# Patient Record
Sex: FEMALE | Race: WHITE | NOT HISPANIC OR LATINO | Employment: FULL TIME | URBAN - METROPOLITAN AREA
[De-identification: names, ages, dates, MRNs, and addresses within clinical notes are randomized per-mention and may not be internally consistent; named-entity substitution may affect disease eponyms.]

---

## 2020-12-10 ENCOUNTER — COMMUNICATION - HEALTHEAST (OUTPATIENT)
Dept: TELEHEALTH | Facility: CLINIC | Age: 35
End: 2020-12-10

## 2020-12-10 ENCOUNTER — OFFICE VISIT - HEALTHEAST (OUTPATIENT)
Dept: FAMILY MEDICINE | Facility: CLINIC | Age: 35
End: 2020-12-10

## 2020-12-10 DIAGNOSIS — M25.562 ACUTE PAIN OF LEFT KNEE: ICD-10-CM

## 2020-12-10 DIAGNOSIS — M25.561 ACUTE PAIN OF RIGHT KNEE: ICD-10-CM

## 2020-12-16 ENCOUNTER — OFFICE VISIT - HEALTHEAST (OUTPATIENT)
Dept: FAMILY MEDICINE | Facility: CLINIC | Age: 35
End: 2020-12-16

## 2020-12-16 DIAGNOSIS — E66.01 MORBID OBESITY (H): ICD-10-CM

## 2020-12-16 DIAGNOSIS — Z23 NEED FOR INFLUENZA VACCINATION: ICD-10-CM

## 2020-12-16 DIAGNOSIS — M17.0 PRIMARY OSTEOARTHRITIS OF BOTH KNEES: ICD-10-CM

## 2020-12-16 DIAGNOSIS — Z23 NEED FOR TETANUS BOOSTER: ICD-10-CM

## 2020-12-16 ASSESSMENT — MIFFLIN-ST. JEOR: SCORE: 2563.56

## 2020-12-22 ENCOUNTER — RECORDS - HEALTHEAST (OUTPATIENT)
Dept: ADMINISTRATIVE | Facility: OTHER | Age: 35
End: 2020-12-22

## 2021-01-13 ENCOUNTER — OFFICE VISIT - HEALTHEAST (OUTPATIENT)
Dept: FAMILY MEDICINE | Facility: CLINIC | Age: 36
End: 2021-01-13

## 2021-01-13 DIAGNOSIS — M22.40 CHONDROMALACIA OF PATELLOFEMORAL JOINT, UNSPECIFIED LATERALITY: ICD-10-CM

## 2021-01-13 DIAGNOSIS — E66.01 MORBID OBESITY (H): ICD-10-CM

## 2021-01-13 DIAGNOSIS — Z00.00 ROUTINE GENERAL MEDICAL EXAMINATION AT A HEALTH CARE FACILITY: ICD-10-CM

## 2021-01-13 ASSESSMENT — MIFFLIN-ST. JEOR: SCORE: 2536.34

## 2021-01-29 ENCOUNTER — AMBULATORY - HEALTHEAST (OUTPATIENT)
Dept: FAMILY MEDICINE | Facility: CLINIC | Age: 36
End: 2021-01-29

## 2021-01-29 ENCOUNTER — AMBULATORY - HEALTHEAST (OUTPATIENT)
Dept: LAB | Facility: CLINIC | Age: 36
End: 2021-01-29

## 2021-01-29 DIAGNOSIS — R63.4 WEIGHT LOSS: ICD-10-CM

## 2021-01-29 DIAGNOSIS — Z00.00 ROUTINE GENERAL MEDICAL EXAMINATION AT A HEALTH CARE FACILITY: ICD-10-CM

## 2021-01-29 LAB
ALBUMIN SERPL-MCNC: 3.8 G/DL (ref 3.5–5)
ALP SERPL-CCNC: 102 U/L (ref 45–120)
ALT SERPL W P-5'-P-CCNC: 37 U/L (ref 0–45)
ANION GAP SERPL CALCULATED.3IONS-SCNC: 10 MMOL/L (ref 5–18)
AST SERPL W P-5'-P-CCNC: 19 U/L (ref 0–40)
BILIRUB SERPL-MCNC: 0.4 MG/DL (ref 0–1)
BUN SERPL-MCNC: 15 MG/DL (ref 8–22)
CALCIUM SERPL-MCNC: 8.9 MG/DL (ref 8.5–10.5)
CHLORIDE BLD-SCNC: 106 MMOL/L (ref 98–107)
CHOLEST SERPL-MCNC: 185 MG/DL
CO2 SERPL-SCNC: 22 MMOL/L (ref 22–31)
CREAT SERPL-MCNC: 0.86 MG/DL (ref 0.6–1.1)
ERYTHROCYTE [DISTWIDTH] IN BLOOD BY AUTOMATED COUNT: 12.5 % (ref 11–14.5)
FASTING STATUS PATIENT QL REPORTED: YES
GFR SERPL CREATININE-BSD FRML MDRD: >60 ML/MIN/1.73M2
GLUCOSE BLD-MCNC: 143 MG/DL (ref 70–125)
HCT VFR BLD AUTO: 40.2 % (ref 35–47)
HDLC SERPL-MCNC: 59 MG/DL
HGB BLD-MCNC: 13.6 G/DL (ref 12–16)
LDLC SERPL CALC-MCNC: 108 MG/DL
MCH RBC QN AUTO: 30.1 PG (ref 27–34)
MCHC RBC AUTO-ENTMCNC: 33.8 G/DL (ref 32–36)
MCV RBC AUTO: 89 FL (ref 80–100)
PLATELET # BLD AUTO: 338 THOU/UL (ref 140–440)
PMV BLD AUTO: 9.5 FL (ref 7–10)
POTASSIUM BLD-SCNC: 4.1 MMOL/L (ref 3.5–5)
PROT SERPL-MCNC: 6.9 G/DL (ref 6–8)
RBC # BLD AUTO: 4.52 MILL/UL (ref 3.8–5.4)
SODIUM SERPL-SCNC: 138 MMOL/L (ref 136–145)
TRIGL SERPL-MCNC: 92 MG/DL
TSH SERPL DL<=0.005 MIU/L-ACNC: 3.76 UIU/ML (ref 0.3–5)
VIT B12 SERPL-MCNC: 214 PG/ML (ref 213–816)
WBC: 7.8 THOU/UL (ref 4–11)

## 2021-02-01 LAB
25(OH)D3 SERPL-MCNC: 10.2 NG/ML (ref 30–80)
25(OH)D3 SERPL-MCNC: 10.2 NG/ML (ref 30–80)

## 2021-02-05 ENCOUNTER — OFFICE VISIT - HEALTHEAST (OUTPATIENT)
Dept: FAMILY MEDICINE | Facility: CLINIC | Age: 36
End: 2021-02-05

## 2021-02-05 ENCOUNTER — COMMUNICATION - HEALTHEAST (OUTPATIENT)
Dept: SCHEDULING | Facility: CLINIC | Age: 36
End: 2021-02-05

## 2021-02-05 DIAGNOSIS — S89.92XA KNEE INJURY, LEFT, INITIAL ENCOUNTER: ICD-10-CM

## 2021-02-05 DIAGNOSIS — R73.09 ELEVATED GLUCOSE: ICD-10-CM

## 2021-02-05 DIAGNOSIS — E66.01 MORBID OBESITY (H): ICD-10-CM

## 2021-02-05 DIAGNOSIS — E56.9 VITAMIN DEFICIENCY: ICD-10-CM

## 2021-02-23 ENCOUNTER — OFFICE VISIT - HEALTHEAST (OUTPATIENT)
Dept: PHYSICAL THERAPY | Facility: REHABILITATION | Age: 36
End: 2021-02-23

## 2021-02-23 DIAGNOSIS — M25.562 MEDIAL KNEE PAIN, LEFT: ICD-10-CM

## 2021-02-23 DIAGNOSIS — M25.561 MEDIAL KNEE PAIN, RIGHT: ICD-10-CM

## 2021-03-01 ENCOUNTER — OFFICE VISIT - HEALTHEAST (OUTPATIENT)
Dept: PHYSICAL THERAPY | Facility: REHABILITATION | Age: 36
End: 2021-03-01

## 2021-03-01 DIAGNOSIS — M25.561 MEDIAL KNEE PAIN, RIGHT: ICD-10-CM

## 2021-03-01 DIAGNOSIS — M25.562 MEDIAL KNEE PAIN, LEFT: ICD-10-CM

## 2021-03-04 ENCOUNTER — COMMUNICATION - HEALTHEAST (OUTPATIENT)
Dept: FAMILY MEDICINE | Facility: CLINIC | Age: 36
End: 2021-03-04

## 2021-03-04 ENCOUNTER — OFFICE VISIT - HEALTHEAST (OUTPATIENT)
Dept: PHYSICAL THERAPY | Facility: REHABILITATION | Age: 36
End: 2021-03-04

## 2021-03-04 DIAGNOSIS — M25.561 MEDIAL KNEE PAIN, RIGHT: ICD-10-CM

## 2021-03-04 DIAGNOSIS — M22.40 CHONDROMALACIA OF PATELLOFEMORAL JOINT, UNSPECIFIED LATERALITY: ICD-10-CM

## 2021-03-04 DIAGNOSIS — M25.562 CHRONIC PAIN OF LEFT KNEE: ICD-10-CM

## 2021-03-04 DIAGNOSIS — M25.562 MEDIAL KNEE PAIN, LEFT: ICD-10-CM

## 2021-03-04 DIAGNOSIS — G89.29 CHRONIC PAIN OF LEFT KNEE: ICD-10-CM

## 2021-03-10 ENCOUNTER — HOSPITAL ENCOUNTER (OUTPATIENT)
Dept: MRI IMAGING | Facility: HOSPITAL | Age: 36
Discharge: HOME | End: 2021-03-10
Attending: FAMILY MEDICINE

## 2021-03-10 DIAGNOSIS — G89.29 CHRONIC PAIN OF LEFT KNEE: ICD-10-CM

## 2021-03-10 DIAGNOSIS — M25.562 CHRONIC PAIN OF LEFT KNEE: ICD-10-CM

## 2021-03-10 DIAGNOSIS — M22.40 CHONDROMALACIA OF PATELLOFEMORAL JOINT, UNSPECIFIED LATERALITY: ICD-10-CM

## 2021-03-16 ENCOUNTER — COMMUNICATION - HEALTHEAST (OUTPATIENT)
Dept: FAMILY MEDICINE | Facility: CLINIC | Age: 36
End: 2021-03-16

## 2021-03-16 ENCOUNTER — AMBULATORY - HEALTHEAST (OUTPATIENT)
Dept: FAMILY MEDICINE | Facility: CLINIC | Age: 36
End: 2021-03-16

## 2021-03-16 DIAGNOSIS — M23.90: ICD-10-CM

## 2021-03-16 DIAGNOSIS — M22.40 CHONDROMALACIA OF PATELLOFEMORAL JOINT, UNSPECIFIED LATERALITY: ICD-10-CM

## 2021-03-16 DIAGNOSIS — M25.562 CHRONIC PAIN OF LEFT KNEE: ICD-10-CM

## 2021-03-16 DIAGNOSIS — G89.29 CHRONIC PAIN OF LEFT KNEE: ICD-10-CM

## 2021-03-18 ENCOUNTER — RECORDS - HEALTHEAST (OUTPATIENT)
Dept: ADMINISTRATIVE | Facility: OTHER | Age: 36
End: 2021-03-18

## 2021-03-26 ENCOUNTER — OFFICE VISIT - HEALTHEAST (OUTPATIENT)
Dept: PHYSICAL THERAPY | Facility: REHABILITATION | Age: 36
End: 2021-03-26

## 2021-03-26 DIAGNOSIS — M25.562 MEDIAL KNEE PAIN, LEFT: ICD-10-CM

## 2021-03-26 DIAGNOSIS — M25.561 MEDIAL KNEE PAIN, RIGHT: ICD-10-CM

## 2021-03-29 ENCOUNTER — OFFICE VISIT - HEALTHEAST (OUTPATIENT)
Dept: PHYSICAL THERAPY | Facility: REHABILITATION | Age: 36
End: 2021-03-29

## 2021-03-29 DIAGNOSIS — M25.561 MEDIAL KNEE PAIN, RIGHT: ICD-10-CM

## 2021-03-29 DIAGNOSIS — M25.562 MEDIAL KNEE PAIN, LEFT: ICD-10-CM

## 2021-04-06 ENCOUNTER — RECORDS - HEALTHEAST (OUTPATIENT)
Dept: ADMINISTRATIVE | Facility: OTHER | Age: 36
End: 2021-04-06

## 2021-05-10 ENCOUNTER — OFFICE VISIT - HEALTHEAST (OUTPATIENT)
Dept: FAMILY MEDICINE | Facility: CLINIC | Age: 36
End: 2021-05-10

## 2021-05-10 DIAGNOSIS — E66.01 MORBID OBESITY (H): ICD-10-CM

## 2021-06-05 VITALS
DIASTOLIC BLOOD PRESSURE: 89 MMHG | WEIGHT: 293 LBS | BODY MASS INDEX: 60.99 KG/M2 | SYSTOLIC BLOOD PRESSURE: 135 MMHG | HEART RATE: 114 BPM

## 2021-06-05 VITALS
RESPIRATION RATE: 14 BRPM | WEIGHT: 293 LBS | TEMPERATURE: 98.8 F | OXYGEN SATURATION: 96 % | DIASTOLIC BLOOD PRESSURE: 100 MMHG | HEART RATE: 100 BPM | SYSTOLIC BLOOD PRESSURE: 171 MMHG

## 2021-06-05 VITALS
WEIGHT: 293 LBS | HEIGHT: 68 IN | HEART RATE: 94 BPM | RESPIRATION RATE: 16 BRPM | TEMPERATURE: 97.5 F | DIASTOLIC BLOOD PRESSURE: 87 MMHG | SYSTOLIC BLOOD PRESSURE: 137 MMHG | BODY MASS INDEX: 44.41 KG/M2

## 2021-06-05 VITALS
DIASTOLIC BLOOD PRESSURE: 95 MMHG | BODY MASS INDEX: 44.41 KG/M2 | RESPIRATION RATE: 16 BRPM | WEIGHT: 293 LBS | SYSTOLIC BLOOD PRESSURE: 160 MMHG | TEMPERATURE: 97.9 F | HEIGHT: 68 IN | HEART RATE: 88 BPM

## 2021-06-10 ENCOUNTER — OFFICE VISIT - HEALTHEAST (OUTPATIENT)
Dept: SURGERY | Facility: CLINIC | Age: 36
End: 2021-06-10

## 2021-06-10 DIAGNOSIS — E66.813 CLASS 3 SEVERE OBESITY DUE TO EXCESS CALORIES WITH BODY MASS INDEX (BMI) OF 60.0 TO 69.9 IN ADULT, UNSPECIFIED WHETHER SERIOUS COMORBIDITY PRESENT (H): ICD-10-CM

## 2021-06-10 DIAGNOSIS — M25.562 CHRONIC PAIN OF BOTH KNEES: ICD-10-CM

## 2021-06-10 DIAGNOSIS — G89.29 CHRONIC PAIN OF BOTH KNEES: ICD-10-CM

## 2021-06-10 DIAGNOSIS — R73.01 ELEVATED FASTING GLUCOSE: ICD-10-CM

## 2021-06-10 DIAGNOSIS — E66.01 CLASS 3 SEVERE OBESITY DUE TO EXCESS CALORIES WITH BODY MASS INDEX (BMI) OF 60.0 TO 69.9 IN ADULT, UNSPECIFIED WHETHER SERIOUS COMORBIDITY PRESENT (H): ICD-10-CM

## 2021-06-10 DIAGNOSIS — M25.561 CHRONIC PAIN OF BOTH KNEES: ICD-10-CM

## 2021-06-10 ASSESSMENT — MIFFLIN-ST. JEOR: SCORE: 2532.48

## 2021-06-11 ENCOUNTER — AMBULATORY - HEALTHEAST (OUTPATIENT)
Dept: LAB | Facility: CLINIC | Age: 36
End: 2021-06-11

## 2021-06-11 DIAGNOSIS — E66.01 CLASS 3 SEVERE OBESITY DUE TO EXCESS CALORIES WITH BODY MASS INDEX (BMI) OF 60.0 TO 69.9 IN ADULT, UNSPECIFIED WHETHER SERIOUS COMORBIDITY PRESENT (H): ICD-10-CM

## 2021-06-11 DIAGNOSIS — E66.813 CLASS 3 SEVERE OBESITY DUE TO EXCESS CALORIES WITH BODY MASS INDEX (BMI) OF 60.0 TO 69.9 IN ADULT, UNSPECIFIED WHETHER SERIOUS COMORBIDITY PRESENT (H): ICD-10-CM

## 2021-06-11 LAB
ALBUMIN SERPL-MCNC: 3.9 G/DL (ref 3.5–5)
ALP SERPL-CCNC: 84 U/L (ref 45–120)
ALT SERPL W P-5'-P-CCNC: 52 U/L (ref 0–45)
ANION GAP SERPL CALCULATED.3IONS-SCNC: 14 MMOL/L (ref 5–18)
AST SERPL W P-5'-P-CCNC: 34 U/L (ref 0–40)
BILIRUB SERPL-MCNC: 0.6 MG/DL (ref 0–1)
BUN SERPL-MCNC: 7 MG/DL (ref 8–22)
CALCIUM SERPL-MCNC: 9.2 MG/DL (ref 8.5–10.5)
CHLORIDE BLD-SCNC: 99 MMOL/L (ref 98–107)
CHOLEST SERPL-MCNC: 167 MG/DL
CO2 SERPL-SCNC: 23 MMOL/L (ref 22–31)
CREAT SERPL-MCNC: 0.72 MG/DL (ref 0.6–1.1)
ERYTHROCYTE [DISTWIDTH] IN BLOOD BY AUTOMATED COUNT: 12.6 % (ref 11–14.5)
FASTING STATUS PATIENT QL REPORTED: YES
FERRITIN SERPL-MCNC: 130 NG/ML (ref 10–130)
GFR SERPL CREATININE-BSD FRML MDRD: >60 ML/MIN/1.73M2
GLUCOSE BLD-MCNC: 161 MG/DL (ref 70–125)
HBA1C MFR BLD: 6.4 %
HCT VFR BLD AUTO: 37.9 % (ref 35–47)
HDLC SERPL-MCNC: 53 MG/DL
HGB BLD-MCNC: 12.8 G/DL (ref 12–16)
LDLC SERPL CALC-MCNC: 88 MG/DL
MCH RBC QN AUTO: 30.1 PG (ref 27–34)
MCHC RBC AUTO-ENTMCNC: 33.8 G/DL (ref 32–36)
MCV RBC AUTO: 89 FL (ref 80–100)
PLATELET # BLD AUTO: 332 THOU/UL (ref 140–440)
PMV BLD AUTO: 10.3 FL (ref 7–10)
POTASSIUM BLD-SCNC: 4.3 MMOL/L (ref 3.5–5)
PROT SERPL-MCNC: 6.8 G/DL (ref 6–8)
PTH-INTACT SERPL-MCNC: 37 PG/ML (ref 10–86)
RBC # BLD AUTO: 4.25 MILL/UL (ref 3.8–5.4)
SODIUM SERPL-SCNC: 136 MMOL/L (ref 136–145)
TRIGL SERPL-MCNC: 128 MG/DL
TSH SERPL DL<=0.005 MIU/L-ACNC: 2.1 UIU/ML (ref 0.3–5)
WBC: 8.1 THOU/UL (ref 4–11)

## 2021-06-12 LAB
FOLATE SERPL-MCNC: 6.5 NG/ML
VIT B12 SERPL-MCNC: 213 PG/ML (ref 213–816)

## 2021-06-13 NOTE — PROGRESS NOTES
ASSESSMENT/PLAN:  1. Primary osteoarthritis of both knees  Ambulatory referral to Orthopedics   2. Morbid obesity (H)     3. Need for influenza vaccination  Influenza, Seasonal Quad, PF =/> 6months   4. Need for tetanus booster  Tdap vaccine,  6yo or older,  IM       This is a 36 yo female with:  1.  Pain in both knees - likely related to osteoarthritis - patient has been seen at  and had xrays.  We reviewed these together - I have demonstrated this to her, especially shows medial joint narrowing bilaterally.  Will refer to Ortho.    2.  Morbid Obesity - patient has actually been working on weight loss and has been successful recently.  In spite of the weight loss, she continues to have significant pain in the knees.   3.  Health Maintenance - due for seasonal influenza vaccine - ordered; due for tetanus booster - ordered.   Return in about 3 months (around 3/16/2021) for Recheck.      There are no discontinued medications.  There are no Patient Instructions on file for this visit.    Chief Complaint:  Chief Complaint   Patient presents with     Establish Care     Follow-up     knee pain       HPI:   Lena Quesada is a 35 y.o. female c/o  Knee pain  Fell a total of 4 times on the ice and snow last winter  Landed straight on knee cap once  Slid and twisted under car  Has been working out - lost about 30 pounds since September -   Dreads going sitting to standing or standing to sitting - because that's the most pain  Going down steps or incline -gets really difficult    Pain is inside the knee   Sharp and in both knees, R>L  Seen at walk in in [place] on Thursday -     Dad has psoriatic arthritis    Doing Weight Watchers - walking every night -   No excessive walking - making better diet decisions -       PMH:   Patient Active Problem List    Diagnosis Date Noted     Morbid obesity (H) 12/16/2020     No past medical history on file.  No past surgical history on file.  Social History     Socioeconomic History      "Marital status: Single     Spouse name: Not on file     Number of children: Not on file     Years of education: Not on file     Highest education level: Not on file   Occupational History     Not on file   Social Needs     Financial resource strain: Not on file     Food insecurity     Worry: Not on file     Inability: Not on file     Transportation needs     Medical: Not on file     Non-medical: Not on file   Tobacco Use     Smoking status: Never Smoker     Smokeless tobacco: Never Used   Substance and Sexual Activity     Alcohol use: Not on file     Drug use: Not on file     Sexual activity: Not on file   Lifestyle     Physical activity     Days per week: Not on file     Minutes per session: Not on file     Stress: Not on file   Relationships     Social connections     Talks on phone: Not on file     Gets together: Not on file     Attends Jain service: Not on file     Active member of club or organization: Not on file     Attends meetings of clubs or organizations: Not on file     Relationship status: Not on file     Intimate partner violence     Fear of current or ex partner: Not on file     Emotionally abused: Not on file     Physically abused: Not on file     Forced sexual activity: Not on file   Other Topics Concern     Not on file   Social History Narrative     Not on file     No family history on file.    Meds:  No current outpatient medications on file.    Allergies:  No Known Allergies    ROS:  Pertinent positives as noted in HPI; otherwise 12 point ROS negative.      Physical Exam:  EXAM:  BP (!) 160/95 (Patient Site: Right Arm, Patient Position: Sitting, Cuff Size: Adult Large)   Pulse 88   Temp 97.9  F (36.6  C) (Temporal)   Resp 16   Ht 5' 7.5\" (1.715 m)   Wt (!) 403 lb (182.8 kg)   LMP 11/23/2020 (Exact Date)   BMI 62.19 kg/m     Gen:  NAD, appears well, well-hydrated  HEENT:  TMs nl, oropharynx benign, nasal mucosa nl, conjunctiva clear  Neck:  Supple, no adenopathy, no thyromegaly, no " carotid bruits, no JVD  Lungs:  Clear to auscultation bilaterally  Cor:  RRR no murmur  Abd:  Soft, nontender, BS+, no masses, no guarding or rebound, no HSM  Extr:  Mild degenerative findings - knees bilaterally  Neuro:  No asymmetry  Skin:  Warm/dry      Xr Knee Bilateral Plus Sunrise Vw    Result Date: 12/10/2020  EXAM: XR KNEE BILATERAL PLUS SUNRISE VW LOCATION: [facility] DATE/TIME: 12/10/2020 5:39 PM INDICATION: Left knee trauma and pain. COMPARISON: None.     RIGHT KNEE: Mild medial compartment joint space narrowing. Mild patellofemoral degenerative changes. No significant joint effusion or evidence of fracture. LEFT KNEE: Mild to moderate medial compartment joint space narrowing. Mild patellofemoral degenerative changes. No joint effusion or evidence of fracture.      Results:  No results found for this or any previous visit.

## 2021-06-14 LAB — 25(OH)D3 SERPL-MCNC: 7.1 NG/ML (ref 30–80)

## 2021-06-14 NOTE — PROGRESS NOTES
"Assessment:      Healthy female exam.    1. Routine general medical examination at a health care facility     2. Morbid obesity (H)     3. Chondromalacia of patellofemoral joint, unspecified laterality          Plan:      This is a 36 yo female here for physical exam.  No specific issues today - she is currently having her period - and would like to defer her labs and pap smear to another visit (seen late in day today).    1.  Morbid Obesity - patient is doing the Weight Watchers program and having success - she has had ongoing weight loss - she has realistic goals - and we discussed this.  Encourage her to continue this weight loss journey.  2.  Chondromalacia patella - patient has seen Ortho for her bilateral knee pain - recognizes that weight loss should help this as well; working on this.  Seems better.       Subjective:      Lena Quesada is a 35 y.o. female who presents for an annual exam.  The patient reports that there is not domestic violence in her life.     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    No exam data present    Gynecologic History  Patient's last menstrual period was 01/12/2021 (exact date).  Contraception: none  Last Pap: unknown  Results were: normal  Last " mammogram: na. Results were: na      OB History   No obstetric history on file.       No current outpatient medications on file.     No current facility-administered medications for this visit.      No past medical history on file.  No past surgical history on file.  Patient has no known allergies.  No family history on file.  Social History     Socioeconomic History     Marital status: Single     Spouse name: Not on file     Number of children: Not on file     Years of education: Not on file     Highest education level: Not on file   Occupational History     Not on file   Social Needs     Financial resource strain: Not on file     Food insecurity     Worry: Not on file     Inability: Not on file     Transportation needs     Medical: Not on file     Non-medical: Not on file   Tobacco Use     Smoking status: Never Smoker     Smokeless tobacco: Never Used   Substance and Sexual Activity     Alcohol use: Not on file     Drug use: Not on file     Sexual activity: Not on file   Lifestyle     Physical activity     Days per week: Not on file     Minutes per session: Not on file     Stress: Not on file   Relationships     Social connections     Talks on phone: Not on file     Gets together: Not on file     Attends Gnosticism service: Not on file     Active member of club or organization: Not on file     Attends meetings of clubs or organizations: Not on file     Relationship status: Not on file     Intimate partner violence     Fear of current or ex partner: Not on file     Emotionally abused: Not on file     Physically abused: Not on file     Forced sexual activity: Not on file   Other Topics Concern     Not on file   Social History Narrative     Not on file       Review of Systems  Review of Systems     Pertinent positives as noted in HPI; otherwise 12 point ROS negative.        Objective:         Vitals:    01/13/21 1638 01/13/21 1647   BP: (!) 164/102 137/87   Pulse: 97 94   Resp: 16    Temp: 97.5  F (36.4  C)   "  TempSrc: Temporal    Weight: (!) 397 lb (180.1 kg)    Height: 5' 7.5\" (1.715 m)      Body mass index is 61.26 kg/m .    Physical  Physical Exam    EXAM:  /87   Pulse 94   Temp 97.5  F (36.4  C) (Temporal)   Resp 16   Ht 5' 7.5\" (1.715 m)   Wt (!) 397 lb (180.1 kg)   LMP 01/12/2021 (Exact Date)   BMI 61.26 kg/m     Gen:  NAD, appears well, well-hydrated, morbid obesity  HEENT:  TMs nl, oropharynx benign, nasal mucosa nl, conjunctiva clear  Neck:  Supple, no adenopathy, no thyromegaly, no carotid bruits, no JVD  Lungs:  Clear to auscultation bilaterally  Breast exam:  No breast lumps, no skin changes, no nipple discharge, no axillary adenopathy  Cor:  RRR no murmur  Abd:  Soft, nontender, BS+, no masses, no guarding or rebound, no HSM  Extr:  Neg.  Neuro:  No asymmetry, Nl motor tone/strength, nl sensation, reflexes =, gait nl, nl coordination, CN intact,   Skin:  Warm/dry      "

## 2021-06-15 LAB
ANNOTATION COMMENT IMP: NORMAL
COPPER SERPL-MCNC: 109.5 UG/DL (ref 80–155)
VIT A SERPL-MCNC: 0.38 MG/L (ref 0.3–1.2)
VITAMIN A (RETINYL PALMITATE): <0.02 MG/L (ref 0–0.1)
ZINC SERPL-MCNC: 61.6 UG/DL (ref 60–120)

## 2021-06-15 NOTE — TELEPHONE ENCOUNTER
patient states she do not want to be seen again due to her having to pay , she did see someone else here and they made her do PT and stated she should get MRI after she just wants and referral she said you can call her if needed.

## 2021-06-15 NOTE — PROGRESS NOTES
[facility] Daily Progress     Patient Name: Lena Quesada  Date: 3/4/2021  Visit #: 3/10   PTA visit #:    Referral Diagnosis: Knee injury, left, initial encounter [S89.92XA]  - Primary   Referring provider:  Shirley Evans MD  Visit Diagnosis:     ICD-10-CM    1. Medial knee pain, left  M25.562    2. Medial knee pain, right  M25.561      Precautions: Morbid obesity (H)  Chondromalacia of patellofemoral joint    Assessment:   From Evaluation: Pt is a 35 y.o. year old female with bilateral knee pain that started in May 2020.  Patient has difficulty with sit<>stand, stairs and walking on uneven surfaces secondary to medial knee pain and instability (L>R). Findings are consistent with .  Patient appears motivated to participate in Physical Therapy and present with a good Physical Therapy prognosis for resolution of activities limitations.     Patient demonstrates understanding/independence with home program.    Pt does reports decreased instability with exercises and decreased pain with K-tape.  Continues to have difficulty/pain with stairs, walking and post severely with sit<>stand. Pt would like to return to [facility].  Will leave chart open for 30 days if pt would like to return.     Goal Status:  Pt. will be independent with home exercise program in : 4 weeks  Pt. will report decreased intensity, frequency of : in 6 weeks  Comment:: pain in medial knee <4/10 8 weeks.    Pt will: perform sit to stand without pain >3/10 in 8 weeks.  Pt will: walk to car (incline) without feelings of instability in 8 weeks.      Plan / Patient Education:     Continue with initial plan of care.  Progress with home program as tolerated.     Plan for next session: monster, K-tape - review and print wall squat     Subjective:   Pt reports 12/10 pain after last session. Left medial knee pain.   This week the pain has been at an 8-9/10.   Right now sitting 0/10   Pain only comes with sit to stand.     Pt had 2  "cortisone shots.     Pt has had pain since Nov/Dec -   Still losing weight.     No longer having to perform stairs at apartment - construction is over.     Objective:   Exercises:  Exercise #1: SLR 2 sets 10-15 reps  Comment #1: Clams 2 sets 10-15 reps  Exercise #2: Bridge - pt has trouble performing in bed and trouble getting up and off the floor  Comment #2: Squats with bed behind her 10-15 reps 2-3 sets  Exercise #3: heel slides on L to increased ROM  Comment #3: S/L hip abdcution 1-2 sets 5-15 reps  Exercise #4: wall squats      K-Tape  Prior to application skin was cleaned with alcohol wipe  2 strips were applied to medial and lateral knee with mild stretch, one strip applied horizontally to tibial tuberosity.  No stretch applied to 2\" ends of tape.   Pt was sitting during application.      K-tape helps her feel more stable - like knee is not going to buckle.     Continues to have significant pain with sit to stand and stand to sit from low surfaces such as her toilet and her car.  Pt does have pain from her work chair but not a severe. Advised pt go get a elevated toilet seat for now.  Pt reports she does have access to one.     Pt would like to return to [facility].  Cancelled Monday's appointment but will leave her chart open for 30 days and she can return at any time.        Treatment Today     TREATMENT MINUTES COMMENTS   Evaluation     Self-care/ Home management     Manual therapy 8 K-tape   Seated tibiofemoral traction in sitting.      Neuromuscular Re-education     Therapeutic Activity     Therapeutic Exercises 22 See above    Gait training     Modality__________________                Total 30    Blank areas are intentional and mean the treatment did not include these items.       Cira Holland, PT, DPT  3/4/2021  "

## 2021-06-15 NOTE — TELEPHONE ENCOUNTER
"Triage call:  Patient calling after having a slip and fall on the ice on Saturday- landed on her left knee   Tuesday of this week- trouble with her left knee on the medial aspect, from knee cap towards the center of her body  Feels unstable- knee has buckled a couple times per patient report  Discomfort with walking- No discomfort at rest  Rating discomfort 5-6/10- 8-9/10 when she initial starts walking  - didn't feel anything when she fell- denies hearing a pop- states that she was more worried about someone having seen her fall- got up quickly.   Knee is \"puffier\" than her right knee, reports that she is not able to fully extend her knee  Able to bear weight but reports that she is limping a lot  Reports that yesterday she took tylenol- extra strength tylenol, has iced a few times    Has been working on weight loss- Lost about 35 pounds    Reports history of knee issues in the past- was referred to [facility]- Cortisone has helped    Advised patient to be seen in the clinic within the next 3 days. Reviewed additional care advice with patient and she verbalizes understanding. Patient agrees to plan, assisted in transferring to scheduling.     Melissa العلي RN BSBA [facility] Triage/Med Refill 2/5/2021 7:58 AM    COVID 19 Nurse Triage Plan/Patient Instructions    Please be aware that novel coronavirus (COVID-19) may be circulating in the community. If you develop symptoms such as fever, cough, or SOB or if you have concerns about the presence of another infection including coronavirus (COVID-19), please contact your health care provider or visit [email or web address].     Disposition/Instructions    In-Person Visit with provider recommended. Reference Visit Selection Guide.    Thank you for taking steps to prevent the spread of this virus.  o Limit your contact with others.  o Wear a simple mask to cover your cough.  o Wash your hands well and often.    Resources    [facility]: About COVID-19: " [email or web address]    CDC: What to Do If You're Sick: www.cdc.gov/coronavirus/2019-ncov/about/steps-when-sick.html    CDC: Ending Home Isolation: www.cdc.gov/coronavirus/2019-ncov/hcp/disposition-in-home-patients.html     CDC: Caring for Someone: www.cdc.gov/coronavirus/2019-ncov/if-you-are-sick/care-for-someone.html     [facility]: Interim Guidance for Hospital Discharge to Home: www.health.[facility].[place]./diseases/coronavirus/hcp/hospdischarge.pdf    [facility] clinical trials (COVID-19 research studies): [email or web address]-clinical-trials     Below are the COVID-19 hotlines at the [place] Department of Health ([facility]). Interpreters are available.   o For health questions: Call 960-423-9601 or 1-226.339.9837 (7 a.m. to 7 p.m.)  o For questions about schools and childcare: Call 537-225-1791 or 1-450.419.2612 (7 a.m. to 7 p.m.)         Reason for Disposition    Knee giving way (or buckling) when walking    Additional Information    Negative: Serious injury with multiple fractures    Negative: [1] Major bleeding (e.g., actively dripping or spurting) AND [2] can't be stopped    Negative: Looks like a dislocated joint or knee cap (crooked or deformed)    Negative: Bullet wound, stabbed by knife, or other serious penetrating wound    Negative: Sounds like a life-threatening emergency to the triager    Negative: Wound looks infected    Negative: Can't stand (bear weight) or walk    Negative: Skin is split open or gaping (or length > 1/2 inch or 12 mm)    Negative: [1] Bleeding AND [2] won't stop after 10 minutes of direct pressure (using correct technique)    Negative: [1] Dirt in the wound AND [2] not removed with 15 minutes of scrubbing    Negative: Sounds like a serious injury to the triager    Negative: [1] SEVERE pain AND [2] not improved 2 hours after pain medicine/ice packs    Negative: Suspicious history for the injury    Negative: [1] High-risk adult (e.g., age > 60, osteoporosis, chronic  "steroid use) AND [2] limping    Negative: A \"snap\" or \"pop\" was heard at the time of injury    Negative: Large swelling or bruise (> 2 inches or 5 cm)    Negative: [1] No prior tetanus shots (or is not fully vaccinated) AND [2] any wound (e.g., cut, scrape)    Negative: [1] Last tetanus shot > 5 years ago AND [2] DIRTY cut or scrape    Negative: [1] Last tetanus shot > 10 years ago AND [2] CLEAN cut or scrape (e.g., object AND skin were clean)    Negative: [1] Limp when walking AND [2] due to a twisted knee    Negative: [1] Limp when walking AND [2] due to a direct blow    Protocols used: KNEE INJURY-A-AH      "

## 2021-06-15 NOTE — PROGRESS NOTES
[facility] IN-OFFICE Visit  Phone : none    Chief Complaint:  Chief Complaint   Patient presents with     Follow-up     fall       Assessment/Plan:  1. Knee injury, left, initial encounter  Left knee injury caused by fall on the ice on 1/25/21. Exam somewhat limited due to body habitus but differentials include ligament strain, ligament tear, or bursitis. Unlikely to change treatment. Referral to PT- encouraged to try for 2 weeks and follow-up if no improvement. Has a scheduled appointment with PCP in 10 days and can check-in with how knee is feeling at that time. Consider left knee MRI and follow-up with knee specialist at [facility] if no improvement.  Has been advised to not have surgery on her knees at this point due to her age and obesity.  Ibuprofen 600 mg three times a day for the next 5 days to help with inflammation and swelling. Icing/cold packs as needed. Encouraged caution with ambulation to ensure no additional falls.   - Ambulatory referral to PT/OT    2. Morbid obesity (H)  BMI 60.99. 35 lb weight loss in last 4 months using weight watchers. Continue physical activity as tolerated given recent knee injury.   Wt Readings from Last 3 Encounters:   02/05/21 (!) 395 lb 4 oz (179.3 kg)   01/13/21 (!) 397 lb (180.1 kg)   12/16/20 (!) 403 lb (182.8 kg)     3. Vitamin deficiency  Vitamin D 10.2 with labs from 1/29/21. Pt aware and planning to initiate Vitamin D supplement.     4. Elevated glucose  Fasting glucose 143 with labs from 1/29/21. Follow-up appointment with PCP on 2/15/21. Consider Hemoglobin A1c for diabetes screening at that time.     Return in about 2 weeks (around 2/19/2021).  The following high BMI interventions were performed this visit: encouragement to exercise, weight monitoring, exercise promotion: walking/step counting, weight loss from baseline weight and lifestyle education regarding diet    Diagnosis or treatment significantly limited by social  determinants of health- finances     Patient Education/AVS:  Patient Instructions   Try ibuprofen 600mg (3 at a time) three times a day with meals for next 5-7 days.      Ice as needed    Physical therapy     If not really getting better in 2-3 weeks consider follow up with ortho again with [facility]      HPI:   Lena Quesada is a 35 y.o. female and presents to clinic today for the following health issues: Left knee injury after fall.    Slipped on the ice on 1/28 and landed on her left knee with her left leg extending behind her. She does have a history of falling on the ice in the past. Felt embarrassed and got up quickly, but did not experience pain that day. Began having left knee pain on 2/2 with pain radiating from her knee cap to the medial aspect of knee. Knee feels unstable and has buckled multiple times. Feels that her knee may buckle backwards and worried that it will give out. Hangs on to objects around her to make sure she doesn't fall. Pain increased with ambulation and any twisting motions. Has tried intermittent extra strength tylenol and ice packs for pain relief. No pain when resting. Rates the pain a 8-9/10 in severity with ambulation. Pain subsides slightly to 5-6/10 after moving. Worried less about pain and more about the knee instability. Stairs are difficulty unless she leads with her other leg. Left knee is only slightly swollen compared to the right. Able to bear weight but difficulty extending the knee fully and feels that she is limping with ambulation. Took her 20 minutes to walk to her car today and this normally takes 10 minutes. History of bilateral knee pain and followed by [facility] for 2 steroid injections in December. This was helpful with managing her every day pain- able to do a little more activity but pain still there. X-rays were obtained at this time. Was to follow-up with ortho as needed.     Focused on weight loss. Prior to knee injury was walking 0.5-1 mile daily  and some arm exercises. On weight watchers. Has lost 35 lbs since September. Feels that she is motivated to lose weight this time and the big difference is that she really wants the weight loss compared to someone else wanting it for her.     Physical with Dr. Escalera on 1/13. Returned for labs 1/29/21. Planning to return on 2/15/21 for pap smear.     Aware that Vitamin D was low and planning to  Vitamin D OTC.    History summarized from1-2: Nurse triage note from 2/5: left knee injury from falling on ice.   Old Records-1: Outside allergies, meds, problems and immunizations were reconciled as needed from [facility]  Radiology tests reviewed-1: Bilateral knee x-ray 12/10/20: Bilaterl joint space narrowing and mild patellofemoral degenerative changes. No joint effusion of evidence of fracture.   Lab tests reviewed-1: Vitamin D low and glucose elevated with labs 1/29/21    Social History     Social History Narrative     Not on file       Physical Exam:  /89 (Patient Site: Left Arm, Patient Position: Sitting, Cuff Size: Thigh)   Pulse (!) 114   Wt (!) 395 lb 4 oz (179.3 kg)   LMP 01/12/2021 (Exact Date)   BMI 60.99 kg/m   Body mass index is 60.99 kg/m . Patient's last menstrual period was 01/12/2021 (exact date).  Vital signs reviewed  Wt Readings from Last 3 Encounters:   02/05/21 (!) 395 lb 4 oz (179.3 kg)   01/13/21 (!) 397 lb (180.1 kg)   12/16/20 (!) 403 lb (182.8 kg)     No data recorded  No data recorded  PHQ-2 Total Score: 1 (12/16/2020  4:25 PM)    No data recorded    All normal as below except abnormalities include: Left knee exam somewhat limited due to body habitus. Preferred to be examined in chair vs. getting up to exam table. No bruising or erythema. No changes in sensation. Pain with palpation below the patella and extending medially. Left knee swelling difficult to visualize but slight swelling palpable below knee cap and medially. Left knee with full ROM but increased pain with  extension and flexion movements. Increased left knee pain with left ankle external rotation. No increased pain with left ankle internal rotation. Able to bear full weight, but limping favoring her right leg when walking. Difficulty performing drawer test given position.  General is a  35 y.o. female sitting comfortably in no apparent distress wearing a mask.  CV: Regular rate and rhythm S1S2 without rubs, murmurs or gallops  Lungs: Clear to auscultation bilaterally  Extremities: Warm, 2+ Pedal and radial pulses bilaterally  Skin: No lesions or rashes noted  Neuro/MSK: See above      Gonzalez Woodard  DNP-FNP Student  [facility]     Physician Attestation   I, Shirley Evans, saw this patient and have discussed and personally reviewed information outlined in this document.    I agree with the findings and plan of care as documented in the note.      Shirley Evans MD      [facility]

## 2021-06-15 NOTE — TELEPHONE ENCOUNTER
Maybe she should have a virtual visit - set it up with me early next week.  (I don't see anything about an MRI in the therapist's last note in chart).

## 2021-06-15 NOTE — PROGRESS NOTES
[facility]   Knee Initial Evaluation    Patient Name: Lena Quesada  Date of evaluation: 2/23/2021  Referral Diagnosis: Knee injury, left, initial encounter [S89.92XA]  - Primary   Referring provider: Shirley Evans MD  Visit Diagnosis:     ICD-10-CM    1. Medial knee pain, left  M25.562    2. Medial knee pain, right  M25.561      Precautions: Morbid obesity (H)  Chondromalacia of patellofemoral joint    Assessment:   Pt is a 35 y.o. year old female with bilateral knee pain that started in May 2020.  Patient has difficulty with sit<>stand, stairs and walking on uneven surfaces secondary to medial knee pain and instability (L>R). Findings are consistent with .  Patient appears motivated to participate in Physical Therapy and present with a good Physical Therapy prognosis for resolution of activities limitations.        Pt. is appropriate for skilled PT intervention as outlined in the Plan of Care (POC).  Pt. is a good candidate for skilled PT services to improve pain levels and function.    Goals:  Pt. will be independent with home exercise program in : 4 weeks  Pt. will report decreased intensity, frequency of : in 6 weeks  Comment:: pain in medial knee <4/10 8 weeks.    Pt will: perform sit to stand without pain >3/10 in 8 weeks.  Pt will: walk to car (incline) without feelings of instability in 8 weeks.      Goals and plan of care were set in collaboration with the patient.    Patient's expectations/goals are realistic.    Barriers to Learning or Achieving Goals:  No Barriers.       Plan / Patient Instructions:      Plan of Care:   Authorization / Certification Start Date: 02/23/21  Authorization / Certification End Date: 04/20/21  Authorization / Certification Number of Visits: 10  Patient Related Instruction: Nature of Condition;Treatment plan and rationale;Self Care instruction;Basis of treatment;Body mechanics;Precautions;Next steps;Expected outcome  Times per Week: start with 2x a week  for 2 week progress to 1x a week  Number of Weeks: 10  Number of Visits: up to 10  Discharge Planning: met goals  Therapeutic Exercise: ROM;Stretching;Strengthening  Neuromuscular Reeducation: kinesio tape;core  Manual Therapy: soft tissue mobilization;myofascial release;joint mobilization;strain counterstrain  Equipment: theraband      Treatment techniques, plan of care, and goals were discussed with the patient. The patient agrees to the plan as outlined. The plan of care is dynamic and will be modified on an ongoing basis.  Plan for next visit: review HEP and progress strengthening   Seated traction   Assess K-tape   Darien Beal      Subjective:     2/5/20: Left knee injury caused by fall on the ice on 1/25/21. Exam somewhat limited due to body habitus but differentials include ligament strain, ligament tear, or bursitis. Unlikely to change treatment. Referral to PT- encouraged to try for 2 weeks and follow-up if no improvement. Has a scheduled appointment with PCP in 10 days and can check-in with how knee is feeling at that time. Consider left knee MRI and follow-up with knee specialist at [facility] if no improvement. Slipped on the ice on 1/28 and landed on her left knee with her left leg extending behind her. She does have a history of falling on the ice in the past. Felt embarrassed and got up quickly, but did not experience pain that day. Began having left knee pain on 2/2 with pain radiating from her knee cap to the medial aspect of knee. Knee feels unstable and has buckled multiple times. Feels that her knee may buckle backwards and worried that it will give out. Hangs on to objects around her to make sure she doesn't fall. Pain increased with ambulation and any twisting motions. Has tried intermittent extra strength tylenol and ice packs for pain relief. No pain when resting. Rates the pain a 8-9/10 in severity with ambulation. Pain subsides slightly to 5-6/10 after moving. Worried less about pain  and more about the knee instability. Stairs are difficulty unless she leads with her other leg. Left knee is only slightly swollen compared to the right. Able to bear weight but difficulty extending the knee fully and feels that she is limping with ambulation. Took her 20 minutes to walk to her car today and this normally takes 10 minutes. History of bilateral knee pain and followed by [facility] for 2 steroid injections in December. This was helpful with managing her every day pain- able to do a little more activity but pain still there. X-rays were obtained at this time. Was to follow-up with ortho as needed. Focused on weight loss. Prior to knee injury was walking 0.5-1 mile daily and some arm exercises. On weight watchers. Has lost 35 lbs since September. Feels that she is motivated to lose weight this time and the big difference is that she really wants the weight loss compared to someone else wanting it for her.     IMPRESSION:   RIGHT KNEE: Mild medial compartment joint space narrowing. Mild patellofemoral degenerative changes. No significant joint effusion or evidence of fracture.     LEFT KNEE: Mild to moderate medial compartment joint space narrowing. Mild patellofemoral degenerative changes. No joint effusion or evidence of fracture.       Social information:   Living Situation:apartment - with elevator   Has to walk 4 blocks to car from her apartment - slight decline - painful    Occupation:works for [employer] [place] - works from home    Work Status:Working full time   Equipment Available: None    History of Present Illness:    Lena is a 35 y.o. female who presents to therapy today with complaints of bilateral knees. Date of onset/duration of symptoms is May 2020.  Onset was gradual. Symptoms are intermittent and not improving. She denies history of similar symptoms. She describes their previous level of function as not limited   Cortisone shot (December 2020) in bilaterally knees due to patella on bone -  no pain relief.        Pain rating at worst: 9  Pt has lost 30# in the last few months with no pain relief in knees.   Left knee more painful then right.     Functional limitations are described as occurring with:   Sharp pain with sit<>stand   Sit to stand - worse is recliner or soft chair   Stand to sit   Pt feels unstable with ambulating.   Achy if sitting prolonged periods with knees flexed.   Playing with niece and nephew  Put shoes on and wiggle to get to get shoe all the way on.   Turn to fast or move to fast - knees will buckle.     Patient reports benefit from:  has tried ice - 10-15 min     10-12 min on treadmill. Arms and legs on weight machines. Weighted squats - 40#.      Objective:      Note: Items left blank indicates the item was not performed or not indicated at the time of the evaluation.    2 min walk test: 339 feet - lateral lean to right. Achy in medial knee. Flat feet.     Knee Examination  1. Medial knee pain, left     2. Medial knee pain, right       Precautions/Restrictions:  None  Involved Side: L>R medial knee   Posture Observation:      General sitting posture is  fair.  Assistive Device: None  Gait Observation: see above       Knee ROM     Date:  2/23/21    AROM in degrees  Right   Left  Right   Left  Right   Left       Knee Flexion  (130 )   110   98                   Knee Extension  (0 )   0    0 with guarding - pt reports her knees hyperextend.                  PROM in degrees  Right   Left  Right   Left  Right   Left       Knee Flexion  (130 )                         Knee Extension  (0 )                       LE Strength                  Date:  2/23/21   Strength (MMT/5)  Right   Left  Right   Left  Right   Left       Hip Flexion   4   4                   Hip Abduction   Pain not able to perform                       Hip Adduction                         Hip Extension                         Hip Internal Rotation                         Hip External Rotation                          "Knee Extension   4   4 pain                    Knee Flexion   4 pain    4 pain                    Ankle Dorsiflexion                         Ankle Plantarflexion                         Palpation:  Pain in medial and lateral joint line on left, pain in lateral distal HS and   Pain in lateral joint on right       Knee Special Tests (+/-):      Knee OA Cluster   Right   Left   Ligament Tests   Right   Left    1. > 51 y/o           Lachman   -   -    2. Stiffness > 30 min.           Anterior Drawer   -   Pain     3. Crepitus           Posterior Drawer          4. Bony tenderness           Posterior Sag          5. Bone enlargement           Valgus Stress         0 /30 degrees    + at 0    + at 0     6. No warmth to the touch           Varus Stress      0/30 degrees    -    + at 0      Meniscal Tests   Right   Left    Other   Right    Left       Varghese's   +   +     Ely's             Joint line tenderness   +   +     Eder             Thessaly - standing            Thomas Apley's - prone       Patellar grind        Bounce home   Guarding    Guarding     Patellar      apprehension    -    -      K-Tape  Prior to application skin was cleaned with alcohol wipe  3 strips were applied to medial/lateral knee with one strip across tibial tuberosity with mild stretch.  No stretch applied to 2\" ends of tape.   Pt was sitting during application.     Exercises:  Exercise #1: SLR 10-15 reps 2-3 sets  Comment #1: Bridge 10-15 reps 2-3 sets  Exercise #2: Clams 10-15 reps 2-3 sets  Comment #2: squats with bed behind her 10-15 reps 2-3 sets  Trialed hip abduction on R resulting in increased knee pain.  Did not give as HEP in S/L - gave clams instead.     Treatment Today     TREATMENT MINUTES COMMENTS   Evaluation 25 -knee pain  educated on POC, diagnosis, relevant anatomy and basis for treatment.  Handouts provided for HEP.    Self-care/ Home management     Manual therapy     Neuromuscular Re-education     Therapeutic " Activity     Therapeutic Exercises 23 -see exercise flow sheet     Gait training     Modality__________________                Total 45    Blank areas are intentional and mean the treatment did not include these items.     PT Evaluation Code: (Please list factors)  Patient History/Comorbidities: see above   Examination: LE  Clinical Presentation: stable   Clinical Decision Making: low    Patient History/  Comorbidities Examination  (body structures and functions, activity limitations, and/or participation restrictions) Clinical Presentation Clinical Decision Making (Complexity)   No documented Comorbidities or personal factors 1-2 Elements Stable and/or uncomplicated Low   1-2 documented comorbidities or personal factor 3 Elements Evolving clinical presentation with changing characteristics Moderate   3-4 documented comorbidities or personal factors 4 or more Unstable and unpredictable High                Cira Holland, PT, DPT  2/23/2021  7:49 AM

## 2021-06-15 NOTE — PROGRESS NOTES
[facility] Daily Progress     Patient Name: Lena Quesada  Date: 3/1/2021  Visit #: 2/10   PTA visit #:    Referral Diagnosis: Knee injury, left, initial encounter [S89.92XA]  - Primary   Referring provider:  Shirley Evans MD  Visit Diagnosis:     ICD-10-CM    1. Medial knee pain, left  M25.562    2. Medial knee pain, right  M25.561      Precautions: Morbid obesity (H)  Chondromalacia of patellofemoral joint    Assessment:   From Evaluation: Pt is a 35 y.o. year old female with bilateral knee pain that started in May 2020.  Patient has difficulty with sit<>stand, stairs and walking on uneven surfaces secondary to medial knee pain and instability (L>R). Findings are consistent with .  Patient appears motivated to participate in Physical Therapy and present with a good Physical Therapy prognosis for resolution of activities limitations.     Patient demonstrates understanding/independence with home program.  Patient is benefitting from skilled physical therapy and is making steady progress toward functional goals.  Patient is appropriate to continue with skilled physical therapy intervention, as indicated by initial plan of care.    Pt does reports decreased instability with exercises and decreased pain with K-tape.  Continues to have difficulty/pain with stairs and walking.  Has had to perform more stairs due to construction at her apartment building.     Goal Status:  Pt. will be independent with home exercise program in : 4 weeks  Pt. will report decreased intensity, frequency of : in 6 weeks  Comment:: pain in medial knee <4/10 8 weeks.    Pt will: perform sit to stand without pain >3/10 in 8 weeks.  Pt will: walk to car (incline) without feelings of instability in 8 weeks.      Plan / Patient Education:     Continue with initial plan of care.  Progress with home program as tolerated.     Plan for next session: monster, K-tape, seated traction     Subjective:   Pain Ratin- 8 on left   3-4  "on right   Feeling less instability since doing the exercises.   K-tape was helpful - able to stand longer.     Pt did stairs at her apartment 3x since last session due to construction.     Objective:   Exercises:  Exercise #1: SLR 2 sets 10-15 reps  Comment #1: Clams 2 sets 10-15 reps  Exercise #2: Bridge - pt has trouble performing in bed and trouble getting up and off the floor  Comment #2: Squats with bed behind her 10-15 reps 2-3 sets  Exercise #3: heel slides on L to increased ROM  Comment #3: S/L hip abdcution 1-2 sets 5-15 reps    Nustep: pain at a 6-7 on left with pushing - 1 min   From 4 to 0 resistance    K-Tape  Prior to application skin was cleaned with alcohol wipe  2 strips were applied to medial and lateral knee with mild stretch, one strip applied horizontally to tibial tuberosity.  No stretch applied to 2\" ends of tape.   Pt was sitting during application.  Pt instructed how to apply and applied R side herself.  Strips given for her to apply at home.        Treatment Today     TREATMENT MINUTES COMMENTS   Evaluation     Self-care/ Home management     Manual therapy 10 K-tape   Tibiofemoral mob: Posterior Glide to improve flexion   Supine: with foot resting on table at 80-90 degrees knee flexion   Grade III oscillation      Neuromuscular Re-education     Therapeutic Activity     Therapeutic Exercises 24 See above    Gait training     Modality__________________                Total 34    Blank areas are intentional and mean the treatment did not include these items.       Cira Holland, PT, DPT  3/1/2021    "

## 2021-06-15 NOTE — PATIENT INSTRUCTIONS - HE
Try ibuprofen 600mg (3 at a time) three times a day with meals for next 5-7 days.      Ice as needed    Physical therapy     If not really getting better in 2-3 weeks consider follow up with ortho again with [facility]

## 2021-06-15 NOTE — TELEPHONE ENCOUNTER
Reason for Call: Request for an order or referral:    Order or referral being requested: refferal for MRI left knee    Date needed: as soon as possible    Has the patient been seen by the PCP for this problem? YES    Additional comments: pt has been going to PT for this knee x 2 weeks ,has had injection in December, she is still having pain therapist recommended she get a MRI    Phone number Patient can be reached at:  Home number on file [phone] (home)    Best Time:  As soon as possible  Can we leave a detailed message on this number?  Yes    Call taken on 3/4/2021 at 2:24 PM by Lachelle Oakes

## 2021-06-16 ENCOUNTER — OFFICE VISIT - HEALTHEAST (OUTPATIENT)
Dept: SURGERY | Facility: CLINIC | Age: 36
End: 2021-06-16

## 2021-06-16 DIAGNOSIS — E66.01 MORBID OBESITY (H): ICD-10-CM

## 2021-06-16 DIAGNOSIS — E66.813 CLASS 3 SEVERE OBESITY DUE TO EXCESS CALORIES WITH BODY MASS INDEX (BMI) OF 60.0 TO 69.9 IN ADULT, UNSPECIFIED WHETHER SERIOUS COMORBIDITY PRESENT (H): ICD-10-CM

## 2021-06-16 DIAGNOSIS — E66.01 CLASS 3 SEVERE OBESITY DUE TO EXCESS CALORIES WITH BODY MASS INDEX (BMI) OF 60.0 TO 69.9 IN ADULT, UNSPECIFIED WHETHER SERIOUS COMORBIDITY PRESENT (H): ICD-10-CM

## 2021-06-16 PROBLEM — M22.40 CHONDROMALACIA OF PATELLOFEMORAL JOINT: Status: ACTIVE | Noted: 2021-01-17

## 2021-06-16 NOTE — PROGRESS NOTES
[facility] Daily Progress     Patient Name: Lena Quesada  Date: 3/26/2021  Visit #: 4/10   PTA visit #:    Referral Diagnosis: Knee injury, left, initial encounter [S89.92XA]  - Primary   Referring provider:  Shirley Evans MD  Visit Diagnosis:     ICD-10-CM    1. Medial knee pain, left  M25.562    2. Medial knee pain, right  M25.561      Precautions: Morbid obesity (H)  Chondromalacia of patellofemoral joint    Assessment:   From Evaluation: Pt is a 35 y.o. year old female with bilateral knee pain that started in May 2020.  Patient has difficulty with sit<>stand, stairs and walking on uneven surfaces secondary to medial knee pain and instability (L>R). Findings are consistent with .  Patient appears motivated to participate in Physical Therapy and present with a good Physical Therapy prognosis for resolution of activities limitations.     Patient demonstrates understanding/independence with home program.    Pt is returning for 2 sessions of PT pre-gel injection at [facility].  Patient's pain has decreased significantly with pain medication but she continues to have functional limitations such as performing stairs, walking >1 block, walking on an incline/decline and performing curb.     Goal Status:  Pt. will be independent with home exercise program in : 4 weeks MET   Pt. will report decreased intensity, frequency of : in 6 weeks  Comment:: pain in medial <4/10 8 weeks. MET with medication   Pt will: perform sit to stand without pain >3/10 in 8 weeks. MET with medication   Pt will: walk to car (incline) without feelings of instability in 8 weeks. Can walk 1 block on even/flat ground but does not walk up or down hill.       Plan / Patient Education:     Continue with initial plan of care.  Progress with home program as tolerated.     Plan for next session: monster, review and print wall squat, review stretches     Subjective:      IMPRESSION:   1.  Both menisci, cruciate and collateral  ligaments are intact.     2.  No evidence of acute injury.     3.  Small full-thickness chondral defect in the lateral tibial plateau.     4.  Moderate degenerative changes in the medial compartment.     5.  Small full-thickness chondral defect in the central trochlear groove.    Went to [facility]: discussed cartilage damage causes wear and tear.  Next steps: cortisone shot - but pt already had in the past with no results.  Prednisone dose - pt finished.  New medication is helping pain.     2/10 with sit to stand with medication.   Next step: gel injection - insurance is requiring 2 more PT visits.   Next step after gel shot is total knee replacement.   Can't step off curb with right leg, can't step up with left leg.      Objective:   Exercises:  Exercise #1: SLR 2 sets 10-15 reps  Comment #1: Clams 2 sets 10-15 reps - added OTB today  Exercise #2: Bridge - pt has trouble performing in bed and trouble getting up and off the floor - not been performing  Comment #2: Squats with bed behind her 10-15 reps 2-3 sets -has not been performing  Exercise #3: heel slides on L to increased ROM - tight but not painful.  Comment #3: S/L hip abdcution 1-2 sets 5-15 reps  Exercise #4: wall squats - pt has not been performing - will assess next session  Comment #4: HS stretch in sitting - pt is uncomfortable with full knee extension - can bend knee slightly and hold 30 seconds  Exercise #5: HS stretch with band - hold 30 seconds - able to fully extend knee with weightbearing without discomfort or apprehension.  Comment #5: Quad stretch with leg off table - use of PTB to increase stretch hold 30 seconds  Performing ex's every other day.   Pt demonstrated HEP in the clinic today to assess form as I have not seen pt in 3 weeks.     Pt was walking 1 mile a day before pain started.   Pt is currently walking 1 block a day on even/level ground.     K-Tape - pt declined application today   Prior to application skin was cleaned with  "alcohol wipe  2 strips were applied to medial and lateral knee with mild stretch, one strip applied horizontally to tibial tuberosity.  No stretch applied to 2\" ends of tape.   Pt was sitting during application.    K-tape helps her feel more stable - like knee is not going to buckle.     Treatment Today     TREATMENT MINUTES COMMENTS   Evaluation     Self-care/ Home management     Manual therapy 0  K-tape   Seated tibiofemoral traction in sitting.      Neuromuscular Re-education     Therapeutic Activity     Therapeutic Exercises 25 See above    Gait training     Modality__________________                Total 25    Blank areas are intentional and mean the treatment did not include these items.       Cira Holland, PT, DPT  3/26/2021  "

## 2021-06-16 NOTE — PROGRESS NOTES
[facility] Daily Progress     Patient Name: Lena Quesada  Date: 3/29/2021  Visit #: 5/10   PTA visit #:    Referral Diagnosis: Knee injury, left, initial encounter [S89.92XA]  - Primary   Referring provider:  Shirley Evans MD  Visit Diagnosis:     ICD-10-CM    1. Medial knee pain, left  M25.562    2. Medial knee pain, right  M25.561      Precautions: Morbid obesity (H)  Chondromalacia of patellofemoral joint    Assessment:   From Evaluation: Pt is a 35 y.o. year old female with bilateral knee pain that started in May 2020.  Patient has difficulty with sit<>stand, stairs and walking on uneven surfaces secondary to medial knee pain and instability (L>R). Findings are consistent with .  Patient appears motivated to participate in Physical Therapy and present with a good Physical Therapy prognosis for resolution of activities limitations.     Patient demonstrates understanding/independence with home program.    Patient has been seen for 5 visits from 2/23/21 to 3/29/21.  Patient's pain has decreased significantly with pain medication but she continues to have functional limitations such as performing stairs, walking >2-3 block, walking on an incline/decline and performing curb.     Goal Status:  Pt. will be independent with home exercise program in : 4 weeks MET   Pt. will report decreased intensity, frequency of : in 6 weeks  Comment:: pain in medial <4/10 8 weeks. MET with medication   Pt will: perform sit to stand without pain >3/10 in 8 weeks. MET with medication   Pt will: walk to car (incline) without feelings of instability in 8 weeks. Can walk 1 block on even/flat ground but does not walk up or down hill.       Plan / Patient Education:     Continue with initial plan of care.  Progress with home program as tolerated.     Subjective:   Getting out of the car is painful on knees. Walking prolonged periods is painful.        IMPRESSION:   1.  Both menisci, cruciate and collateral ligaments  are intact.     2.  No evidence of acute injury.     3.  Small full-thickness chondral defect in the lateral tibial plateau.     4.  Moderate degenerative changes in the medial compartment.     5.  Small full-thickness chondral defect in the central trochlear groove.    Last session: Went to [facility]: discussed cartilage damage causes wear and tear.  Next steps: cortisone shot - but pt already had in the past with no results.  Prednisone dose - pt finished.  New medication is helping pain.     2/10 with sit to stand with medication.   Next step: gel injection - insurance is requiring 2 more PT visits.   Next step after gel shot is total knee replacement.   Can't step off curb with right leg, can't step up with left leg.      Objective:   Exercises:  Exercise #1: SLR 2 sets 10-15 reps  Comment #1: Clams 2 sets 10-15 reps - added OTB today  Exercise #2: Bridge - pt has trouble performing in bed and trouble getting up and off the floor - not been performing  Comment #2: Squats with bed behind her 10-15 reps 2-3 sets -has not been performing  Exercise #3: heel slides on L to increased ROM - tight but not painful.  Comment #3: S/L hip abdcution 1-2 sets 5-15 reps  Exercise #4: wall squats - pt has not been performing - will assess next session  Comment #4: HS stretch in sitting - pt is uncomfortable with full knee extension - can bend knee slightly and hold 30 seconds  Exercise #5: HS stretch with band - hold 30 seconds - able to fully extend knee with weightbearing without discomfort or apprehension.  Comment #5: Quad stretch with leg off table - use of PTB to increase stretch hold 30 seconds  Exercise #6: Monster walk - sideways only with GTB  Today: added monster walks - reviewed HS stretch, wall squats.     Performing ex's every other day.     Pt was walking 1 mile a day before pain started.   Pt is currently walking 2-3 block a day on even/level ground.     K-Tape - pt declined application today   Prior to  "application skin was cleaned with alcohol wipe  2 strips were applied to medial and lateral knee with mild stretch, one strip applied horizontally to tibial tuberosity.  No stretch applied to 2\" ends of tape.   Pt was sitting during application.    K-tape helps her feel more stable - like knee is not going to buckle.     Treatment Today     TREATMENT MINUTES COMMENTS   Evaluation     Self-care/ Home management     Manual therapy 17 K-tape   Tibiofemoral mob: Posterior Glide to improve flexion   Supine: with foot resting on table at 80-90 degrees knee flexion   Grade III oscillation \    STM to quads     Quad stretch in S/L hold 30 seconds      Neuromuscular Re-education     Therapeutic Activity     Therapeutic Exercises 10 See above    Gait training     Modality__________________                Total 27    Blank areas are intentional and mean the treatment did not include these items.       Cira Holland, PT, DPT  3/29/2021     [facility] Discharge Summary  Patient Name: Lena Quesada  Date: 4/30/2021  Referral Diagnosis: left knee injury   Referring provider: Maris Abbott*  Visit Diagnosis:   1. Medial knee pain, left     2. Medial knee pain, right       Goal Status:  Pt. will be independent with home exercise program in : 4 weeks MET   Pt. will report decreased intensity, frequency of : in 6 weeks  Comment:: pain in medial <4/10 8 weeks. MET with medication   Pt will: perform sit to stand without pain >3/10 in 8 weeks. MET with medication   Pt will: walk to car (incline) without feelings of instability in 8 weeks. Can walk 1 block on even/flat ground but does not walk up or down hill.       Patient was seen for 5 visits from 2/23/21 to 3/29/21 with 0 missed appointments.  The patient was instructed to follow up with physician's clinic.    Therapy will be discontinued at this time.  The patient will need a new referral to resume.    Thank you for your referral.  Cira DAVIS" Skyler  4/30/2021  11:28 AM

## 2021-06-17 NOTE — PROGRESS NOTES
Lena Quesada is a 35 y.o. female who is being evaluated via a billable video visit.      How would you like to obtain your AVS? MyChart.  If dropped from the video visit, the video invitation should be resent by: Send to e-mail at: [email or web address]  Will anyone else be joining your video visit? No      Video Start Time: 5:46 PM    ASSESSMENT/PLAN:  1. Morbid obesity (H)  Ambulatory referral to Bariatric Care: Surgical and Non-Surgical       This is a 36 yo female with morbid obesity.  She is now ready to consider bariatric intervention - she has done a great deal of research.  Wants bariatric surgical options.  Discussed process with patient.  Referral sent for bariatric care.    Return in about 3 months (around 8/10/2021) for Recheck.      There are no discontinued medications.  There are no Patient Instructions on file for this visit.    Chief Complaint:  Chief Complaint   Patient presents with     weightloss     looking into Bariatic surgery       HPI:   Lena Quesada is a 35 y.o. female c/o  Wants to get bariatric surgery  Has thought about it for years - now ready to do it  Has been having trouble with her knees - pain hasn't gotten better - this is preventing her from living life  Tried weight watchers (and continues) - lost a little weight, then increased  Gets winded quickly when walking  Top weight noted as 425#, today, 395# at 57.5 inches    PMH:   Patient Active Problem List    Diagnosis Date Noted     Chondromalacia of patellofemoral joint 01/17/2021     Morbid obesity (H) 12/16/2020     No past medical history on file.  No past surgical history on file.  Social History     Socioeconomic History     Marital status: Single     Spouse name: Not on file     Number of children: Not on file     Years of education: Not on file     Highest education level: Not on file   Occupational History     Not on file   Social Needs     Financial resource strain: Not on file     Food insecurity      Worry: Not on file     Inability: Not on file     Transportation needs     Medical: Not on file     Non-medical: Not on file   Tobacco Use     Smoking status: Never Smoker     Smokeless tobacco: Never Used   Substance and Sexual Activity     Alcohol use: Not on file     Drug use: Not on file     Sexual activity: Not on file   Lifestyle     Physical activity     Days per week: Not on file     Minutes per session: Not on file     Stress: Not on file   Relationships     Social connections     Talks on phone: Not on file     Gets together: Not on file     Attends Confucianist service: Not on file     Active member of club or organization: Not on file     Attends meetings of clubs or organizations: Not on file     Relationship status: Not on file     Intimate partner violence     Fear of current or ex partner: Not on file     Emotionally abused: Not on file     Physically abused: Not on file     Forced sexual activity: Not on file   Other Topics Concern     Not on file   Social History Narrative     Not on file     No family history on file.    Meds:  No current outpatient medications on file.    Allergies:  No Known Allergies    ROS:  Pertinent positives as noted in HPI; otherwise 12 point ROS negative.      Physical Exam:  EXAM:  LMP 04/14/2021 (Approximate)    Gen:  NAD, appears well, well-hydrated, morbid obesity  Extr:  Neg.  Neuro:  No asymmetry  Skin:  Warm/dry        Results:  Results for orders placed or performed in visit on 01/29/21   Comprehensive Metabolic Panel   Result Value Ref Range    Sodium 138 136 - 145 mmol/L    Potassium 4.1 3.5 - 5.0 mmol/L    Chloride 106 98 - 107 mmol/L    CO2 22 22 - 31 mmol/L    Anion Gap, Calculation 10 5 - 18 mmol/L    Glucose 143 (H) 70 - 125 mg/dL    BUN 15 8 - 22 mg/dL    Creatinine 0.86 0.60 - 1.10 mg/dL    GFR MDRD Af Amer >60 >60 mL/min/1.73m2    GFR MDRD Non Af Amer >60 >60 mL/min/1.73m2    Bilirubin, Total 0.4 0.0 - 1.0 mg/dL    Calcium 8.9 8.5 - 10.5 mg/dL    Protein,  Total 6.9 6.0 - 8.0 g/dL    Albumin 3.8 3.5 - 5.0 g/dL    Alkaline Phosphatase 102 45 - 120 U/L    AST 19 0 - 40 U/L    ALT 37 0 - 45 U/L   HM2(CBC w/o Differential)   Result Value Ref Range    WBC 7.8 4.0 - 11.0 thou/uL    RBC 4.52 3.80 - 5.40 mill/uL    Hemoglobin 13.6 12.0 - 16.0 g/dL    Hematocrit 40.2 35.0 - 47.0 %    MCV 89 80 - 100 fL    MCH 30.1 27.0 - 34.0 pg    MCHC 33.8 32.0 - 36.0 g/dL    RDW 12.5 11.0 - 14.5 %    Platelets 338 140 - 440 thou/uL    MPV 9.5 7.0 - 10.0 fL   Lipid Cascade FASTING   Result Value Ref Range    Cholesterol 185 <=199 mg/dL    Triglycerides 92 <=149 mg/dL    HDL Cholesterol 59 >=50 mg/dL    LDL Calculated 108 <=129 mg/dL    Patient Fasting > 8hrs? Yes    Thyroid Stimulating Hormone (TSH)   Result Value Ref Range    TSH 3.76 0.30 - 5.00 uIU/mL   Vitamin D, Total (25-Hydroxy)   Result Value Ref Range    Vitamin D, Total (25-Hydroxy) 10.2 (L) 30.0 - 80.0 ng/mL   Vitamin B12   Result Value Ref Range    Vitamin B-12 214 213 - 816 pg/mL             Video-Visit Details    Type of service:  Video Visit    Video End Time (time video stopped): 6:02 PM  Originating Location (pt. Location): Home    Distant Location (provider location):  [facility]     Platform used for Video Visit: [employer]

## 2021-06-18 NOTE — PATIENT INSTRUCTIONS - HE
Patient Instructions by Cira Holland PT at 3/29/2021  7:00 AM     Author: Cira Holland PT Service: -- Author Type: Physical Therapist    Filed: 3/29/2021  7:21 AM Encounter Date: 3/29/2021 Status: Incomplete    : Cira Holland PT (Physical Therapist)           Wall Squat (mini squat)     1 set - work up to 2 sets   Hold 30-60 seconds.     Leaning up against a wall or closed door on your back, slide your body downward and then return back to upright position.    A door was used here because it was smoother and had less friction than the wall.     Knees should bend in line with the 2nd toe and not pass the front of the foot.            MONSTER WALK    Walk forwards, backwards and side step both ways with knees slightly bent, keeping tension in the resistance band.    Green band at ankles. Sideways only.

## 2021-06-18 NOTE — PATIENT INSTRUCTIONS - HE
Patient Instructions by Cira Holland PT at 3/1/2021  7:00 AM     Author: Cira Holland PT Service: -- Author Type: Physical Therapist    Filed: 3/1/2021  7:26 AM Encounter Date: 3/1/2021 Status: Signed    : Cira Holland PT (Physical Therapist)             Heel Slides    Lying on your back with knees straight, slide the affected heel towards your buttock as you bend your knee.     Hold a gentle stretch in this position and then return to original position.            HIP ABDUCTION - SIDELYING    While lying on your side, slowly raise up your top leg to the side. Keep your knee straight and maintain your toes pointed forward the entire time.     The bottom leg can be bent to stabilize your body.    1-2 sets 5-15 reps

## 2021-06-18 NOTE — PATIENT INSTRUCTIONS - HE
"Patient Instructions by Cira Holland PT at 2/23/2021  7:00 AM     Author: Cira Holland PT Service: -- Author Type: Physical Therapist    Filed: 2/23/2021  7:49 AM Encounter Date: 2/23/2021 Status: Signed    : Cira Holland PT (Physical Therapist)        STRAIGHT LEG RAISE - SLR    While lying or sitting, raise up your leg with a straight knee.  Keep the opposite knee bent with the foot planted to the ground.      BRIDGING    While lying on your back, tighten your lower abdominals, squeeze your buttocks and then raise your buttocks off the floor/bed as creating a \"Bridge\" with your body.      CLAM SHELLS    While lying on your side with your knees bent, draw up the top knee while keeping contact of your feet together.    Do not let your pelvis roll back during the lifting movement.        SQUAT WITH HIP HINGE - HIP AND BACK DISASSOCIATION DRILL    When squatting, bend over at the waist, tighten your stomach muscles by drawing in your navel and keep your back straight while bending at your hips. This will protect your back from excessive loads.     Your buttock should lower behind your feet as if you are going to sit on a seat. Emphasize your weight going through your heels.     Also, for good knee alignment, do not let your knees pass in front of your toes and keep your knee in line with your 2nd toe (next to the big toe) as it bends.                "

## 2021-06-18 NOTE — PATIENT INSTRUCTIONS - HE
Patient Instructions by Cira Holland PT at 3/4/2021 12:30 PM     Author: Cira Holland PT Service: -- Author Type: Physical Therapist    Filed: 3/4/2021  1:02 PM Encounter Date: 3/4/2021 Status: Signed    : Cira Holland PT (Physical Therapist)           Wall Squat    Leaning up against a wall or closed door on your back, slide your body downward and then return back to upright position.    A door was used here because it was smoother and had less friction than the wall.     Knees should bend in line with the 2nd toe and not pass the front of the foot.

## 2021-06-18 NOTE — PATIENT INSTRUCTIONS - HE
Patient Instructions by Gem Luna CNP at 12/10/2020  4:30 PM     Author: Gem Luna CNP Service: -- Author Type: Nurse Practitioner    Filed: 12/10/2020  6:06 PM Encounter Date: 12/10/2020 Status: Addendum    : Gem Luna CNP (Nurse Practitioner)    Related Notes: Original Note by Gem Luna CNP (Nurse Practitioner) filed at 12/10/2020  6:04 PM       Xray were similar - showed mild joint narrowing - unlikely to be causing your symptoms.      Recommend Aleve two tablets every 12 hours scheduled for up to 14 days. Anti-inflammatory.       Ice sore knees 3-4 times daily x 20 minutes each time for the next 3 days.     Rest from extra walking the next few days.  If start walking for exercise again, start slowly and increase as tolerated.  Preferably biking with minimal leg flexion when gyms open again vs walking to start.  Can continue upper body workouts, situps, etc.      See your primary care provider in the next 1-2 weeks - may need MRI and/or physical therapy.      Can consider [facility] if pain very severe or your knee is giving out/suddenly worsens.            Patient Education     Knee Pain with Uncertain Cause    There are several common causes for knee pain. These can include:    A sprain of the ligaments that support the joint    An injury to the cartilage lining of the joint    Arthritis from wear-and-tear or inflammation  There are other causes as well. There may also be swelling, reduced movement of the knee joint, and pain with walking. A definite diagnosis will still need to be made. If your symptoms do not improve, further follow-up and testing may be needed.  Home care    Stay off the injured leg as much as possible until pain improves.    Apply an ice pack over the injured area for 15 to 20 minutes every 3 to 6 hours. You should do this for the first 24 to 48 hours. You can make an ice pack by filling a plastic bag that seals at the top with ice cubes and  then wrapping it with a thin towel. Continue to use ice packs for relief of pain and swelling as needed. As the ice melts, be careful to avoid getting your wrap, splint, or cast wet. After 48 hours, apply heat (warm shower or warm bath) for 15 to 20 minutes several times a day, or alternate ice and heat. If you have to wear a hook-and-loop knee brace, you can open it to apply the ice pack, or heat, directly to the knee. Never put ice directly on the skin. Always wrap the ice in a towel or other type of cloth.    You may use over-the-counter pain medicine to control pain, unless another pain medicine was prescribed. If you have chronic liver or kidney disease or ever had a stomach ulcer or GI bleeding, talk with your healthcare provider using these medicines.    If crutches or a walker have been recommended, do not put weight on the injured leg until you can do so without pain. Check with your healthcare provider before returning to sports or full work duties.    If you have a hook-and-loop knee brace, you can remove it to bathe and sleep, unless told otherwise.  Follow-up care  Follow up with your healthcare provider as advised. This is usually within 1 to 2 weeks.  If X-rays were taken, you will be told of any new findings that may affect your care.  Call 911  Call 911 if you have:    Shortness of breath    Chest pain  When to seek medical advice  Call your healthcare provider right away if any of these occur:    Toes or foot becomes swollen, cold, blue, numb, or tingly    Pain or swelling spreads over the knee or calf    Warmth or redness appears over the knee or calf    Other joints become painful    Rash appears    Fever of 100.4 F (38 C) or higher, or as directed by your healthcare provider  Date Last Reviewed: 11/23/2015 2000-2017 [employer]. [address] [place] [address], [place] [address]. All rights reserved. This information is not intended as a substitute for professional medical care. Always  follow your healthcare professional's instructions.

## 2021-06-18 NOTE — PATIENT INSTRUCTIONS - HE
Patient Instructions by Cira Holland PT at 3/26/2021  7:00 AM     Author: Cira Holland PT Service: -- Author Type: Physical Therapist    Filed: 3/26/2021  7:17 AM Encounter Date: 3/26/2021 Status: Signed    : Cira Holland PT (Physical Therapist)               SEATED HAMSTRING STRETCH    While seated, rest your heel on the floor with your knee straight and gently lean forward until a stretch is felt behind you knee/thigh.    30 seconds       HAMSTRING STRETCH WITH TOWEL    While lying down on your back, hook a towel or strap under  your foot and draw up your leg until a stretch is felt under your leg. calf area.    Keep your knee in a straightened position during the stretch    30 seconds       Quad stretch off edge of bed    Lie down on your back with your leg off the edge of your bed.  Put a belt or leash around your ankle and use it to bend your knee back until a stretch is felt in the front of your thigh.     30 seconds

## 2021-06-19 LAB — VIT B1 PYROPHOSHATE BLD-SCNC: 93 NMOL/L (ref 70–180)

## 2021-06-20 ENCOUNTER — AMBULATORY - HEALTHEAST (OUTPATIENT)
Dept: SURGERY | Facility: CLINIC | Age: 36
End: 2021-06-20

## 2021-06-20 DIAGNOSIS — E55.9 VITAMIN D DEFICIENCY: ICD-10-CM

## 2021-06-20 DIAGNOSIS — E66.1 CLASS 3 DRUG-INDUCED OBESITY WITH SERIOUS COMORBIDITY AND BODY MASS INDEX (BMI) OF 60.0 TO 69.9 IN ADULT (H): ICD-10-CM

## 2021-06-20 DIAGNOSIS — K76.0 FATTY LIVER: ICD-10-CM

## 2021-06-20 DIAGNOSIS — R73.03 BORDERLINE DIABETES: ICD-10-CM

## 2021-06-20 DIAGNOSIS — E66.813 CLASS 3 DRUG-INDUCED OBESITY WITH SERIOUS COMORBIDITY AND BODY MASS INDEX (BMI) OF 60.0 TO 69.9 IN ADULT (H): ICD-10-CM

## 2021-06-20 DIAGNOSIS — E53.8 VITAMIN B12 DEFICIENCY (NON ANEMIC): ICD-10-CM

## 2021-06-21 NOTE — LETTER
Letter by Shirley Evans MD at      Author: Shirley Evans MD Service: -- Author Type: --    Filed:  Encounter Date: 2/5/2021 Status: (Other)         February 5, 2021     Patient: Lena Quesada   YOB: 1985   Date of Visit: 2/5/2021       To Whom it May Concern:    Lena Quesada was seen in my clinic on 2/5/2021.    If you have any questions or concerns, please don't hesitate to call.    Sincerely,         Electronically signed by Shirley Evans MD

## 2021-06-25 ENCOUNTER — OFFICE VISIT - HEALTHEAST (OUTPATIENT)
Dept: SURGERY | Facility: CLINIC | Age: 36
End: 2021-06-25

## 2021-06-25 DIAGNOSIS — E66.813 CLASS 3 SEVERE OBESITY DUE TO EXCESS CALORIES WITH BODY MASS INDEX (BMI) OF 60.0 TO 69.9 IN ADULT, UNSPECIFIED WHETHER SERIOUS COMORBIDITY PRESENT (H): ICD-10-CM

## 2021-06-25 DIAGNOSIS — Z71.3 NUTRITIONAL COUNSELING: ICD-10-CM

## 2021-06-25 DIAGNOSIS — E66.01 CLASS 3 SEVERE OBESITY DUE TO EXCESS CALORIES WITH BODY MASS INDEX (BMI) OF 60.0 TO 69.9 IN ADULT, UNSPECIFIED WHETHER SERIOUS COMORBIDITY PRESENT (H): ICD-10-CM

## 2021-06-26 NOTE — PROGRESS NOTES
Tele-Visit Details    Type of service:  Video Visit    Video Start Time (time video started): 8:27 AM    Video End Time (time video stopped): 9:12 AM    Originating Location (pt. Location): Patient Home    Distant Location (provider location): Home office    Reason for Televisit: Bariatric Psychology-Initial Session    Mode of Communication:  Video Conference via [email or web address]    Physician has received verbal consent for a video visit from the patient? Yes    Lena would like to follow through with [facility] for health reasons. She has struggled with her weight for much of her life and now is concerned about various comorbidities. She has a history of boredom eating. She has good knowledge of surgery and good support. She will follow up and complete psychological testing. Dx: F50.9    Yosef Snow

## 2021-06-26 NOTE — PROGRESS NOTES
"Lena Quesada is 35 y.o.  female who presents for a billable video visit today.    How would you like to obtain your AVS? MyChart.  If dropped from the video visit, the video invitation should be resent by: Send to e-mail at: [email or web address]  Will anyone else be joining your video visit? No      Video Start Time: 8:40 AM    Provider Notes:   New Bariatric Surgery Consultation Note    6/10/2021    RE: Lena Quesada  MR#: [id number]  : 1985      Referring provider:   BAR REFERRING PROVIDER 2021   Who referred you? Dr. Krueger       Chief Complaint/Reason for visit: evaluation for possible weight loss surgery    Dear Jazz Abbott MD,    I had the pleasure of seeing your patient, Lena Quesada, to evaluate her obesity and consider her for possible weight loss surgery. As you know, Lena Quesada is 35 y.o..  She has a height of Height: 5' 7\" (1.702 m)  , a weight of   Vitals:    06/10/21 0800   Weight: (!) 399 lb (181 kg)   , and calculated Body mass index is 62.49 kg/m .  She's struggled to find durable weight loss over her lifetime and has used multiple commercial and self guided weight loss attempts/diets in the past.  She's developing signs of borderline diabetes on lab testing in January with a reportedly fasting blood glucose of 143mg/dl and we'll confirm presence of diabetes on A1c check tomorrow with her labs.     She has 6 months of structured weight loss and once her labs are back we'll discuss medication options to expedite her preoperative weight loss requirement goal of getting under 380 lbs or less. Semaglutide, phentermine/contrave/topiramate/metformin may all be options based on her findings.  We'll recheck med efficacy/tolerance about a month after initiation and continue with dietary/psychological visits accordingly. Given her preoperative weight loss goal, we'll move up her dietician visit to as soon as possible.     Plan:  1. Welcome to the bariatric " surgery program, your manual should be arriving in about a week. Call if not received.  2. Get labs done tomorrow. I'll review those over the following week as results come in and then call you with an update and review which appetite suppressant medications may be best suited to your health needs.  3. Intake visits with dietician ASAP and psychology later this summer as you go through your 6 months of structured weight loss requirements.  4. Weight will need to be under 380 lbs by the completion of these 6 months, more is better.  5. Attend support group at least once.  6. I anticipate needing both D and B12 correction based on your January labs, we'll see how things look.  7. Consider non progesterone birth control options to allow at least 15mo of no pregnancy following bariatric surgery.           HISTORY OF PRESENT ILLNESS:  Weight Loss History Reviewed with Patient 6/7/2021   How long have you been overweight? Since early childhood   What is the most that you have ever weighed? 410   What is the most weight you have lost? 40   I have tried the following methods to lose weight Watching portions or calories, Exercise, Weight Watchers, Atkins type diet (low carb/high protein)   I have tried the following weight loss medications? (Check all that apply) None   Have you ever had weight loss surgery? No   medication supported weight loss in the past?   Using Weight watchers currently.     CO-MORBIDITIES OF OBESITY INCLUDE:  No flowsheet data found.  Knee pain/chondromalacia. Elevated fasting glucose in January, diabetic status pending.  PAST MEDICAL HISTORY:  Past Medical History:   Diagnosis Date     Arthritis     chondromalacia of knee (right). injections.     Cholelithiasis      Low D and B12 on January labs w/ elevated glucose of 143mg/dl reportedly fasting.    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 No   Who do you have in your support network that can be available to help you for the first 2 weeks after surgery? Yes   Who can you count on for support throughout your weight loss surgery journey? Parents, family, friends.   Can you afford 3 meals a day?  Yes   Can you afford 50-60 dollars a month for vitamins? Yes       HABITS:  BAR SURGERY - HABITS 6/7/2021   How often do you drink alcohol? 2-4 times a month   If you do drink alcohol, how many drinks might you have in a day? (one drink = 5 oz. wine, 1 can/bottle of beer, 1 shot liquor) 3 or 4   Do you currently use any of the following Nicotine products? No   Have you ever used any of the folowing nicotine products? No   Have you or are you currently using street drugs or prescription strength medication for which you do not have a prescription for? No   Do you have a history of chemical dependency (alcohol or drug abuse)? No       PSYCHOLOGICAL HISTORY:   Psychological History Reviewed With Patient 6/7/2021   Have you ever attempted suicide? Never   Have you had thoughts of suicide in the past year? No   Have you ever been hospitalized for mental illness or a suicide attempt? Never   Do you have a history of chronic pain? No   Have you ever been diagnosed with fibromyalgia? No   Are you currently being treated for any of the following? I do not have a mental illness       ROS:  BAR NBS ROS 6/7/2021   Skin: None of the above   HEENT: None of these  "  Musculoskeletal: Joint Pain, Limited mobility   Cardiovascular: Shortness of breath with activity   Pulmonary: Shortness of breath with activity, Snoring   Gastrointestinal: None of the above   Genitourinary: None of the above   Hematological: None of the above   Neurological: None of the above   Female ONLY: None of the above       EATING BEHAVIORS:  BAR SURGERY - EATING BEHAVIORS 6/7/2021   Have you or anyone else thought that you had an eating disorder? No   Do you currently binge eat (eat a large amount of food in a short time)? Yes: \"eat until can't move\", \"should have stopped\" worse if skipped meal and over compensates..   Are you an emotional eater? No   Do you get up to eat after falling asleep? No       EXERCISE:  BAR SURGERY - EXERCISE 6/7/2021   How often do you exercise? 3 to 4 times per week   What is the duration of your exercise (in minutes)? 15 Minutes   What types of exercise do you do? walking   What keeps you from being more active?  Pain, My ability to walk or move around is limited, Shortness of breath, Worried people will look at me       MEDICATIONS:  No current outpatient medications on file.     No current facility-administered medications for this visit.        ALLERGIES:  No Known Allergies    LABS/IMAGING/MEDICAL RECORDS REVIEW: glucose 143 in January, low d of 10 and B12 in low 200s.     PHYSICAL EXAM:  There were no vitals filed for this visit.  Alert/young female in NAD, normal cognition and mood. No dyspnea. Central adiposity/super morbid obesity. No tremor. No pallor or jaundice.    In summary, Lena Quesada has Class III obesity with a body mass index of Body mass index is 62.49 kg/m . kg/m2 and the comorbidities stated above. She completed an informational seminar and is a candidate for the laparoscopic gastric sleeve   Once the patient has completed the requirements in their task list and there are no further recommendations, the pt will be allowed to see the surgeon of her " choice for consultation on the laparoscopic gastric sleeve surgery. Patient verbalizes understanding of the process to surgery and expectations for the postoperative period including the need for lifelong lifestyle changes, vitamin supplementation, and laboratory monitoring.  Sincerely,     Wai Anders MD    Medical Decision Makin minutes spent on the date of the encounter doing chart review, history and exam, documentation and further activities per the note        Video-Visit Details    Type of service:  Video Visit    Video End Time (time video stopped): 9:49 AM  Originating Location (pt. Location): Home    Distant Location (provider location):  [facility]     Platform used for Video Visit: [employer]

## 2021-06-27 ENCOUNTER — HEALTH MAINTENANCE LETTER (OUTPATIENT)
Age: 36
End: 2021-06-27

## 2021-06-30 NOTE — PROGRESS NOTES
Progress Notes by Gem Luna CNP at 12/10/2020  4:30 PM     Author: Gem Luna CNP Service: -- Author Type: Nurse Practitioner    Filed: 12/14/2020  7:36 AM Encounter Date: 12/10/2020 Status: Addendum    : Gem Luna CNP (Nurse Practitioner)    Related Notes: Original Note by Gem Luna CNP (Nurse Practitioner) filed at 12/14/2020  7:35 AM       Chief Complaint   Patient presents with   ? knee pain x going  sitting and standing  x 3 months       ASSESSMENT & PLAN:   Diagnoses and all orders for this visit:    Acute pain of right knee  -     Cancel: XR Knee Right Plus [place]mera VW    Acute pain of left knee  -     Cancel: XR Knee Left Plus Sunrise VW  -     XR Knee Bilateral Plus Hymera VW        MDM:  Patient with morbid obesity with right greater than left knee pain, worse with flexion and extension or going up and down stairs. Able to bear weight.  Declines offer of crutches or ACE.  Tender over the MCL and the LCL bilaterally.  Also associated with pain while kneeling.  Recently started walking more frequently to lose weight.  No recent trauma.  X-rays were negative except for mild compartment narrowing.      Exam limited by body habitus, but likely dx include tendonitis or bursitis given assoc w kneeling.        Aleve 2 tab two times a day x 14 days, limit walking, no kneeling for now.  Ice, elevate.  Recheck in about a week.  Will need BP rechecked as well.     Patient to follow up with Primary Care provider regarding elevated blood pressure.    Supportive care discussed.  See discharge instructions below for specific recommendations given.    At the end of the encounter, I discussed results, diagnosis, medications. Discussed red flags for immediate return to clinic/ER, as well as indications for follow up if no improvement. Patient and/or caregiver understood and agreed to plan. Patient was stable for discharge.    SUBJECTIVE    HPI:  HPI  Lena Quesada presents to the  walk-in clinic with   Chief Complaint   Patient presents with   ? knee pain x going  sitting and standing  x 3 months     Right greater than left medial and lateral knee pain, posterior knee pain bilaterally, worse during the act of  flexion and extension, going up stairs, going down stairs, squatting.    With minimal squatting, pain is 9 out of 10 on the right side.     Believes pain has flared up recently because she has started taking daily walks to lose weight.  Also worsened after kneeling to clean apartment.      Has lost 20 or 30 pounds so far.  Patient is notably 402 pounds today.    Tried 400 mg of ibuprofen recently a few times with no change in pain.  No recent acute trauma nor acute worsening of pain.    Associated with: Fell 4 times last winter which caused knee pain at the time - . Unable to kneel.     See ROS for additional symptoms and/or pertinent negatives.       History obtained from the patient.    No past medical history on file.    There are no active non-hospital problems to display for this patient.      No family history on file.    Social History     Tobacco Use   ? Smoking status: Never Smoker   ? Smokeless tobacco: Never Used   Substance Use Topics   ? Alcohol use: Not on file       Review of Systems   Constitutional: Negative for fever.   HENT: Negative for congestion.    Respiratory: Negative for cough.    Cardiovascular: Negative for leg swelling.       OBJECTIVE    Vitals:    12/10/20 1650 12/10/20 1655   BP: (!) 162/111 (!) 171/100   Pulse: 100    Resp: 14    Temp: 98.8  F (37.1  C)    SpO2: 95% 96%   Weight: (!) 402 lb (182.3 kg)        Physical Exam  Constitutional:       General: She is not in acute distress.     Appearance: She is well-developed.   HENT:      Right Ear: External ear normal.      Left Ear: External ear normal.   Eyes:      General:         Right eye: No discharge.         Left eye: No discharge.      Conjunctiva/sclera: Conjunctivae normal.   Cardiovascular:       Pulses: Normal pulses.   Pulmonary:      Effort: Pulmonary effort is normal.   Musculoskeletal: Normal range of motion.         General: Tenderness present. No deformity.      Right knee: She exhibits no erythema and normal patellar mobility. Tenderness found. MCL and LCL tenderness noted. No patellar tendon tenderness noted.      Left knee: She exhibits no erythema. Tenderness found. MCL and LCL tenderness noted.      Comments: Able to walk. Only minimal ability to squat before pain 'severe'.     Exam limited by body habitus    Skin:     General: Skin is warm and dry.      Capillary Refill: Capillary refill takes less than 2 seconds.      Findings: No bruising or erythema.      Comments: No joint warmth   Neurological:      Mental Status: She is alert and oriented to person, place, and time.   Psychiatric:         Mood and Affect: Mood normal.         Behavior: Behavior normal.         Thought Content: Thought content normal.         Judgment: Judgment normal.         Labs:  No results found for this or any previous visit (from the past 240 hour(s)).      Radiology:    Xr Knee Bilateral Plus Sunrise Vw    Result Date: 12/10/2020  EXAM: XR KNEE BILATERAL PLUS SUNRISE VW LOCATION: [facility] DATE/TIME: 12/10/2020 5:39 PM INDICATION: Left knee trauma and pain. COMPARISON: None.     RIGHT KNEE: Mild medial compartment joint space narrowing. Mild patellofemoral degenerative changes. No significant joint effusion or evidence of fracture. LEFT KNEE: Mild to moderate medial compartment joint space narrowing. Mild patellofemoral degenerative changes. No joint effusion or evidence of fracture.    xrays reviewed by Gem Luna No acute fractures nor obvious dislocations seen.     PATIENT INSTRUCTIONS:   Patient Instructions   Xray were similar - showed mild joint narrowing - unlikely to be causing your symptoms.      Recommend Aleve two tablets every 12 hours scheduled for up to 14 days.  Anti-inflammatory.       Ice sore knees 3-4 times daily x 20 minutes each time for the next 3 days.     Rest from extra walking the next few days.  If start walking for exercise again, start slowly and increase as tolerated.  Preferably biking with minimal leg flexion when gyms open again vs walking to start.  Can continue upper body workouts, situps, etc.      See your primary care provider in the next 1-2 weeks - may need MRI and/or physical therapy.      Can consider [facility] if pain very severe or your knee is giving out/suddenly worsens.            Patient Education     Knee Pain with Uncertain Cause    There are several common causes for knee pain. These can include:    A sprain of the ligaments that support the joint    An injury to the cartilage lining of the joint    Arthritis from wear-and-tear or inflammation  There are other causes as well. There may also be swelling, reduced movement of the knee joint, and pain with walking. A definite diagnosis will still need to be made. If your symptoms do not improve, further follow-up and testing may be needed.  Home care    Stay off the injured leg as much as possible until pain improves.    Apply an ice pack over the injured area for 15 to 20 minutes every 3 to 6 hours. You should do this for the first 24 to 48 hours. You can make an ice pack by filling a plastic bag that seals at the top with ice cubes and then wrapping it with a thin towel. Continue to use ice packs for relief of pain and swelling as needed. As the ice melts, be careful to avoid getting your wrap, splint, or cast wet. After 48 hours, apply heat (warm shower or warm bath) for 15 to 20 minutes several times a day, or alternate ice and heat. If you have to wear a hook-and-loop knee brace, you can open it to apply the ice pack, or heat, directly to the knee. Never put ice directly on the skin. Always wrap the ice in a towel or other type of cloth.    You may use over-the-counter pain  medicine to control pain, unless another pain medicine was prescribed. If you have chronic liver or kidney disease or ever had a stomach ulcer or GI bleeding, talk with your healthcare provider using these medicines.    If crutches or a walker have been recommended, do not put weight on the injured leg until you can do so without pain. Check with your healthcare provider before returning to sports or full work duties.    If you have a hook-and-loop knee brace, you can remove it to bathe and sleep, unless told otherwise.  Follow-up care  Follow up with your healthcare provider as advised. This is usually within 1 to 2 weeks.  If X-rays were taken, you will be told of any new findings that may affect your care.  Call 911  Call 911 if you have:    Shortness of breath    Chest pain  When to seek medical advice  Call your healthcare provider right away if any of these occur:    Toes or foot becomes swollen, cold, blue, numb, or tingly    Pain or swelling spreads over the knee or calf    Warmth or redness appears over the knee or calf    Other joints become painful    Rash appears    Fever of 100.4 F (38 C) or higher, or as directed by your healthcare provider  Date Last Reviewed: 11/23/2015 2000-2017 [employer]. [address] [place] [address], [place] [address]. All rights reserved. This information is not intended as a substitute for professional medical care. Always follow your healthcare professional's instructions.

## 2021-07-01 ENCOUNTER — TRANSFERRED RECORDS (OUTPATIENT)
Dept: HEALTH INFORMATION MANAGEMENT | Facility: CLINIC | Age: 36
End: 2021-07-01

## 2021-07-04 NOTE — PATIENT INSTRUCTIONS - HE
Patient Instructions by Richie Pisano CMA at 6/10/2021  8:45 AM     Author: Richie Pisano CMA Service: -- Author Type: Certified Medical Assistant    Filed: 6/25/2021 10:36 AM Encounter Date: 6/10/2021 Status: Addendum    : Hawa Trevino RN (Registered Nurse)    Related Notes: Original Note by Wai Anders MD (Physician) filed at 6/10/2021 10:48 AM         Plan:  1. Welcome to the bariatric surgery program, your manual should be arriving in about a week. Call if not received.  2. Get labs done tomorrow. I'll review those over the following week as results come in and then call you with an update and review which appetite suppressant medications may be best suited to your health needs.  3. Intake visits with dietician ASAP and psychology later this summer as you go through your 6 months of structured weight loss requirements.  4. Weight will need to be under 380 lbs by the completion of these 6 months, more is better.  5. Attend support group at least once.  6. I anticipate needing both D and B12 correction based on your January labs, we'll see how things look.  7. Consider non progesterone birth control options to allow at least 15mo of no pregnancy following bariatric surgery.           Before being submitted for insurance approval, you will need the following:    -Clearance by the Psychologist  -Clearance by the dietitian  -Attend Support Group ([address] [place] [address] at [facility]) 2nd Tuesday of the month 6:30-8pm. Make sure to sign in.  -Routine Health Care Maintenance must be up to date (mammograms yearly after age 40, paps as recommended by your primary provider,  colonoscopy after age 50, earlier if high risk.  -If you are on estrogen, estrogen will be discontinued one month prior to surgery. It may be resumed one month after surgery unless otherwise advised to limit blood clotting risks.  -Pre Operative Lab work-ordered today.    -Structured weight loss visits IF mandated by your  insurance carrier or bariatrician.  -Surgeon consult as we get nearer to potential surgery or sooner if you have special considerations that may make your surgery more complex.  -If you have sleep apnea you will need to be using CPAP for at least one month before surgery to reduce your risk of breathing problems after surgery. Make sure to bring your CPAP or BiPAP to the hospital at the time of surgery.    -You will need to be tobacco free for 2 months before surgery and remain a non-smoker thereafter. If you are currently smoking or have recently quit, your urine will be evaluated for tobacco metabolites pre-operatively.  Smoking is a major risk factor for developing ulceration of your surgical sites and potential severe complications.    -If you are on insulin, you might be referred to an endocrinologist who will manage your insulin during the liquid diet and around the time of surgery. This endocrinologist does not replace your primary provider or your endocrinologist.   -You will need an exercise plan which includes MOVE, ie., walking and MUSCLE, ie.,calisthenics, bands, weight, machines, etc...Maintenance of long term weight loss and quality of life is much improved with regular exercise as the weight comes off.  ______________________________________________________________________    Remember that after your bariatric surgery, vitamin supplementation is a lifelong need.    You will take:    B-12 300-500mcg or higher sublingual (under the tongue) daily or by 1000mcg injection 1-2X/month  D3:  3000- 5000 IUs daily   Multivitamin containing 18mg of iron twice a day  Calcium citrate 1 or 2 daily    To keep your weight off and your vitamin levels up, follow-up is important.    Your labs will be monitored every 6 months for the first two years (every 3 months if you had a duodenal switch) and yearly thereafter.    To avoid ulcers in your stomach avoid tobacco forever, alcohol in excess, caffeine in excess and  "anti-inflammatories (NSAIDS)  (Aspirin, Ibuprofen, Naproxen and similar medications). Tylenol is fine at usual doses on the box.    If you are told by your physician take Aspirin to protect your heart or for another reason, make sure to take omeprazole or similar medication (protonix, nexium, prevacid) to protect your stomach.    Remember that alcohol affects you differently after bariatric surgery. If you have even ONE drink DO NOT DRIVE due to rapid absorption and fast spiking of blood alcohol levels within minutes of consumption.     Slowly Increasing your exercise capacity such that 3-6 months after your surgery you're tolerating 150-250 minutes of exercise weekly will help optimize your metabolism and improve the chance of good weight loss maintenance.                What makes a person succeed with dramatic and sustained weight loss?    It's being at the right point in your life where you feel the need to lose the weight, not because anyone told you so but because of a voice inside of you that says, \"I am ready for this\".  You're now at a point where you may be feeling anxious, irritable and when you look in the mirror you do not recognize the person looking back.  Your healthy self is buried somewhere in that reflexion and you want to free it again.  This is the sort of motivation that leads to success, and it comes from you.    Because the only person that can lose that extra weight is you, I like my patients to focus on the mindset of being in Weight Loss Season.  This gives you permission to make the changes necessary to be consistent with the diet/activity and behavior changes that lead to successful, healthy weight loss.  Nearly any diet plan can work for weight loss, but keeping it healthy and nutrient based to prevent deficiencies/hair loss/fatigue or irritability is vital.  If you have a plan you want to try, we'll work with you to make sure no adjustments are needed to keep you healthy through your " "weight loss season and working with our Bariatric Dieticians you'll be given expert guidance to customize your diet plan to suit your particular needs. If you don't have your own ideas in mind, we are always happy to suggest well researched and validated plans that provide enough food to prevent hunger but still tap into your excess fat reserves and lose weight in a sustainable fashion.  There is great evidence that lean protein/healthy fat intake with good fiber intake while minimizing simple starches/carbs produces reliable/sustainable weight loss in most people. But some feel more connected to an intermittent fasting/fast mimicking or ketogenic diet.  These protocols can be hard for many to stick with and that's why we prefer the protein/healthy fat focused diets but if these alternative strategies appeal to you, we can work with you to optimize your knowledge and results with these tools.    Losing weight is a temporary commitment, but you need to be \"All In\" to have a good weight loss season.  To avoid frustration, you have to be willing to be on track 19 out of 20 days or even better than that. But, weight loss season is generally only 4-8 months in length. After that length of time, it can be hard to maintain a negative calorie balance and our brain, motivations and metabolism will usually bring you to a plateau that cannot be broken in this modern world where other commitments start to take priority. That's when we look to stabilize the weight loss you've achieved.  If you've reached your goal by that time, fantastic, and job well done.  If there is more to go, then after a few months of stabilization, we can usually attack that previous plateau and break into new territory.    Because of this time limitation, we want to really get to work right away and get into a sustainable routine ASAP.  One of the best predictors of how much weight you're going to lose throughout the season is how much you lose in the " "first 6 weeks, so prepare well and jump in with both feet.      Occasionally, people may feel like they cannot commit fully to the changes necessary and may want to change one thing at a time and \"get used to\" the idea of losing weight.  That is OK because that is where they are in their life, and they cannot fully commit for any number of reasons.  It's part of that internal motivation and they just haven't reached PRIORTY NUMBER ONE status yet. It's possible that what they need is more time to reach that point and I am always willing to work with people that want to \"dabble\", but understand, the amount of success obtained with half measures, is much less than half results. Behavior Change cannot occur until we prepare our minds, bodies and environment for what is to come, action!    As you go through your plan, look for things to keep your motivation rolling.  The most successful people have a goal or target/reward that they are working towards.  Having a reward that celebrates your new fitness, mobility and energy is the best sort because it will encourage you to do well with the weight maintenance phase and long term lifestyle changes that promotes keeping the weight off for the long term.  Usually, \"getting healthier\" or improving blood tests or losing weight so your clothes fit better is not as internally motivating as having a tangible reward.  A good weight loss season reward is one that isn't food based, is affordable, but something special:  Something you won't be getting/doing unless you achieve your goal.   Its important to keep to the rule of success:  in order to get the reward, your goal MUST be achieved. Write this reward down, where you can look at it daily and keep it in the front of your mind as you go through your weight loss season and it will help keep you on track.    Tools that help change behavior are vital for success. The most studied and most supported tool for weight loss is nothing more " than writing down your food and weight every day.  Every Day.  Accurately and completely.  When you commit to weight loss season, this information tells you whether you're getting ENOUGH food to fuel your weight loss properly as well as teaches you the interaction between different foods you eat and how your body responds with weight loss.  You'll see that sometimes after a heavy workout you don't see the scale move until 2-3 days later.  How saltier meals (chili for example) may make you retain water for 4-5 days before you see the weight come off, you'll get used to the mini-plateaus that develop after a good 3-8 lb drop in weight as well as how you break through if you keep working the diet as you should.    Weight loss is not a linear process, there are mini ups and downs.  Learning how your body loses weight and getting comfortable with that is very rewarding. The act of writing words on paper also solidifies your will power and commitment to the season of weight loss and that by itself changes your brain chemistry/appetite, motivation and prepares you for maximal success.     Behavior change is all about getting into a new routine.  The old habits and routine have to change because without changing the circumstances of how you gained your weight, it's unlikely you'll enjoy satisfying results. If you have snacking habits, like every time you walk through the kitchen you grab a little something, well, that habit has to change and be replaced by a new habit.  It can be something as simple as keeping a doodle pad on the counter that you make a few scribbles and then walk through the kitchen having not opened the cupboard, or starting with a glass of water and leaving the kitchen without anything else, or checking your food journal to see how many calories you have left for the day.  Boredom is the enemy as are the old habits. Break new ground and try to push those old habits into a deep hole.  There are  "apps/counseling options available that can help with some of the day to day urges/behaviors if you're struggling. One commercial product that does a good job is [employer].  Unfortunately, there is a subscription fee.    Finally, exercise always helps.  While not mandatory to lose weight, every little bit helps and exercise has so many other benefits that to not work it into your plan is to miss out on all the mood, sleep, stress and general health benefits that come from making yourself a little short of breath and sweaty at least 3-4 days out of the week.  The metabolism and calorie burn benefits aside, almost every chronic ailment in medicine gets better with proper, aerobic exercise.  Allow yourself to start slow and let your body prepare itself to accept harder training 4-6 weeks down the road, but start now and commit to a plan.  Whether you have the means to hire a , join a gym or just walk out your front door or go down to your basement for a video workout, get into a exercise routine and  after 3 weeks of at least 3 times a week exercise you should be at a point where you can slowly start ramping up 10% each week to our goal of at least 150-300minutes weekly of aerobic exercise and at least twice weekly resistance training/strenghtening with weights/bands or body weight exercises.     I am a big fan of modifying the free training plan, \"Couch to 5k\", for almost all of my patients. Just type it into [employer] or look it up on your smart phone [employer] store.  To modify the plan,  you can use the training plan for whatever aerobic activity you do (bike/treadmill/elliptical/rower/pool/etc). During the \"jog\" intervals, you just move a little faster or harder or increase the tension or incline.  You use those little intervals to switch up the workout and recruit more muscles and pump the blood a little more and then recover again in the \"walk\" intervals by slowing down, decreasing the incline or turning down the " tension.  3-4 days a week is not that much to ask and the benefits are enormous.  Start slow and develop the base from which you can then build on and reduce the risk of injury.  It's much more important 2-4 months from now to be enjoying your exercise then it is to over exert yourself at the start and hurt yourself.  Starting slowly allows your body to accept the training better down the road when the exercise becomes crucial for weight maintenance.  Without exercise down the road during your maintenance phase, all this hard work you are about to put in can be undone. It usually takes about 100-300 calories a day of exercise to maintain a weight loss and our focus during weight loss season is to generate the routine/activities and hobbies that make that enjoyable/sustainable.    Thanks for taking this first and most important step in your weight loss season.  Commit to it and we will cheerlead you all the way to success.  When things get tough or off track we'll offer guidance and analysis and when you reach your goal we'll celebrate your success.  In the end, it is all about your success, your health and what you do with it.      Wai Anders MD  [facility]  [phone]      MEDICATIONS FOR WEIGHT LOSS  There are several medications available to assist us in weight loss.  By themselves, without compliance to a change in diet and increase in movement/activity these medications are disappointing in their results. However, combined with a closely monitored program of diet change and exercise they can be very effective in controlling appetite and boosting initial weight loss.  All weight loss medications need continual re-evaluation for efficacy as their side effects and health benefits fail to be worthwhile if a person is not continuing to lose weight or in maintaining their healthy weight.  Some weight loss medications are scheduled drugs, meaning there is at least a theoretical  possibility for developing addiction to them but in practice this is rare.  We do anticipate coming off meds in the future after stabilization of weight loss is assurred.  Finally, a tolerance can develop and peoples perceived efficacy of medication can diminish.  In communication with your physician, it may be appropriate to intermittently take a break from these medications and then restart again (few weeks off then restart again) if a plateau is reached that cannot be broken through.  Each person can respond to a medication differently and to be a good option for you, it will need to be affordable, effective and well tolerated with minimal side effects.    In most cases, weight loss progress after one month and three months will be obtained and if a patient is not reaching the satisfactory progress towards weight loss, the medications may be discontinued.  The thought is that if a person is taking a weight loss medication and not receiving the potential health benefit of that drug, the side effects are not worthwhile and use should be discontinued.  On the flip side, there are many people on some weight loss medications for years because it continues to be an effective tool in their weight management and they are tolerating the medication without any long-term side effects.  Each person's response and purpose will be evaluated.      PHENTERMINE (Adipex): approved in 1959 for appetite suppression.  It has stimulant effects and cannot be used with Ritalin, Concerta, or other stimulants.  Although it is not highly addictive, it's chemically related to amphetamines which are addictive.  Occasional dependence can develop, but rarely. The most common side effects are dry mouth, increased energy and concentration, increased pulse, and constipation.  You should not take phentermine if you have glaucoma, hyperthyroidism, or uncontrolled/untreated hypertension or overly anxious. You should stop if dramatic mood swings,  severe insomnia, palpations, chest pains, visual changes or if your Blood Pressure is consistently elevated or any time it's over 160/90.   It's ok to go off the med for a few weeks and restart if efficacy is wearing off.  $24-$30 for 90 tablets at [facility]. Females are required to have reliable birth control to reduce the risk birth defects/miscarriage.      TOPIRAMATE (Topamax): Anti-seizure medication, also used to prevent migraines and sometimes for mood stabilization.  Side effects include paresthesia, glaucoma, altered concentration, attention difficulties, memory and speech problems, metabolic acidosis, depression, increase in body temperature and decrease sweating, risk of kidney stones.  Do not take Topamax while taking Depakote as this can cause high ammonia levels.  You must have reliable birth control as Topamax can cause birth defects.  If prolonged use has occurred it should be tapered off slowly to avoid withdrawal issues.  Insurance usually covers Topiramate.  At higher doses, there may be some confusion/forgetfulness associated with this so we try to limit dose to under 75mg twice daily to reduce this risk. Often covered by insurance as it's used for many reasons.  Topamax will cause carbonated beverages to taste bad. A recheck of your kidney/electrolytes may occur within a few months of starting.    QSYMIA (Phentermine + Topamax):  See above information about phentermine and Topamax.  Most common side effects are paresthesia, dizziness, distortion of taste, insomnia, constipation, and dry mouth.  See above descriptions for the two individual agents.Females are required to have reliable birth control to reduce the risk birth defects/miscarriage.  $150-$220 per month      Liraglutide (Victoza/Saxenda):   Part of the family of Glucagon Like Peptide Agonists, liraglutide directly suppresses appetite and is often used by diabetic patients due to decrease liver production of glucose, thereby  "improving glucose levels.  It also slows how quickly the stomach empties. May be hard to get covered for non diabetics and is an injectable medication delivered via a injector pen. It can be very costly without insurance coverage (over $500/month).  Small risk for pancreatitis and dose should be held if increased mid abdominal pain/burning. It is not to be used if previous Multiple Endocrine Neoplasia. In rodents, may increase risk of thyroid tumors and not indicated for anyone with hx of medullary thyroid cancer as a result.  If changes in voice/swallowing should be discontinued. Reliable birth control required in women.    Contrave (Bupropion/Naltrexone).    Synergistic combination of a mild appetite suppressing anti-depressant (Bupropion) whose effects are increased due to interaction with Naltrexone.  Naltrexone may have some effects on craving and is often used in addiction medicine to help previous opiate addicts be less prone to relapse as it blocks the action of opiates. Should be stopped if any need for opiate pain medication, surgery or planned procedures where you'll be given sedation/anesthesia. If prolonged use recommend stepping down bupropion over 2-3 weeks to limit any risk of withdrawal issues. Side effects may include dry mouth, increased heart rate, mild elevation in Blood pressure;  dizziness, ringing in the ears, anxiety (typically due to bupropion), nausea, constipation, and some get fatigued with naltrexone.  About $210 on [facility] for 120 tabs of \"Contrave\", the brand name without insurance coverage. Generic Bupropion 75mg: $25 for 120 tabs, Naltrexone: $55 for 90 tabs without insurance coverage on [employer] Cannot be used if pregnant/trying to conceive or breast feeding.      Plenity:   Recently available October 2020, by mail order pharmacy only, but expensive. $98/month.  2-3 capsules taken 20 minutes before 2 meals daily provides crystals that expand into a gel that provides a mechanical " "fullness. Gel mixes with your next meal and increases satisfaction by bulking the meal up to feel bigger than it truly is. Plenity is not absorbed and gets passed through the digestive tract and excreted in stools. May cause some bloating/gas/full feeling as it behaves like a fiber in many ways. Cost not available yet. FDA cleared in March of 2019 but not available in stores as of March of 2020. Clearance safety and efficacy data done under \"Gelesis\" name. Not appropriate for people with stomach or bowel motility issues as requires you to pass it through digestive tract. [email or web address].com has more information.    LEAN PROTEIN SOURCES  Getting 20-30 grams of protein, 3 meals daily, is appropriate for most people, some need more but more than about 40 grams per meal is not useful.  General rule is drinking one ounce of water per gram of protein eaten over the course of the day:  70 grams of protein each day, drink 70 oz of water.  Protein Source Portion Calories Grams of Protein                           Nonfat, plain Greek yogurt    (10 grams sugar or less) 3/4 cup (6 oz)  12-17   Light Yogurt (10 grams sugar or less) 3/4 cup (6 oz)  6-8   Protein Shake 1 shake 110-180 15-30   Skim/1% Milk or lactose-free milk 1 cup ( 8 oz)  8   Plain or light, flavored soymilk 1 cup  7-8   Plain or light, hemp milk 1 cup 110 6   Fat Free or 1% Cottage Cheese 1/2 cup 90 15   Part skim ricotta cheese 1/2 cup 100 14   Part skim or reduced fat cheese slices 1 ounce 65-80 8     Mozzarella String Cheese 1 80 8   Canned tuna, chicken, crab or salmon  (canned in water)  1/2 cup 100 15-20   White fish (broiled, grilled, baked) 3 ounces 100 21   [place]/Tuna (broiled, grilled, baked) 3 ounces 150-180 21   Shrimp, Scallops, Lobster, Crab 3 ounces 100 21   Pork loin, Pork Tenderloin 3 ounces 150 21   Boneless, skinless chicken /turkey breast                          (broiled, grilled, baked) 3 ounces 120 21   [place], [place], " [place], and Venison 3 ounces 120 21   Lean cuts of red meat and pork (sirloin,   round, tenderloin, flank, ground 93%-96%) 3 ounces 170 21   Lean or Extra Lean Ground Turkey 1/2 cup 150 20   90-95% Lean Gratis Burger 1 phani 140-180 21   Low-fat casserole with lean meat 3/4 cup 200 17   Luncheon Meats                                                        (turkey, lean ham, roast beef, chicken) 3 ounces 100 21   Egg (boiled, poached, scrambled) 1 Egg 60 7   Egg Substitute 1/2 cup 70 10   Nuts (limit to 1 serving per day)  3 Tbsp. 150 7   Nut Lago (peanut, almond)  Limit to 1 serving or less daily 1 Tbsp. 90 4   Soy Burger (varies) 1  15   Garbanzo, Black, Pinon Beans 1/2 cup 110 7   Refried Beans 1/2 cup 100 7   Kidney and Lima beans 1/2 cup 110 7   Tempeh 3 oz 175 18   Vegan crumbles 1/2 cup 100 14   Tofu 1/2 cup 110 14   Chili (beans and extra lean beef or turkey) 1 cup 200 23   Lentil Stew/Soup 1 cup 150 12   Black Bean Soup 1 cup 175 12         Exercise Guidance    Nearly everything that bothers us gets better when the proper amount of exercise can be done in the proper amounts.  Getting to that level safely and without injury is the key.  When it comes to weight loss, exercise is especially important in maintaining the weight loss.  Unfortunately, one of the harsh realities is that substantial weight loss slows our metabolism, often anywhere from 5-20%.    Our brain always remembers our heaviest weight and we can return to that if we're not mindful and moving regularly.  Our biology doesn't understand the concept of having too much energy, only not having enough.  As such, when we lose weight, it's thought that the brain interprets this as we're ill or in a famine and dials back our metabolism to limit further weight loss.  This is why exercise is so important in keeping the weight off and is the main reason people have some weight regain from their low weight point after weight loss.  We have to make  up that 10-20% of calories not being burned.Since we can restrict our intake for only so long, exercise becomes very important in our long term healthy weigh maintenance to balance out the occasional indiscretion with our diet.    Generally, for every 5% body weight reduction in a weight loss season, a person needs to add  kilocalories of exercise in their daily routine to keep that weight off for the long term.  This is why it's vital to be starting your fitness regimen during weight loss season, so that routine is well established as you move into your maintenance period.    Additionally, all sorts of good enzymes and genes turn on with exercise and our stress, sleep, mood and bodies feel better when we can get to the point of making ourselves a little sweaty and short of breath 35-50 minutes most days of the week. But we have to start with what we can do first and give ourselves permission to work our way up to this goal.    Who isn't ready for exercise? Well, if you get severe dizziness/palpitations, chest pain or short of breath/faint with even minimal activity like walking across a room or you're having to pause while going up a flight of stairs, then getting your heart and/or lungs fully evaluated prior to starting an exercise regimen is recommended. Everyone else can probably start a program, but everyone may start at a different point:  Some can set a 5-10 minute walking goal and others will be able to ride their bike for an hour.      Start with where you're at and look to add 10% more each week until you're at that 150 minutes or more a week (or 75 minutes/week or more of vigorous exercise). Moderate exercise can be estimated as the pace you can carry on a conversation and vigorous is the pace at which you can get 3-5 words out before having to take a breath.  If you're using heart rate monitoring, Moderate is about 60% of your maximum heart rate and vigorous about 75%. (Max heart rate estimated as  "220 beats minus your age:  Example: 220-age of 44 =176 Beats per minute (BPM) maximum. 0.6X 176= 105 BPM (moderate), 132 BMP(vigorous)).    If you like to count steps, the 10,000 steps per day does correlate well with weight maintenance but try to make at least 20-25% of those steps at a brisk pace (like you are about to miss your bus).    Finally, if you are pressed for time, it's important to know that some exercise is better than none.  High Intensity Interval training (HIIT) is a good way to get as much out of a short period of working out. If you can't walk, use the stairs, bike or swim; you could use a punching/arm workout regimen for your activity.  The idea with HIIT is to have a 3-6 minute warm up period of low intensity and the 3-6 \"intervals\" where you push the intensity up and then recover and start the next interval. One study showed that 3 intervals of 20 seconds at \"Maximum Effort\" while either biking on a stationary bike or going up stairs and then having 100 seconds recovery time before the next Maximum Effort was equally as beneficial on cardiovascular fitness development as doing 30 minutes of moderately paced walking 3 days weekly over a 6 week period of time.  So intensity matters. You just need to be able to safely do your desired exercise without injury. There are many great HIIT exercises/routines out there. IF you're not doing much exercise currently, I recommend giving your self 2-3 weeks of moderate exercise, 3 days weekly minimum to get your bones/tendons/muscles used to exercise before going for High Intensity workouts.    If you like to use Apps on the phone, the couch to 5k beryl and 7 minute workout apps are nice places to start if you are reasonably healthy.  There are hundreds of other options out there.  Consider viewing YouVivakorube if gentler exercise/movement is desired. Videos on [employer]her [employer] and chair yoga for seniors exist and are free. Check them out and let's get that 3-4 days a week " routine going.    Let's move!  Wai Anders MD.               Thank you for your interest in Support Group!  We currently have two options for support group but they are in the virtual format only at this time.  Both groups are using Microsoft Teams for their platform and you can access it through the web or an beryl that can be downloaded to a smart phone if you have one.      The Pre- and Post-op Connections Group is on the second Tuesday of the month from 6:30-8pm and is hosted by Yosef Snow, PhD.  If you are interested in this group, you will need to email him at [email or web address] each month and then he will in turn send you the invitation to join.      We also have a Post-op focused Connections Group the 4th Wednesday of the month from 11am-12pm that is mostly geared toward post-operative patients who are past three months post-op.  This group is hosted by MARII Herrera, CBN, CIC and you can email her to join the group at [email or web address] each month and she will send you an invite similar to Yosef's group.  If you decide you would like to be a regular attender at this group, we can add you to an automatic invitation list of people.    You can see more information about available support groups that the [facility] offers to our patients as well by following the link:    [email or web address]      Please let us know if you have any questions.     Thank you,   [facility] B[facility]ric Team    Bariatric Task List  Status:  Is patient a candidate for bariatric surgery?:  patient is a candidate for bariatric surgery -     Cleared to schedule surgeon consult?:  not cleared to schedule surgeon consult -     Status:  surgery evaluation in process -     Surgeon: Yusuf or Michael -        Insurance: Insurance:  [employer] [place]  -     Other:  Call [facility] at [phone] to discuss  "insurance requirements. -        Patient Info: Initial Weight:  399#, 5'7, BMI 62.4 -     Date of Initial Weight/Height:  6/10/2021 -     Goal Weight (lbs):  380 -     Required Weight Loss:  19 pounds -     Surgery Type:  sleeve gastrectomy -        Dietician Visits: Structured weight loss required by insurance?:  structured weight loss required - 6 months consecutive between the dietitian and .   Dietician Visit 1:  Completed - June-   Dietician Visit 2:  Needed -     Dietician Visit 3:  Needed -     Dietician Visit 4:  Needed -     Dietician Visit 5:  Needed -     Dietician Visit 6:  Needed -     Clearance from dietician to see surgeon?:  Needed -        Psychological Evaluation: Psych eval:  Needed - Yosef      Lab Work: Hgb A1c:  Completed - 6.4 on 6/11/2021   Other:  Completed - Initial labs completed 6/11/2021      Consults/ Clearance: Other  Needed - Pap Smear-NEEDED         Smoking: Quit tobacco use (3 months smoke free)?:    - Never smoker      Patient Education:  Information Session:  Completed - 5/11/2021   Given \"Making your decision\" handout?:  Given \"\"Making your decision\"\" handout? -     Given support group information?:  support group contact information provided - send email to [email or web address] to request invite to next meeting   Attended support group?:  Needed -     Support plan in place?:  Completed - Parents, family and friends      Additional Surgery Requirements: Review Coag plan:  Completed - standard   HgA1c <8:  Needed - check with pre-op labs   Birth control plan:  Needed - Need birth control following surgery to prevent pregnancy   Other Requirements:  Actigall-not needed, s/p cholecystectomy -        Final Tasks:  Before surgery online class:  Needed - Pre-surgery w/dietitian and nurse   After surgery online class:  Needed - 1 wk after surgery w/dietitian   Nurse visit for weigh-in and information:  Needed - Call [phone] to make nurse visit to get " official height, weight, measurements and photos   Pre-assessment clinic visit with primary MD for H&P:  Needed - within 30 days of surgery   Final labs and testing:  Needed - done at pre-surgery appt w/primary MD      Notes: preop weight loss/med management. -

## 2021-07-06 VITALS — BODY MASS INDEX: 45.99 KG/M2 | HEIGHT: 67 IN | WEIGHT: 293 LBS

## 2021-07-07 NOTE — PROGRESS NOTES
"Lena Quesada is a 35 y.o. female who is being evaluated via a billable video visit.       How would you like to obtain your AVS? MyChart.  If dropped from the video visit, the video invitation should be resent by: Send to e-mail at: [email or web address]  Will anyone else be joining your video visit? No     Video Start Time: 3:01 PM    Initial Structured Weight Loss Supervised Diet Evaluation     Assessment:  Lena is being seen today for initial RD nutritional evaluation. Patient has been unsuccessful with non-surgical weight loss methods and is interested in bariatric surgery. Today we reviewed current eating habits and level of physical activity, and instructed on the changes that are required for successful bariatric outcomes.    Surgery of interest per pt: LSG.    Workflow review:  Support Group: Not completed.  Psychology:In progress.  Lab work:Completed.  SWL:Yes 2nd Visit       Weight goal: 380 lbs or less.    Anthropometrics:  Pt's Initial Weight: 399 lbs  Weight: (!) 399 lb (181 kg)  Weight loss from initial: 0  % Weight loss: 0 %  Weight (Patient Reported): 399 lb (181 kg)  Height (Patient Reported): 5' 7\" (1.702 m)  BMI (Based on Pt Reported Ht/Wt): 62.49  Current Weight: 399 lbs   BMI: There is no height or weight on file to calculate BMI.   Patient weight not recorded  Estimated RMR ([place]-St Jeor equation):  2540 kcals x 1.2 (sedentary) = 3048 kcals (for weight maintenance)    Medical History:  Patient Active Problem List   Diagnosis     Morbid obesity (H)     Chondromalacia of patellofemoral joint      Diabetes: No   HGBA1c:    Hemoglobin A1c   Date/Time Value Ref Range Status   06/11/2021 04:31 PM 6.4 (H) <=5.6 % Final       Nutrition History  Food allergies/intolerances/cultural or religous food customs: No   Diet history: WW, Paleo/Keto,   Vitamins/Mineral currently taking: Vitamin D, Vitamin B12, MVI, Calcium Citrate,   Socioeconomic Status  Who does the grocery shopping for your " household? Self     Who prepares your meals at home? Self     Diet Recall/Time  Breakfast: skipped or toasted bagel with PB or bowl of cereal or Scrambled Eggs   Lunch: buttered noodles or pasta or grilled chicken with taco seasoning and tortillas or fast food   Dinner: similar to lunch  Typical Snacks: in between meals-chips or popcorn or crackers or saltines with PB   Overnight eating: No  Eating out: has been avoiding in the last 3 week, however prior to that was averaging 4-5 days/week  Beverages  Water 32 oz 4 times/day   Diet Pop 1 can or bottle every 2 weeks    Exercise  No set regimen due to knee pain     Nutrition Diagnosis (PES statement)  Overweight/Obesity (NC 3.3) related to overeating and poor lifestyle habits as evidenced by patient's subjective statements (excessive energy intake, lack of exercise) and BMI of 62.6 kg/m2.     Intervention    Nutrition Education:   1. Provided general overview of diet and lifestyle modifications needed to be a deemed a safe candidate for bariatric surgery.     Food/Nutrient Delivery:  2. Educated patient on eating three meals, with cutting out snacking.  3. Bariatric Plate: Patient and I discussed the importance of including a lean protein source (20-30 grams/meal), vegetables (included at lunch and dinner), one serving (15g) of carbohydrate, and limited added fat (1 tb/day) at each meal.   4. Educated patient on using a protein powder drink as a meal replacement and/or supplement after bariatric surgery.   5. Discussed importance of adequate hydration after surgery, with goal of at least 64 oz of fluids/day.  6. Addressed avoiding all carbonated, caffeinated and sweetened drinks to prepare for bariatric surgery.     Nutrition Counselin. Mindful eating techniques: Encouraged slow meal pace, chewing foods to applesauce consistency for 20-30 minutes/meal.   8. Discussed  fluids 30 minutes before, during, and after meal to prevent dumping syndrome and  discomfort post bariatric surgery.     Instructions/Goals:     1. Start implementing bariatric surgery lifestyle modifications.    Handouts Provided:   [facility] Weight Management Patient Handbook      Monitor/Evaluation:  Pt. s target weight: weight loss to 380 lbs or below/no gain from initial visit, patient verbalized understanding.     Plan for next visit:   Bariatric plate.   Educate on dumping syndrome and reading food labels.  (Final Supervised Diet visit with RD) pre/post-op  diet progression, give review of surgery process.    Video Visit Details:    Type of service:  Video Visit    Video End Time (time video stopped): 3:42 PM  Originating Location (pt. Location): Home    Distant Location (provider location):  [facility] GENERAL SURGERY AND BAR[facility]ATRICS CARE     Platform used for Video Visit: Monster Shin, TIFFANY

## 2021-07-13 ENCOUNTER — TRANSFERRED RECORDS (OUTPATIENT)
Dept: HEALTH INFORMATION MANAGEMENT | Facility: CLINIC | Age: 36
End: 2021-07-13

## 2021-07-14 ENCOUNTER — VIRTUAL VISIT (OUTPATIENT)
Dept: SURGERY | Facility: CLINIC | Age: 36
End: 2021-07-14
Payer: COMMERCIAL

## 2021-07-14 DIAGNOSIS — R73.03 BORDERLINE DIABETES: ICD-10-CM

## 2021-07-14 DIAGNOSIS — E66.813 CLASS 3 SEVERE OBESITY DUE TO EXCESS CALORIES WITH BODY MASS INDEX (BMI) OF 60.0 TO 69.9 IN ADULT, UNSPECIFIED WHETHER SERIOUS COMORBIDITY PRESENT (H): Primary | ICD-10-CM

## 2021-07-14 DIAGNOSIS — K76.0 FATTY LIVER: ICD-10-CM

## 2021-07-14 DIAGNOSIS — E66.01 CLASS 3 SEVERE OBESITY DUE TO EXCESS CALORIES WITH BODY MASS INDEX (BMI) OF 60.0 TO 69.9 IN ADULT, UNSPECIFIED WHETHER SERIOUS COMORBIDITY PRESENT (H): Primary | ICD-10-CM

## 2021-07-14 PROCEDURE — 99203 OFFICE O/P NEW LOW 30 MIN: CPT | Mod: 95 | Performed by: EMERGENCY MEDICINE

## 2021-07-14 NOTE — PROGRESS NOTES
"Lena Quesada is 36 year old  female who presents for a billable video visit today.    How would you like to obtain your AVS? MyChart  If dropped from the video visit, the video invitation should be resent by: Send to e-mail at: [email or web address]  Will anyone else be joining your video visit? No      Video Start Time: 2:20 PM    Provider Notes: Follow up visit for weight management as she progresses towards bariatric surgery.  Surgical intake visit on 6/10/21 at 399 lbs, BMI 62.5. We have a preoperative weight loss goal of getting under 380 lbs. She was found to have borderline diabetes/metabolic syndrome on her intake labs (A1c 6.4%), low vitamin D, Low B12.  She's met with the dietician and has a calculated RMR of 2540kcal/day.  At her intro, after labs returned she was started on metformin (didn't fill) and semaglutide therapy and today she reports weight has come down 4 lbs, 395lbs.   Medication tolerance is good, will be ramping up to 0.5mg/week next week. Taking time w/ meals more. Travelled to [place] last week and was more in control and has come back down in weight. More mindful protein/carb balance.  Vitamin use has been D3 1250mcg boost has been started.  B12 twice now 3 days weekly? Using drops, advised 1000mcg 3 days weekly..  Had Synvisc injection followed by cortisone injection under ultrasound yesterday. Knee feels great today.  Ozempic well tolerated but not using metformin, reviewed using both together and she'll  new rx. Will give a second 0.25mg shot of Ozempic today and next Tuesday start the 0.5mg/week setting and continue on that dose until our follow up in 2 months.  Vital signs:                         Estimated body mass index is 62.49 kg/m  as calculated from the following:    Height as of 6/10/21: 1.702 m (5' 7\").    Weight as of 6/10/21: 181 kg (399 lb).   reported weight today of 395lbs.    Well appearing on exam, no distress. Normal affect/speech/cognition and " respiratory effort. Denies abdominal tenderness. Tolerating abdominal shots well.    Plan:  1. Great work with coming down in weight while on vacation last week. Continued mindful eating  Of 3 meals daily about every 5 hours with good protein and vegetable focus should produce regular/consistent weight reduction.  Aiming for 500-600kcal meals and 25-30g of lean protein per meal should produce good satiety. Daily goal of 1900-2000kcal/day and 80g of lean protein daily should work well for your goals.    2. Start metformin 500mg once daily with supper for 2-3 weeks. If too much intestinal discomfort, reduce to half a tablet for 2 weeks then increase back to one full tablet for 2 more weeks and then, only if tolerating the supper dose, increase to twice daily dosing of 500mg with breakfast and supper.    3. Continue semaglutide therapy for metabolic syndrome and additional appetite suppressant effects to help achieve your preoperative weight loss goal of getting under 380 lbs. More weight loss is fine. We do anticipate some additional reduction with preoperative liquid diet.  You can take a second 0.25mg injection today and then start the 0.5mg/week setting next Tuesday when you're due for your next shot. Stay on this dose until our September follow up.  We may consider a dose increase at that time if needed.    4. Continue your weekly D3 boost until done with the full 12 weeks. Once done, continue with OTC vitamin D3 ql517ume/day.    5. B12 should be 1000mcg 3 days weekly.  Sublingual works best for absorption.    6. Discussed your birth control needs and given current abstinence no need to start meds. If relationship status changes, barrier methods and consideration for IUD or pill use to reduce risk of early pregnancy after bariatric surgery.      Medical Decision Makin minutes spent on the date of the encounter doing chart review, history and exam, documentation and further activities per the  note      Video-Visit Details    Type of service:  Video Visit    Video End Time (time video stopped): 3:50 PM  Originating Location (pt. Location): Home    Distant Location (provider location):  [facility]     Platform used for Video Visit: Apptimize

## 2021-07-14 NOTE — PATIENT INSTRUCTIONS
Plan:  1. Great work with coming down in weight while on vacation last week. Continued mindful eating  Of 3 meals daily about every 5 hours with good protein and vegetable focus should produce regular/consistent weight reduction.  Aiming for 500-600kcal meals and 25-30g of lean protein per meal should produce good satiety. Daily goal of 1900-2000kcal/day and 80g of lean protein daily should work well for your goals.    2. Start metformin 500mg once daily with supper for 2-3 weeks. If too much intestinal discomfort, reduce to half a tablet for 2 weeks then increase back to one full tablet for 2 more weeks and then, only if tolerating the supper dose, increase to twice daily dosing of 500mg with breakfast and supper.    3. Continue semaglutide therapy for metabolic syndrome and additional appetite suppressant effects to help achieve your preoperative weight loss goal of getting under 380 lbs. More weight loss is fine. We do anticipate some additional reduction with preoperative liquid diet.  You can take a second 0.25mg injection today and then start the 0.5mg/week setting next Tuesday when you're due for your next shot. Stay on this dose until our September follow up.  We may consider a dose increase at that time if needed.    4. Continue your weekly D3 boost until done with the full 12 weeks. Once done, continue with OTC vitamin D3 [id number]/day.    5. B12 should be 1000mcg 3 days weekly.  Sublingual works best for absorption.    6. Discussed your birth control needs and given current abstinence no need to start meds. If relationship status changes, barrier methods and consideration for IUD or pill use to reduce risk of early pregnancy after bariatric surgery.            Before being submitted for insurance approval, you will need the following:    -Clearance by the Psychologist  -Clearance by the dietitian  -Attend Support Group ([address] [place] [address] at [facility]) 2nd Tuesday of the month 6:30-8pm. Make sure to sign  in.  -Routine Health Care Maintenance must be up to date (mammograms yearly after age 40, paps as recommended by your primary provider,  colonoscopy after age 50, earlier if high risk.  -If you are on estrogen, estrogen will be discontinued one month prior to surgery. It may be resumed one month after surgery unless otherwise advised to limit blood clotting risks.  -Pre Operative Lab work-ordered today.    -Structured weight loss visits IF mandated by your insurance carrier or bariatrician.  -Surgeon consult as we get nearer to potential surgery or sooner if you have special considerations that may make your surgery more complex.  -If you have sleep apnea you will need to be using CPAP for at least one month before surgery to reduce your risk of breathing problems after surgery. Make sure to bring your CPAP or BiPAP to the hospital at the time of surgery.    -You will need to be tobacco free for 2 months before surgery and remain a non-smoker thereafter. If you are currently smoking or have recently quit, your urine will be evaluated for tobacco metabolites pre-operatively.  Smoking is a major risk factor for developing ulceration of your surgical sites and potential severe complications.    -If you are on insulin, you might be referred to an endocrinologist who will manage your insulin during the liquid diet and around the time of surgery. This endocrinologist does not replace your primary provider or your endocrinologist.   -You will need an exercise plan which includes MOVE, ie., walking and MUSCLE, ie.,calisthenics, bands, weight, machines, etc...Maintenance of long term weight loss and quality of life is much improved with regular exercise as the weight comes off.  ______________________________________________________________________    Remember that after your bariatric surgery, vitamin supplementation is a lifelong need.    You will take:    B-12 300-500mcg or higher sublingual (under the tongue) daily or by  1000mcg injection 1-2X/month  D3:  3000- 5000 IUs daily   Multivitamin containing 18mg of iron twice a day  Calcium citrate 1 or 2 daily    To keep your weight off and your vitamin levels up, follow-up is important.    Your labs will be monitored every 6 months for the first two years (every 3 months if you had a duodenal switch) and yearly thereafter.    To avoid ulcers in your stomach avoid tobacco forever, alcohol in excess, caffeine in excess and anti-inflammatories (NSAIDS)  (Aspirin, Ibuprofen, Naproxen and similar medications). Tylenol is fine at usual doses on the box.    If you are told by your physician take Aspirin to protect your heart or for another reason, make sure to take omeprazole or similar medication (protonix, nexium, prevacid) to protect your stomach.    Remember that alcohol affects you differently after bariatric surgery. If you have even ONE drink DO NOT DRIVE due to rapid absorption and fast spiking of blood alcohol levels within minutes of consumption.     Slowly Increasing your exercise capacity such that 3-6 months after your surgery you're tolerating 150-250 minutes of exercise weekly will help optimize your metabolism and improve the chance of good weight loss maintenance.              LEAN PROTEIN SOURCES  Getting 20-30 grams of protein, 3 meals daily, is appropriate for most people, some need more but more than about 40 grams per meal is not useful.  General rule is drinking one ounce of water per gram of protein eaten over the course of the day:  70 grams of protein each day, drink 70 oz of water.  Protein Source Portion Calories Grams of Protein                           Nonfat, plain Greek yogurt    (10 grams sugar or less) 3/4 cup (6 oz)  12-17   Light Yogurt (10 grams sugar or less) 3/4 cup (6 oz)  6-8   Protein Shake 1 shake 110-180 15-30   Skim/1% Milk or lactose-free milk 1 cup ( 8 oz)  8   Plain or light, flavored soymilk 1 cup  7-8   Plain or light,  hemp milk 1 cup 110 6   Fat Free or 1% Cottage Cheese 1/2 cup 90 15   Part skim ricotta cheese 1/2 cup 100 14   Part skim or reduced fat cheese slices 1 ounce 65-80 8     Mozzarella String Cheese 1 80 8   Canned tuna, chicken, crab or salmon  (canned in water)  1/2 cup 100 15-20   White fish (broiled, grilled, baked) 3 ounces 100 21   [place]/Tuna (broiled, grilled, baked) 3 ounces 150-180 21   Shrimp, Scallops, Lobster, Crab 3 ounces 100 21   Pork loin, Pork Tenderloin 3 ounces 150 21   Boneless, skinless chicken /turkey breast                          (broiled, grilled, baked) 3 ounces 120 21   [place], [place], [place], and Venison 3 ounces 120 21   Lean cuts of red meat and pork (sirloin,   round, tenderloin, flank, ground 93%-96%) 3 ounces 170 21   Lean or Extra Lean Ground Turkey 1/2 cup 150 20   90-95% Lean [place] Burger 1 phani 140-180 21   Low-fat casserole with lean meat 3/4 cup 200 17   Luncheon Meats                                                        (turkey, lean ham, roast beef, chicken) 3 ounces 100 21   Egg (boiled, poached, scrambled) 1 Egg 60 7   Egg Substitute 1/2 cup 70 10   Nuts (limit to 1 serving per day)  3 Tbsp. 150 7   Nut [place] (peanut, almond)  Limit to 1 serving or less daily 1 Tbsp. 90 4   Soy Burger (varies) 1  15   Garbanzo, Black, Pinon Beans 1/2 cup 110 7   Refried Beans 1/2 cup 100 7   Kidney and Lima beans 1/2 cup 110 7   Tempeh 3 oz 175 18   Vegan crumbles 1/2 cup 100 14   Tofu 1/2 cup 110 14   Chili (beans and extra lean beef or turkey) 1 cup 200 23   Lentil Stew/Soup 1 cup 150 12   Black Bean Soup 1 cup 175 12

## 2021-07-14 NOTE — Clinical Note
Progressing well in preop weight loss phase. I updated episode of care some (not sure if old epic carry over). Abstinence is her birth control and will take on new form if needed after surgery. Anticipate coming off both semaglutide and metformin preop.  Wai Anders MD

## 2021-07-14 NOTE — LETTER
7/14/2021         RE: Lena Quesada  [address]  [place] [address]        Dear Colleague,    Thank you for referring your patient, Lena Quesada, to the [facility] SURGERY CLIN[facility] AND BARIATRICS CARE [place]. Please see a copy of my visit note below.    Lena Quesada is 36 year old  female who presents for a billable video visit today.    How would you like to obtain your AVS? MyChart  If dropped from the video visit, the video invitation should be resent by: Send to e-mail at: [email or web address]  Will anyone else be joining your video visit? No      Video Start Time: 2:20 PM    Provider Notes: Follow up visit for weight management as she progresses towards bariatric surgery.  Surgical intake visit on 6/10/21 at 399 lbs, BMI 62.5. We have a preoperative weight loss goal of getting under 380 lbs. She was found to have borderline diabetes/metabolic syndrome on her intake labs (A1c 6.4%), low vitamin D, Low B12.  She's met with the dietician and has a calculated RMR of 2540kcal/day.  At her intro, after labs returned she was started on metformin (didn't fill) and semaglutide therapy and today she reports weight has come down 4 lbs, 395lbs.   Medication tolerance is good, will be ramping up to 0.5mg/week next week. Taking time w/ meals more. Travelled to [place] last week and was more in control and has come back down in weight. More mindful protein/carb balance.  Vitamin use has been D3 1250mcg boost has been started.  B12 twice now 3 days weekly? Using drops, advised 1000mcg 3 days weekly..  Had Synvisc injection followed by cortisone injection under ultrasound yesterday. Knee feels great today.  Ozempic well tolerated but not using metformin, reviewed using both together and she'll  new rx. Will give a second 0.25mg shot of Ozempic today and next Tuesday start the 0.5mg/week setting and continue on that dose until our follow up in 2 months.  Vital signs:            "              Estimated body mass index is 62.49 kg/m  as calculated from the following:    Height as of 6/10/21: 1.702 m (5' 7\").    Weight as of 6/10/21: 181 kg (399 lb).   reported weight today of 395lbs.    Well appearing on exam, no distress. Normal affect/speech/cognition and respiratory effort. Denies abdominal tenderness. Tolerating abdominal shots well.    Plan:  1. Great work with coming down in weight while on vacation last week. Continued mindful eating  Of 3 meals daily about every 5 hours with good protein and vegetable focus should produce regular/consistent weight reduction.  Aiming for 500-600kcal meals and 25-30g of lean protein per meal should produce good satiety. Daily goal of 1900-2000kcal/day and 80g of lean protein daily should work well for your goals.    2. Start metformin 500mg once daily with supper for 2-3 weeks. If too much intestinal discomfort, reduce to half a tablet for 2 weeks then increase back to one full tablet for 2 more weeks and then, only if tolerating the supper dose, increase to twice daily dosing of 500mg with breakfast and supper.    3. Continue semaglutide therapy for metabolic syndrome and additional appetite suppressant effects to help achieve your preoperative weight loss goal of getting under 380 lbs. More weight loss is fine. We do anticipate some additional reduction with preoperative liquid diet.  You can take a second 0.25mg injection today and then start the 0.5mg/week setting next Tuesday when you're due for your next shot. Stay on this dose until our September follow up.  We may consider a dose increase at that time if needed.    4. Continue your weekly D3 boost until done with the full 12 weeks. Once done, continue with OTC vitamin D3 [id number]/day.    5. B12 should be 1000mcg 3 days weekly.  Sublingual works best for absorption.    6. Discussed your birth control needs and given current abstinence no need to start meds. If relationship status changes, " barrier methods and consideration for IUD or pill use to reduce risk of early pregnancy after bariatric surgery.      Medical Decision Makin minutes spent on the date of the encounter doing chart review, history and exam, documentation and further activities per the note      Video-Visit Details    Type of service:  Video Visit    Video End Time (time video stopped): 3:50 PM  Originating Location (pt. Location): Home    Distant Location (provider location):  [facility] SURGERY [facility] AND BARIATRICS [facility]     Platform used for Video Visit: AmWell      Again, thank you for allowing me to participate in the care of your patient.        Sincerely,        Wai Anders MD

## 2021-07-20 ENCOUNTER — MYC MEDICAL ADVICE (OUTPATIENT)
Dept: SURGERY | Facility: CLINIC | Age: 36
End: 2021-07-20

## 2021-07-20 DIAGNOSIS — E66.1 CLASS 3 DRUG-INDUCED OBESITY WITH SERIOUS COMORBIDITY AND BODY MASS INDEX (BMI) OF 60.0 TO 69.9 IN ADULT (H): ICD-10-CM

## 2021-07-20 DIAGNOSIS — R73.03 BORDERLINE DIABETES: ICD-10-CM

## 2021-07-20 DIAGNOSIS — E66.813 CLASS 3 DRUG-INDUCED OBESITY WITH SERIOUS COMORBIDITY AND BODY MASS INDEX (BMI) OF 60.0 TO 69.9 IN ADULT (H): ICD-10-CM

## 2021-07-29 ENCOUNTER — VIRTUAL VISIT (OUTPATIENT)
Dept: SURGERY | Facility: CLINIC | Age: 36
End: 2021-07-29
Payer: COMMERCIAL

## 2021-07-29 DIAGNOSIS — E66.01 MORBID OBESITY (H): Primary | ICD-10-CM

## 2021-07-29 NOTE — LETTER
7/29/2021         RE: Lena Quesada  [address]  [place] [address]        Dear Colleague,    Thank you for referring your patient, Lena Quesada, to the [facility] SURGERY [facility] AND BARIATRICS [facility]. Please see a copy of my visit note below.    Video-Visit Details    Type of service:  Video Visit    Video Start Time (time video started): 7:23 AM    Video End Time (time video stopped): 7:49 AM    Originating Location (pt. Location): Home    Distant Location (provider location):  [facility]     Mode of Communication:  Video Conference via [facility]    Physician has received verbal consent for a Video Visit from the patient? Yes    Lena noted that being on Ozempic and Metformin has suppressed her appetite. She also admits that she needs to work on her picky eating behaviors. On the other hand, I feel these are behaviors that she can continue to work on with the bariatric dietitian. At this point, she has been much more mindful about her eating and there are no contraindications to bariatric surgery from a psychosocial perspective. 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        Sincerely,        Yosef Snow, PhD

## 2021-07-29 NOTE — PROGRESS NOTES
Video-Visit Details    Type of service:  Video Visit    Video Start Time (time video started): 7:23 AM    Video End Time (time video stopped): 7:49 AM    Originating Location (pt. Location): Home    Distant Location (provider location):  [facility] SURGERY [facility] AND BARIATRICS [facility]     Mode of Communication:  Video Conference via [employer]    Physician has received verbal consent for a Video Visit from the patient? Yes    Lena noted that being on Ozempic and Metformin has suppressed her appetite. She also admits that she needs to work on her picky eating behaviors. On the other hand, I feel these are behaviors that she can continue to work on with the bariatric dietitian. At this point, she has been much more mindful about her eating and there are no contraindications to bariatric surgery from a psychosocial perspective. 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

## 2021-08-09 ENCOUNTER — VIRTUAL VISIT (OUTPATIENT)
Dept: SURGERY | Facility: CLINIC | Age: 36
End: 2021-08-09
Payer: COMMERCIAL

## 2021-08-09 DIAGNOSIS — K76.0 FATTY LIVER: ICD-10-CM

## 2021-08-09 DIAGNOSIS — E66.813 CLASS 3 SEVERE OBESITY DUE TO EXCESS CALORIES WITH BODY MASS INDEX (BMI) OF 60.0 TO 69.9 IN ADULT, UNSPECIFIED WHETHER SERIOUS COMORBIDITY PRESENT (H): Primary | ICD-10-CM

## 2021-08-09 DIAGNOSIS — Z71.3 NUTRITIONAL COUNSELING: ICD-10-CM

## 2021-08-09 DIAGNOSIS — E66.01 CLASS 3 SEVERE OBESITY DUE TO EXCESS CALORIES WITH BODY MASS INDEX (BMI) OF 60.0 TO 69.9 IN ADULT, UNSPECIFIED WHETHER SERIOUS COMORBIDITY PRESENT (H): Primary | ICD-10-CM

## 2021-08-09 PROCEDURE — 97803 MED NUTRITION INDIV SUBSEQ: CPT | Mod: 95 | Performed by: DIETITIAN, REGISTERED

## 2021-08-09 NOTE — PROGRESS NOTES
"Lena Quesada is a 36 year old who is being evaluated via a billable video visit.      How would you like to obtain your AVS? MyChart  If the video visit is dropped, the invitation should be resent by: Send to e-mail at: [email or web address]  Will anyone else be joining your video visit? No      Video Start Time: 3:47 PM      Follow Up Surgical Weight Loss Supervised Diet Evaluation    Assessment:  Pt. is being seen today for a follow up RD nutritional evaluation. Pt. has been unsuccessful with non-surgical weight loss methods and is interested in bariatric surgery. Today we reviewed current eating habits and level of physical activity, and instructed on the changes that are required for successful bariatric outcomes.    Surgery of interest per pt: LSG.    Workflow review:  Support Group: Completed.  Psychology:Completed.  Lab work:Completed.  SWL:Yes 3/6      Weight goal: 380 lbs or less .    Anthropometrics:  Initial Weight: 399 lbs   Current Weight: 384.8 lbs   BMI: There is no height or weight on file to calculate BMI.   Ht Readings from Last 2 Encounters:   06/10/21 1.702 m (5' 7\")   01/13/21 1.715 m (5' 7.5\")     Wt Readings from Last 2 Encounters:   06/10/21 (!) 181 kg (399 lb)   02/05/21 (!) 179.3 kg (395 lb 4 oz)     No height and weight on file for this encounter.    Ideal body weight: 61.6 kg (135 lb 12.9 oz)  Adjusted ideal body weight: 109.4 kg (241 lb 1.3 oz)    Estimated RMR (Jber-St Jeor equation):  2471 kcals x 1.3 (light active) = 3212 kcals (for weight maintenance)    Medical History:  Patient Active Problem List   Diagnosis     Morbid obesity (H)     Chondromalacia of patellofemoral joint      Diabetes: No   HbA1c:  No results found for: HGBA1C    Progress over past month: Has been focusing on eating 3 small meals/day, focusing on protein first along with incorporating more vegetables and fruit into the diet. Has been taking all of her vitamins on a regular basis as well (including " MVI, Vitamin D, Vitamin B12, Calcium Citrate).      Meal duration: has been working on, being conscious      fluids by 30 minutes before, during meal, and waiting 30 minutes after meal before drinking fluids: Yes-finding very tough, however trying to be consistent    Beverages  Water 4-5 bottles/day (32 oz bottle), occasional cup of milk     Exercise  Walking 2-3 times/week for a total of 10 blocks at a time due to knee issues, some stairs     PES statement:   Overweight/Obesity (NC 3.3) related to overeating and poor lifestyle habits as evidenced by patient's subjective statements (h/o excessive energy intake, lack of exercise) and BMI of 60.4 kg/m2.     Intervention    Nutrition Education:   1. Provided general overview of diet and lifestyle modifications needed to be a deemed a safe candidate for bariatric surgery.   2. Educated patient on how to read a food label: choosing foods with than 10 grams fat and 10 grams sugar per serving to avoid dumping syndrome.  3. Dumping Syndrome: Described the mechanisms of syndrome, symptoms, and prevention tools from a dietary perspective.       Food/Nutrient Delivery:  4. Educated patient on eating three meals, with cutting out snacking.  5. Bariatric Plate: Patient and I discussed the importance of including a lean protein source (20-30 grams/meal), vegetables (included at lunch and dinner), one serving (15g) of carbohydrate, and limited added fat (1 tb/day) at each meal.   6. Educated patient on using a protein powder drink as a meal replacement and/or supplement after bariatric surgery.   7. Discussed importance of adequate hydration after surgery, with goal of at least 64 oz of fluids/day.  8. Addressed avoiding all carbonated, caffeinated and sweetened drinks to prepare for bariatric surgery.     Nutrition Counselin. Mindful eating techniques: Encouraged slow meal pace, chewing foods to applesauce consistency for 20-30 minutes/meal.   10. Discussed   fluids 30 minutes before, during, and after meal to prevent dumping syndrome and discomfort post bariatric surgery.     Instructions/Goals:     1. Continue implementing bariatric surgery lifestyle modifications.    Handouts Provided:   [facility] Weight Management Patient Handbook    Monitor/Evaluation:  Pt. s target weight:  weight loss to 380 lbs or less/no gain from initial visit, pt. verbalized understanding.       Plan for next visit:   (Final supervised diet visit with RD) pre/post-op  diet progression, give review of surgery process.      Video-Visit Details    Type of service:  Video Visit    Video End Time:4:21 PM    Originating Location (pt. Location): Home    Distant Location (provider location):  [facility] SURGERY CLINIC AND BARIATRICS CARE [place]     Platform used for Video Visit: Monster Shin RD

## 2021-08-09 NOTE — LETTER
"    8/9/2021         RE: Lena Quesada  [address]  [place] [address]        Dear Colleague,    Thank you for referring your patient, Lena Quesada, to the [facility] SURGERY CLINIC AND BARIATRICS CARE [place]. Please see a copy of my visit note below.    Lena Quesada is a 36 year old who is being evaluated via a billable video visit.      How would you like to obtain your AVS? MyChart  If the video visit is dropped, the invitation should be resent by: Send to e-mail at: [email or web address]  Will anyone else be joining your video visit? No      Video Start Time: 3:47 PM      Follow Up Surgical Weight Loss Supervised Diet Evaluation    Assessment:  Pt. is being seen today for a follow up RD nutritional evaluation. Pt. has been unsuccessful with non-surgical weight loss methods and is interested in bariatric surgery. Today we reviewed current eating habits and level of physical activity, and instructed on the changes that are required for successful bariatric outcomes.    Surgery of interest per pt: LSG.    Workflow review:  Support Group: Completed.  Psychology:Completed.  Lab work:Completed.  SWL:Yes 3/6      Weight goal: 380 lbs or less .    Anthropometrics:  Initial Weight: 399 lbs   Current Weight: 384.8 lbs   BMI: There is no height or weight on file to calculate BMI.   Ht Readings from Last 2 Encounters:   06/10/21 1.702 m (5' 7\")   01/13/21 1.715 m (5' 7.5\")     Wt Readings from Last 2 Encounters:   06/10/21 (!) 181 kg (399 lb)   02/05/21 (!) 179.3 kg (395 lb 4 oz)     No height and weight on file for this encounter.    Ideal body weight: 61.6 kg (135 lb 12.9 oz)  Adjusted ideal body weight: 109.4 kg (241 lb 1.3 oz)    Estimated RMR ([place]-St Jeor equation):  2471 kcals x 1.3 (light active) = 3212 kcals (for weight maintenance)    Medical History:  Patient Active Problem List   Diagnosis     Morbid obesity (H)     Chondromalacia of patellofemoral joint    "   Diabetes: No   HbA1c:  No results found for: HGBA1C    Progress over past month: Has been focusing on eating 3 small meals/day, focusing on protein first along with incorporating more vegetables and fruit into the diet. Has been taking all of her vitamins on a regular basis as well (including MVI, Vitamin D, Vitamin B12, Calcium Citrate).      Meal duration: has been working on, being conscious      fluids by 30 minutes before, during meal, and waiting 30 minutes after meal before drinking fluids: Yes-finding very tough, however trying to be consistent    Beverages  Water 4-5 bottles/day (32 oz bottle), occasional cup of milk     Exercise  Walking 2-3 times/week for a total of 10 blocks at a time due to knee issues, some stairs     PES statement:   Overweight/Obesity (NC 3.3) related to overeating and poor lifestyle habits as evidenced by patient's subjective statements (h/o excessive energy intake, lack of exercise) and BMI of 60.4 kg/m2.     Intervention    Nutrition Education:   1. Provided general overview of diet and lifestyle modifications needed to be a deemed a safe candidate for bariatric surgery.   2. Educated patient on how to read a food label: choosing foods with than 10 grams fat and 10 grams sugar per serving to avoid dumping syndrome.  3. Dumping Syndrome: Described the mechanisms of syndrome, symptoms, and prevention tools from a dietary perspective.       Food/Nutrient Delivery:  4. Educated patient on eating three meals, with cutting out snacking.  5. Bariatric Plate: Patient and I discussed the importance of including a lean protein source (20-30 grams/meal), vegetables (included at lunch and dinner), one serving (15g) of carbohydrate, and limited added fat (1 tb/day) at each meal.   6. Educated patient on using a protein powder drink as a meal replacement and/or supplement after bariatric surgery.   7. Discussed importance of adequate hydration after surgery, with goal of at least 64  oz of fluids/day.  8. Addressed avoiding all carbonated, caffeinated and sweetened drinks to prepare for bariatric surgery.     Nutrition Counselin. Mindful eating techniques: Encouraged slow meal pace, chewing foods to applesauce consistency for 20-30 minutes/meal.   10. Discussed  fluids 30 minutes before, during, and after meal to prevent dumping syndrome and discomfort post bariatric surgery.     Instructions/Goals:     1. Continue implementing bariatric surgery lifestyle modifications.    Handouts Provided:   [facility] Weight Management Patient Handbook    Monitor/Evaluation:  Pt. s target weight:  weight loss to 380 lbs or less/no gain from initial visit, pt. verbalized understanding.       Plan for next visit:   (Final supervised diet visit with RD) pre/post-op  diet progression, give review of surgery process.      Video-Visit Details    Type of service:  Video Visit    Video End Time:4:21 PM    Originating Location (pt. Location): Home    Distant Location (provider location):  [facility] SURGERY CLINIC AND BARIATRICS CARE [place]     Platform used for Video Visit: Monster Shin RD          Again, thank you for allowing me to participate in the care of your patient.        Sincerely,        Rosalva Shin RD

## 2021-09-16 ENCOUNTER — VIRTUAL VISIT (OUTPATIENT)
Dept: SURGERY | Facility: CLINIC | Age: 36
End: 2021-09-16
Payer: COMMERCIAL

## 2021-09-16 VITALS — HEIGHT: 67 IN | BODY MASS INDEX: 45.99 KG/M2 | WEIGHT: 293 LBS

## 2021-09-16 DIAGNOSIS — E66.813 CLASS 3 SEVERE OBESITY DUE TO EXCESS CALORIES WITH BODY MASS INDEX (BMI) OF 60.0 TO 69.9 IN ADULT, UNSPECIFIED WHETHER SERIOUS COMORBIDITY PRESENT (H): Primary | ICD-10-CM

## 2021-09-16 DIAGNOSIS — K76.0 FATTY LIVER: ICD-10-CM

## 2021-09-16 DIAGNOSIS — E66.01 CLASS 3 SEVERE OBESITY DUE TO EXCESS CALORIES WITH BODY MASS INDEX (BMI) OF 60.0 TO 69.9 IN ADULT, UNSPECIFIED WHETHER SERIOUS COMORBIDITY PRESENT (H): Primary | ICD-10-CM

## 2021-09-16 DIAGNOSIS — R73.03 BORDERLINE DIABETES: ICD-10-CM

## 2021-09-16 PROCEDURE — 99214 OFFICE O/P EST MOD 30 MIN: CPT | Mod: 95 | Performed by: EMERGENCY MEDICINE

## 2021-09-16 ASSESSMENT — MIFFLIN-ST. JEOR: SCORE: 2444.03

## 2021-09-16 NOTE — PATIENT INSTRUCTIONS
Impression     36 year old female with Body mass index is 59.44 kg/m . in the setting of morbid obesity which satisfies the NIH criteria for bariatric surgery. She is progressing through the program without difficulty, has already met weight loss goal with her reported weight of 379 lbs today, down 20 lbs and reaching preop goal of getting under 380lbs after intake weight of 399 w/ BMI of 62.5. BMI now 59.4.     Toleration of metformin is getting better (500mg twice daily) and 0.5mg semaglutide once weekly. She'll stay on both through the duration of her structured weight loss phase the next 2-3 months and can discontinue both preop (semaglutide w/ liquid diet and metformin 1-2 days preop).     Continues to improve on avoiding soda pop.     D3 boost is done, recommended she start 125mcg/day now, continue her twice weekly B12 and once daily multivitamin should suffice. She can hold off on extra calcium right now     Follow up as planned with dietician the next couple of months and I anticipate getting your surgeon meeting as planned toward the end of the year.    She'll update her papsmear this Fall with her PCP.             Before being submitted for insurance approval, you will need the following:    -Clearance by the Psychologist  -Clearance by the dietitian  -Attend Support Group ([address] [place] [address] at [facility]) 2nd Tuesday of the month 6:30-8pm. Make sure to sign in.  -Routine Health Care Maintenance must be up to date (mammograms yearly after age 40, paps as recommended by your primary provider,  colonoscopy after age 50, earlier if high risk.  -If you are on estrogen, estrogen will be discontinued one month prior to surgery. It may be resumed one month after surgery unless otherwise advised to limit blood clotting risks.  -Pre Operative Lab work-ordered today.    -Structured weight loss visits IF mandated by your insurance carrier or bariatrician.  -Surgeon consult as we get nearer to potential surgery or  sooner if you have special considerations that may make your surgery more complex.  -If you have sleep apnea you will need to be using CPAP for at least one month before surgery to reduce your risk of breathing problems after surgery. Make sure to bring your CPAP or BiPAP to the hospital at the time of surgery.    -You will need to be tobacco free for 2 months before surgery and remain a non-smoker thereafter. If you are currently smoking or have recently quit, your urine will be evaluated for tobacco metabolites pre-operatively.  Smoking is a major risk factor for developing ulceration of your surgical sites and potential severe complications.    -If you are on insulin, you might be referred to an endocrinologist who will manage your insulin during the liquid diet and around the time of surgery. This endocrinologist does not replace your primary provider or your endocrinologist.   -You will need an exercise plan which includes MOVE, ie., walking and MUSCLE, ie.,calisthenics, bands, weight, machines, etc...Maintenance of long term weight loss and quality of life is much improved with regular exercise as the weight comes off.  ______________________________________________________________________    Remember that after your bariatric surgery, vitamin supplementation is a lifelong need.    You will take after surgery similar to what you're taking now, Lena, but add in the calcium depending upon the type of surgery you wind up getting.    B-12 300-500mcg or higher sublingual (under the tongue) daily or by 1000mcg injection 1-2X/month  D3:  3000- 5000 IUs daily   Multivitamin containing 18mg of iron twice a day  Calcium citrate 1 or 2 daily    To keep your weight off and your vitamin levels up, follow-up is important.    Your labs will be monitored every 6 months for the first two years (every 3 months if you had a duodenal switch) and yearly thereafter.    To avoid ulcers in your stomach avoid tobacco forever,  alcohol in excess, caffeine in excess and anti-inflammatories (NSAIDS)  (Aspirin, Ibuprofen, Naproxen and similar medications). Tylenol is fine at usual doses on the box.    If you are told by your physician take Aspirin to protect your heart or for another reason, make sure to take omeprazole or similar medication (protonix, nexium, prevacid) to protect your stomach.    Remember that alcohol affects you differently after bariatric surgery. If you have even ONE drink DO NOT DRIVE due to rapid absorption and fast spiking of blood alcohol levels within minutes of consumption.     Slowly Increasing your exercise capacity such that 3-6 months after your surgery you're tolerating 150-250 minutes of exercise weekly will help optimize your metabolism and improve the chance of good weight loss maintenance.

## 2021-09-16 NOTE — PROGRESS NOTES
Lena Quesada is 36 year old  female who presents for a billable video visit today.    How would you like to obtain your AVS? MyChart  If dropped from the video visit, the video invitation should be resent by: Send to e-mail at: [email or web address]  Will anyone else be joining your video visit? No      Video Start Time: 3:15 pm3:17 PM      Provider Notes:   Outpatient Bariatric Medicine Progress Note-Structured Weight Loss   Indication: Medical bariatric therapy to precede bariatric surgery    Surgery:  TBD but given borderline diabetes, consideration for gastric bypass vs AVELINO would likely be most beneficial for her metabolic disease vs consideration of staged LSG w/ conversion to AVELINO if needed.    Surgeon: TBD    Impression     36 year old female with Body mass index is 59.44 kg/m . in the setting of morbid obesity which satisfies the NIH criteria for bariatric surgery. She is progressing through the program without difficulty, has already met weight loss goal with her reported weight of 379 lbs today, down 20 lbs and reaching preop goal of getting under 380lbs after intake weight of 399 w/ BMI of 62.5. BMI now 59.4.     Toleration of metformin is getting better (500mg twice daily) and 0.5mg of injected Semaglutide once weekly. She'll stay on both through the duration of her structured weight loss phase the next 2-3 months and can discontinue both preop (semaglutide w/ liquid diet and metformin 1-2 days preop).     Continues to improve on avoiding soda pop.     D3 boost is done, recommended she start 125mcg/day now, continue her twice weekly B12 and once daily multivitamin should suffice. She can hold off on extra calcium right now     Follow up as planned with dietician the next couple of months and I anticipate getting your surgeon meeting as planned toward the end of the year.    She'll update her papsmear this Fall with her PCP.       Comorbidities:  Patient Active Problem List   Diagnosis      "Morbid obesity (H)     Chondromalacia of patellofemoral joint       Plan   Weight will need to be under 380lbs prior to the 2 week pre-operative diet.  Heparin Protocol: standard  CPAP: none.  Actigall: no need, cholecystectomy  Exercise Plan: walking a bit more. More steps.3-4 nights   Contraception Plan: none.  Agrees to take vitamins for Life: yes  Agrees to follow up for life: yes  Medication Management: anticipate stoppage of semaglutide w/ liquid diet and stopping metformin 2 days preop if tolerating.  Preop lab evaluation has been done, using B12, D3 can start at 125mcg/day after boosting in June. .  Past Surgical History        Past Surgical History:   Procedure Laterality Date     CHOLECYSTECTOMY  2005     TYMPANOPLASTY       WISDOM TOOTH EXTRACTION         Family History, Social History   Reviewed as documented. See time and date confirmation.  Using bariatric group online.  Interim History/Pre-op needs list    Initial Labs Complete and Reviewed: yes  Dietitian Visits Initiated/cleared: pending visit next month  Weight Change since initial weight: down 20 lbs, has met goal (reported weight today). Weighs in on Saturdays at [facility]  Psychologist visits initiated/cleared: yes  HCM UTD:    Pap: needed.   Mammogram: none needed   Colonscopy:  n/a   Other: NA  Sugars Well Controlled: tolerating meds, A1c of 6.4%.  Cardiac: n/o  Pulmonary: n/a  Hormone use: semaglutide.  Birth control none..  Other: feeling well.  Medications and Allergies      Current Outpatient Medications   Medication Sig Dispense Refill     cyanocobalamin, vitamin B-12, 1,000 mcg Subl [CYANOCOBALAMIN, VITAMIN B-12, 1,000 MCG SUBL] Use once daily for 14 days then reduce to 3 days weekly thereafter. 90 each 1     metFORMIN (GLUCOPHAGE) 500 MG tablet Take 1 tablet (500 mg) by mouth 2 times daily (with meals) 180 tablet 1     pen needle, diabetic (PEN NEEDLE) 32 gauge x 5/32\" Ndle [PEN NEEDLE, DIABETIC (PEN NEEDLE) 32 GAUGE X 5/32\" NDLE] For use " "with pen injector. 90 each 3     Allergies: Patient has no known allergies.    Habits   Tobacco Use: no  Alcohol Use: stopped in April.   NSAIDS: occasionally. Mostly tylenol now.   Caffeine: cut back on soda, water. No coffee.   SLEEP: 7-8. Work from home at least through the end of the year.  CPAP: no  PHYSICAL ACTIVITY PATTERNS:  Cardiovascular: walking  Strength Training: weights  Dietary History   Reviewed proper bariatric method again today.  The patient has been working with our bariatric dieticians and has working on changes w/ resultant weight loss..    As far as distracted eating/grazing, getting 3 meals daily, avoiding empty calories and optimizing their protein intake, going well. .    Review of Systems       GASTROINTESTINAL:  GERD/Heartburn: none  Gallbladder: removed.   Some nausea initially w/ metformin, has improved.  ENDOCRINE:  A1c of 6.4% on June labs. Tolerating metformin and semaglutide.  DERMATOLOGIC:  Rashes: none.  Physical Exam   Vitals: Ht 1.702 m (5' 7\")   Wt (!) 172.1 kg (379 lb 8 oz)   BMI 59.44 kg/m    Body mass index is 59.44 kg/m .  GENERAL: appears her stated age. Ambulation is independent on video.  NECK:thick.  HEART:n/a  LUNGS: Clear speech, no cough.  MUSCULOSKELETAL: no obvious deformities  Krystal color, no pallor. No jaundice.  Smiling affect, happy with progress.    Counseling     We discussed the National Weight Control Registry and Healthy Weight Maintenance Strategies.   We discussed ways to optimize her metabolism.  We discussed specific exercise goals and dietary habits conducive to a healthy weight.  We discussed the importance of lifelong vitamin supplementation and the potential medical complications of vitamin non-compliance.  We discussed the importance of restorative sleep and stress management in maintaining a healthy weight.  I reminded her to avoid alcohol for 1 year post-operatively, to avoid NSAIDS for life unless specifically indicated and used with a " concomitant PPI, to avoid caffeine in excess, and explained the importance of lifelong tobacco abstinence.  Medical Decision Makin minutes spent on the date of the encounter doing chart review, history and exam, documentation and further activities per the note      Wai Anders MD  [facility] Bariatric Care [facility] Surgery [facility]  2021  3:17 PM      Video-Visit Details    Type of service:  Video Visit    Video End Time (time video stopped): 3:50 PM  Originating Location (pt. Location): Home    Distant Location (provider location):  [facility] SURGERY CLINIC AND BARIATRICS CARE [place]     Platform used for Video Visit: Vestec

## 2021-09-16 NOTE — LETTER
9/16/2021         RE: Lena Quesada  [address]  [place] [address]        Dear Colleague,    Thank you for referring your patient, Lena Quesada, to the [facility] SURGERY CLINIC AND BARIATRICS CARE [place]. Please see a copy of my visit note below.    Lena Quesada is 36 year old  female who presents for a billable video visit today.    How would you like to obtain your AVS? MyChart  If dropped from the video visit, the video invitation should be resent by: Send to e-mail at: [email or web address]  Will anyone else be joining your video visit? No      Video Start Time: 3:15 pm3:17 PM      Provider Notes:   Outpatient Bariatric Medicine Progress Note-Structured Weight Loss   Indication: Medical bariatric therapy to precede bariatric surgery    Surgery:  TBD but given borderline diabetes, consideration for gastric bypass vs AVELINO would likely be most beneficial for her metabolic disease vs consideration of staged LSG w/ conversion to AVELINO if needed.    Surgeon: TBD    Impression     36 year old female with Body mass index is 59.44 kg/m . in the setting of morbid obesity which satisfies the NIH criteria for bariatric surgery. She is progressing through the program without difficulty, has already met weight loss goal with her reported weight of 379 lbs today, down 20 lbs and reaching preop goal of getting under 380lbs after intake weight of 399 w/ BMI of 62.5. BMI now 59.4.     Toleration of metformin is getting better (500mg twice daily) and 0.5mg of injected Semaglutide once weekly. She'll stay on both through the duration of her structured weight loss phase the next 2-3 months and can discontinue both preop (semaglutide w/ liquid diet and metformin 1-2 days preop).     Continues to improve on avoiding soda pop.     D3 boost is done, recommended she start 125mcg/day now, continue her twice weekly B12 and once daily multivitamin should suffice. She can hold off on extra  calcium right now     Follow up as planned with dietician the next couple of months and I anticipate getting your surgeon meeting as planned toward the end of the year.    She'll update her papsmear this Fall with her PCP.       Comorbidities:  Patient Active Problem List   Diagnosis     Morbid obesity (H)     Chondromalacia of patellofemoral joint       Plan   Weight will need to be under 380lbs prior to the 2 week pre-operative diet.  Heparin Protocol: standard  CPAP: none.  Actigall: no need, cholecystectomy  Exercise Plan: walking a bit more. More steps.3-4 nights   Contraception Plan: none.  Agrees to take vitamins for Life: yes  Agrees to follow up for life: yes  Medication Management: anticipate stoppage of semaglutide w/ liquid diet and stopping metformin 2 days preop if tolerating.  Preop lab evaluation has been done, using B12, D3 can start at 125mcg/day after boosting in June. .  Past Surgical History        Past Surgical History:   Procedure Laterality Date     CHOLECYSTECTOMY  2005     TYMPANOPLASTY       WISDOM TOOTH EXTRACTION         Family History, Social History   Reviewed as documented. See time and date confirmation.  Using bariatric group online.  Interim History/Pre-op needs list    Initial Labs Complete and Reviewed: yes  Dietitian Visits Initiated/cleared: pending visit next month  Weight Change since initial weight: down 20 lbs, has met goal (reported weight today). Weighs in on Saturdays at s  Psychologist visits initiated/cleared: yes  HCM UTD:    Pap: needed.   Mammogram: none needed   Colonscopy:  n/a   Other: NA  Sugars Well Controlled: tolerating meds, A1c of 6.4%.  Cardiac: n/o  Pulmonary: n/a  Hormone use: semaglutide.  Birth control none..  Other: feeling well.  Medications and Allergies      Current Outpatient Medications   Medication Sig Dispense Refill     cyanocobalamin, vitamin B-12, 1,000 mcg Subl [CYANOCOBALAMIN, VITAMIN B-12, 1,000 MCG SUBL] Use once daily for 14 days then  "reduce to 3 days weekly thereafter. 90 each 1     metFORMIN (GLUCOPHAGE) 500 MG tablet Take 1 tablet (500 mg) by mouth 2 times daily (with meals) 180 tablet 1     pen needle, diabetic (PEN NEEDLE) 32 gauge x 5/32\" Ndle [PEN NEEDLE, DIABETIC (PEN NEEDLE) 32 GAUGE X 5/32\" NDLE] For use with pen injector. 90 each 3     Allergies: Patient has no known allergies.    Habits   Tobacco Use: no  Alcohol Use: stopped in April.   NSAIDS: occasionally. Mostly tylenol now.   Caffeine: cut back on soda, water. No coffee.   SLEEP: 7-8. Work from home at least through the end of the year.  CPAP: no  PHYSICAL ACTIVITY PATTERNS:  Cardiovascular: walking  Strength Training: weights  Dietary History   Reviewed proper bariatric method again today.  The patient has been working with our bariatric dieticians and has working on changes w/ resultant weight loss..    As far as distracted eating/grazing, getting 3 meals daily, avoiding empty calories and optimizing their protein intake, going well. .    Review of Systems       GASTROINTESTINAL:  GERD/Heartburn: none  Gallbladder: removed.   Some nausea initially w/ metformin, has improved.  ENDOCRINE:  A1c of 6.4% on June labs. Tolerating metformin and semaglutide.  DERMATOLOGIC:  Rashes: none.  Physical Exam   Vitals: Ht 1.702 m (5' 7\")   Wt (!) 172.1 kg (379 lb 8 oz)   BMI 59.44 kg/m    Body mass index is 59.44 kg/m .  GENERAL: appears her stated age. Ambulation is independent on video.  NECK:thick.  HEART:n/a  LUNGS: Clear speech, no cough.  MUSCULOSKELETAL: no obvious deformities  Krystal color, no pallor. No jaundice.  Smiling affect, happy with progress.    Counseling     We discussed the National Weight Control Registry and Healthy Weight Maintenance Strategies.   We discussed ways to optimize her metabolism.  We discussed specific exercise goals and dietary habits conducive to a healthy weight.  We discussed the importance of lifelong vitamin supplementation and the potential medical " complications of vitamin non-compliance.  We discussed the importance of restorative sleep and stress management in maintaining a healthy weight.  I reminded her to avoid alcohol for 1 year post-operatively, to avoid NSAIDS for life unless specifically indicated and used with a concomitant PPI, to avoid caffeine in excess, and explained the importance of lifelong tobacco abstinence.  Medical Decision Makin minutes spent on the date of the encounter doing chart review, history and exam, documentation and further activities per the note      Wai Anders MD  [facility] Bariatric Care and Surgery [facility]  2021  3:17 PM      Video-Visit Details    Type of service:  Video Visit    Video End Time (time video stopped): 3:50 PM  Originating Location (pt. Location): Home    Distant Location (provider location):  [facility] SURGERY CLINIC AND BARIATRICS [facility]     Platform used for Video Visit: Well        Again, thank you for allowing me to participate in the care of your patient.        Sincerely,        Wai Anders MD

## 2021-10-05 ENCOUNTER — OFFICE VISIT (OUTPATIENT)
Dept: FAMILY MEDICINE | Facility: CLINIC | Age: 36
End: 2021-10-05
Payer: COMMERCIAL

## 2021-10-05 VITALS
TEMPERATURE: 98.2 F | SYSTOLIC BLOOD PRESSURE: 141 MMHG | BODY MASS INDEX: 58.61 KG/M2 | WEIGHT: 293 LBS | DIASTOLIC BLOOD PRESSURE: 93 MMHG | HEART RATE: 101 BPM

## 2021-10-05 DIAGNOSIS — Z12.4 SCREENING FOR MALIGNANT NEOPLASM OF CERVIX: Primary | ICD-10-CM

## 2021-10-05 PROCEDURE — G0123 SCREEN CERV/VAG THIN LAYER: HCPCS | Performed by: FAMILY MEDICINE

## 2021-10-05 PROCEDURE — 99213 OFFICE O/P EST LOW 20 MIN: CPT | Mod: 25 | Performed by: FAMILY MEDICINE

## 2021-10-05 PROCEDURE — 87624 HPV HI-RISK TYP POOLED RSLT: CPT | Performed by: FAMILY MEDICINE

## 2021-10-05 PROCEDURE — 90471 IMMUNIZATION ADMIN: CPT | Performed by: FAMILY MEDICINE

## 2021-10-05 PROCEDURE — 90686 IIV4 VACC NO PRSV 0.5 ML IM: CPT | Performed by: FAMILY MEDICINE

## 2021-10-05 NOTE — PROGRESS NOTES
"Assessment & Plan    There are no diagnoses linked to this encounter.     No follow-ups on file.    Chief Complaint   Patient presents with     Gyn Exam      HPI  On Ozempic, Metformin for weight loss -   Down 50# from highest weight -   Will be scheduled for bariatric surgery before end of year (hopefully)    Knees are a little better -   Had to get cortisone with ultrasound -   Had cortisone and then gel - didn't help  Then, with ultrasound - improved immediately     Trying to walk more - 2-3 times/week   Walks about 1/2 mile to 1 mile         Patient Active Problem List   Diagnosis     Morbid obesity (H)     Chondromalacia of patellofemoral joint        Past Medical History:   Diagnosis Date     Arthritis     chondromalacia of knee (right). injections.     Cholelithiasis         Current Outpatient Medications   Medication     cyanocobalamin, vitamin B-12, 1,000 mcg Subl     metFORMIN (GLUCOPHAGE) 500 MG tablet     pen needle, diabetic (PEN NEEDLE) 32 gauge x 5/32\" Ndle     Semaglutide-Weight Management 0.5 MG/0.5ML SOAJ     No current facility-administered medications for this visit.        Past Surgical History:   Procedure Laterality Date     CHOLECYSTECTOMY  2005     TYMPANOPLASTY       WISDOM TOOTH EXTRACTION          Social History     Socioeconomic History     Marital status: Single     Spouse name: Not on file     Number of children: Not on file     Years of education: Not on file     Highest education level: Not on file   Occupational History     Not on file   Tobacco Use     Smoking status: Never Smoker     Smokeless tobacco: Never Used   Substance and Sexual Activity     Alcohol use: Yes     Comment: Alcoholic Drinks/day:  x2 month      Drug use: Never     Sexual activity: Not Currently   Other Topics Concern     Not on file   Social History Narrative     Not on file     Social Determinants of Health     Financial Resource Strain:      Difficulty of Paying Living Expenses:    Food Insecurity:      " Worried About Running Out of Food in the Last Year:      Ran Out of Food in the Last Year:    Transportation Needs:      Lack of Transportation (Medical):      Lack of Transportation (Non-Medical):    Physical Activity:      Days of Exercise per Week:      Minutes of Exercise per Session:    Stress:      Feeling of Stress :    Social Connections:      Frequency of Communication with Friends and Family:      Frequency of Social Gatherings with Friends and Family:      Attends [employer] Services:      Active Member of Clubs or Organizations:      Attends Club or Organization Meetings:      Marital Status:    Intimate Partner Violence:      Fear of Current or Ex-Partner:      Emotionally Abused:      Physically Abused:      Sexually Abused:         Family History   Problem Relation Age of Onset     Arthritis Mother      Obesity Mother      Arthritis Father      Obesity Father      Sleep Apnea Brother      Obesity Brother         Review of Systems   Musculoskeletal: Positive for arthralgias (knee pain - sl improved).   All other systems reviewed and are negative.       BP (!) 141/93   Pulse 101   Temp 98.2  F (36.8  C)   Wt (!) 169.7 kg (374 lb 3.2 oz)   LMP 10/02/2021 (Exact Date)   BMI 58.61 kg/m       Physical Exam  Constitutional:       General: She is not in acute distress.     Appearance: She is well-developed.   HENT:      Right Ear: Tympanic membrane and external ear normal.      Left Ear: Tympanic membrane and external ear normal.      Nose: Nose normal.      Mouth/Throat:      Pharynx: No oropharyngeal exudate.   Eyes:      General:         Right eye: No discharge.         Left eye: No discharge.      Conjunctiva/sclera: Conjunctivae normal.      Pupils: Pupils are equal, round, and reactive to light.   Neck:      Thyroid: No thyromegaly.      Trachea: No tracheal deviation.   Cardiovascular:      Rate and Rhythm: Normal rate and regular rhythm.      Pulses: Normal pulses.      Heart sounds: Normal heart  "sounds, S1 normal and S2 normal. No murmur heard.   No friction rub. No S3 or S4 sounds.    Pulmonary:      Effort: Pulmonary effort is normal. No respiratory distress.      Breath sounds: Normal breath sounds. No wheezing or rales.   Chest:      Breasts:         Right: No mass, nipple discharge or tenderness.         Left: No mass, nipple discharge or tenderness.   Abdominal:      General: Bowel sounds are normal.      Palpations: Abdomen is soft. There is no mass.      Tenderness: There is no abdominal tenderness.   Genitourinary:     Comments: PELVIC EXAM:External genitalia: normal  Vaginal mucosa normal  Vaginal discharge: minimal  Speculum exam shows a normal appearing cervix .   Bimanual exam: Cervix closed, firm, non tender  to motion.  Uterus  firm, regular, mobile, non tender to palpation. No adnexal masses or tenderness.     Musculoskeletal:         General: Normal range of motion.      Cervical back: Neck supple.   Lymphadenopathy:      Cervical: No cervical adenopathy.   Skin:     General: Skin is warm and dry.      Findings: No rash.   Neurological:      Mental Status: She is alert and oriented to person, place, and time.      Motor: No abnormal muscle tone.      Deep Tendon Reflexes: Reflexes are normal and symmetric.   Psychiatric:         Thought Content: Thought content normal.         Judgment: Judgment normal.          Results:  No results found for any visits on 10/05/21.    Medications at Conclusion of Visit:  Current Outpatient Medications   Medication Sig Dispense Refill     cyanocobalamin, vitamin B-12, 1,000 mcg Subl [CYANOCOBALAMIN, VITAMIN B-12, 1,000 MCG SUBL] Use once daily for 14 days then reduce to 3 days weekly thereafter. 90 each 1     metFORMIN (GLUCOPHAGE) 500 MG tablet Take 1 tablet (500 mg) by mouth 2 times daily (with meals) 180 tablet 1     pen needle, diabetic (PEN NEEDLE) 32 gauge x 5/32\" Ndle [PEN NEEDLE, DIABETIC (PEN NEEDLE) 32 GAUGE X 5/32\" NDLE] For use with pen " injector. 90 each 3     Semaglutide-Weight Management 0.5 MG/0.5ML SOAJ Inject 0.5 mg Subcutaneous every 7 days           CR DUDLEY MD

## 2021-10-10 ASSESSMENT — ENCOUNTER SYMPTOMS: ARTHRALGIAS: 1

## 2021-10-11 ENCOUNTER — VIRTUAL VISIT (OUTPATIENT)
Dept: SURGERY | Facility: CLINIC | Age: 36
End: 2021-10-11
Payer: COMMERCIAL

## 2021-10-11 DIAGNOSIS — E66.01 CLASS 3 SEVERE OBESITY DUE TO EXCESS CALORIES WITHOUT SERIOUS COMORBIDITY WITH BODY MASS INDEX (BMI) OF 50.0 TO 59.9 IN ADULT (H): Primary | ICD-10-CM

## 2021-10-11 DIAGNOSIS — E66.813 CLASS 3 SEVERE OBESITY DUE TO EXCESS CALORIES WITHOUT SERIOUS COMORBIDITY WITH BODY MASS INDEX (BMI) OF 50.0 TO 59.9 IN ADULT (H): Primary | ICD-10-CM

## 2021-10-11 DIAGNOSIS — Z71.3 NUTRITIONAL COUNSELING: ICD-10-CM

## 2021-10-11 LAB
HUMAN PAPILLOMA VIRUS 16 DNA: NEGATIVE
HUMAN PAPILLOMA VIRUS 18 DNA: NEGATIVE
HUMAN PAPILLOMA VIRUS FINAL DIAGNOSIS: NORMAL
HUMAN PAPILLOMA VIRUS OTHER HR: NEGATIVE

## 2021-10-11 PROCEDURE — 97803 MED NUTRITION INDIV SUBSEQ: CPT | Mod: 95 | Performed by: DIETITIAN, REGISTERED

## 2021-10-11 NOTE — LETTER
"    10/11/2021         RE: Lena Quesada  [address]  [place] [address]        Dear Colleague,    Thank you for referring your patient, Lena Quesada, to the [facility]ATRICS C[facility] [place]. Please see a copy of my visit note below.    Lena Quesada is a 36 year old who is being evaluated via a billable video visit.      How would you like to obtain your AVS? MyChart  If the video visit is dropped, the invitation should be resent by: Send to e-mail at: [email or web address]  Will anyone else be joining your video visit? No      Video Start Time: 4:00 PM      Follow Up Surgical Weight Loss Supervised Diet Evaluation    Assessment:  Pt. is being seen today for a follow up RD nutritional evaluation. Pt. has been unsuccessful with non-surgical weight loss methods and is interested in bariatric surgery. Today we reviewed current eating habits and level of physical activity, and instructed on the changes that are required for successful bariatric outcomes.    Surgery of interest per pt: LSG.    Workflow review:  Support Group: Completed.  Psychology:Completed.  Lab work:Completed.  SWL:Yes 5/6      Weight goal: 380 lbs or less.    Anthropometrics:  Current Weight: 373.8 lbs   BMI: There is no height or weight on file to calculate BMI.   Ht Readings from Last 2 Encounters:   09/16/21 1.702 m (5' 7\")   06/10/21 1.702 m (5' 7\")     Wt Readings from Last 2 Encounters:   10/05/21 (!) 169.7 kg (374 lb 3.2 oz)   09/16/21 (!) 172.1 kg (379 lb 8 oz)     No height and weight on file for this encounter.    Ideal body weight: 61.6 kg (135 lb 12.9 oz)  Adjusted ideal body weight: 104.9 kg (231 lb 2.6 oz)    Estimated RMR ([place]-St Jeor equation):  2421 kcals x 1.3 (light active) = 3147 kcals (for weight maintenance)    Medical History:  Patient Active Problem List   Diagnosis     Morbid obesity (H)     Chondromalacia of patellofemoral joint      Diabetes: No  HbA1c:  " No results found for: HGBA1C    Progress over past month: Focusing on protein first along with reduced carbohydrate consumption.  Patient has also reduced consumption of certain condiments as well (I.e. Ketchup).  Patient reports that her portion sizes have significantly reduced over the past few months.  Patient reports that she continues to take the MVI 2 times/day as well as Biotin on a daily basis.        Meal duration: 20-30 minutes.      fluids by 30 minutes before, during meal, and waiting 30 minutes after meal before drinking fluids: Yes-continues to be as consistent with it as possible    Beverages  Water     Exercise  Walking-has been able to increase more recently, 4 days/week for 3/4th-1 mile at at time  Taking the stairs more frequently as well     PES statement:   Overweight/Obesity (NC 3.3) related to overeating and poor lifestyle habits as evidenced by patient's subjective statements (h/o excessive energy intake, lack of exercise) and BMI of 58.7 kg/m2.     Intervention    Nutrition Education:   1. Provided general overview of diet and lifestyle modifications needed to be a deemed a safe candidate for bariatric surgery.     Food/Nutrient Delivery:  2. Educated patient on eating three meals, with cutting out snacking.  3. Bariatric Plate: Patient and I discussed the importance of including a lean protein source (20-30 grams/meal), vegetables (included at lunch and dinner), one serving (15g) of carbohydrate, and limited added fat (1 tb/day) at each meal.   4. Discussed importance of adequate hydration after surgery, with goal of at least 64 oz of fluids/day.  5. Addressed avoiding all carbonated, caffeinated and sweetened drinks to prepare for bariatric surgery.     Nutrition Counselin. Mindful eating techniques: Encouraged slow meal pace, chewing foods to applesauce consistency for 20-30 minutes/meal.   7. Discussed  fluids 30 minutes before, during, and after meal to prevent  dumping syndrome and discomfort post bariatric surgery.     Instructions/Goals:     1. Continue implementing bariatric surgery lifestyle modifications.      Handouts Provided:   [facility] Weight Management Patient Handbook    Monitor/Evaluation:  Pt. s target weight:  weight loss to 380 lbs or less/no gain from initial visit, pt. verbalized understanding.       Plan for next visit:   (Final supervised diet visit with RD) pre/post-op  diet progression, give review of surgery process.      Video-Visit Details    Type of service:  Video Visit    Video End Time:4:13 PM    Originating Location (pt. Location): Home    Distant Location (provider location):  [facility] SURGERY CL[facility] AND BARIATRICS CARE [place]     Platform used for Video Visit: Monster Shin RD          Again, thank you for allowing me to participate in the care of your patient.        Sincerely,        Rosalva Shin RD

## 2021-10-11 NOTE — PROGRESS NOTES
"Lena Quesada is a 36 year old who is being evaluated via a billable video visit.      How would you like to obtain your AVS? MyChart  If the video visit is dropped, the invitation should be resent by: Send to e-mail at: [email or web address]  Will anyone else be joining your video visit? No      Video Start Time: 4:00 PM      Follow Up Surgical Weight Loss Supervised Diet Evaluation    Assessment:  Pt. is being seen today for a follow up RD nutritional evaluation. Pt. has been unsuccessful with non-surgical weight loss methods and is interested in bariatric surgery. Today we reviewed current eating habits and level of physical activity, and instructed on the changes that are required for successful bariatric outcomes.    Surgery of interest per pt: LSG.    Workflow review:  Support Group: Completed.  Psychology:Completed.  Lab work:Completed.  SWL:Yes 5/6      Weight goal: 380 lbs or less.    Anthropometrics:  Current Weight: 373.8 lbs   BMI: There is no height or weight on file to calculate BMI.   Ht Readings from Last 2 Encounters:   09/16/21 1.702 m (5' 7\")   06/10/21 1.702 m (5' 7\")     Wt Readings from Last 2 Encounters:   10/05/21 (!) 169.7 kg (374 lb 3.2 oz)   09/16/21 (!) 172.1 kg (379 lb 8 oz)     No height and weight on file for this encounter.    Ideal body weight: 61.6 kg (135 lb 12.9 oz)  Adjusted ideal body weight: 104.9 kg (231 lb 2.6 oz)    Estimated RMR ([place]-St Jeor equation):  2421 kcals x 1.3 (light active) = 3147 kcals (for weight maintenance)    Medical History:  Patient Active Problem List   Diagnosis     Morbid obesity (H)     Chondromalacia of patellofemoral joint      Diabetes: No  HbA1c:  No results found for: HGBA1C    Progress over past month: Focusing on protein first along with reduced carbohydrate consumption.  Patient has also reduced consumption of certain condiments as well (I.e. Ketchup).  Patient reports that her portion sizes have significantly reduced over the " past few months.  Patient reports that she continues to take the MVI 2 times/day as well as Biotin on a daily basis.        Meal duration: 20-30 minutes.      fluids by 30 minutes before, during meal, and waiting 30 minutes after meal before drinking fluids: Yes-continues to be as consistent with it as possible    Beverages  Water     Exercise  Walking-has been able to increase more recently, 4 days/week for 3/-1 mile at at time  Taking the stairs more frequently as well     PES statement:   Overweight/Obesity (NC 3.3) related to overeating and poor lifestyle habits as evidenced by patient's subjective statements (h/o excessive energy intake, lack of exercise) and BMI of 58.7 kg/m2.     Intervention    Nutrition Education:   1. Provided general overview of diet and lifestyle modifications needed to be a deemed a safe candidate for bariatric surgery.     Food/Nutrient Delivery:  2. Educated patient on eating three meals, with cutting out snacking.  3. Bariatric Plate: Patient and I discussed the importance of including a lean protein source (20-30 grams/meal), vegetables (included at lunch and dinner), one serving (15g) of carbohydrate, and limited added fat (1 tb/day) at each meal.   4. Discussed importance of adequate hydration after surgery, with goal of at least 64 oz of fluids/day.  5. Addressed avoiding all carbonated, caffeinated and sweetened drinks to prepare for bariatric surgery.     Nutrition Counselin. Mindful eating techniques: Encouraged slow meal pace, chewing foods to applesauce consistency for 20-30 minutes/meal.   7. Discussed  fluids 30 minutes before, during, and after meal to prevent dumping syndrome and discomfort post bariatric surgery.     Instructions/Goals:     1. Continue implementing bariatric surgery lifestyle modifications.      Handouts Provided:   [facility] Weight Management Patient Handbook    Monitor/Evaluation:  Pt. s target weight:  weight loss  to 380 lbs or less/no gain from initial visit, pt. verbalized understanding.       Plan for next visit:   (Final supervised diet visit with RD) pre/post-op  diet progression, give review of surgery process.      Video-Visit Details    Type of service:  Video Visit    Video End Time:4:13 PM    Originating Location (pt. Location): Home    Distant Location (provider location):  [facility] SURGERY CLINIC AND BARIATRICS CARE [place]     Platform used for Video Visit: Monster Shin RD

## 2021-10-13 LAB
BKR LAB AP GYN ADEQUACY: NORMAL
BKR LAB AP GYN INTERPRETATION: NORMAL
BKR LAB AP HPV REFLEX: NORMAL
BKR LAB AP LMP: NORMAL
BKR LAB AP PREVIOUS ABNORMAL: NORMAL
PATH REPORT.COMMENTS IMP SPEC: NORMAL
PATH REPORT.RELEVANT HX SPEC: NORMAL

## 2021-11-02 ENCOUNTER — VIRTUAL VISIT (OUTPATIENT)
Dept: SURGERY | Facility: CLINIC | Age: 36
End: 2021-11-02
Payer: COMMERCIAL

## 2021-11-02 ENCOUNTER — DOCUMENTATION ONLY (OUTPATIENT)
Dept: SURGERY | Facility: CLINIC | Age: 36
End: 2021-11-02

## 2021-11-02 DIAGNOSIS — E66.813 CLASS 3 SEVERE OBESITY DUE TO EXCESS CALORIES WITHOUT SERIOUS COMORBIDITY WITH BODY MASS INDEX (BMI) OF 50.0 TO 59.9 IN ADULT (H): Primary | ICD-10-CM

## 2021-11-02 DIAGNOSIS — E66.01 CLASS 3 SEVERE OBESITY DUE TO EXCESS CALORIES WITHOUT SERIOUS COMORBIDITY WITH BODY MASS INDEX (BMI) OF 50.0 TO 59.9 IN ADULT (H): Primary | ICD-10-CM

## 2021-11-02 DIAGNOSIS — Z71.3 NUTRITIONAL COUNSELING: ICD-10-CM

## 2021-11-02 PROCEDURE — 97803 MED NUTRITION INDIV SUBSEQ: CPT | Mod: 95 | Performed by: DIETITIAN, REGISTERED

## 2021-11-02 NOTE — LETTER
11/2/2021         RE: Lena Quesada  [address]  [place] [address]        Dear Colleague,    Thank you for referring your patient, Lena Quesada, to the [facility] BARIATRICS [facility] [place]. Please see a copy of my visit note below.    Lena Quesada is a 36 year old who is being evaluated via a billable video visit.      How would you like to obtain your AVS? MyChart  If the video visit is dropped, the invitation should be resent by: Send to e-mail at: [email or web address]  Will anyone else be joining your video visit? No      Video Start Time: 7:31 AM      Follow Up Surgical Weight Loss Supervised Diet Evaluation    Assessment:  Pt. is being seen today for a follow up RD nutritional evaluation. Pt. has been unsuccessful with non-surgical weight loss methods and is interested in bariatric surgery. Today we reviewed current eating habits and level of physical activity, and instructed on the changes that are required for successful bariatric outcomes.    Surgery of interest per pt: LSG.    Workflow review:  Support Group: Completed.  Psychology:Completed.  Lab work:Completed.  SWL:Yes 6/6    Weight goal: 380 lbs or less.    Anthropometrics:  Pt's weight is 370.4 lbs  Initial weight: 380 lbs   Weight change: 9.6 lbs down    BMI: There is no height or weight on file to calculate BMI.   Ideal body weight: 61.6 kg (135 lb 12.9 oz)  Adjusted ideal body weight: 104.9 kg (231 lb 2.6 oz)    Estimated RMR ([place]-St Jeor equation):  2406 kcals x 1.3 (light active) = 3128 kcals (for weight maintenance)    Medical History:  Patient Active Problem List   Diagnosis     Morbid obesity (H)     Chondromalacia of patellofemoral joint      Diabetes: No  HbA1c:  No results found for: HGBA1C    Progress over past month: continues to take vitamins on a regular basis.  Patient continues to focus on adequate protein intake, consuming her protein first at each meal.      Diet  Recall/Time  Breakfast: scrambled or hard boiled egg with string cheese   Lunch: greek yogurt, fruit, grilled chicken (from the night before)   Dinner: baked fish or grilled chicken or chicken tacos     Typical Snacks: has been avoiding, focusing on the 3 meals/day    Meal duration: 20-30 minutes-has noticed that it has helped with portion control      fluids by 30 minutes before, during meal, and waiting 30 minutes after meal before drinking fluids: Yes -continues to improve upon     Beverages  Water (at least 64 oz/day)     Exercise  Walking 3 times/week for 1 mile   Packing (planning to move this coming weekend), Cleaning     PES statement:   Overweight/Obesity (NC 3.3) related to overeating and poor lifestyle habits as evidenced by patient's subjective statements (h/o excessive energy intake, lack of exercise) and BMI of 58.1 kg/m2.     Intervention    Nutrition Education:   1. Provided general overview of diet and lifestyle modifications needed to be a deemed a safe candidate for bariatric surgery.       Food/Nutrient Delivery:  2. Educated patient on eating three meals, with cutting out snacking.  3. Bariatric Plate: Patient and I discussed the importance of including a lean protein source (20-30 grams/meal), vegetables (included at lunch and dinner), one serving (15g) of carbohydrate, and limited added fat (1 tb/day) at each meal.   4. Educated patient on using a protein powder drink as a meal replacement and/or supplement after bariatric surgery.   5. Discussed importance of adequate hydration after surgery, with goal of at least 64 oz of fluids/day.  6. Addressed avoiding all carbonated, caffeinated and sweetened drinks to prepare for bariatric surgery.     Nutrition Counselin. Mindful eating techniques: Encouraged slow meal pace, chewing foods to applesauce consistency for 20-30 minutes/meal.   8. Discussed  fluids 30 minutes before, during, and after meal to prevent dumping  syndrome and discomfort post bariatric surgery.   9. Discussed pre/post operative diet progression, post op vitamin regimen, gave review of surgery process.     Instructions/Goals:     1. Continue implementing bariatric surgery lifestyle modifications.      Handouts Provided:   [facility] Weight Management Patient Handbook  Protein supplement list    Monitor/Evaluation:  Pt. s target weight:  weight loss to 380 lbs or less/no gain from initial visit, pt. verbalized understanding.     Pt has completed all nutrition requirements and is well-informed of the dietary and physical activity requirements that are necessary for successful bariatric outcomes. This pt is an appropriate candidate for surgery from a nutrition standpoint at this time. The patient understands that surgery is a tool, not a cure, and post operative follow up is essential.     Plan for next visit:   Post Op Nutrition      Video-Visit Details    Type of service:  Video Visit    Video End Time:7:53 AM    Originating Location (pt. Location): Home    Distant Location (provider location):  [facility] SURGERY CLINIC AND BARIATRICS CARE [place]     Platform used for Video Visit: Monster Shin RD          Again, thank you for allowing me to participate in the care of your patient.        Sincerely,        Rosalva Shin RD

## 2021-11-02 NOTE — PROGRESS NOTES
Lena Quesada is a 36 year old who is being evaluated via a billable video visit.      How would you like to obtain your AVS? MyChart  If the video visit is dropped, the invitation should be resent by: Send to e-mail at: [email or web address]  Will anyone else be joining your video visit? No      Video Start Time: 7:31 AM      Follow Up Surgical Weight Loss Supervised Diet Evaluation    Assessment:  Pt. is being seen today for a follow up RD nutritional evaluation. Pt. has been unsuccessful with non-surgical weight loss methods and is interested in bariatric surgery. Today we reviewed current eating habits and level of physical activity, and instructed on the changes that are required for successful bariatric outcomes.    Surgery of interest per pt: LSG.    Workflow review:  Support Group: Completed.  Psychology:Completed.  Lab work:Completed.  SWL:Yes 6/6    Weight goal: 380 lbs or less.    Anthropometrics:  Pt's weight is 370.4 lbs  Initial weight: 380 lbs   Weight change: 9.6 lbs down    BMI: There is no height or weight on file to calculate BMI.   Ideal body weight: 61.6 kg (135 lb 12.9 oz)  Adjusted ideal body weight: 104.9 kg (231 lb 2.6 oz)    Estimated RMR ([place]-St Jeor equation):  2406 kcals x 1.3 (light active) = 3128 kcals (for weight maintenance)    Medical History:  Patient Active Problem List   Diagnosis     Morbid obesity (H)     Chondromalacia of patellofemoral joint      Diabetes: No  HbA1c:  No results found for: HGBA1C    Progress over past month: continues to take vitamins on a regular basis.  Patient continues to focus on adequate protein intake, consuming her protein first at each meal.      Diet Recall/Time  Breakfast: scrambled or hard boiled egg with string cheese   Lunch: greek yogurt, fruit, grilled chicken (from the night before)   Dinner: baked fish or grilled chicken or chicken tacos     Typical Snacks: has been avoiding, focusing on the 3 meals/day    Meal duration: 20-30  minutes-has noticed that it has helped with portion control      fluids by 30 minutes before, during meal, and waiting 30 minutes after meal before drinking fluids: Yes -continues to improve upon     Beverages  Water (at least 64 oz/day)     Exercise  Walking 3 times/week for 1 mile   Packing (planning to move this coming weekend), Cleaning     PES statement:   Overweight/Obesity (NC 3.3) related to overeating and poor lifestyle habits as evidenced by patient's subjective statements (h/o excessive energy intake, lack of exercise) and BMI of 58.1 kg/m2.     Intervention    Nutrition Education:   1. Provided general overview of diet and lifestyle modifications needed to be a deemed a safe candidate for bariatric surgery.       Food/Nutrient Delivery:  2. Educated patient on eating three meals, with cutting out snacking.  3. Bariatric Plate: Patient and I discussed the importance of including a lean protein source (20-30 grams/meal), vegetables (included at lunch and dinner), one serving (15g) of carbohydrate, and limited added fat (1 tb/day) at each meal.   4. Educated patient on using a protein powder drink as a meal replacement and/or supplement after bariatric surgery.   5. Discussed importance of adequate hydration after surgery, with goal of at least 64 oz of fluids/day.  6. Addressed avoiding all carbonated, caffeinated and sweetened drinks to prepare for bariatric surgery.     Nutrition Counselin. Mindful eating techniques: Encouraged slow meal pace, chewing foods to applesauce consistency for 20-30 minutes/meal.   8. Discussed  fluids 30 minutes before, during, and after meal to prevent dumping syndrome and discomfort post bariatric surgery.   9. Discussed pre/post operative diet progression, post op vitamin regimen, gave review of surgery process.     Instructions/Goals:     1. Continue implementing bariatric surgery lifestyle modifications.      Handouts Provided:   [facility]  Weight Management Patient Handbook  Protein supplement list    Monitor/Evaluation:  Pt. s target weight:  weight loss to 380 lbs or less/no gain from initial visit, pt. verbalized understanding.     Pt has completed all nutrition requirements and is well-informed of the dietary and physical activity requirements that are necessary for successful bariatric outcomes. This pt is an appropriate candidate for surgery from a nutrition standpoint at this time. The patient understands that surgery is a tool, not a cure, and post operative follow up is essential.     Plan for next visit:   Post Op Nutrition      Video-Visit Details    Type of service:  Video Visit    Video End Time:7:53 AM    Originating Location (pt. Location): Home    Distant Location (provider location):  [facility]     Platform used for Video Visit: Monster Shin RD

## 2021-11-02 NOTE — PROGRESS NOTES
Patient has now been cleared by RD and Psych.  Workflow reviewed and updated.  She is now ready for an AB Consult. Corrie Sanchez RN

## 2021-11-11 ENCOUNTER — OFFICE VISIT (OUTPATIENT)
Dept: SURGERY | Facility: CLINIC | Age: 36
End: 2021-11-11
Payer: COMMERCIAL

## 2021-11-11 VITALS — WEIGHT: 293 LBS | BODY MASS INDEX: 45.99 KG/M2 | HEIGHT: 67 IN

## 2021-11-11 DIAGNOSIS — E66.01 MORBID OBESITY (H): Primary | ICD-10-CM

## 2021-11-11 PROCEDURE — 99204 OFFICE O/P NEW MOD 45 MIN: CPT | Performed by: SURGERY

## 2021-11-11 RX ORDER — CEFAZOLIN SODIUM IN 0.9 % NACL 3 G/100 ML
3 INTRAVENOUS SOLUTION, PIGGYBACK (ML) INTRAVENOUS
Status: CANCELLED | OUTPATIENT
Start: 2022-03-24

## 2021-11-11 RX ORDER — ACETAMINOPHEN 325 MG/1
975 TABLET ORAL ONCE
Status: CANCELLED | OUTPATIENT
Start: 2022-03-24 | End: 2021-11-11

## 2021-11-11 RX ORDER — GABAPENTIN 100 MG/1
600 CAPSULE ORAL
Status: CANCELLED | OUTPATIENT
Start: 2021-11-11

## 2021-11-11 RX ORDER — SEMAGLUTIDE 1.34 MG/ML
0.5 INJECTION, SOLUTION SUBCUTANEOUS WEEKLY
Status: ON HOLD | COMMUNITY
Start: 2021-11-09 | End: 2022-03-24

## 2021-11-11 RX ORDER — CELECOXIB 100 MG/1
400 CAPSULE ORAL
Status: CANCELLED | OUTPATIENT
Start: 2021-11-11

## 2021-11-11 RX ORDER — ONDANSETRON 2 MG/ML
4 INJECTION INTRAMUSCULAR; INTRAVENOUS
Status: CANCELLED | OUTPATIENT
Start: 2022-03-24

## 2021-11-11 RX ORDER — CEFAZOLIN SODIUM 1 G/3ML
1 INJECTION, POWDER, FOR SOLUTION INTRAMUSCULAR; INTRAVENOUS SEE ADMIN INSTRUCTIONS
Status: CANCELLED | OUTPATIENT
Start: 2021-11-11

## 2021-11-11 RX ORDER — HEPARIN SODIUM 5000 [USP'U]/.5ML
5000 INJECTION, SOLUTION INTRAVENOUS; SUBCUTANEOUS ONCE
Status: CANCELLED | OUTPATIENT
Start: 2022-03-24 | End: 2021-11-11

## 2021-11-11 ASSESSMENT — MIFFLIN-ST. JEOR: SCORE: 2421.8

## 2021-11-11 NOTE — LETTER
11/11/2021         RE: Lena Quesada  [address]  [facility] [address]        Dear Colleague,    Thank you for referring your patient, Lena Quesada, to the [facility] [place]. Please see a copy of my visit note below.    HPI: Lena Quesada is a 36 year old female here today for consideration of metabolic and bariatric surgery. She is referred by Dr. Abbott.  She has struggled with obesity for her entire life, reaching a high of 410 pounds in the past several years.  She began working with us over the summer and has been able to get her weight down to 375 pounds.  She has attempted weight loss via a variety of means thus far, primarily low calorie diet and exercise regimens but has thus far failed to maintain any long-term success.  She turned her attention to her bariatric surgery due to increasing knee pain, and limited physical function including walking and going up and down stairs.  She would like to be able to engage in more physical activity with her young nieces and nephews and struggling to do so in her current size.    Her bariatric comorbidities include mild GERD, borderline diabetes with H1Ac of 6.4.    Bariatric comorbidities not present include hypertension, obstructive sleep apnea,.      Allergies:Patient has no known allergies.    Past Medical History:   Diagnosis Date     Arthritis     chondromalacia of knee (right). injections.     Cholelithiasis        Past Surgical History:   Procedure Laterality Date     CHOLECYSTECTOMY  2005     TYMPANOPLASTY       WISDOM TOOTH EXTRACTION         CURRENT MEDS:  Prior to Admission medications    Medication Sig Start Date End Date Taking? Authorizing Provider   OZEMPIC, 0.25 OR 0.5 MG/DOSE, 2 MG/1.5ML SOPN pen Inject 0.5 mg Subcutaneous once a week 11/9/21  Yes Reported, Patient   metFORMIN (GLUCOPHAGE) 500 MG tablet Take 1 tablet (500 mg) by mouth 2 times daily (with meals)  "7/20/21   Wai Anders MD         Social Connections: Not on file       Family History   Problem Relation Age of Onset     Arthritis Mother      Obesity Mother      Arthritis Father      Obesity Father      Sleep Apnea Brother      Obesity Brother         reports that she has never smoked. She has never used smokeless tobacco. She reports current alcohol use. She reports that she does not use drugs.    History   Smoking Status     Never Smoker   Smokeless Tobacco     Never Used       Review of Systems -  A complete ROS was reviewed and except for what is listed in the HPI above, all others are negative  PSYCHIATRIC: She has undergone a lifestyle assessment and has been deemed a good candidate for bariatric surgery by the psychologist.    Ht 1.702 m (5' 7\")   Wt (!) 169.9 kg (374 lb 9.6 oz)   Breastfeeding No   BMI 58.67 kg/m      Wt Readings from Last 4 Encounters:   11/11/21 (!) 169.9 kg (374 lb 9.6 oz)   10/05/21 (!) 169.7 kg (374 lb 3.2 oz)   09/16/21 (!) 172.1 kg (379 lb 8 oz)   06/10/21 (!) 181 kg (399 lb)        Body mass index is 58.67 kg/m .    EXAM:  GENERAL: This is a well-developed 36 year old female who appears her stated age  HEAD & NECK: Grossly normal.  No visible goiter or scars  CARDIAC: Regular rate and rhythm  CHEST/LUNG: Normal respiratory effort  ABDOMEN: Obese.  No visible hernias or masses appreciated.  LYMPHATIC:  No significant adenopathy visualized.    EXTREMITIES: Grossly normal.  No evidence of chronic venous stasis.    NEUROLOGIC: Focally intact  INTEGUMENT: No open lesions or ulcers appreciated visually  PSYCHIATRIC: Normal affect. She has a good grasp on the nature of her obesity and the treatment options.    LABS:      Assessment/Plan: 36 year old female who is an excellent candidate for bariatric and metabolic surgery.  After a careful conversation with the patient it was decided that a laparoscopic sleeve gastrectomy would be her best option.       I went over the surgery in " detail with her.  I went over the nature of the operation and some of the potential consequences of the surgery.  I went over the expected hospital course and discussed laparoscopic versus open surgery, understanding that we will plan on doing this laparoscopically with the possibility of having to convert to an open operation.  I went over some of the risks and complications of the operation including, but not limited to, DVT, pulmonary emboli, pneumonia, postoperative bleeding, wound infection, staple line leak, intra-abdominal sepsis, and possible death.  I also went over some of the potential nutritional concerns such as vitamin B-12, iron, vitamin D, vitamin A, calcium and protein deficiencies.  I will also went over the need for lifelong nutritional surveillance.  The patient understands and wants to proceed with surgery.  We will submit for prior authorization.      Bobby Rodriguez MD ,MD  [facility] Department of Surgery      Again, thank you for allowing me to participate in the care of your patient.        Sincerely,        Bobby Rodriguez MD

## 2021-11-11 NOTE — PROGRESS NOTES
HPI: Lena Quesada is a 36 year old female here today for consideration of metabolic and bariatric surgery. She is referred by Dr. Abbott.  She has struggled with obesity for her entire life, reaching a high of 410 pounds in the past several years.  She began working with us over the summer and has been able to get her weight down to 375 pounds.  She has attempted weight loss via a variety of means thus far, primarily low calorie diet and exercise regimens but has thus far failed to maintain any long-term success.  She turned her attention to her bariatric surgery due to increasing knee pain, and limited physical function including walking and going up and down stairs.  She would like to be able to engage in more physical activity with her young nieces and nephews and struggling to do so in her current size.    Her bariatric comorbidities include mild GERD, borderline diabetes with H1Ac of 6.4.    Bariatric comorbidities not present include hypertension, obstructive sleep apnea,.      Allergies:Patient has no known allergies.    Past Medical History:   Diagnosis Date     Arthritis     chondromalacia of knee (right). injections.     Cholelithiasis        Past Surgical History:   Procedure Laterality Date     CHOLECYSTECTOMY  2005     TYMPANOPLASTY       WISDOM TOOTH EXTRACTION         CURRENT MEDS:  Prior to Admission medications    Medication Sig Start Date End Date Taking? Authorizing Provider   OZEMPIC, 0.25 OR 0.5 MG/DOSE, 2 MG/1.5ML SOPN pen Inject 0.5 mg Subcutaneous once a week 11/9/21  Yes Reported, Patient   metFORMIN (GLUCOPHAGE) 500 MG tablet Take 1 tablet (500 mg) by mouth 2 times daily (with meals) 7/20/21   Wai Anders MD         Social Connections: Not on file       Family History   Problem Relation Age of Onset     Arthritis Mother      Obesity Mother      Arthritis Father      Obesity Father      Sleep Apnea Brother      Obesity Brother         reports that she has never smoked. She  "has never used smokeless tobacco. She reports current alcohol use. She reports that she does not use drugs.    History   Smoking Status     Never Smoker   Smokeless Tobacco     Never Used       Review of Systems -  A complete ROS was reviewed and except for what is listed in the HPI above, all others are negative  PSYCHIATRIC: She has undergone a lifestyle assessment and has been deemed a good candidate for bariatric surgery by the psychologist.    Ht 1.702 m (5' 7\")   Wt (!) 169.9 kg (374 lb 9.6 oz)   Breastfeeding No   BMI 58.67 kg/m      Wt Readings from Last 4 Encounters:   11/11/21 (!) 169.9 kg (374 lb 9.6 oz)   10/05/21 (!) 169.7 kg (374 lb 3.2 oz)   09/16/21 (!) 172.1 kg (379 lb 8 oz)   06/10/21 (!) 181 kg (399 lb)        Body mass index is 58.67 kg/m .    EXAM:  GENERAL: This is a well-developed 36 year old female who appears her stated age  HEAD & NECK: Grossly normal.  No visible goiter or scars  CARDIAC: Regular rate and rhythm  CHEST/LUNG: Normal respiratory effort  ABDOMEN: Obese.  No visible hernias or masses appreciated.  LYMPHATIC:  No significant adenopathy visualized.    EXTREMITIES: Grossly normal.  No evidence of chronic venous stasis.    NEUROLOGIC: Focally intact  INTEGUMENT: No open lesions or ulcers appreciated visually  PSYCHIATRIC: Normal affect. She has a good grasp on the nature of her obesity and the treatment options.    LABS:      Assessment/Plan: 36 year old female who is an excellent candidate for bariatric and metabolic surgery.  After a careful conversation with the patient it was decided that a laparoscopic sleeve gastrectomy would be her best option.       I went over the surgery in detail with her.  I went over the nature of the operation and some of the potential consequences of the surgery.  I went over the expected hospital course and discussed laparoscopic versus open surgery, understanding that we will plan on doing this laparoscopically with the possibility of having to " convert to an open operation.  I went over some of the risks and complications of the operation including, but not limited to, DVT, pulmonary emboli, pneumonia, postoperative bleeding, wound infection, staple line leak, intra-abdominal sepsis, and possible death.  I also went over some of the potential nutritional concerns such as vitamin B-12, iron, vitamin D, vitamin A, calcium and protein deficiencies.  I will also went over the need for lifelong nutritional surveillance.  The patient understands and wants to proceed with surgery.  We will submit for prior authorization.      Bobby Rodriguez MD ,MD  [facility] Department of Surgery

## 2021-11-15 ENCOUNTER — DOCUMENTATION ONLY (OUTPATIENT)
Dept: SURGERY | Facility: CLINIC | Age: 36
End: 2021-11-15
Payer: COMMERCIAL

## 2021-11-15 NOTE — TELEPHONE ENCOUNTER
Tasklist updated.    Hawa Trevino RN, CBN  [facility]  P [phone]  F [phone]

## 2021-11-15 NOTE — PROGRESS NOTES
I have submitted a PA to [facility] for this patient to be approved for a LSG with Dr. Bobby Rodriguez

## 2022-01-26 ENCOUNTER — TELEPHONE (OUTPATIENT)
Dept: SURGERY | Facility: CLINIC | Age: 37
End: 2022-01-26

## 2022-01-27 ENCOUNTER — MYC MEDICAL ADVICE (OUTPATIENT)
Dept: SURGERY | Facility: CLINIC | Age: 37
End: 2022-01-27
Payer: COMMERCIAL

## 2022-01-27 NOTE — TELEPHONE ENCOUNTER
Pre-op Class Checklist:    Hemoglobin A1C   Date Value Ref Range Status   06/11/2021 6.4 (H) <=5.6 % Final      CPAP: No   Smoking Cessation Date: NA  Urine Nicotine Screen: N/A   Consents: Complete   Support Group: Yes 7/13/2021  Omeprazole: Need   Actigall: Rafaela yonathan   Bariatrician: Wai Anders MD   Email: [email or web address]  Initial Wt:  399 lb  AB Visit:    Wt Readings from Last 1 Encounters:   11/11/21 (!) 169.9 kg (374 lb 9.6 oz)

## 2022-01-31 ENCOUNTER — VIRTUAL VISIT (OUTPATIENT)
Dept: SURGERY | Facility: CLINIC | Age: 37
End: 2022-01-31
Payer: COMMERCIAL

## 2022-01-31 DIAGNOSIS — E66.01 CLASS 3 SEVERE OBESITY DUE TO EXCESS CALORIES WITHOUT SERIOUS COMORBIDITY WITH BODY MASS INDEX (BMI) OF 50.0 TO 59.9 IN ADULT (H): Primary | ICD-10-CM

## 2022-01-31 DIAGNOSIS — Z71.3 NUTRITIONAL COUNSELING: ICD-10-CM

## 2022-01-31 DIAGNOSIS — E66.813 CLASS 3 SEVERE OBESITY DUE TO EXCESS CALORIES WITHOUT SERIOUS COMORBIDITY WITH BODY MASS INDEX (BMI) OF 50.0 TO 59.9 IN ADULT (H): Primary | ICD-10-CM

## 2022-01-31 PROCEDURE — 97803 MED NUTRITION INDIV SUBSEQ: CPT | Mod: 95 | Performed by: DIETITIAN, REGISTERED

## 2022-01-31 NOTE — LETTER
1/31/2022         RE: Lena Quesada  [address]  [place] [address]        Dear Colleague,    Thank you for referring your patient, Lena Quesada, to the [facility] [place]. Please see a copy of my visit note below.    Lena Quesada is a 36 year old who is being evaluated via a billable video visit.      How would you like to obtain your AVS? MyChart  If the video visit is dropped, the invitation should be resent by: Send to e-mail at: [email or web address]  Will anyone else be joining your video visit? No      Video Start Time: 2:49 PM      Follow Up Surgical Weight Loss Supervised Diet Evaluation    Assessment:  Pt. is being seen today for a follow up RD nutritional evaluation. Pt. has been unsuccessful with non-surgical weight loss methods and is interested in bariatric surgery. Today we reviewed current eating habits and level of physical activity, and instructed on the changes that are required for successful bariatric outcomes.    Surgery of interest per pt: LSG.    Workflow review:  Support Group: Completed.  Psychology:Completed.  Lab work:Completed.  SWL:Yes 7/6 (check in)     Weight goal: 380 lbs or less.    Anthropometrics:  Pt's weight is 360.2 lbs   Initial weight: 380 lbs   Weight change: 19.8 lbs (down)  BMI: There is no height or weight on file to calculate BMI.   Ideal body weight: 61.6 kg (135 lb 12.9 oz)  Adjusted ideal body weight: 104.9 kg (231 lb 5.1 oz)    Estimated RMR ([place]-St Jeor equation):  2359 kcals x 1.3 (light active) = 3067 kcals (for weight maintenance)    Medical History:  Patient Active Problem List   Diagnosis     Morbid obesity (H)     Chondromalacia of patellofemoral joint      Diabetes: No   HbA1c:  No results found for: HGBA1C    Progress over past month: Has really stepped up her water consumption more recently.  Patient reports that she has been working on making more meals from home vs  eating out.  Patient reports that she has been working on increased protein along with vegetables and fruit at meals.  Patient reports that she has significantly reduced her consumption of carbohydrates, especially pasta.  Patient reports that she really isn't having snacks in between meals, noting that the Ozempic seems to be helping with this.        Meal duration: 20-30 minutes.      fluids by 30 minutes before, during meal, and waiting 30 minutes after meal before drinking fluids: Yes-continues to be conscious of, noting that it can francisco javier challenging at times     Beverages  Water    Exercise  Pool Exercises/Swimming most days of the week     PES statement:   Overweight/Obesity (NC 3.3) related to overeating and poor lifestyle habits as evidenced by patient's subjective statements (h/o excessive energy intake) and BMI of 56.5 kg/m2.     Intervention    Nutrition Education:   1. Provided general overview of diet and lifestyle modifications needed to be a deemed a safe candidate for bariatric surgery.     Food/Nutrient Delivery:  2. Educated patient on eating three meals, with cutting out snacking.  3. Bariatric Plate: Patient and I discussed the importance of including a lean protein source (20-30 grams/meal), vegetables (included at lunch and dinner), one serving (15g) of carbohydrate, and limited added fat (1 tb/day) at each meal.   4. Discussed importance of adequate hydration after surgery, with goal of at least 64 oz of fluids/day.  5. Addressed avoiding all carbonated, caffeinated and sweetened drinks to prepare for bariatric surgery.     Nutrition Counselin. Mindful eating techniques: Encouraged slow meal pace, chewing foods to applesauce consistency for 20-30 minutes/meal.   7. Discussed  fluids 30 minutes before, during, and after meal to prevent dumping syndrome and discomfort post bariatric surgery.     Instructions/Goals:     1. Continue implementing bariatric surgery lifestyle  modifications.    Handouts Provided:   [facility] Bariatric Surgery Nutrition Info    Monitor/Evaluation:  Pt. s target weight:  no gain from initial visit, pt. verbalized understanding.     Pt has completed all nutrition requirements and is well-informed of the dietary and physical activity requirements that are necessary for successful bariatric outcomes. This pt is an appropriate candidate for surgery from a nutrition standpoint at this time. The patient understands that surgery is a tool, not a cure, and post operative follow up is essential.     Plan for next visit:   Post-Op Nutrition       Video-Visit Details    Type of service:  Video Visit    Video End Time:3:09 PM    Originating Location (pt. Location): Home    Distant Location (provider location):  [facility]     Platform used for Video Visit: Monster Shin RD          Again, thank you for allowing me to participate in the care of your patient.        Sincerely,        Rosalva Shin RD

## 2022-01-31 NOTE — PROGRESS NOTES
Lena Quesada is a 36 year old who is being evaluated via a billable video visit.      How would you like to obtain your AVS? MyChart  If the video visit is dropped, the invitation should be resent by: Send to e-mail at: [email or web address]  Will anyone else be joining your video visit? No      Video Start Time: 2:49 PM      Follow Up Surgical Weight Loss Supervised Diet Evaluation    Assessment:  Pt. is being seen today for a follow up RD nutritional evaluation. Pt. has been unsuccessful with non-surgical weight loss methods and is interested in bariatric surgery. Today we reviewed current eating habits and level of physical activity, and instructed on the changes that are required for successful bariatric outcomes.    Surgery of interest per pt: LSG.    Workflow review:  Support Group: Completed.  Psychology:Completed.  Lab work:Completed.  SWL:Yes 7/6 (check in)     Weight goal: 380 lbs or less.    Anthropometrics:  Pt's weight is 360.2 lbs   Initial weight: 380 lbs   Weight change: 19.8 lbs (down)  BMI: There is no height or weight on file to calculate BMI.   Ideal body weight: 61.6 kg (135 lb 12.9 oz)  Adjusted ideal body weight: 104.9 kg (231 lb 5.1 oz)    Estimated RMR (Cassel-St Jeor equation):  2359 kcals x 1.3 (light active) = 3067 kcals (for weight maintenance)    Medical History:  Patient Active Problem List   Diagnosis     Morbid obesity (H)     Chondromalacia of patellofemoral joint      Diabetes: No   HbA1c:  No results found for: HGBA1C    Progress over past month: Has really stepped up her water consumption more recently.  Patient reports that she has been working on making more meals from home vs eating out.  Patient reports that she has been working on increased protein along with vegetables and fruit at meals.  Patient reports that she has significantly reduced her consumption of carbohydrates, especially pasta.  Patient reports that she really isn't having snacks in between meals,  noting that the Ozempic seems to be helping with this.        Meal duration: 20-30 minutes.      fluids by 30 minutes before, during meal, and waiting 30 minutes after meal before drinking fluids: Yes-continues to be conscious of, noting that it can francisco javier challenging at times     Beverages  Water    Exercise  Pool Exercises/Swimming most days of the week     PES statement:   Overweight/Obesity (NC 3.3) related to overeating and poor lifestyle habits as evidenced by patient's subjective statements (h/o excessive energy intake) and BMI of 56.5 kg/m2.     Intervention    Nutrition Education:   1. Provided general overview of diet and lifestyle modifications needed to be a deemed a safe candidate for bariatric surgery.     Food/Nutrient Delivery:  2. Educated patient on eating three meals, with cutting out snacking.  3. Bariatric Plate: Patient and I discussed the importance of including a lean protein source (20-30 grams/meal), vegetables (included at lunch and dinner), one serving (15g) of carbohydrate, and limited added fat (1 tb/day) at each meal.   4. Discussed importance of adequate hydration after surgery, with goal of at least 64 oz of fluids/day.  5. Addressed avoiding all carbonated, caffeinated and sweetened drinks to prepare for bariatric surgery.     Nutrition Counselin. Mindful eating techniques: Encouraged slow meal pace, chewing foods to applesauce consistency for 20-30 minutes/meal.   7. Discussed  fluids 30 minutes before, during, and after meal to prevent dumping syndrome and discomfort post bariatric surgery.     Instructions/Goals:     1. Continue implementing bariatric surgery lifestyle modifications.    Handouts Provided:   [facility] Bariatric Surgery Nutrition Info    Monitor/Evaluation:  Pt. s target weight:  no gain from initial visit, pt. verbalized understanding.     Pt has completed all nutrition requirements and is well-informed of the dietary and physical  activity requirements that are necessary for successful bariatric outcomes. This pt is an appropriate candidate for surgery from a nutrition standpoint at this time. The patient understands that surgery is a tool, not a cure, and post operative follow up is essential.     Plan for next visit:   Post-Op Nutrition       Video-Visit Details    Type of service:  Video Visit    Video End Time:3:09 PM    Originating Location (pt. Location): Home    Distant Location (provider location):  [facility]     Platform used for Video Visit: Monster Shin RD

## 2022-02-22 DIAGNOSIS — Z11.59 ENCOUNTER FOR SCREENING FOR OTHER VIRAL DISEASES: Primary | ICD-10-CM

## 2022-02-23 ENCOUNTER — DOCUMENTATION ONLY (OUTPATIENT)
Dept: SURGERY | Facility: CLINIC | Age: 37
End: 2022-02-23
Payer: COMMERCIAL

## 2022-02-23 NOTE — PROGRESS NOTES
I have Lena scheduled for a LSG with Dr. Bobby Rodriguez on 03/24/22 @ 8:30 am.  Scheduled for pre op class on 03/02/22.  [facility] [place] [id number]  12/15/21-5/31/22

## 2022-02-28 DIAGNOSIS — Z98.84 S/P BARIATRIC SURGERY: ICD-10-CM

## 2022-02-28 DIAGNOSIS — E66.01 MORBID OBESITY (H): Primary | ICD-10-CM

## 2022-02-28 DIAGNOSIS — K21.9 ACID REFLUX: ICD-10-CM

## 2022-02-28 DIAGNOSIS — K91.2 POSTOPERATIVE MALABSORPTION: ICD-10-CM

## 2022-02-28 RX ORDER — MAGNESIUM 200 MG
1000 TABLET ORAL DAILY
Qty: 90 TABLET | Refills: 3 | Status: SHIPPED | OUTPATIENT
Start: 2022-02-28 | End: 2022-09-08

## 2022-02-28 RX ORDER — MULTIVIT-MIN/FOLIC/VIT K/LYCOP 400-300MCG
1 TABLET ORAL 2 TIMES DAILY
Qty: 180 TABLET | Refills: 3 | Status: SHIPPED | OUTPATIENT
Start: 2022-02-28 | End: 2022-09-08

## 2022-02-28 NOTE — PROGRESS NOTES
Order placed for post op medications/supplements per .    Hawa Trevino RN, CBN  [facility]  P [phone]  F [phone]

## 2022-03-01 VITALS — BODY MASS INDEX: 45.99 KG/M2 | HEIGHT: 67 IN | WEIGHT: 293 LBS

## 2022-03-01 NOTE — PROGRESS NOTES
Patient attended virtual group class to review pre-op instructions for upcoming surgery.    Discussed admission process, COVID restrictions and hospital course.  Pharmacy information packet given and explained. Patient was given exercises to work on post-op for maintaining muscle mass and strengthening.  Bariatric quiz reviewed in class. Discharge instructions and follow up appointments given and reviewed with pt at this time and patient verbalized understanding. She will have her H&P at [facility] on 3/7/2022 with  and do her pre-op testing at that visit.  Prescriptions for Omeprazole sent to the patient's pharmacy to be started after surgery as well as vitamins.      Hawa Trevino RN, CBN  [facility]  P [phone]  F [phone]

## 2022-03-01 NOTE — PROGRESS NOTES
Time in: 1:00p /Time out: 2:00p   Pt's [unfilled]  BMI: There is no height or weight on file to calculate BMI.    Pt presents for nutrition education class for liquid diets. Educated pt on 2-week pre-op and 1-week post-op liquid diets. Discussed appropriate liquids and demonstrated portions for each of the food groupings during each diet phase. Reviewed appropriate calories/protein/fluid goals during 2-week pre-op liquid diet. Educated on correct vitamins/minerals to take after surgery in correct dosage and frequency. Provided grocery list and sample menu plan for each diet stage, as well as unflavored protein powder samples.        Pt will begin 2-week pre-op liquid diet 3/10/22, will do clear liquids the day before surgery. Pt is scheduled for [facility] on 3/24/22, and will then follow 2 week post-op liquid diet. Pt will f/u with RD 1-week post-op for further diet advancement.

## 2022-03-02 ENCOUNTER — ALLIED HEALTH/NURSE VISIT (OUTPATIENT)
Dept: SURGERY | Facility: CLINIC | Age: 37
End: 2022-03-02
Payer: COMMERCIAL

## 2022-03-02 DIAGNOSIS — Z71.89 ENCOUNTER FOR PRE-BARIATRIC SURGERY COUNSELING AND EDUCATION: Primary | ICD-10-CM

## 2022-03-02 DIAGNOSIS — E66.01 MORBID OBESITY (H): ICD-10-CM

## 2022-03-02 DIAGNOSIS — Z01.818 PRE-OP TESTING: ICD-10-CM

## 2022-03-02 PROCEDURE — 99207 PR PREOP VISIT IN GLOBAL PKG: CPT

## 2022-03-07 ENCOUNTER — OFFICE VISIT (OUTPATIENT)
Dept: FAMILY MEDICINE | Facility: CLINIC | Age: 37
End: 2022-03-07
Payer: COMMERCIAL

## 2022-03-07 VITALS
BODY MASS INDEX: 45.99 KG/M2 | SYSTOLIC BLOOD PRESSURE: 156 MMHG | HEART RATE: 83 BPM | WEIGHT: 293 LBS | HEIGHT: 67 IN | DIASTOLIC BLOOD PRESSURE: 94 MMHG | TEMPERATURE: 97.7 F | RESPIRATION RATE: 16 BRPM

## 2022-03-07 DIAGNOSIS — Z01.818 PRE-OP TESTING: Primary | ICD-10-CM

## 2022-03-07 DIAGNOSIS — E66.01 MORBID OBESITY (H): ICD-10-CM

## 2022-03-07 DIAGNOSIS — Z11.4 SCREENING FOR HIV (HUMAN IMMUNODEFICIENCY VIRUS): ICD-10-CM

## 2022-03-07 DIAGNOSIS — Z00.00 HEALTHCARE MAINTENANCE: ICD-10-CM

## 2022-03-07 DIAGNOSIS — Z11.59 NEED FOR HEPATITIS C SCREENING TEST: ICD-10-CM

## 2022-03-07 LAB
ALBUMIN SERPL-MCNC: 3.9 G/DL (ref 3.5–5)
ALP SERPL-CCNC: 74 U/L (ref 45–120)
ALT SERPL W P-5'-P-CCNC: 44 U/L (ref 0–45)
ANION GAP SERPL CALCULATED.3IONS-SCNC: 13 MMOL/L (ref 5–18)
AST SERPL W P-5'-P-CCNC: 31 U/L (ref 0–40)
BASOPHILS # BLD AUTO: 0 10E3/UL (ref 0–0.2)
BASOPHILS NFR BLD AUTO: 0 %
BILIRUB SERPL-MCNC: 0.6 MG/DL (ref 0–1)
BUN SERPL-MCNC: 9 MG/DL (ref 8–22)
CALCIUM SERPL-MCNC: 9.8 MG/DL (ref 8.5–10.5)
CHLORIDE BLD-SCNC: 101 MMOL/L (ref 98–107)
CO2 SERPL-SCNC: 25 MMOL/L (ref 22–31)
CREAT SERPL-MCNC: 0.73 MG/DL (ref 0.6–1.1)
EOSINOPHIL # BLD AUTO: 0.1 10E3/UL (ref 0–0.7)
EOSINOPHIL NFR BLD AUTO: 1 %
ERYTHROCYTE [DISTWIDTH] IN BLOOD BY AUTOMATED COUNT: 12.8 % (ref 10–15)
GFR SERPL CREATININE-BSD FRML MDRD: >90 ML/MIN/1.73M2
GLUCOSE BLD-MCNC: 101 MG/DL (ref 70–125)
HBA1C MFR BLD: 5.5 % (ref 0–5.6)
HCT VFR BLD AUTO: 37.8 % (ref 35–47)
HGB BLD-MCNC: 13 G/DL (ref 11.7–15.7)
IMM GRANULOCYTES # BLD: 0 10E3/UL
IMM GRANULOCYTES NFR BLD: 0 %
LYMPHOCYTES # BLD AUTO: 2.4 10E3/UL (ref 0.8–5.3)
LYMPHOCYTES NFR BLD AUTO: 22 %
MCH RBC QN AUTO: 30 PG (ref 26.5–33)
MCHC RBC AUTO-ENTMCNC: 34.4 G/DL (ref 31.5–36.5)
MCV RBC AUTO: 87 FL (ref 78–100)
MONOCYTES # BLD AUTO: 0.6 10E3/UL (ref 0–1.3)
MONOCYTES NFR BLD AUTO: 6 %
NEUTROPHILS # BLD AUTO: 7.5 10E3/UL (ref 1.6–8.3)
NEUTROPHILS NFR BLD AUTO: 71 %
PLATELET # BLD AUTO: 322 10E3/UL (ref 150–450)
POTASSIUM BLD-SCNC: 4.2 MMOL/L (ref 3.5–5)
PROT SERPL-MCNC: 7 G/DL (ref 6–8)
RBC # BLD AUTO: 4.33 10E6/UL (ref 3.8–5.2)
SODIUM SERPL-SCNC: 139 MMOL/L (ref 136–145)
WBC # BLD AUTO: 10.5 10E3/UL (ref 4–11)

## 2022-03-07 PROCEDURE — 99214 OFFICE O/P EST MOD 30 MIN: CPT | Performed by: FAMILY MEDICINE

## 2022-03-07 PROCEDURE — 36415 COLL VENOUS BLD VENIPUNCTURE: CPT | Performed by: FAMILY MEDICINE

## 2022-03-07 PROCEDURE — 85610 PROTHROMBIN TIME: CPT | Performed by: FAMILY MEDICINE

## 2022-03-07 PROCEDURE — 93010 ELECTROCARDIOGRAM REPORT: CPT | Performed by: INTERNAL MEDICINE

## 2022-03-07 PROCEDURE — 93005 ELECTROCARDIOGRAM TRACING: CPT | Performed by: FAMILY MEDICINE

## 2022-03-07 PROCEDURE — 85025 COMPLETE CBC W/AUTO DIFF WBC: CPT | Performed by: FAMILY MEDICINE

## 2022-03-07 PROCEDURE — 93000 ELECTROCARDIOGRAM COMPLETE: CPT | Mod: 77 | Performed by: FAMILY MEDICINE

## 2022-03-07 PROCEDURE — 83036 HEMOGLOBIN GLYCOSYLATED A1C: CPT | Performed by: FAMILY MEDICINE

## 2022-03-07 PROCEDURE — 80053 COMPREHEN METABOLIC PANEL: CPT | Performed by: FAMILY MEDICINE

## 2022-03-07 PROCEDURE — 85730 THROMBOPLASTIN TIME PARTIAL: CPT | Performed by: FAMILY MEDICINE

## 2022-03-07 NOTE — PROGRESS NOTES
[facility] [address] [address] [place] [address]  Phone: [phone]  Fax: [phone]  Primary Provider: Jazz Abbott  Pre-op Performing Provider: JAZZ ABBOTT      PREOPERATIVE EVALUATION:  Today's date: 3/7/2022    Lena Quesada is a 36 year old female who presents for a preoperative evaluation.    Surgical Information:  Surgery/Procedure: gastric sleeve  Surgery Location: [facility]  Surgeon: Dr. Mauricio  Surgery Date: 03/24/2022  Time of Surgery: 8:30am  Where patient plans to recover: At home with family  Fax number for surgical facility: Note does not need to be faxed, will be available electronically in Epic.    Type of Anesthesia Anticipated: General    Assessment & Plan     The proposed surgical procedure is considered INTERMEDIATE risk.    Problem List Items Addressed This Visit        Digestive    Morbid obesity (H)      Other Visit Diagnoses     Pre-op testing    -  Primary    Relevant Orders    EKG 12-lead, tracing only (Completed)    Screening for HIV (human immunodeficiency virus)        Need for hepatitis C screening test        Healthcare maintenance        Relevant Orders    REVIEW OF HEALTH MAINTENANCE PROTOCOL ORDERS (Completed)               Risks and Recommendations:  The patient has the following additional risks and recommendations for perioperative complications:   - Morbid obesity (BMI >40)    Medication Instructions:  Take all medications until morning of surgery     RECOMMENDATION:  APPROVAL GIVEN to proceed with proposed procedure, without further diagnostic evaluation.    Review of external notes as documented above       Subjective     HPI related to upcoming procedure: starts liquid diet on Thursday -   Has lost 60 pounds - Ozempic, Weight Watchers, Metformin - doesn't feel hungry  Swimming 2-3x/week; does some steps going down (not modified); last cortisone in knees was July -     Has COVID test scheduled on Monday  on the week of surgery -   Has stayed COVID free in last 2 years -        Preop Questions 3/7/2022   1. Have you ever had a heart attack or stroke? No   2. Have you ever had surgery on your heart or blood vessels, such as a stent placement, a coronary artery bypass, or surgery on an artery in your head, neck, heart, or legs? No   3. Do you have chest pain with activity? No   4. Do you have a history of  heart failure? No   5. Do you currently have a cold, bronchitis or symptoms of other infection? No   6. Do you have a cough, shortness of breath, or wheezing? No   7. Do you or anyone in your family have previous history of blood clots? No   8. Do you or does anyone in your family have a serious bleeding problem such as prolonged bleeding following surgeries or cuts? No   9. Have you ever had problems with anemia or been told to take iron pills? No   10. Have you had any abnormal blood loss such as black, tarry or bloody stools, or abnormal vaginal bleeding? No   11. Have you ever had a blood transfusion? No   12. Are you willing to have a blood transfusion if it is medically needed before, during, or after your surgery? Yes   13. Have you or any of your relatives ever had problems with anesthesia? No   14. Do you have sleep apnea, excessive snoring or daytime drowsiness? No   15. Do you have any artifical heart valves or other implanted medical devices like a pacemaker, defibrillator, or continuous glucose monitor? No   16. Do you have artificial joints? No   17. Are you allergic to latex? No   18. Is there any chance that you may be pregnant? No       Health Care Directive:  Patient does not have a Health Care Directive or Living Will: Discussed advance care planning with patient; information given to patient to review.    Preoperative Review of :   reviewed - no record of controlled substances prescribed.      Status of Chronic Conditions:  See problem list for active medical problems.  Problems all  longstanding and stable, except as noted/documented.  See ROS for pertinent symptoms related to these conditions.      Review of Systems   Constitutional: Positive for appetite change (improving appetite while working on weight loss).   HENT: Negative.    Respiratory: Negative for chest tightness and shortness of breath.    Cardiovascular: Negative.  Negative for chest pain, palpitations and leg swelling.   Gastrointestinal: Negative.  Negative for abdominal pain.   Endocrine: Negative.    Genitourinary: Negative.    Musculoskeletal: Positive for arthralgias (bilateral knee pain, improving with weight loss).   Skin: Negative.    Allergic/Immunologic: Negative.    Neurological: Negative.    Hematological: Negative.    Psychiatric/Behavioral: Negative.          Patient Active Problem List    Diagnosis Date Noted     Chondromalacia of patellofemoral joint 01/17/2021     Priority: Medium     bilateral         Morbid obesity (H) 12/16/2020     Priority: Medium      Past Medical History:   Diagnosis Date     Arthritis     chondromalacia of knee (right). injections.     Cholelithiasis      Past Surgical History:   Procedure Laterality Date     CHOLECYSTECTOMY  2005     TYMPANOPLASTY       WISDOM TOOTH EXTRACTION       Current Outpatient Medications   Medication Sig Dispense Refill     cyanocobalamin (VITAMIN B-12) 1000 MCG SUBL sublingual tablet Place 1 tablet (1,000 mcg) under the tongue daily Start right after surgery. 90 tablet 3     metFORMIN (GLUCOPHAGE) 500 MG tablet Take 1 tablet (500 mg) by mouth 2 times daily (with meals) 180 tablet 1     [START ON 3/25/2022] omeprazole (PRILOSEC) 20 MG DR capsule Take 1 capsule (20 mg) by mouth daily Start day after surgery, open contents and sprinkle on food for first 6 weeks. Take daily for 3 months after surgery. 90 capsule 0     OZEMPIC, 0.25 OR 0.5 MG/DOSE, 2 MG/1.5ML SOPN pen Inject 0.5 mg Subcutaneous once a week       Pediatric Multivitamins-Iron (MULTIVITAMINS PLUS IRON  "CHILD) 18 MG CHEW Take 1 chew tab by mouth 2 times daily Ok to substitute with any chewable that contains 18 mg of iron, Vitamin A, Thiamine and Zinc. 180 tablet 3       No Known Allergies     Social History     Tobacco Use     Smoking status: Never Smoker     Smokeless tobacco: Never Used   Substance Use Topics     Alcohol use: Yes     Comment: Alcoholic Drinks/day:  x2 month      Family History   Problem Relation Age of Onset     Arthritis Mother      Obesity Mother      Arthritis Father      Obesity Father      Sleep Apnea Brother      Obesity Brother      History   Drug Use Unknown         Objective     BP (!) 156/94 (BP Location: Left arm, Patient Position: Sitting, Cuff Size: Adult Large)   Pulse 83   Temp 97.7  F (36.5  C) (Oral)   Resp 16   Ht 1.71 m (5' 7.32\")   Wt (!) 169 kg (372 lb 9.6 oz)   LMP 02/04/2022 (Approximate)   BMI 57.80 kg/m      Physical Exam  Constitutional:       General: She is not in acute distress.     Appearance: She is well-developed. She is obese.   HENT:      Right Ear: Tympanic membrane and external ear normal.      Left Ear: Tympanic membrane and external ear normal.      Nose: Nose normal.      Mouth/Throat:      Pharynx: No oropharyngeal exudate.   Eyes:      General:         Right eye: No discharge.         Left eye: No discharge.      Conjunctiva/sclera: Conjunctivae normal.      Pupils: Pupils are equal, round, and reactive to light.   Neck:      Thyroid: No thyromegaly.      Trachea: No tracheal deviation.   Cardiovascular:      Rate and Rhythm: Normal rate and regular rhythm.      Pulses: Normal pulses.      Heart sounds: Normal heart sounds, S1 normal and S2 normal. No murmur heard.    No friction rub. No S3 or S4 sounds.   Pulmonary:      Effort: Pulmonary effort is normal. No respiratory distress.      Breath sounds: Normal breath sounds. No wheezing or rales.   Abdominal:      General: Bowel sounds are normal.      Palpations: Abdomen is soft. There is no mass.    "   Tenderness: There is no abdominal tenderness.   Musculoskeletal:         General: Normal range of motion.      Cervical back: Neck supple.   Lymphadenopathy:      Cervical: No cervical adenopathy.   Skin:     General: Skin is warm and dry.      Findings: No rash.   Neurological:      Mental Status: She is alert and oriented to person, place, and time.      Motor: No abnormal muscle tone.      Deep Tendon Reflexes: Reflexes are normal and symmetric.   Psychiatric:         Thought Content: Thought content normal.         Judgment: Judgment normal.           Recent Labs   Lab Test 06/11/21  1631 01/29/21  0709   HGB 12.8 13.6    338    138   POTASSIUM 4.3 4.1   CR 0.72 0.86   A1C 6.4*  --         Diagnostics:  Recent Results (from the past 168 hour(s))   Partial thromboplastin time    Collection Time: 03/07/22  5:53 PM   Result Value Ref Range    aPTT 32 22 - 38 Seconds   INR    Collection Time: 03/07/22  5:53 PM   Result Value Ref Range    INR 0.95 0.85 - 1.15   Hemoglobin A1c    Collection Time: 03/07/22  5:53 PM   Result Value Ref Range    Hemoglobin A1C 5.5 0.0 - 5.6 %   Comprehensive metabolic panel    Collection Time: 03/07/22  5:53 PM   Result Value Ref Range    Sodium 139 136 - 145 mmol/L    Potassium 4.2 3.5 - 5.0 mmol/L    Chloride 101 98 - 107 mmol/L    Carbon Dioxide (CO2) 25 22 - 31 mmol/L    Anion Gap 13 5 - 18 mmol/L    Urea Nitrogen 9 8 - 22 mg/dL    Creatinine 0.73 0.60 - 1.10 mg/dL    Calcium 9.8 8.5 - 10.5 mg/dL    Glucose 101 70 - 125 mg/dL    Alkaline Phosphatase 74 45 - 120 U/L    AST 31 0 - 40 U/L    ALT 44 0 - 45 U/L    Protein Total 7.0 6.0 - 8.0 g/dL    Albumin 3.9 3.5 - 5.0 g/dL    Bilirubin Total 0.6 0.0 - 1.0 mg/dL    GFR Estimate >90 >60 mL/min/1.73m2   CBC with platelets and differential    Collection Time: 03/07/22  5:53 PM   Result Value Ref Range    WBC Count 10.5 4.0 - 11.0 10e3/uL    RBC Count 4.33 3.80 - 5.20 10e6/uL    Hemoglobin 13.0 11.7 - 15.7 g/dL    Hematocrit  37.8 35.0 - 47.0 %    MCV 87 78 - 100 fL    MCH 30.0 26.5 - 33.0 pg    MCHC 34.4 31.5 - 36.5 g/dL    RDW 12.8 10.0 - 15.0 %    Platelet Count 322 150 - 450 10e3/uL    % Neutrophils 71 %    % Lymphocytes 22 %    % Monocytes 6 %    % Eosinophils 1 %    % Basophils 0 %    % Immature Granulocytes 0 %    Absolute Neutrophils 7.5 1.6 - 8.3 10e3/uL    Absolute Lymphocytes 2.4 0.8 - 5.3 10e3/uL    Absolute Monocytes 0.6 0.0 - 1.3 10e3/uL    Absolute Eosinophils 0.1 0.0 - 0.7 10e3/uL    Absolute Basophils 0.0 0.0 - 0.2 10e3/uL    Absolute Immature Granulocytes 0.0 <=0.4 10e3/uL   EKG 12-lead, tracing only    Collection Time: 03/07/22  6:13 PM   Result Value Ref Range    Systolic Blood Pressure  mmHg    Diastolic Blood Pressure  mmHg    Ventricular Rate 74 BPM    Atrial Rate 74 BPM    MD Interval 174 ms    QRS Duration 90 ms     ms    QTc 472 ms    P Axis 39 degrees    R AXIS -9 degrees    T Axis 10 degrees    Interpretation ECG       Sinus rhythm with occasional Premature ventricular complexes  Otherwise normal ECG  No previous ECGs available  Confirmed by FLOWER FUENTES MD LOC:[facility] ([id number]) on 3/8/2022 12:34:30 PM            Revised Cardiac Risk Index (RCRI):  The patient has the following serious cardiovascular risks for perioperative complications:   - High risk surgery (>5% cardiac complication risk) = 1 point     RCRI Interpretation: 1 point: Class II (low risk - 0.9% complication rate)           Signed Electronically by: CR DUDLEY MD  Copy of this evaluation report is provided to requesting physician.

## 2022-03-07 NOTE — H&P (VIEW-ONLY)
[facility] [place] [address]  Phone: [phone]  Fax: [phone]  Primary Provider: Jazz Abbott  Pre-op Performing Provider: JAZZ ABBOTT      PREOPERATIVE EVALUATION:  Today's date: 3/7/2022    Lena Quesada is a 36 year old female who presents for a preoperative evaluation.    Surgical Information:  Surgery/Procedure: gastric sleeve  Surgery Location: [facility]  Surgeon: Dr. Mauricio  Surgery Date: 03/24/2022  Time of Surgery: 8:30am  Where patient plans to recover: At home with family  Fax number for surgical facility: Note does not need to be faxed, will be available electronically in Epic.    Type of Anesthesia Anticipated: General    Assessment & Plan     The proposed surgical procedure is considered INTERMEDIATE risk.    Problem List Items Addressed This Visit        Digestive    Morbid obesity (H)      Other Visit Diagnoses     Pre-op testing    -  Primary    Relevant Orders    EKG 12-lead, tracing only (Completed)    Screening for HIV (human immunodeficiency virus)        Need for hepatitis C screening test        Healthcare maintenance        Relevant Orders    REVIEW OF HEALTH MAINTENANCE PROTOCOL ORDERS (Completed)               Risks and Recommendations:  The patient has the following additional risks and recommendations for perioperative complications:   - Morbid obesity (BMI >40)    Medication Instructions:  Take all medications until morning of surgery     RECOMMENDATION:  APPROVAL GIVEN to proceed with proposed procedure, without further diagnostic evaluation.    Review of external notes as documented above       Subjective     HPI related to upcoming procedure: starts liquid diet on Thursday -   Has lost 60 pounds - Ozempic, Weight Watchers, Metformin - doesn't feel hungry  Swimming 2-3x/week; does some steps going down (not modified); last cortisone in knees was July -     Has COVID test scheduled on Monday  on the week of surgery -   Has stayed COVID free in last 2 years -        Preop Questions 3/7/2022   1. Have you ever had a heart attack or stroke? No   2. Have you ever had surgery on your heart or blood vessels, such as a stent placement, a coronary artery bypass, or surgery on an artery in your head, neck, heart, or legs? No   3. Do you have chest pain with activity? No   4. Do you have a history of  heart failure? No   5. Do you currently have a cold, bronchitis or symptoms of other infection? No   6. Do you have a cough, shortness of breath, or wheezing? No   7. Do you or anyone in your family have previous history of blood clots? No   8. Do you or does anyone in your family have a serious bleeding problem such as prolonged bleeding following surgeries or cuts? No   9. Have you ever had problems with anemia or been told to take iron pills? No   10. Have you had any abnormal blood loss such as black, tarry or bloody stools, or abnormal vaginal bleeding? No   11. Have you ever had a blood transfusion? No   12. Are you willing to have a blood transfusion if it is medically needed before, during, or after your surgery? Yes   13. Have you or any of your relatives ever had problems with anesthesia? No   14. Do you have sleep apnea, excessive snoring or daytime drowsiness? No   15. Do you have any artifical heart valves or other implanted medical devices like a pacemaker, defibrillator, or continuous glucose monitor? No   16. Do you have artificial joints? No   17. Are you allergic to latex? No   18. Is there any chance that you may be pregnant? No       Health Care Directive:  Patient does not have a Health Care Directive or Living Will: Discussed advance care planning with patient; information given to patient to review.    Preoperative Review of :   reviewed - no record of controlled substances prescribed.      Status of Chronic Conditions:  See problem list for active medical problems.  Problems all  longstanding and stable, except as noted/documented.  See ROS for pertinent symptoms related to these conditions.      Review of Systems   Constitutional: Positive for appetite change (improving appetite while working on weight loss).   HENT: Negative.    Respiratory: Negative for chest tightness and shortness of breath.    Cardiovascular: Negative.  Negative for chest pain, palpitations and leg swelling.   Gastrointestinal: Negative.  Negative for abdominal pain.   Endocrine: Negative.    Genitourinary: Negative.    Musculoskeletal: Positive for arthralgias (bilateral knee pain, improving with weight loss).   Skin: Negative.    Allergic/Immunologic: Negative.    Neurological: Negative.    Hematological: Negative.    Psychiatric/Behavioral: Negative.          Patient Active Problem List    Diagnosis Date Noted     Chondromalacia of patellofemoral joint 01/17/2021     Priority: Medium     bilateral         Morbid obesity (H) 12/16/2020     Priority: Medium      Past Medical History:   Diagnosis Date     Arthritis     chondromalacia of knee (right). injections.     Cholelithiasis      Past Surgical History:   Procedure Laterality Date     CHOLECYSTECTOMY  2005     TYMPANOPLASTY       WISDOM TOOTH EXTRACTION       Current Outpatient Medications   Medication Sig Dispense Refill     cyanocobalamin (VITAMIN B-12) 1000 MCG SUBL sublingual tablet Place 1 tablet (1,000 mcg) under the tongue daily Start right after surgery. 90 tablet 3     metFORMIN (GLUCOPHAGE) 500 MG tablet Take 1 tablet (500 mg) by mouth 2 times daily (with meals) 180 tablet 1     [START ON 3/25/2022] omeprazole (PRILOSEC) 20 MG DR capsule Take 1 capsule (20 mg) by mouth daily Start day after surgery, open contents and sprinkle on food for first 6 weeks. Take daily for 3 months after surgery. 90 capsule 0     OZEMPIC, 0.25 OR 0.5 MG/DOSE, 2 MG/1.5ML SOPN pen Inject 0.5 mg Subcutaneous once a week       Pediatric Multivitamins-Iron (MULTIVITAMINS PLUS IRON  "CHILD) 18 MG CHEW Take 1 chew tab by mouth 2 times daily Ok to substitute with any chewable that contains 18 mg of iron, Vitamin A, Thiamine and Zinc. 180 tablet 3       No Known Allergies     Social History     Tobacco Use     Smoking status: Never Smoker     Smokeless tobacco: Never Used   Substance Use Topics     Alcohol use: Yes     Comment: Alcoholic Drinks/day:  x2 month      Family History   Problem Relation Age of Onset     Arthritis Mother      Obesity Mother      Arthritis Father      Obesity Father      Sleep Apnea Brother      Obesity Brother      History   Drug Use Unknown         Objective     BP (!) 156/94 (BP Location: Left arm, Patient Position: Sitting, Cuff Size: Adult Large)   Pulse 83   Temp 97.7  F (36.5  C) (Oral)   Resp 16   Ht 1.71 m (5' 7.32\")   Wt (!) 169 kg (372 lb 9.6 oz)   LMP 02/04/2022 (Approximate)   BMI 57.80 kg/m      Physical Exam  Constitutional:       General: She is not in acute distress.     Appearance: She is well-developed. She is obese.   HENT:      Right Ear: Tympanic membrane and external ear normal.      Left Ear: Tympanic membrane and external ear normal.      Nose: Nose normal.      Mouth/Throat:      Pharynx: No oropharyngeal exudate.   Eyes:      General:         Right eye: No discharge.         Left eye: No discharge.      Conjunctiva/sclera: Conjunctivae normal.      Pupils: Pupils are equal, round, and reactive to light.   Neck:      Thyroid: No thyromegaly.      Trachea: No tracheal deviation.   Cardiovascular:      Rate and Rhythm: Normal rate and regular rhythm.      Pulses: Normal pulses.      Heart sounds: Normal heart sounds, S1 normal and S2 normal. No murmur heard.    No friction rub. No S3 or S4 sounds.   Pulmonary:      Effort: Pulmonary effort is normal. No respiratory distress.      Breath sounds: Normal breath sounds. No wheezing or rales.   Abdominal:      General: Bowel sounds are normal.      Palpations: Abdomen is soft. There is no mass.    "   Tenderness: There is no abdominal tenderness.   Musculoskeletal:         General: Normal range of motion.      Cervical back: Neck supple.   Lymphadenopathy:      Cervical: No cervical adenopathy.   Skin:     General: Skin is warm and dry.      Findings: No rash.   Neurological:      Mental Status: She is alert and oriented to person, place, and time.      Motor: No abnormal muscle tone.      Deep Tendon Reflexes: Reflexes are normal and symmetric.   Psychiatric:         Thought Content: Thought content normal.         Judgment: Judgment normal.           Recent Labs   Lab Test 06/11/21  1631 01/29/21  0709   HGB 12.8 13.6    338    138   POTASSIUM 4.3 4.1   CR 0.72 0.86   A1C 6.4*  --         Diagnostics:  Recent Results (from the past 168 hour(s))   Partial thromboplastin time    Collection Time: 03/07/22  5:53 PM   Result Value Ref Range    aPTT 32 22 - 38 Seconds   INR    Collection Time: 03/07/22  5:53 PM   Result Value Ref Range    INR 0.95 0.85 - 1.15   Hemoglobin A1c    Collection Time: 03/07/22  5:53 PM   Result Value Ref Range    Hemoglobin A1C 5.5 0.0 - 5.6 %   Comprehensive metabolic panel    Collection Time: 03/07/22  5:53 PM   Result Value Ref Range    Sodium 139 136 - 145 mmol/L    Potassium 4.2 3.5 - 5.0 mmol/L    Chloride 101 98 - 107 mmol/L    Carbon Dioxide (CO2) 25 22 - 31 mmol/L    Anion Gap 13 5 - 18 mmol/L    Urea Nitrogen 9 8 - 22 mg/dL    Creatinine 0.73 0.60 - 1.10 mg/dL    Calcium 9.8 8.5 - 10.5 mg/dL    Glucose 101 70 - 125 mg/dL    Alkaline Phosphatase 74 45 - 120 U/L    AST 31 0 - 40 U/L    ALT 44 0 - 45 U/L    Protein Total 7.0 6.0 - 8.0 g/dL    Albumin 3.9 3.5 - 5.0 g/dL    Bilirubin Total 0.6 0.0 - 1.0 mg/dL    GFR Estimate >90 >60 mL/min/1.73m2   CBC with platelets and differential    Collection Time: 03/07/22  5:53 PM   Result Value Ref Range    WBC Count 10.5 4.0 - 11.0 10e3/uL    RBC Count 4.33 3.80 - 5.20 10e6/uL    Hemoglobin 13.0 11.7 - 15.7 g/dL    Hematocrit  37.8 35.0 - 47.0 %    MCV 87 78 - 100 fL    MCH 30.0 26.5 - 33.0 pg    MCHC 34.4 31.5 - 36.5 g/dL    RDW 12.8 10.0 - 15.0 %    Platelet Count 322 150 - 450 10e3/uL    % Neutrophils 71 %    % Lymphocytes 22 %    % Monocytes 6 %    % Eosinophils 1 %    % Basophils 0 %    % Immature Granulocytes 0 %    Absolute Neutrophils 7.5 1.6 - 8.3 10e3/uL    Absolute Lymphocytes 2.4 0.8 - 5.3 10e3/uL    Absolute Monocytes 0.6 0.0 - 1.3 10e3/uL    Absolute Eosinophils 0.1 0.0 - 0.7 10e3/uL    Absolute Basophils 0.0 0.0 - 0.2 10e3/uL    Absolute Immature Granulocytes 0.0 <=0.4 10e3/uL   EKG 12-lead, tracing only    Collection Time: 03/07/22  6:13 PM   Result Value Ref Range    Systolic Blood Pressure  mmHg    Diastolic Blood Pressure  mmHg    Ventricular Rate 74 BPM    Atrial Rate 74 BPM    NH Interval 174 ms    QRS Duration 90 ms     ms    QTc 472 ms    P Axis 39 degrees    R AXIS -9 degrees    T Axis 10 degrees    Interpretation ECG       Sinus rhythm with occasional Premature ventricular complexes  Otherwise normal ECG  No previous ECGs available  Confirmed by FLOWER FUENTES MD LOC:JN ([id number]) on 3/8/2022 12:34:30 PM            Revised Cardiac Risk Index (RCRI):  The patient has the following serious cardiovascular risks for perioperative complications:   - High risk surgery (>5% cardiac complication risk) = 1 point     RCRI Interpretation: 1 point: Class II (low risk - 0.9% complication rate)           Signed Electronically by: CR DUDLEY MD  Copy of this evaluation report is provided to requesting physician.

## 2022-03-08 LAB
APTT PPP: 32 SECONDS (ref 22–38)
ATRIAL RATE - MUSE: 74 BPM
DIASTOLIC BLOOD PRESSURE - MUSE: NORMAL MMHG
INR PPP: 0.95 (ref 0.85–1.15)
INTERPRETATION ECG - MUSE: NORMAL
P AXIS - MUSE: 39 DEGREES
PR INTERVAL - MUSE: 174 MS
QRS DURATION - MUSE: 90 MS
QT - MUSE: 426 MS
QTC - MUSE: 472 MS
R AXIS - MUSE: -9 DEGREES
SYSTOLIC BLOOD PRESSURE - MUSE: NORMAL MMHG
T AXIS - MUSE: 10 DEGREES
VENTRICULAR RATE- MUSE: 74 BPM

## 2022-03-13 ASSESSMENT — ENCOUNTER SYMPTOMS
ENDOCRINE NEGATIVE: 1
HEMATOLOGIC/LYMPHATIC NEGATIVE: 1
PALPITATIONS: 0
NEUROLOGICAL NEGATIVE: 1
ABDOMINAL PAIN: 0
APPETITE CHANGE: 1
CARDIOVASCULAR NEGATIVE: 1
GASTROINTESTINAL NEGATIVE: 1
ALLERGIC/IMMUNOLOGIC NEGATIVE: 1
SHORTNESS OF BREATH: 0
PSYCHIATRIC NEGATIVE: 1
CHEST TIGHTNESS: 0
ARTHRALGIAS: 1

## 2022-03-14 ENCOUNTER — DOCUMENTATION ONLY (OUTPATIENT)
Dept: SURGERY | Facility: CLINIC | Age: 37
End: 2022-03-14
Payer: COMMERCIAL

## 2022-03-14 NOTE — PROGRESS NOTES
Federal Family and Medical Leave Act forms received.     Leave start date: 03/24/2022    Leave end date: 04/07/2022    Pt can RTW on 04/08/2022 with no restrictions.    Forms faxed to: [facility] [employer], F: [phone].    Forms sent to HIM for scanning.    Pt informed via email that forms have been sent: [email or web address].    Teodora Snyder CMA  [facility]  Surgery Clinic [facility]  [facility] [address] [address]  Office: [phone]  Fax: [phone]

## 2022-03-17 ENCOUNTER — APPOINTMENT (OUTPATIENT)
Dept: LAB | Facility: CLINIC | Age: 37
End: 2022-03-17
Payer: COMMERCIAL

## 2022-03-17 LAB
ALBUMIN UR-MCNC: NEGATIVE MG/DL
APPEARANCE UR: CLEAR
BACTERIA #/AREA URNS HPF: ABNORMAL /HPF
BILIRUB UR QL STRIP: NEGATIVE
COLOR UR AUTO: YELLOW
GLUCOSE UR STRIP-MCNC: NEGATIVE MG/DL
HGB UR QL STRIP: ABNORMAL
KETONES UR STRIP-MCNC: NEGATIVE MG/DL
LEUKOCYTE ESTERASE UR QL STRIP: ABNORMAL
NITRATE UR QL: NEGATIVE
PH UR STRIP: 5.5 [PH] (ref 5–8)
RBC #/AREA URNS AUTO: ABNORMAL /HPF
SP GR UR STRIP: <=1.005 (ref 1–1.03)
SQUAMOUS #/AREA URNS AUTO: ABNORMAL /LPF
UROBILINOGEN UR STRIP-ACNC: 0.2 E.U./DL
WBC #/AREA URNS AUTO: ABNORMAL /HPF

## 2022-03-17 PROCEDURE — 81001 URINALYSIS AUTO W/SCOPE: CPT | Performed by: FAMILY MEDICINE

## 2022-03-17 PROCEDURE — 87086 URINE CULTURE/COLONY COUNT: CPT | Performed by: FAMILY MEDICINE

## 2022-03-19 LAB — BACTERIA UR CULT: NORMAL

## 2022-03-21 ENCOUNTER — LAB (OUTPATIENT)
Dept: FAMILY MEDICINE | Facility: CLINIC | Age: 37
End: 2022-03-21
Payer: COMMERCIAL

## 2022-03-21 DIAGNOSIS — Z11.59 ENCOUNTER FOR SCREENING FOR OTHER VIRAL DISEASES: ICD-10-CM

## 2022-03-21 PROCEDURE — U0003 INFECTIOUS AGENT DETECTION BY NUCLEIC ACID (DNA OR RNA); SEVERE ACUTE RESPIRATORY SYNDROME CORONAVIRUS 2 (SARS-COV-2) (CORONAVIRUS DISEASE [COVID-19]), AMPLIFIED PROBE TECHNIQUE, MAKING USE OF HIGH THROUGHPUT TECHNOLOGIES AS DESCRIBED BY CMS-2020-01-R: HCPCS

## 2022-03-21 PROCEDURE — U0005 INFEC AGEN DETEC AMPLI PROBE: HCPCS

## 2022-03-22 LAB — SARS-COV-2 RNA RESP QL NAA+PROBE: NEGATIVE

## 2022-03-24 ENCOUNTER — ANESTHESIA EVENT (OUTPATIENT)
Dept: SURGERY | Facility: HOSPITAL | Age: 37
End: 2022-03-24
Payer: COMMERCIAL

## 2022-03-24 ENCOUNTER — HOSPITAL ENCOUNTER (INPATIENT)
Facility: HOSPITAL | Age: 37
LOS: 1 days | Discharge: HOME | End: 2022-03-25
Attending: SURGERY | Admitting: SURGERY
Payer: COMMERCIAL

## 2022-03-24 ENCOUNTER — ANESTHESIA (OUTPATIENT)
Dept: SURGERY | Facility: HOSPITAL | Age: 37
End: 2022-03-24
Payer: COMMERCIAL

## 2022-03-24 DIAGNOSIS — E66.01 MORBID OBESITY DUE TO EXCESS CALORIES (H): Primary | ICD-10-CM

## 2022-03-24 LAB
CREAT SERPL-MCNC: 0.74 MG/DL (ref 0.6–1.1)
GFR SERPL CREATININE-BSD FRML MDRD: >90 ML/MIN/1.73M2
HCG UR QL: NEGATIVE
HOLD SPECIMEN: NORMAL

## 2022-03-24 PROCEDURE — 36415 COLL VENOUS BLD VENIPUNCTURE: CPT | Performed by: NURSE PRACTITIONER

## 2022-03-24 PROCEDURE — 0DB64Z3 EXCISION OF STOMACH, PERCUTANEOUS ENDOSCOPIC APPROACH, VERTICAL: ICD-10-PCS | Performed by: SURGERY

## 2022-03-24 PROCEDURE — 43775 LAP SLEEVE GASTRECTOMY: CPT | Performed by: SURGERY

## 2022-03-24 PROCEDURE — 250N000011 HC RX IP 250 OP 636: Performed by: ANESTHESIOLOGY

## 2022-03-24 PROCEDURE — 250N000011 HC RX IP 250 OP 636: Performed by: SURGERY

## 2022-03-24 PROCEDURE — 272N000001 HC OR GENERAL SUPPLY STERILE: Performed by: SURGERY

## 2022-03-24 PROCEDURE — 250N000011 HC RX IP 250 OP 636: Performed by: NURSE PRACTITIONER

## 2022-03-24 PROCEDURE — 360N000077 HC SURGERY LEVEL 4, PER MIN: Performed by: SURGERY

## 2022-03-24 PROCEDURE — 82565 ASSAY OF CREATININE: CPT | Performed by: NURSE PRACTITIONER

## 2022-03-24 PROCEDURE — 999N000141 HC STATISTIC PRE-PROCEDURE NURSING ASSESSMENT: Performed by: SURGERY

## 2022-03-24 PROCEDURE — 43775 LAP SLEEVE GASTRECTOMY: CPT | Mod: AS | Performed by: NURSE PRACTITIONER

## 2022-03-24 PROCEDURE — 250N000013 HC RX MED GY IP 250 OP 250 PS 637: Performed by: ANESTHESIOLOGY

## 2022-03-24 PROCEDURE — 250N000013 HC RX MED GY IP 250 OP 250 PS 637: Performed by: NURSE PRACTITIONER

## 2022-03-24 PROCEDURE — 250N000009 HC RX 250: Performed by: SURGERY

## 2022-03-24 PROCEDURE — 81025 URINE PREGNANCY TEST: CPT | Performed by: SURGERY

## 2022-03-24 PROCEDURE — 258N000003 HC RX IP 258 OP 636: Performed by: ANESTHESIOLOGY

## 2022-03-24 PROCEDURE — 258N000003 HC RX IP 258 OP 636: Performed by: NURSE PRACTITIONER

## 2022-03-24 PROCEDURE — 370N000017 HC ANESTHESIA TECHNICAL FEE, PER MIN: Performed by: SURGERY

## 2022-03-24 PROCEDURE — 250N000009 HC RX 250: Performed by: NURSE ANESTHETIST, CERTIFIED REGISTERED

## 2022-03-24 PROCEDURE — 88305 TISSUE EXAM BY PATHOLOGIST: CPT | Mod: TC | Performed by: SURGERY

## 2022-03-24 PROCEDURE — 250N000013 HC RX MED GY IP 250 OP 250 PS 637: Performed by: SURGERY

## 2022-03-24 PROCEDURE — 250N000025 HC SEVOFLURANE, PER MIN: Performed by: SURGERY

## 2022-03-24 PROCEDURE — 120N000001 HC R&B MED SURG/OB

## 2022-03-24 PROCEDURE — 250N000011 HC RX IP 250 OP 636: Performed by: NURSE ANESTHETIST, CERTIFIED REGISTERED

## 2022-03-24 PROCEDURE — 250N000009 HC RX 250: Performed by: ANESTHESIOLOGY

## 2022-03-24 PROCEDURE — 710N000010 HC RECOVERY PHASE 1, LEVEL 2, PER MIN: Performed by: SURGERY

## 2022-03-24 RX ORDER — ENALAPRILAT 1.25 MG/ML
1.25 INJECTION INTRAVENOUS EVERY 6 HOURS PRN
Status: DISCONTINUED | OUTPATIENT
Start: 2022-03-24 | End: 2022-03-25 | Stop reason: HOSPADM

## 2022-03-24 RX ORDER — DEXAMETHASONE SODIUM PHOSPHATE 10 MG/ML
INJECTION, SOLUTION INTRAMUSCULAR; INTRAVENOUS PRN
Status: DISCONTINUED | OUTPATIENT
Start: 2022-03-24 | End: 2022-03-24

## 2022-03-24 RX ORDER — NALOXONE HYDROCHLORIDE 1 MG/ML
0.2 INJECTION INTRAMUSCULAR; INTRAVENOUS; SUBCUTANEOUS
Status: DISCONTINUED | OUTPATIENT
Start: 2022-03-24 | End: 2022-03-24

## 2022-03-24 RX ORDER — NALOXONE HYDROCHLORIDE 1 MG/ML
0.4 INJECTION INTRAMUSCULAR; INTRAVENOUS; SUBCUTANEOUS
Status: DISCONTINUED | OUTPATIENT
Start: 2022-03-24 | End: 2022-03-24

## 2022-03-24 RX ORDER — ONDANSETRON 4 MG/1
4 TABLET, ORALLY DISINTEGRATING ORAL EVERY 6 HOURS PRN
Status: DISCONTINUED | OUTPATIENT
Start: 2022-03-24 | End: 2022-03-25 | Stop reason: HOSPADM

## 2022-03-24 RX ORDER — ONDANSETRON 2 MG/ML
4 INJECTION INTRAMUSCULAR; INTRAVENOUS EVERY 30 MIN PRN
Status: DISCONTINUED | OUTPATIENT
Start: 2022-03-24 | End: 2022-03-24 | Stop reason: HOSPADM

## 2022-03-24 RX ORDER — LIDOCAINE 40 MG/G
CREAM TOPICAL
Status: DISCONTINUED | OUTPATIENT
Start: 2022-03-24 | End: 2022-03-24 | Stop reason: HOSPADM

## 2022-03-24 RX ORDER — ACETAMINOPHEN 325 MG/1
975 TABLET ORAL ONCE
Status: COMPLETED | OUTPATIENT
Start: 2022-03-24 | End: 2022-03-24

## 2022-03-24 RX ORDER — MAGNESIUM 200 MG
1000 TABLET ORAL DAILY
Status: DISCONTINUED | OUTPATIENT
Start: 2022-03-24 | End: 2022-03-25 | Stop reason: HOSPADM

## 2022-03-24 RX ORDER — DEXTROSE MONOHYDRATE, SODIUM CHLORIDE, AND POTASSIUM CHLORIDE 50; 1.49; 4.5 G/1000ML; G/1000ML; G/1000ML
INJECTION, SOLUTION INTRAVENOUS CONTINUOUS
Status: DISCONTINUED | OUTPATIENT
Start: 2022-03-24 | End: 2022-03-25 | Stop reason: HOSPADM

## 2022-03-24 RX ORDER — EPHEDRINE SULFATE 50 MG/ML
INJECTION, SOLUTION INTRAMUSCULAR; INTRAVENOUS; SUBCUTANEOUS PRN
Status: DISCONTINUED | OUTPATIENT
Start: 2022-03-24 | End: 2022-03-24

## 2022-03-24 RX ORDER — ONDANSETRON 4 MG/1
4 TABLET, ORALLY DISINTEGRATING ORAL EVERY 30 MIN PRN
Status: DISCONTINUED | OUTPATIENT
Start: 2022-03-24 | End: 2022-03-24 | Stop reason: HOSPADM

## 2022-03-24 RX ORDER — SODIUM CHLORIDE, SODIUM LACTATE, POTASSIUM CHLORIDE, CALCIUM CHLORIDE 600; 310; 30; 20 MG/100ML; MG/100ML; MG/100ML; MG/100ML
INJECTION, SOLUTION INTRAVENOUS CONTINUOUS
Status: DISCONTINUED | OUTPATIENT
Start: 2022-03-24 | End: 2022-03-24 | Stop reason: HOSPADM

## 2022-03-24 RX ORDER — ACETAMINOPHEN 10 MG/ML
1000 INJECTION, SOLUTION INTRAVENOUS EVERY 6 HOURS
Status: DISCONTINUED | OUTPATIENT
Start: 2022-03-24 | End: 2022-03-25 | Stop reason: HOSPADM

## 2022-03-24 RX ORDER — FENTANYL CITRATE 50 UG/ML
INJECTION, SOLUTION INTRAMUSCULAR; INTRAVENOUS PRN
Status: DISCONTINUED | OUTPATIENT
Start: 2022-03-24 | End: 2022-03-24

## 2022-03-24 RX ORDER — DEXMEDETOMIDINE HYDROCHLORIDE 4 UG/ML
INJECTION, SOLUTION INTRAVENOUS CONTINUOUS PRN
Status: DISCONTINUED | OUTPATIENT
Start: 2022-03-24 | End: 2022-03-24

## 2022-03-24 RX ORDER — GABAPENTIN 250 MG/5ML
250 SOLUTION ORAL EVERY 8 HOURS SCHEDULED
Status: DISCONTINUED | OUTPATIENT
Start: 2022-03-24 | End: 2022-03-25 | Stop reason: HOSPADM

## 2022-03-24 RX ORDER — MAGNESIUM SULFATE 4 G/50ML
4 INJECTION INTRAVENOUS ONCE
Status: COMPLETED | OUTPATIENT
Start: 2022-03-24 | End: 2022-03-24

## 2022-03-24 RX ORDER — ONDANSETRON 2 MG/ML
4 INJECTION INTRAMUSCULAR; INTRAVENOUS
Status: DISCONTINUED | OUTPATIENT
Start: 2022-03-24 | End: 2022-03-24 | Stop reason: HOSPADM

## 2022-03-24 RX ORDER — PROPOFOL 10 MG/ML
INJECTION, EMULSION INTRAVENOUS
Status: COMPLETED | OUTPATIENT
Start: 2022-03-24 | End: 2022-03-24

## 2022-03-24 RX ORDER — NALBUPHINE HYDROCHLORIDE 10 MG/ML
5 INJECTION, SOLUTION INTRAMUSCULAR; INTRAVENOUS; SUBCUTANEOUS EVERY 4 HOURS PRN
Status: DISCONTINUED | OUTPATIENT
Start: 2022-03-24 | End: 2022-03-25 | Stop reason: HOSPADM

## 2022-03-24 RX ORDER — LIDOCAINE 40 MG/G
CREAM TOPICAL
Status: DISCONTINUED | OUTPATIENT
Start: 2022-03-24 | End: 2022-03-25 | Stop reason: HOSPADM

## 2022-03-24 RX ORDER — LORAZEPAM 2 MG/ML
.5-1 INJECTION INTRAMUSCULAR EVERY 6 HOURS PRN
Status: DISCONTINUED | OUTPATIENT
Start: 2022-03-24 | End: 2022-03-25 | Stop reason: HOSPADM

## 2022-03-24 RX ORDER — MORPHINE SULFATE 1 MG/ML
INJECTION, SOLUTION EPIDURAL; INTRATHECAL; INTRAVENOUS
Status: DISCONTINUED | OUTPATIENT
Start: 2022-03-24 | End: 2022-03-24

## 2022-03-24 RX ORDER — MORPHINE SULFATE 1 MG/ML
150 INJECTION, SOLUTION EPIDURAL; INTRATHECAL; INTRAVENOUS ONCE
Status: DISCONTINUED | OUTPATIENT
Start: 2022-03-24 | End: 2022-03-24

## 2022-03-24 RX ORDER — HEPARIN SODIUM 5000 [USP'U]/.5ML
5000 INJECTION, SOLUTION INTRAVENOUS; SUBCUTANEOUS ONCE
Status: DISCONTINUED | OUTPATIENT
Start: 2022-03-24 | End: 2022-03-24 | Stop reason: HOSPADM

## 2022-03-24 RX ORDER — NALBUPHINE HYDROCHLORIDE 10 MG/ML
2.5-5 INJECTION, SOLUTION INTRAMUSCULAR; INTRAVENOUS; SUBCUTANEOUS EVERY 6 HOURS PRN
Status: DISCONTINUED | OUTPATIENT
Start: 2022-03-24 | End: 2022-03-24

## 2022-03-24 RX ORDER — ONDANSETRON 2 MG/ML
INJECTION INTRAMUSCULAR; INTRAVENOUS PRN
Status: DISCONTINUED | OUTPATIENT
Start: 2022-03-24 | End: 2022-03-24

## 2022-03-24 RX ORDER — OXYCODONE HYDROCHLORIDE 5 MG/1
5 TABLET ORAL EVERY 4 HOURS PRN
Status: DISCONTINUED | OUTPATIENT
Start: 2022-03-24 | End: 2022-03-24 | Stop reason: HOSPADM

## 2022-03-24 RX ORDER — FENTANYL CITRATE 50 UG/ML
25 INJECTION, SOLUTION INTRAMUSCULAR; INTRAVENOUS EVERY 5 MIN PRN
Status: DISCONTINUED | OUTPATIENT
Start: 2022-03-24 | End: 2022-03-24 | Stop reason: HOSPADM

## 2022-03-24 RX ORDER — LIDOCAINE HYDROCHLORIDE 20 MG/ML
INJECTION, SOLUTION INFILTRATION; PERINEURAL PRN
Status: DISCONTINUED | OUTPATIENT
Start: 2022-03-24 | End: 2022-03-24

## 2022-03-24 RX ORDER — ONDANSETRON 2 MG/ML
4 INJECTION INTRAMUSCULAR; INTRAVENOUS EVERY 6 HOURS PRN
Status: DISCONTINUED | OUTPATIENT
Start: 2022-03-24 | End: 2022-03-25 | Stop reason: HOSPADM

## 2022-03-24 RX ORDER — ACETAMINOPHEN 325 MG/1
975 TABLET ORAL ONCE
Status: DISCONTINUED | OUTPATIENT
Start: 2022-03-24 | End: 2022-03-24 | Stop reason: HOSPADM

## 2022-03-24 RX ORDER — GABAPENTIN 300 MG/1
600 CAPSULE ORAL
Status: COMPLETED | OUTPATIENT
Start: 2022-03-24 | End: 2022-03-24

## 2022-03-24 RX ORDER — CEFAZOLIN SODIUM IN 0.9 % NACL 3 G/100 ML
3 INTRAVENOUS SOLUTION, PIGGYBACK (ML) INTRAVENOUS SEE ADMIN INSTRUCTIONS
Status: DISCONTINUED | OUTPATIENT
Start: 2022-03-24 | End: 2022-03-24 | Stop reason: HOSPADM

## 2022-03-24 RX ORDER — TRAMADOL HYDROCHLORIDE 50 MG/1
100 TABLET ORAL EVERY 6 HOURS PRN
Status: DISCONTINUED | OUTPATIENT
Start: 2022-03-24 | End: 2022-03-25 | Stop reason: HOSPADM

## 2022-03-24 RX ORDER — DEXMEDETOMIDINE HYDROCHLORIDE 4 UG/ML
.1-1.2 INJECTION, SOLUTION INTRAVENOUS CONTINUOUS
Status: DISCONTINUED | OUTPATIENT
Start: 2022-03-24 | End: 2022-03-24

## 2022-03-24 RX ORDER — SCOLOPAMINE TRANSDERMAL SYSTEM 1 MG/1
1 PATCH, EXTENDED RELEASE TRANSDERMAL ONCE
Status: COMPLETED | OUTPATIENT
Start: 2022-03-24 | End: 2022-03-25

## 2022-03-24 RX ORDER — BUPIVACAINE HYDROCHLORIDE 2.5 MG/ML
INJECTION, SOLUTION EPIDURAL; INFILTRATION; INTRACAUDAL PRN
Status: DISCONTINUED | OUTPATIENT
Start: 2022-03-24 | End: 2022-03-24 | Stop reason: HOSPADM

## 2022-03-24 RX ORDER — ACETAMINOPHEN 325 MG/1
975 TABLET ORAL EVERY 6 HOURS
Status: DISCONTINUED | OUTPATIENT
Start: 2022-03-24 | End: 2022-03-25 | Stop reason: HOSPADM

## 2022-03-24 RX ORDER — DEXMEDETOMIDINE HYDROCHLORIDE 4 UG/ML
INJECTION, SOLUTION INTRAVENOUS
Status: DISCONTINUED
Start: 2022-03-24 | End: 2022-03-24 | Stop reason: HOSPADM

## 2022-03-24 RX ORDER — CELECOXIB 200 MG/1
400 CAPSULE ORAL
Status: COMPLETED | OUTPATIENT
Start: 2022-03-24 | End: 2022-03-24

## 2022-03-24 RX ORDER — PROCHLORPERAZINE MALEATE 10 MG
10 TABLET ORAL EVERY 6 HOURS PRN
Status: DISCONTINUED | OUTPATIENT
Start: 2022-03-24 | End: 2022-03-25 | Stop reason: HOSPADM

## 2022-03-24 RX ORDER — KETOROLAC TROMETHAMINE 30 MG/ML
30 INJECTION, SOLUTION INTRAMUSCULAR; INTRAVENOUS EVERY 6 HOURS
Status: DISCONTINUED | OUTPATIENT
Start: 2022-03-24 | End: 2022-03-25 | Stop reason: HOSPADM

## 2022-03-24 RX ORDER — MORPHINE SULFATE 1 MG/ML
150 INJECTION, SOLUTION EPIDURAL; INTRATHECAL; INTRAVENOUS ONCE
Status: DISCONTINUED | OUTPATIENT
Start: 2022-03-24 | End: 2022-03-24 | Stop reason: HOSPADM

## 2022-03-24 RX ORDER — FENTANYL CITRATE 50 UG/ML
50 INJECTION, SOLUTION INTRAMUSCULAR; INTRAVENOUS
Status: DISCONTINUED | OUTPATIENT
Start: 2022-03-24 | End: 2022-03-24 | Stop reason: HOSPADM

## 2022-03-24 RX ORDER — CEFAZOLIN SODIUM IN 0.9 % NACL 3 G/100 ML
3 INTRAVENOUS SOLUTION, PIGGYBACK (ML) INTRAVENOUS
Status: DISCONTINUED | OUTPATIENT
Start: 2022-03-24 | End: 2022-03-24 | Stop reason: HOSPADM

## 2022-03-24 RX ADMIN — FENTANYL CITRATE 25 MCG: 50 INJECTION INTRAMUSCULAR; INTRAVENOUS at 11:13

## 2022-03-24 RX ADMIN — GABAPENTIN 600 MG: 300 CAPSULE ORAL at 08:21

## 2022-03-24 RX ADMIN — SCOPALAMINE 1 PATCH: 1 PATCH, EXTENDED RELEASE TRANSDERMAL at 08:22

## 2022-03-24 RX ADMIN — ACETAMINOPHEN 1000 MG: 10 INJECTION, SOLUTION INTRAVENOUS at 14:48

## 2022-03-24 RX ADMIN — SUGAMMADEX 320 MG: 100 INJECTION, SOLUTION INTRAVENOUS at 10:32

## 2022-03-24 RX ADMIN — HYOSCYAMINE SULFATE 125 MCG: 0.12 TABLET ORAL at 19:07

## 2022-03-24 RX ADMIN — ACETAMINOPHEN 975 MG: 325 TABLET ORAL at 08:07

## 2022-03-24 RX ADMIN — MIDAZOLAM 1 MG: 1 INJECTION INTRAMUSCULAR; INTRAVENOUS at 09:35

## 2022-03-24 RX ADMIN — Medication 0.5 MCG/KG/HR: at 09:46

## 2022-03-24 RX ADMIN — Medication 3 G: at 09:46

## 2022-03-24 RX ADMIN — HYOSCYAMINE SULFATE 125 MCG: 0.12 TABLET ORAL at 14:47

## 2022-03-24 RX ADMIN — PROPOFOL 200 MG: 10 INJECTION, EMULSION INTRAVENOUS at 09:40

## 2022-03-24 RX ADMIN — SODIUM CHLORIDE, POTASSIUM CHLORIDE, SODIUM LACTATE AND CALCIUM CHLORIDE: 600; 310; 30; 20 INJECTION, SOLUTION INTRAVENOUS at 10:04

## 2022-03-24 RX ADMIN — Medication 10 MG: at 10:04

## 2022-03-24 RX ADMIN — KETOROLAC TROMETHAMINE 30 MG: 30 INJECTION, SOLUTION INTRAMUSCULAR; INTRAVENOUS at 21:46

## 2022-03-24 RX ADMIN — Medication 100 MG: at 09:40

## 2022-03-24 RX ADMIN — HYOSCYAMINE SULFATE 125 MCG: 0.12 TABLET ORAL at 22:06

## 2022-03-24 RX ADMIN — SODIUM CHLORIDE, POTASSIUM CHLORIDE, SODIUM LACTATE AND CALCIUM CHLORIDE: 600; 310; 30; 20 INJECTION, SOLUTION INTRAVENOUS at 08:06

## 2022-03-24 RX ADMIN — FENTANYL CITRATE 25 MCG: 50 INJECTION, SOLUTION INTRAMUSCULAR; INTRAVENOUS at 10:03

## 2022-03-24 RX ADMIN — MAGNESIUM SULFATE HEPTAHYDRATE 4 G: 80 INJECTION, SOLUTION INTRAVENOUS at 08:29

## 2022-03-24 RX ADMIN — POTASSIUM CHLORIDE, DEXTROSE MONOHYDRATE AND SODIUM CHLORIDE: 150; 5; 450 INJECTION, SOLUTION INTRAVENOUS at 12:19

## 2022-03-24 RX ADMIN — FENTANYL CITRATE 25 MCG: 50 INJECTION INTRAMUSCULAR; INTRAVENOUS at 11:18

## 2022-03-24 RX ADMIN — DEXAMETHASONE SODIUM PHOSPHATE 10 MG: 10 INJECTION, SOLUTION INTRAMUSCULAR; INTRAVENOUS at 09:53

## 2022-03-24 RX ADMIN — LIDOCAINE HYDROCHLORIDE 60 MG: 20 INJECTION, SOLUTION INFILTRATION; PERINEURAL at 09:40

## 2022-03-24 RX ADMIN — ROCURONIUM BROMIDE 50 MG: 50 INJECTION, SOLUTION INTRAVENOUS at 09:43

## 2022-03-24 RX ADMIN — FENTANYL CITRATE 25 MCG: 50 INJECTION INTRAMUSCULAR; INTRAVENOUS at 11:26

## 2022-03-24 RX ADMIN — MIDAZOLAM HYDROCHLORIDE 1 MG: 1 INJECTION, SOLUTION INTRAMUSCULAR; INTRAVENOUS at 08:54

## 2022-03-24 RX ADMIN — FENTANYL CITRATE 50 MCG: 50 INJECTION INTRAMUSCULAR; INTRAVENOUS at 08:55

## 2022-03-24 RX ADMIN — FAMOTIDINE 20 MG: 10 INJECTION, SOLUTION INTRAVENOUS at 08:38

## 2022-03-24 RX ADMIN — FENTANYL CITRATE 25 MCG: 50 INJECTION INTRAMUSCULAR; INTRAVENOUS at 11:32

## 2022-03-24 RX ADMIN — ACETAMINOPHEN 1000 MG: 10 INJECTION, SOLUTION INTRAVENOUS at 21:51

## 2022-03-24 RX ADMIN — CELECOXIB 400 MG: 200 CAPSULE ORAL at 08:20

## 2022-03-24 RX ADMIN — POTASSIUM CHLORIDE, DEXTROSE MONOHYDRATE AND SODIUM CHLORIDE: 150; 5; 450 INJECTION, SOLUTION INTRAVENOUS at 21:39

## 2022-03-24 RX ADMIN — ENOXAPARIN SODIUM 40 MG: 40 INJECTION SUBCUTANEOUS at 21:49

## 2022-03-24 RX ADMIN — ONDANSETRON 4 MG: 2 INJECTION INTRAMUSCULAR; INTRAVENOUS at 09:53

## 2022-03-24 RX ADMIN — Medication 0.15 MG: at 09:00

## 2022-03-24 RX ADMIN — ONDANSETRON 4 MG: 2 INJECTION INTRAMUSCULAR; INTRAVENOUS at 21:41

## 2022-03-24 ASSESSMENT — ACTIVITIES OF DAILY LIVING (ADL)
ADLS_ACUITY_SCORE: 4
ADLS_ACUITY_SCORE: 6
ADLS_ACUITY_SCORE: 6
ADLS_ACUITY_SCORE: 4
ADLS_ACUITY_SCORE: 6
ADLS_ACUITY_SCORE: 4
ADLS_ACUITY_SCORE: 4
ADLS_ACUITY_SCORE: 6
ADLS_ACUITY_SCORE: 4
ADLS_ACUITY_SCORE: 4
ADLS_ACUITY_SCORE: 6
ADLS_ACUITY_SCORE: 6
ADLS_ACUITY_SCORE: 4
ADLS_ACUITY_SCORE: 6

## 2022-03-24 ASSESSMENT — ENCOUNTER SYMPTOMS
DYSRHYTHMIAS: 0
SEIZURES: 0

## 2022-03-24 ASSESSMENT — COPD QUESTIONNAIRES: COPD: 0

## 2022-03-24 ASSESSMENT — LIFESTYLE VARIABLES: TOBACCO_USE: 0

## 2022-03-24 NOTE — OR NURSING
0853 Timeout performed correct site, procedure, and pt.  LOBO Hirsch  Unable to chart on Block flowsheet.

## 2022-03-24 NOTE — ANESTHESIA PROCEDURE NOTES
Intrathecal injection Procedure Note    Pre-Procedure   Staff -        Anesthesiologist:  Ariella Ellis MD       Performed By: anesthesiologist       Location: pre-op       Procedure Start/Stop Times: 3/24/2022 8:52 AM and 3/24/2022 9:01 AM       Pre-Anesthestic Checklist: patient identified, IV checked, risks and benefits discussed, informed consent, monitors and equipment checked, pre-op evaluation, at physician/surgeon's request and post-op pain management  Timeout:       Correct Patient: Yes        Correct Procedure: Yes        Correct Site: Yes        Correct Position: Yes   Procedure Documentation  Procedure: intrathecal injection       Patient Position: sitting       Skin prep: Chloraprep       Insertion Site: L3-4. (midline approach).       Needle Gauge: 24.        Needle Length (Inches): 4        Spinal Needle Type: Pencan       Introducer used       # of attempts: 1 and  # of redirects:  0    Assessment/Narrative         Paresthesias: No.       CSF fluid: clear.      Opening pressure was cmH2O while  Sitting.      Medication(s) Administered   Morphine PF 1 mg/mL (Intrathecal), 0.15 mg  Medication Administration Time: 3/24/2022 9:00 AM

## 2022-03-24 NOTE — ANESTHESIA PREPROCEDURE EVALUATION
Anesthesia Pre-Procedure Evaluation    Patient: Lena Quesada   MRN: [id number] : 1985        Procedure : Procedure(s):  GASTRECTOMY, SLEEVE, LAPAROSCOPIC          Past Medical History:   Diagnosis Date     Arthritis     chondromalacia of knee (right). injections.     Cholelithiasis      Chondromalacia of patellofemoral joint       Past Surgical History:   Procedure Laterality Date     CHOLECYSTECTOMY  2005     TYMPANOPLASTY       WISDOM TOOTH EXTRACTION        No Known Allergies   Social History     Tobacco Use     Smoking status: Never Smoker     Smokeless tobacco: Never Used   Substance Use Topics     Alcohol use: Not Currently      Wt Readings from Last 1 Encounters:   22 (!) 161 kg (354 lb 14.4 oz)        Anesthesia Evaluation   Pt has had prior anesthetic.     No history of anesthetic complications       ROS/MED HX  ENT/Pulmonary:    (-) tobacco use, asthma, COPD, sleep apnea, KALIA risk factors and recent URI   Neurologic:    (-) no seizures and no CVA   Cardiovascular:    (-) hypertension, taking anticoagulants/antiplatelets, syncope and arrhythmias   METS/Exercise Tolerance: >4 METS    Hematologic:    (-) anemia   Musculoskeletal:       GI/Hepatic:    (-) GERD   Renal/Genitourinary:    (-) renal disease   Endo:     (+) Obesity (BMI 58),  (-) Type I DM, Type II DM and thyroid disease   Psychiatric/Substance Use:    (-) chronic opioid use history   Infectious Disease:    (-) Recent Fever   Malignancy:       Other:            Physical Exam    Airway        Mallampati: III   TM distance: > 3 FB   Neck ROM: full   Mouth opening: > 3 cm    Respiratory Devices and Support         Dental     Comment: Fair dentition, no loose or removable teeth        Cardiovascular          Rhythm and rate: regular and normal     Pulmonary               Other findings:   COVID negative 3/21/22    OUTSIDE LABS:  CBC:   Lab Results   Component Value Date    WBC 10.5 2022    WBC 8.1 2021    HGB 13.0  03/07/2022    HGB 12.8 06/11/2021    HCT 37.8 03/07/2022    HCT 37.9 06/11/2021     03/07/2022     06/11/2021     BMP:   Lab Results   Component Value Date     03/07/2022     06/11/2021    POTASSIUM 4.2 03/07/2022    POTASSIUM 4.3 06/11/2021    CHLORIDE 101 03/07/2022    CHLORIDE 99 06/11/2021    CO2 25 03/07/2022    CO2 23 06/11/2021    BUN 9 03/07/2022    BUN 7 (L) 06/11/2021    CR 0.73 03/07/2022    CR 0.72 06/11/2021     03/07/2022     (H) 06/11/2021     COAGS:   Lab Results   Component Value Date    PTT 32 03/07/2022    INR 0.95 03/07/2022     POC:   Lab Results   Component Value Date    HCG Negative 03/24/2022     HEPATIC:   Lab Results   Component Value Date    ALBUMIN 3.9 03/07/2022    PROTTOTAL 7.0 03/07/2022    ALT 44 03/07/2022    AST 31 03/07/2022    ALKPHOS 74 03/07/2022    BILITOTAL 0.6 03/07/2022     OTHER:   Lab Results   Component Value Date    A1C 5.5 03/07/2022    LUCINA 9.8 03/07/2022    TSH 2.10 06/11/2021       Anesthesia Plan    ASA Status:  3   NPO Status:  NPO Appropriate    Anesthesia Type: General.     - Airway: ETT   Induction: Intravenous, Propofol.   Maintenance: Balanced.   Techniques and Equipment:     - Airway: Video-Laryngoscope       - Drips/Meds: Dexmed. infusion     Consents    Anesthesia Plan(s) and associated risks, benefits, and realistic alternatives discussed. Questions answered and patient/representative(s) expressed understanding.    - Discussed:     - Discussed with:  Patient      - Extended Intubation/Ventilatory Support Discussed: No.      - Patient is DNR/DNI Status: No    Use of blood products discussed: No .     Postoperative Care    Pain management: Multi-modal analgesia.   PONV prophylaxis: Ondansetron (or other 5HT-3), Dexamethasone or Solumedrol, Scopolamine patch     Comments:    Other Comments:   GETA - Glidescope   Intrathecal duramorph, r/b/a discussed and patient in agreement to proceed  Acetaminophen (pre-op), Mg 4 g  (pre-op), ketamine 50 mg on induction, Precedex gtt - minimize IV opioids  Dexamethasone, ondansetron, scopolamine PONV ppx             Ariella Ellis MD

## 2022-03-24 NOTE — PROVIDER NOTIFICATION
PROVIDER NOTIFICATION    Reason for communication: Patient unable to void.  Team member name: Gem (KEREN)  Team member role: General surgery   Method of Communication: Call placed  Response: New order to straight cath.  Response time: 1835.

## 2022-03-24 NOTE — INTERVAL H&P NOTE
"I have reviewed the surgical (or preoperative) H&P that is linked to this encounter, and examined the patient. There are no significant changes    Bobby Rodriguez MD, FACS  Office: [phone]  [facility]   General and Bariatric Surgery      Clinical Conditions Present on Arrival:  SECTIONPRESENTONADMISSIONBEGIN@                   # Severe Obesity: Estimated body mass index is 57.8 kg/m  as calculated from the following:    Height as of 3/7/22: 1.71 m (5' 7.32\").    Weight as of 3/7/22: 169 kg (372 lb 9.6 oz).       "

## 2022-03-24 NOTE — ANESTHESIA POSTPROCEDURE EVALUATION
Patient: Lena Quesada    Procedure: Procedure(s):  GASTRECTOMY, SLEEVE, LAPAROSCOPIC       Anesthesia Type:  General    Note:  Disposition: Admission   Postop Pain Control: Uneventful            Sign Out: Well controlled pain   PONV: No   Neuro/Psych: Uneventful            Sign Out: Acceptable/Baseline neuro status   Airway/Respiratory: Uneventful            Sign Out: Acceptable/Baseline resp. status   CV/Hemodynamics: Uneventful            Sign Out: Acceptable CV status; No obvious hypovolemia; No obvious fluid overload   Other NRE: NONE   DID A NON-ROUTINE EVENT OCCUR? No           Last vitals:  Vitals Value Taken Time   /61 03/24/22 1330   Temp 36.2  C (97.2  F) 03/24/22 1312   Pulse 71 03/24/22 1339   Resp 16 03/24/22 1300   SpO2 94 % 03/24/22 1339   Vitals shown include unvalidated device data.    Electronically Signed By: Ariella Ellis MD  March 24, 2022  2:25 PM

## 2022-03-24 NOTE — PLAN OF CARE
[id number].    Goal Outcome Evaluation:      Care Plan Progress Note:    Name: Lena Quesada  :   1985  MRN:   [id number]    Problem: Bariatric Procedure  Goal: Prevent post-op bariatric complications.  Appropriate oral intake.  Discharge needs are met.  Intervention:   Post Op Day 0/1 (progress as patient tolerates)   -Notify MD of excessive nausea   -NPO per MD orders; read exceptions to the order   -Toothette with 1/2 inch warm water at bedside until able to take PO   -Do not give ice chips, straws, or carbonation    -Whenever drinking liquids, patient must be upright with both feet on the floor   -No more than 30mL per sip   -All liquids must be at room or warm temperature   -After 4 hours of 30mL PO q30min, you may take 30mL as tolerated and advance to a clear liquid diet    -No oral pills until after the patient tolerates the 4 hours of 30mL sips (exceptions: liquid gabapentin, sublingual medications, or instruction from surgery)   -Strict intake and output   -Bladder scan every two hours until voiding freely   -If tolerating sips, start bariatric clear liquid diet   -If tolerating bariatric clears, advance to a bariatric full liquid diet  Discharge Planning   -Educate patient about pill size   -Patient should take no pill greater than 1/4 inch   -Send pill cutter home with patient   -Nurse to review home discharge instructions  Outcome:    Shift  Intake: Stated sips of water at 1800   Drank 60cc complain of nausea and  small emesis of  10 cc of  Phlegm.  Output:unable to void. An order to cath every 8hrs..  Bladder Scan:  Every 2 hours 43,160,214, 170.  Pain:Denies pain  Incision: 5 lap sites intact.  Nausea: Denies nausea  Activity: Ambulated on the hallway X1, to the bathroom X2  Incentive Spirometry:up to >2000  Abdominal Binder: in place.    Ana Cheung RN  3/24/2022  6:30 PM

## 2022-03-24 NOTE — ANESTHESIA PROCEDURE NOTES
Airway         Procedure Start/Stop Times: 3/24/2022 9:42 AM and 3/24/2022 9:43 AM  Staff -        CRNA: Kendrick Bragg APRN CRNA       Performed By: CRNA  Consent for Airway        Urgency: elective  Indications and Patient Condition       Indications for airway management: paul-procedural and airway protection       Induction type:intravenous       Mask difficulty assessment: 0 - not attempted    Final Airway Details       Final airway type: endotracheal airway       Successful airway: Oral  Endotracheal Airway Details        Cuffed: yes       Cuff volume (mL): 8       Successful intubation technique: video laryngoscopy       VL Blade Size: Glidescope 3       Grade View of Cords: 1       Adjucts: stylet       Position: Center       Measured from: lips       Secured at (cm): 22       Bite block used: None    Post intubation assessment        Placement verified by: capnometry        Number of attempts at approach: 1       Secured with: plastic tape       Ease of procedure: easy       Dentition: Intact    Medication(s) Administered   propofol (DIPRIVAN) injection 10 mg/mL vial, 200 mg  succinylcholine (ANECTINE) 20 mg/mL injection, 100 mg  Medication Administration Time: 3/24/2022 9:40 AM

## 2022-03-24 NOTE — ANESTHESIA CARE TRANSFER NOTE
Patient: Lena Quesada    Procedure: Procedure(s):  GASTRECTOMY, SLEEVE, LAPAROSCOPIC       Diagnosis: Morbid obesity (H) [E66.01]  Diagnosis Additional Information: No value filed.    Anesthesia Type:   General     Note:    Oropharynx: oropharynx clear of all foreign objects  Level of Consciousness: awake  Oxygen Supplementation: face mask  Level of Supplemental Oxygen (L/min / FiO2): 6  Independent Airway: airway patency satisfactory and stable  Dentition: dentition unchanged  Vital Signs Stable: post-procedure vital signs reviewed and stable  Report to RN Given: handoff report given  Patient transferred to: PACU    Handoff Report: Identifed the Patient, Identified the Reponsible Provider, Reviewed the pertinent medical history, Discussed the surgical course, Reviewed Intra-OP anesthesia mangement and issues during anesthesia, Set expectations for post-procedure period and Allowed opportunity for questions and acknowledgement of understanding      Vitals:  Vitals Value Taken Time   /57 03/24/22 1048   Temp 36.1  C (97  F) 03/24/22 1047   Pulse 72 03/24/22 1049   Resp 26 03/24/22 1049   SpO2 97 % 03/24/22 1049   Vitals shown include unvalidated device data.    Electronically Signed By: CATERINA Gonzalez CRNA  March 24, 2022  10:51 AM

## 2022-03-24 NOTE — OP NOTE
[facility]  Operative Note    Pre-operative diagnosis: Morbid obesity (H) [E66.01]   Post-operative diagnosis Morbid obesity   Procedure: Procedure(s):  GASTRECTOMY, SLEEVE, LAPAROSCOPIC   Surgeon: Bobby Rodriguez MD   Assistants(s): The assistance of Gem Ashby CNP, was necessary for retraction and exposure during the course of the case.   Anesthesia: Combined General with Block    Estimated blood loss: Less than 50 ml        Operative Indication:  Lena Quesada has a Body mass index is 55.05 kg/m .  she has attempted weight loss through medical management, diet, and exercise alone without longterm success.  she is therefore pursuing bariatric surgery as a means of achieving long term weight loss and resolution of her bariatric comorbidities.       Drains: None   Specimens: {Sleeve Gastrectomy       Findings: None.   Complications: None.           Description of procedure:    The patient was brought to the operating room where after induction of general anesthesia with endotracheal intubation, they were positioned with both arms out.  After a procedural pause, we began by injecting quarter percent Marcaine and the skin 20 cm inferior to the xiphoid process and just to the left of midline.  This was then sharply incised and access to the abdomen gained with an optical 10 mm trocar.  The abdomen was insufflated.  We then proceeded to place 2 additional 5 mm ports, and one additional 15 mm port across the upper abdomen after injection of local anesthetic.  An incision was made in the epigastric region for placement of the Humberto liver retractor.  Bilateral TAP blocks were performed under visual guidance with 0.25% marcaine, 25 cc per side.  The patient was then put into reverse Trendelenburg body positioning.    On initial survey, the stomach appeared normal with no evidence of a hiatal hernia.  We began our dissection by dividing the omentum from the greater curvature of the stomach  with the Harmonic scalpel.  Distally this was mobilized until we were within 6 cm of the pylorus, which was identified by palpation with the laparoscopic graspers.  Proximally this dissection was carried up along the greater curvature of the stomach, dividing the short gastric vessels and posterior adhesions between the stomach and retroperitoneum until we reached the angle of His.  The left branch of the diaphragmatic raya was identified.  Satisfied with our mobilization of the stomach, we then had our anesthesia colleagues advance a 32 Guinean red rubber catheter into the stomach where was then manipulated until it lay flush against the lesser curvature stomach, terminating near the pylorus.      We then proceeded to begin dividing the stomach, first with a 60 mm black load Endo JAZMINE stapler which was positioned adjacent to the red rubber catheter across the antrum, with approximately 1 cm of distance between the lateral edge of the catheter and medial edge of the stapler.  Adequate space at the incisura was ensured.  After firing the staple load, we then proceeded to perform the remainder of the sleeve gastrectomy, dividing the stomach with sequential firings of the green, yellow, and blue staple loads until we reached the angle of His.  For each firing, we ensured there is approximately 1 cm of space between the lateral aspect of the catheter and medial aspect of the stapler.  No staple line reinforcement was utilized.  The staple line was not over sewn.  A total of 5 staple loads were utilized.  With the stomach thus fully resected, we inspected our staple line which appeared hemostatic.  Clips were then placed along the staple line we could see vessels extending visibly to the edge.  We then sutured the proximal aspect of the staple line to the left branch of the diaphragmatic raya with a 2-0 silk suture.      The resected stomach was then removed from the abdominal cavity through the 15 mm port site.  This  fascial defect was then closed with a interrupted 0 Vicryl suture.  The remainder of the local anesthetic was then injected into the skin and fascia surrounding the port sites.  The Humberto liver retractor was removed under direct visualization and insufflation then released.  The ports were then removed and the skin closed with interrupted 4-0 Vicryl sutures.      Bobby Rodriguez MD, FACS  Office: [phone]  [facility]   General and Bariatric Surgery

## 2022-03-24 NOTE — PHARMACY-ADMISSION MEDICATION HISTORY
Pharmacy Note - Admission Medication History    Pertinent Provider Information: None   ______________________________________________________________________    Prior To Admission (PTA) med list completed and updated in EMR.       PTA Med List   Medication Sig Last Dose     cyanocobalamin (VITAMIN B-12) 1000 MCG SUBL sublingual tablet Place 1 tablet (1,000 mcg) under the tongue daily Start right after surgery.      [START ON 3/25/2022] omeprazole (PRILOSEC) 20 MG DR capsule Take 1 capsule (20 mg) by mouth daily Start day after surgery, open contents and sprinkle on food for first 6 weeks. Take daily for 3 months after surgery.      Pediatric Multivitamins-Iron (MULTIVITAMINS PLUS IRON CHILD) 18 MG CHEW Take 1 chew tab by mouth 2 times daily Ok to substitute with any chewable that contains 18 mg of iron, Vitamin A, Thiamine and Zinc.        Information source(s): Patient and Hospital records    Method of interview communication: in-person    Patient was asked about OTC/herbal products specifically.  PTA med list reflects this.    Based on the pharmacist's assessment, the PTA med list information appears reliable    Allergies were reviewed, assessed, and updated with the patient.      Patient does not use any multi-dose medications prior to admission.     Thank you for the opportunity to participate in the care of this patient.      Jennifer Sifuentes [facility]     3/24/2022     8:20 AM

## 2022-03-25 VITALS
DIASTOLIC BLOOD PRESSURE: 56 MMHG | HEIGHT: 67 IN | RESPIRATION RATE: 18 BRPM | BODY MASS INDEX: 45.99 KG/M2 | SYSTOLIC BLOOD PRESSURE: 118 MMHG | OXYGEN SATURATION: 93 % | HEART RATE: 75 BPM | WEIGHT: 293 LBS | TEMPERATURE: 98.6 F

## 2022-03-25 LAB
PATH REPORT.COMMENTS IMP SPEC: NORMAL
PATH REPORT.COMMENTS IMP SPEC: NORMAL
PATH REPORT.FINAL DX SPEC: NORMAL
PATH REPORT.GROSS SPEC: NORMAL
PATH REPORT.MICROSCOPIC SPEC OTHER STN: NORMAL
PATH REPORT.RELEVANT HX SPEC: NORMAL
PHOTO IMAGE: NORMAL

## 2022-03-25 PROCEDURE — 250N000011 HC RX IP 250 OP 636: Performed by: NURSE PRACTITIONER

## 2022-03-25 PROCEDURE — 99024 POSTOP FOLLOW-UP VISIT: CPT | Performed by: SURGERY

## 2022-03-25 PROCEDURE — 250N000013 HC RX MED GY IP 250 OP 250 PS 637: Performed by: NURSE PRACTITIONER

## 2022-03-25 PROCEDURE — 88307 TISSUE EXAM BY PATHOLOGIST: CPT | Mod: 26 | Performed by: PATHOLOGY

## 2022-03-25 RX ORDER — GABAPENTIN 250 MG/5ML
250 SOLUTION ORAL EVERY 8 HOURS
Qty: 45 ML | Refills: 0 | Status: SHIPPED | OUTPATIENT
Start: 2022-03-25 | End: 2022-03-28

## 2022-03-25 RX ADMIN — HYOSCYAMINE SULFATE 125 MCG: 0.12 TABLET ORAL at 05:52

## 2022-03-25 RX ADMIN — OMEPRAZOLE 20 MG: 20 CAPSULE, DELAYED RELEASE ORAL at 08:35

## 2022-03-25 RX ADMIN — KETOROLAC TROMETHAMINE 30 MG: 30 INJECTION, SOLUTION INTRAMUSCULAR; INTRAVENOUS at 09:57

## 2022-03-25 RX ADMIN — ACETAMINOPHEN 975 MG: 160 SOLUTION ORAL at 02:23

## 2022-03-25 RX ADMIN — KETOROLAC TROMETHAMINE 30 MG: 30 INJECTION, SOLUTION INTRAMUSCULAR; INTRAVENOUS at 03:42

## 2022-03-25 RX ADMIN — GABAPENTIN 250 MG: 250 SOLUTION ORAL at 05:52

## 2022-03-25 RX ADMIN — HYOSCYAMINE SULFATE 125 MCG: 0.12 TABLET ORAL at 02:23

## 2022-03-25 RX ADMIN — Medication 1 TABLET: at 08:37

## 2022-03-25 RX ADMIN — HYOSCYAMINE SULFATE 125 MCG: 0.12 TABLET ORAL at 09:58

## 2022-03-25 RX ADMIN — Medication 1000 MCG: at 08:36

## 2022-03-25 RX ADMIN — ACETAMINOPHEN 975 MG: 160 SOLUTION ORAL at 08:37

## 2022-03-25 ASSESSMENT — ACTIVITIES OF DAILY LIVING (ADL)
ADLS_ACUITY_SCORE: 6

## 2022-03-25 NOTE — PROGRESS NOTES
Education was provided on the following prior to discharge:    You will remain on a full liquid diet until you follow up in the post op class with the dietician.  It is extremely important to follow the liquid diet to allow for optimal healing and to prevent food from becoming lodged at the pouch outlet.     Protein is an important part of the diet after surgery; therefore, it is necessary to drink fluids that are high in protein.  Proteins are building blocks that help you heal after surgery and help preserve your muscle mass while you are losing weight, aim for 40-50g daily for the first week      Including carbohydrates in the diet is also important after surgery to prevent your body from burning fat for energy (Ketosis).  These carbohydrates include: unsweetened applesauce, blended canned fruit (in its own juice), Stage I baby food fruit, thin cream of wheat, light yogurt (no fruit chunks), or 100% fruit juice diluted (50% juice/50% water).      Remember to take 1 Multivitamin with Iron 2 times daily and 1000 mcg of Sublingual B-12 daily.     Sip 48-64 ounces of liquid per day in addition to full liquid meals/supplements, increase liquids slowly using 1oz measuring cup. After day 4 you are able to drink normally.      After 1-2 weeks (1 week for RYGB or 2 weeks for LSG and DS) of the full liquid diet, you will advance to the pureed diet, as instructed.

## 2022-03-25 NOTE — PROGRESS NOTES
Lena Quesada is POD 1 s/p laparoscopic sleeve gastrectomy.  She is doing well this morning, has been able to void this morning.  No nausea, had some retrosternal tightness last night but that has resolved.  Tolerating her liquids well.        Vitals:    03/25/22 0057 03/25/22 0300 03/25/22 0500 03/25/22 0721   BP: 125/59   118/56   BP Location: Left arm   Left arm   Patient Position: Semi-Winchester's   Semi-Winchester's   Cuff Size:    Adult Large   Pulse: 62 59 61 75   Resp: 18 18 18 18   Temp: 98.6  F (37  C)   98.6  F (37  C)   TempSrc: Oral   Oral   SpO2: 95% 93% 94% 93%   Weight:       Height:             PHYSICAL EXAM:  GEN: No acute distress, comfortable  LUNGS: CTA bilaterally  CV:RRR  ABD:Soft, non-distended, appropriate tenderness with palpation.    EXT:No cyanosis, edema or obvious abnormalities    03/24 0700 - 03/25 0659  In: 4377.67 [P.O.:510; I.V.:3867.67]  Out: 1300 [Urine:1300]      A/P:  Lena Quesada is s/p lap sleeve gastrectomy.  Is overall doing quite well.  Can plan to discharge to home later today.       Bobby Rodriguez MD, FACS  Office: [phone]  [facility]   General and Bariatric Surgery

## 2022-03-25 NOTE — PROGRESS NOTES
Care Plan Progress Note:    Name: Lena Quesada  :   1985  MRN:   [id number]    Problem: Bariatric Procedure  Goal: Prevent post-op bariatric complications.  Appropriate oral intake.  Discharge needs are met.  Intervention:   Post Op Day 0/ (progress as patient tolerates)   -Notify MD of excessive nausea   -NPO per MD orders; read exceptions to the order   -Toothette with 1/2 inch warm water at bedside until able to take PO   -Do not give ice chips, straws, or carbonation    -Whenever drinking liquids, patient must be upright with both feet on the floor   -No more than 30mL per sip   -All liquids must be at room or warm temperature   -After 4 hours of 30mL PO q30min, you may take 30mL as tolerated and advance to a clear liquid diet    -No oral pills until after the patient tolerates the 4 hours of 30mL sips (exceptions: liquid gabapentin, sublingual medications, or instruction from surgery)   -Strict intake and output   -Bladder scan every two hours until voiding freely   -If tolerating sips, start bariatric clear liquid diet   -If tolerating bariatric clears, advance to a bariatric full liquid diet  Discharge Planning   -Educate patient about pill size   -Patient should take no pill greater than 1/4 inch   -Send pill cutter home with patient   -Nurse to review home discharge instructions  Outcome:  Denies pain. Denies nausea right now, prn zofran to prevent nausea and try to start sips of water again. Sips started again at 22:40 pm.  Unable to pee. Up walking in hallway. Bladder scan 139 ml. Continue to monitor and straight cath if unable to pee in 8 h.   Addendum: 23:00 pm unable to pee, straight cath for 1100 ml.     Shift  Intake: 120 ml in total since arrival to the unit.  Output: 0   Bladder Scan: 139 ml  Pain: denies  Incision: 5 lap incision, wdl.   Nausea: Denies now.  Activity: stand by assist, ambulated in hallway x 1 and to bathroom x 2 .  Incentive Spirometry: 2500   Abdominal Binder: In  place.    Kaye Cameron, MARCELLA  3/24/2022  10:17 PM

## 2022-03-25 NOTE — DISCHARGE SUMMARY
Physician Discharge Summary    Primary Care Physician:  Jazz Abbott    Discharge Provider: Bobby Rodriguez MD  Admission Date: 3/24/2022  Discharge Date: 3/25/2022     Primary Diagnosis at Discharge:   Patient Active Problem List   Diagnosis     Morbid obesity (H)     Chondromalacia of patellofemoral joint     Morbid obesity due to excess calories (H)      Disposition: Home or Self Care  Condition at Discharge: Stable     Surgery: Laparoscopic sleeve gastrectomy    Hospital Summary:   Lena Quesada was admitted for morbid obesity.  she then underwent an uncomplicated laparoscopic sleeve gastrectomy.  Following a brief recovery in the PACU, she was transferred to the Med/Surg floor for the remainder of her stay.  her post operative course was uneventful, and her diet was advanced as tolerated.  On POD # 1 she was discharged home tolerating a full liquid diet, ambulating without assistance, and pain controlled with oral analgesics.        Discharge Medications:      Medication List      Started    acetaminophen 32 mg/mL liquid  Commonly known as: TYLENOL  975 mg, Oral, EVERY 6 HOURS     gabapentin 250 MG/5ML solution  Commonly known as: NEURONTIN  250 mg, Oral, EVERY 8 HOURS     hyoscyamine 0.125 MG sublingual tablet  Commonly known as: LEVSIN/SL  125 mcg, Sublingual, EVERY 4 HOURS            Discharge Instructions:  Follow up appointment with Primary Care Physician: Jazz Abbott  Follow up appointment with Dr. Rodriguez in: 2-3 weeks        Post operative instructions:   Diet:     For two weeks following your surgery or until you meet with the dietician, you will be on a full liquid diet.  It is extremely important to follow the liquid diet to allow for optimal healing and to prevent food from becoming lodged at the pouch outlet.    Protein is an important part of the diet after surgery; therefore, it is necessary to drink fluids that are high in protein.  Proteins are building  blocks that help you heal after surgery and help preserve your muscle mass while you are losing weight.      Including carbohydrates in the diet is also important after surgery to prevent your body from burning fat for energy (Ketosis).  These carbohydrates include: unsweetened applesauce, blended canned fruit (in its own juice), Stage I baby food fruit, thin cream of wheat, light yogurt (no fruit chunks), or 100% fruit juice diluted (50% juice/50% water).     Remember to take 1 Multivitamin with Iron 2 times daily and 1000 mcg of Sublingual B-12 daily.     Do NOT take any calcium or vitamin D supplements unless directed by your surgeon for the next 3 weeks (dietitian will remind you when to start these).    Aim for 40-50 grams of protein daily during this week.    Sip 48-64 ounces of liquid per day in addition to full liquid meals/supplements.    After 2 weeks of the full liquid diet, you will advance to the pureed diet, as instructed.    Activity: You should continue to be active at home including ambulating frequently.  If possible try to limit the amount of time spent in bed.    Restrictions: you should avoid lifting anything more than 20 pounds or strenuous physical activity for a total of 2 weeks.  This is to help reduce the risk of hernia  formation at your port sites.  Walking does not count as strenuous physical activity.  You are safe to walk up and down stairs.  Following 2 weeks he may resume all normal physical activity.    Wound / drain care:You may remove your outer dressings after a period of 48 hours.  The small white strips on the incisions act like artificial scabs, and will begin to peel at the edges at around 7-10 days.  These can then be removed.     Bathing: You may shower after 48 hours from surgery.  It is ok to get your incisions wet, but avoid rubbing them.  Avoid soaking in bath tubs, or swimming in lakes, pools, or streams for 4 weeks following surgery.          Bobby Rodriguez MD,  FACS  Office: [phone]  [facility] and Bariatric Surgery

## 2022-03-25 NOTE — PLAN OF CARE
Goal Outcome Evaluation:                    Care Plan Progress Note:    Name: Lena Quesada  :   1985  MRN:   [id number]    Problem: Bariatric Procedure  Goal: Prevent post-op bariatric complications.  Appropriate oral intake.  Discharge needs are met.  Intervention:   Post Op Day 0/1 (progress as patient tolerates)   -Notify MD of excessive nausea   -NPO per MD orders; read exceptions to the order   -Toothette with 1/2 inch warm water at bedside until able to take PO   -Do not give ice chips, straws, or carbonation    -Whenever drinking liquids, patient must be upright with both feet on the floor   -No more than 30mL per sip   -All liquids must be at room or warm temperature   -After 4 hours of 30mL PO q30min, you may take 30mL as tolerated and advance to a clear liquid diet    -No oral pills until after the patient tolerates the 4 hours of 30mL sips (exceptions: liquid gabapentin, sublingual medications, or instruction from surgery)   -Strict intake and output   -Bladder scan every two hours until voiding freely   -If tolerating sips, start bariatric clear liquid diet   -If tolerating bariatric clears, advance to a bariatric full liquid diet  Discharge Planning   -Educate patient about pill size   -Patient should take no pill greater than 1/4 inch   -Send pill cutter home with patient   -Nurse to review home discharge instructions  Outcome:    Shift  Intake:390  Output:Pt voided 200ml at 06:30  Bladder Scan: 125ml   Pain:pt denies pain  Incision:dry and intact  Nausea:pt denies any nausea overnight  Activity: Pt is up independently and has walked the halls.  Incentive Spirometry: done independently  Abdominal Binder:in place    Pt tolerated her sips and clears, advanced to full liquids for breakfast.     Gem Brock RN  3/25/2022  6:27 AM

## 2022-03-25 NOTE — PLAN OF CARE
Goal Outcome Evaluation:        Care Plan Progress Note:    Name: Lena Quesada  :   1985  MRN:   [id number]    Problem: Bariatric Procedure  Goal: Prevent post-op bariatric complications.  Appropriate oral intake.  Discharge needs are met.  Intervention:   Post Op Day 0/1 (progress as patient tolerates)   -Notify MD of excessive nausea   -NPO per MD orders; read exceptions to the order   -Toothette with 1/2 inch warm water at bedside until able to take PO   -Do not give ice chips, straws, or carbonation    -Whenever drinking liquids, patient must be upright with both feet on the floor   -No more than 30mL per sip   -All liquids must be at room or warm temperature   -After 4 hours of 30mL PO q30min, you may take 30mL as tolerated and advance to a clear liquid diet    -No oral pills until after the patient tolerates the 4 hours of 30mL sips (exceptions: liquid gabapentin, sublingual medications, or instruction from surgery)   -Strict intake and output   -Bladder scan every two hours until voiding freely   -If tolerating sips, start bariatric clear liquid diet   -If tolerating bariatric clears, advance to a bariatric full liquid diet  Discharge Planning   -Educate patient about pill size   -Patient should take no pill greater than 1/4 inch   -Send pill cutter home with patient   -Nurse to review home discharge instructions  Outcome:    Shift  Intake: 480 tolerated full liquids this am  Output: voiding freely  Bladder Scan: N/A  Pain: denies  Incision: C/D/I  Nausea: denies  Activity: independent  Incentive Spirometry: at bedside, independent  Abdominal Binder: sending with patient    Mamie Oconnell RN  3/25/2022  11:07 AM

## 2022-03-28 ENCOUNTER — TELEPHONE (OUTPATIENT)
Dept: SURGERY | Facility: CLINIC | Age: 37
End: 2022-03-28
Payer: COMMERCIAL

## 2022-03-28 NOTE — TELEPHONE ENCOUNTER
Post-Op Phone Call  [facility]      Date/Time Called:   Date: 3/28/2022 Time: 3:26 PM   Attempt: Second    Patient unavailable, message left to call [facility] with questions/problems? No    Pain Control:  Intensity: Mild (1 - 3)  Duration/Location/Explain: soreness, like she did like 30,000 ab exercises.  What makes it better/worse? Movement.    Medications:  Narcotic Use - No  Drug type: Ran out of Gabapentin this morning.  Frequency:     Non-prescription pain control: Ran out of Tylenol last night, will take PRN.    Other medications currently taking: see med list.    Complete Multivitamin + Iron BID? Yes    Vitamin B12? sublingual daily    Incisions:  Drainage? clean and dry  Comment: Steri strips on still.    Intake/Output:  Fluid Intake(oz/day)? 48 ounces yesterday  Fluid type? water    Heartburn? No  Counseling: Omeprazole.  Nausea? No  Vomiting? No  Explain:     Voiding Frequency? 3/day     Voiding-Color/Amount? Good.    Flatus? Yes    Bowel Movement?Yes     Are you using your incentive spirometer? If yes, how often? 3+/day    Any fever type symptoms? No  Explain:     Walking activity?   Frequency/Type: Lots of walking, steps.    In Preparation for Surgery:  On a scale of 1-5, with 5 being the highest, how well did the pre-op class prepare you for what actually happened in the hospital? 5  If you were unable to give us a 5, what could we have done to earn a 5?     Is there anything that you wish you would have known prior to surgery that you did not know? If yes, what? How mentally challenging the liquid diet would be.    On a scale of 1-5, with 5 being the highest, how was the service while you were in the hospital? 5  If you were unable to give us a 5, what could we have done to earn a 5?     Is/was there anyone in particular; nurse, aide, hospital staff, that did a great job and you would like us to recognize? If yes, whom. All of them.  What did they do? They did  great.    Would you recommend [facility] to others? Yes  If no, can you explain why?     Would you recommend [facility] to others?Yes  If no, can you explain why?      Thank you for your time. Please do not hesitate to call us with any questions or concerns.    Call completed by:   Hawa Trevino RN, CBN  [facility]  P [phone]  F [phone]

## 2022-03-28 NOTE — TELEPHONE ENCOUNTER
Post-Op Phone Call  [facility]    Surgeon: Bobby Rodriguez M.D.  Date of Surgery: 3/24/2022  Discharge Date: 3/25/2022    Date/Time Called:   Date: 3/28/2022 Time: 3:22 PM   Attempt: First    Patient unavailable, message left to call [facility] with questions/problems? Yes    Call completed by:   Hawa Trevino RN, CBN  [facility]  P [phone]  F [phone]

## 2022-03-29 ENCOUNTER — ALLIED HEALTH/NURSE VISIT (OUTPATIENT)
Dept: SURGERY | Facility: CLINIC | Age: 37
End: 2022-03-29
Payer: COMMERCIAL

## 2022-03-29 DIAGNOSIS — Z98.84 BARIATRIC SURGERY STATUS: Primary | ICD-10-CM

## 2022-03-29 DIAGNOSIS — Z71.3 NUTRITIONAL COUNSELING: ICD-10-CM

## 2022-03-29 PROCEDURE — 99024 POSTOP FOLLOW-UP VISIT: CPT

## 2022-03-29 NOTE — PROGRESS NOTES
Time in: 10:00a   /Time out: 10:45a      Pt presents for 1 week post op dietitian follow up. Reports tolerating full liquids well. Denies n/v, constipation/diarrhea, or significant pain. Taking MVI and SL B12 appropriately. Taking 34 oz fluid/day and 40g protein shake. Educated pt on pureed and soft to bariatric regular diets. Provided grocery list and sample meal plan for each diet stage.  Pt will begin pureed diet on 4-8-22 and advance as tolerated to softs/bariatric regular on 4-20-22. Instructed pt to begin 500 mg calcium citrate BID and 5000IU Vitamin D3 on 4-20-22. Pt to follow up with RD at 3 months post op.

## 2022-04-01 ENCOUNTER — VIRTUAL VISIT (OUTPATIENT)
Dept: SURGERY | Facility: CLINIC | Age: 37
End: 2022-04-01
Payer: COMMERCIAL

## 2022-04-01 VITALS — BODY MASS INDEX: 45.99 KG/M2 | HEIGHT: 67 IN | WEIGHT: 293 LBS

## 2022-04-01 DIAGNOSIS — Z98.890 POST-OPERATIVE STATE: Primary | ICD-10-CM

## 2022-04-01 PROCEDURE — 99024 POSTOP FOLLOW-UP VISIT: CPT | Performed by: SURGERY

## 2022-04-01 NOTE — LETTER
"    4/1/2022         RE: Lena Quesada  [address]  [facility] [address]        Dear Colleague,    Thank you for referring your patient, Lena Quesada, to the [facility] SURGERY CLINIC AND BARIATRICS CARE [place]. Please see a copy of my visit note below.    Lena Quesada is 36 year old  female who presents for a billable video visit today.    How would you like to obtain your AVS? MyChart  If dropped from the video visit, the video invitation should be resent by: Text to cell phone: [phone]  Will anyone else be joining your video visit? No      Video Start Time: 8:35 AM    Are there any specific questions or needs that you would like addressed at your visit today? Frequently getting dizzy and lightheaded    Provider Notes:   HPI: Pt is here for follow up of a lap sleeve gastrectomy. She is doing well, though is feeling dizzy with some frequency. Taking po well, estimating she is getting in 64 oz of fluid. No vomiting.  No pain with drinking or eating purees. No fevers or chills. Ambulating without problems.     Current Outpatient Medications   Medication Sig Dispense Refill     cyanocobalamin (VITAMIN B-12) 1000 MCG SUBL sublingual tablet Place 1 tablet (1,000 mcg) under the tongue daily Start right after surgery. 90 tablet 3     omeprazole (PRILOSEC) 20 MG DR capsule Take 1 capsule (20 mg) by mouth daily Start day after surgery, open contents and sprinkle on food for first 6 weeks. Take daily for 3 months after surgery. 90 capsule 0     Pediatric Multivitamins-Iron (MULTIVITAMINS PLUS IRON CHILD) 18 MG CHEW Take 1 chew tab by mouth 2 times daily Ok to substitute with any chewable that contains 18 mg of iron, Vitamin A, Thiamine and Zinc. 180 tablet 3         Ht 1.702 m (5' 7\")   Wt (!) 155.3 kg (342 lb 6.4 oz)   LMP 03/24/2022   BMI 53.63 kg/m    Wt Readings from Last 4 Encounters:   04/01/22 (!) 155.3 kg (342 lb 6.4 oz)   03/24/22 (!) 163.5 kg (360 lb 6.4 oz)   03/07/22 " (!) 169 kg (372 lb 9.6 oz)   03/01/22 (!) 165.6 kg (365 lb)         Body mass index is 53.63 kg/m .    EXAM:  GENERAL:Appears well  ABDOMEN: Incisions healing well      Assessment/Plan: Pt s/p lap sleeve gastrectomy. Doing well. Diet and activity discussed. She will f/u with us at the [facility] in 1 month to meet with our dietician, and again at 3 months for additional follow up.    Bobby Rodriguez MD, FACS  Office: [phone]  [facility]   General and Bariatric Surgery      Video-Visit Details    Type of service:  Video Visit    Video End Time (time video stopped): 0846  Originating Location (pt. Location): Home    Distant Location (provider location):  [facility] SURGERY CLINIC AND BARIATRICS CARE [place]     Platform used for Video Visit: Jenny      Again, thank you for allowing me to participate in the care of your patient.        Sincerely,        Bobby Rodriguez MD

## 2022-04-01 NOTE — PROGRESS NOTES
"Lena Quesada is 36 year old  female who presents for a billable video visit today.    How would you like to obtain your AVS? MyChart  If dropped from the video visit, the video invitation should be resent by: Text to cell phone: [phone]  Will anyone else be joining your video visit? No      Video Start Time: 8:35 AM    Are there any specific questions or needs that you would like addressed at your visit today? Frequently getting dizzy and lightheaded    Provider Notes:   HPI: Pt is here for follow up of a lap sleeve gastrectomy. She is doing well, though is feeling dizzy with some frequency. Taking po well, estimating she is getting in 64 oz of fluid. No vomiting.  No pain with drinking or eating purees. No fevers or chills. Ambulating without problems.     Current Outpatient Medications   Medication Sig Dispense Refill     cyanocobalamin (VITAMIN B-12) 1000 MCG SUBL sublingual tablet Place 1 tablet (1,000 mcg) under the tongue daily Start right after surgery. 90 tablet 3     omeprazole (PRILOSEC) 20 MG DR capsule Take 1 capsule (20 mg) by mouth daily Start day after surgery, open contents and sprinkle on food for first 6 weeks. Take daily for 3 months after surgery. 90 capsule 0     Pediatric Multivitamins-Iron (MULTIVITAMINS PLUS IRON CHILD) 18 MG CHEW Take 1 chew tab by mouth 2 times daily Ok to substitute with any chewable that contains 18 mg of iron, Vitamin A, Thiamine and Zinc. 180 tablet 3         Ht 1.702 m (5' 7\")   Wt (!) 155.3 kg (342 lb 6.4 oz)   LMP 03/24/2022   BMI 53.63 kg/m    Wt Readings from Last 4 Encounters:   04/01/22 (!) 155.3 kg (342 lb 6.4 oz)   03/24/22 (!) 163.5 kg (360 lb 6.4 oz)   03/07/22 (!) 169 kg (372 lb 9.6 oz)   03/01/22 (!) 165.6 kg (365 lb)         Body mass index is 53.63 kg/m .    EXAM:  GENERAL:Appears well  ABDOMEN: Incisions healing well      Assessment/Plan: Pt s/p lap sleeve gastrectomy. Doing well. Diet and activity discussed. She will f/u with us at the " [facility] Center in 1 month to meet with our dietician, and again at 3 months for additional follow up.    Bobby Rodriguez MD, FACS  Office: [phone]  [facility]   General and Bariatric Surgery      Video-Visit Details    Type of service:  Video Visit    Video End Time (time video stopped): 0846  Originating Location (pt. Location): Home    Distant Location (provider location):  [facility] SURGERY CLINIC AND BARIATRICS CARE [place]     Platform used for Video Visit: MovieSet

## 2022-04-02 ENCOUNTER — HEALTH MAINTENANCE LETTER (OUTPATIENT)
Age: 37
End: 2022-04-02

## 2022-04-25 ENCOUNTER — VIRTUAL VISIT (OUTPATIENT)
Dept: SURGERY | Facility: CLINIC | Age: 37
End: 2022-04-25
Payer: COMMERCIAL

## 2022-04-25 DIAGNOSIS — Z98.84 BARIATRIC SURGERY STATUS: Primary | ICD-10-CM

## 2022-04-25 DIAGNOSIS — K91.2 POSTOPERATIVE MALABSORPTION: ICD-10-CM

## 2022-04-25 DIAGNOSIS — Z71.3 NUTRITIONAL COUNSELING: ICD-10-CM

## 2022-04-25 PROCEDURE — 99024 POSTOP FOLLOW-UP VISIT: CPT | Performed by: DIETITIAN, REGISTERED

## 2022-04-25 NOTE — PROGRESS NOTES
Pt presents for 1 month post op dietitian follow up visit. Reports tolerating soft food diet well. Denies n/v, constipation/diarrhea, or significant pain. Taking MVI and Vitamin B12 appropriately.Instructed pt to begin taking 500 mg calcium citrate BID and 5000 IU Vitamin D3. Educated pt on soft to bariatric regular diets, medications, developing an exercise regimen, and other dietary points of care. Provided  Education handout on items discussed. Pt to follow up with RD at 3 months post op.     Current Weight: 340.9 lbs    Have you been to the hospital or seen by a doctor for any reason since your surgery? No     If yes:  Have you been seen in an outpatient clinic or doctors office after your surgery? Yes  If yes, was this a routine follow-up? 2 week Post-Op visit   Date of visit: 4/1/2022  Have you experienced any health problems since your surgery? No     Did you go to an Emergency Department or hospital after your surgery? No       Did you have additional surgery(ies) during this hospitalization? No   Date of surgery: 3/24/2022 (FRANK)

## 2022-04-25 NOTE — LETTER
4/25/2022         RE: Lena Quesada  [address]  [facility] [address]        Dear Colleague,    Thank you for referring your patient, Lena Quesada, to the [facility] SURGERY CLINIC AND BARIATRICS CARE [place]. Please see a copy of my visit note below.    Pt presents for 1 month post op dietitian follow up visit. Reports tolerating soft food diet well. Denies n/v, constipation/diarrhea, or significant pain. Taking MVI and Vitamin B12 appropriately.Instructed pt to begin taking 500 mg calcium citrate BID and 5000 IU Vitamin D3. Educated pt on soft to bariatric regular diets, medications, developing an exercise regimen, and other dietary points of care. Provided  Education handout on items discussed. Pt to follow up with TIFFANY at 3 months post op.     Current Weight: 340.9 lbs    Have you been to the hospital or seen by a doctor for any reason since your surgery? No     If yes:  Have you been seen in an outpatient clinic or doctors office after your surgery? Yes  If yes, was this a routine follow-up? 2 week Post-Op visit   Date of visit: 4/1/2022  Have you experienced any health problems since your surgery? No     Did you go to an Emergency Department or hospital after your surgery? No       Did you have additional surgery(ies) during this hospitalization? No   Date of surgery: 3/24/2022 (LSG)      Again, thank you for allowing me to participate in the care of your patient.        Sincerely,        Rosalva Shin RD

## 2022-06-24 ENCOUNTER — VIRTUAL VISIT (OUTPATIENT)
Dept: SURGERY | Facility: CLINIC | Age: 37
End: 2022-06-24
Payer: COMMERCIAL

## 2022-06-24 DIAGNOSIS — K91.2 POSTOPERATIVE MALABSORPTION: ICD-10-CM

## 2022-06-24 DIAGNOSIS — Z98.84 BARIATRIC SURGERY STATUS: Primary | ICD-10-CM

## 2022-06-24 DIAGNOSIS — Z71.3 NUTRITIONAL COUNSELING: ICD-10-CM

## 2022-06-24 PROCEDURE — 99024 POSTOP FOLLOW-UP VISIT: CPT | Performed by: DIETITIAN, REGISTERED

## 2022-06-24 NOTE — LETTER
6/24/2022         RE: Lena Quesada  [address]  [facility] [address]        Dear Colleague,    Thank you for referring your patient, Lena Quesada, to the [facility] SURGERY CLINIC AND BARIATRICS CARE [place]. Please see a copy of my visit note below.    Lena Quesada is a 36 year old who is being evaluated via a billable video visit.    How would you like to obtain your AVS? MyChart  If the video visit is dropped, the invitation should be resent by: Send to e-mail at: [email or web address]  Will anyone else be joining your video visit? No      Video Start Time: 7:43 AM      Post-op Surgical Weight Loss Diet Evaluation     Assessment:  Pt presents for 3 months post-op RD visit, s/p LSG on 3/24/2022 with Dr. Rodriguez. Today we reviewed current eating habits and level of physical activity, and instructed on the changes that are required for successful bariatric outcomes.    Patient Progress: struggling with weight status    Initial weight: 372.6 lbs   Current weight: 325.4 lbs   Weight change: 47.2 lbs (down)     There is no height or weight on file to calculate BMI.    Patient Active Problem List   Diagnosis     Morbid obesity (H)     Chondromalacia of patellofemoral joint     Morbid obesity due to excess calories (H)       Diabetes: No     Vitamins   Multi Vit with Iron: yes  Calcium Citrate: yes  B12: yes  D3: yes    Do you experience any reflux or discomfort with eating? none    Hair loss:yes-started a collagen supplement    Diet Recall/Time:   Breakfast: either a Protein Shake (30 g) and/or Protein Yogurt (12 g)  Lunch: chicken, high fiber tortilla   Dinner: hard boiled egg with a slice of cheese, fruit popsicle (sugar free)    Typical Snacks: string cheese or nuts or Protein Shake or Protein Bar    Proteins/Veg/Fruits/CHO (NOT well tolerated): None     Estimated protein intake: 60+ grams    Estimated portion size per meals:1/4-1/2 cup/meal      Meal Duration: 15-20  "minutes     Fluid-meal separation:  Fluids are  30min before and 30 minutes after meals.    Fluid Intake  Water: at least 64 oz/day (SF flavor drops added)      Exercise: walking on a treadmill (at an increased incline)      PES statement:      (NC-1.4) Altered GI Function related to Alteration in gastrointestinal tract structure and/or function/ Decreased functional length of the GI tract as evidenced by Weight loss of 13% initial body weight; sleeve gastrectomy    Intervention    Discussion  1. Discussed 3 months Post-Op Nutritional Guidelines for LSG.   2. Recommended to consume 15-20gm protein at 3 meals daily, along with protein supplement/\"planned protein containing snack\" of 15-30gm protein, to reach goal of 60-80 gm protein daily.  3. Educated on post-op vitamin regimen: Multi Vit + iron 2x/day, calcium citrate 400-600 mg 2x/day, 6536-9931 mcg of Sublingual B-12 daily, and 5000 IU Vitamin D3 daily (MVI and calcium can be taken at the same time BID)  4. Reviewed lean protein sources  5. Bariatric Plate Method-  including lean/low fat protein at each meal, including a vegetable/fruit, and limiting carbohydrate intake to less than 25% of plate volume.     Instructions  1. Include 15-20gm protein at each meal, along with protein supplement/\"planned protein containing snack\" of 15-30gm protein, to reach goal of 60-80 gm protein daily.  2. Increase fluid intake to 64oz daily: choose plain or calorie/carbonation-free beverages.  3. Incorporate daily structured activity, 30-60 minutes most days of the week  4. Recommended pt to start taking: Multi Vit + iron 2x/day, calcium citrate 400-600 mg 2x/day, 3689-1510 mcg of Sublingual B-12 daily, and 5000 IU Vitamin D3 daily. (MVI and calcium can be taken at the same time)  5. Read food labels more consistently: keeping total fat grams <10, total sugar grams <10, fiber >3gm per serving.  6. Increase vegetable/fruit intake, by having a vegetable or fruit with each " meal daily.  7. Practice plate method: 1/2 plate lean/low fat protein source, vegetable/fruit, <25% of plate complex carbohydrates.  8. Separate fluids 30 minutes before/after meal times.  9. Practice eating off of smaller plates/bowls, chewing to applesauce consistency, taking 20-30 minutes to eat in a calm/relaxed environment without distractions of tv/email/cell phone.    Handouts provided:  3 months Post-Op Nutritional Guidelines for LSG.    Assessment/Plan:    Pt to follow up for 6 months post-op visit with bariatrician       Video-Visit Details    Type of service:  Video Visit    Video End Time:8:27 AM    Originating Location (pt. Location): Home    Distant Location (provider location):  [facility] SURGERY CLINIC AND BARIATRICS CARE [place]     Platform used for Video Visit: Monster Shin RD          Again, thank you for allowing me to participate in the care of your patient.        Sincerely,        Rosalva Shin RD

## 2022-06-24 NOTE — PROGRESS NOTES
Lena Quesada is a 36 year old who is being evaluated via a billable video visit.    How would you like to obtain your AVS? MyChart  If the video visit is dropped, the invitation should be resent by: Send to e-mail at: [email or web address]  Will anyone else be joining your video visit? No      Video Start Time: 7:43 AM      Post-op Surgical Weight Loss Diet Evaluation     Assessment:  Pt presents for 3 months post-op RD visit, s/p LSG on 3/24/2022 with Dr. Rodriguez. Today we reviewed current eating habits and level of physical activity, and instructed on the changes that are required for successful bariatric outcomes.    Patient Progress: struggling with weight status    Initial weight: 372.6 lbs   Current weight: 325.4 lbs   Weight change: 47.2 lbs (down)     There is no height or weight on file to calculate BMI.    Patient Active Problem List   Diagnosis     Morbid obesity (H)     Chondromalacia of patellofemoral joint     Morbid obesity due to excess calories (H)       Diabetes: No     Vitamins   Multi Vit with Iron: yes  Calcium Citrate: yes  B12: yes  D3: yes    Do you experience any reflux or discomfort with eating? none    Hair loss:yes-started a collagen supplement    Diet Recall/Time:   Breakfast: either a Protein Shake (30 g) and/or Protein Yogurt (12 g)  Lunch: chicken, high fiber tortilla   Dinner: hard boiled egg with a slice of cheese, fruit popsicle (sugar free)    Typical Snacks: string cheese or nuts or Protein Shake or Protein Bar    Proteins/Veg/Fruits/CHO (NOT well tolerated): None     Estimated protein intake: 60+ grams    Estimated portion size per meals:1/4-1/2 cup/meal      Meal Duration: 15-20 minutes     Fluid-meal separation:  Fluids are  30min before and 30 minutes after meals.    Fluid Intake  Water: at least 64 oz/day (SF flavor drops added)      Exercise: walking on a treadmill (at an increased incline)      PES statement:      (NC-1.4) Altered GI Function related to  "Alteration in gastrointestinal tract structure and/or function/ Decreased functional length of the GI tract as evidenced by Weight loss of 13% initial body weight; sleeve gastrectomy    Intervention    Discussion  1. Discussed 3 months Post-Op Nutritional Guidelines for LSG.   2. Recommended to consume 15-20gm protein at 3 meals daily, along with protein supplement/\"planned protein containing snack\" of 15-30gm protein, to reach goal of 60-80 gm protein daily.  3. Educated on post-op vitamin regimen: Multi Vit + iron 2x/day, calcium citrate 400-600 mg 2x/day, 0508-9763 mcg of Sublingual B-12 daily, and 5000 IU Vitamin D3 daily (MVI and calcium can be taken at the same time BID)  4. Reviewed lean protein sources  5. Bariatric Plate Method-  including lean/low fat protein at each meal, including a vegetable/fruit, and limiting carbohydrate intake to less than 25% of plate volume.     Instructions  1. Include 15-20gm protein at each meal, along with protein supplement/\"planned protein containing snack\" of 15-30gm protein, to reach goal of 60-80 gm protein daily.  2. Increase fluid intake to 64oz daily: choose plain or calorie/carbonation-free beverages.  3. Incorporate daily structured activity, 30-60 minutes most days of the week  4. Recommended pt to start taking: Multi Vit + iron 2x/day, calcium citrate 400-600 mg 2x/day, 6420-6177 mcg of Sublingual B-12 daily, and 5000 IU Vitamin D3 daily. (MVI and calcium can be taken at the same time)  5. Read food labels more consistently: keeping total fat grams <10, total sugar grams <10, fiber >3gm per serving.  6. Increase vegetable/fruit intake, by having a vegetable or fruit with each meal daily.  7. Practice plate method: 1/2 plate lean/low fat protein source, vegetable/fruit, <25% of plate complex carbohydrates.  8. Separate fluids 30 minutes before/after meal times.  9. Practice eating off of smaller plates/bowls, chewing to applesauce consistency, taking 20-30 minutes " to eat in a calm/relaxed environment without distractions of tv/email/cell phone.    Handouts provided:  3 months Post-Op Nutritional Guidelines for LSG.    Assessment/Plan:    Pt to follow up for 6 months post-op visit with bariatrician       Video-Visit Details    Type of service:  Video Visit    Video End Time:8:27 AM    Originating Location (pt. Location): Home    Distant Location (provider location):  [facility] SURGERY CLINIC AND BARIATRICS CARE [place]     Platform used for Video Visit: Monster Shin RD

## 2022-08-29 ENCOUNTER — MYC MEDICAL ADVICE (OUTPATIENT)
Dept: SURGERY | Facility: CLINIC | Age: 37
End: 2022-08-29

## 2022-08-29 DIAGNOSIS — Z98.84 S/P BARIATRIC SURGERY: ICD-10-CM

## 2022-08-29 DIAGNOSIS — E53.8 VITAMIN B12 DEFICIENCY (NON ANEMIC): ICD-10-CM

## 2022-08-29 DIAGNOSIS — K91.2 POSTOPERATIVE MALABSORPTION: Primary | ICD-10-CM

## 2022-08-29 DIAGNOSIS — E55.9 VITAMIN D DEFICIENCY: ICD-10-CM

## 2022-08-29 NOTE — TELEPHONE ENCOUNTER
Internal 6 months post-op Sleeve labs needed for appointment with Wai Anders MD on Wed, Sept 28th at 8 am.

## 2022-09-07 ASSESSMENT — ANXIETY QUESTIONNAIRES
1. FEELING NERVOUS, ANXIOUS, OR ON EDGE: NOT AT ALL
2. NOT BEING ABLE TO STOP OR CONTROL WORRYING: NOT AT ALL
5. BEING SO RESTLESS THAT IT IS HARD TO SIT STILL: NOT AT ALL
GAD7 TOTAL SCORE: 0
6. BECOMING EASILY ANNOYED OR IRRITABLE: NOT AT ALL
IF YOU CHECKED OFF ANY PROBLEMS ON THIS QUESTIONNAIRE, HOW DIFFICULT HAVE THESE PROBLEMS MADE IT FOR YOU TO DO YOUR WORK, TAKE CARE OF THINGS AT HOME, OR GET ALONG WITH OTHER PEOPLE: NOT DIFFICULT AT ALL
8. IF YOU CHECKED OFF ANY PROBLEMS, HOW DIFFICULT HAVE THESE MADE IT FOR YOU TO DO YOUR WORK, TAKE CARE OF THINGS AT HOME, OR GET ALONG WITH OTHER PEOPLE?: NOT DIFFICULT AT ALL
7. FEELING AFRAID AS IF SOMETHING AWFUL MIGHT HAPPEN: NOT AT ALL
GAD7 TOTAL SCORE: 0
3. WORRYING TOO MUCH ABOUT DIFFERENT THINGS: NOT AT ALL
4. TROUBLE RELAXING: NOT AT ALL
7. FEELING AFRAID AS IF SOMETHING AWFUL MIGHT HAPPEN: NOT AT ALL

## 2022-09-07 ASSESSMENT — ENCOUNTER SYMPTOMS
POOR WOUND HEALING: 0
NAIL CHANGES: 0
SKIN CHANGES: 0

## 2022-09-08 ENCOUNTER — OFFICE VISIT (OUTPATIENT)
Dept: OBGYN | Facility: CLINIC | Age: 37
End: 2022-09-08
Attending: MIDWIFE
Payer: COMMERCIAL

## 2022-09-08 VITALS
HEIGHT: 67 IN | SYSTOLIC BLOOD PRESSURE: 157 MMHG | HEART RATE: 77 BPM | DIASTOLIC BLOOD PRESSURE: 89 MMHG | BODY MASS INDEX: 45.99 KG/M2 | WEIGHT: 293 LBS

## 2022-09-08 DIAGNOSIS — Z30.017 NEXPLANON INSERTION: ICD-10-CM

## 2022-09-08 DIAGNOSIS — Z11.3 SCREENING EXAMINATION FOR VENEREAL DISEASE: ICD-10-CM

## 2022-09-08 DIAGNOSIS — Z30.09 BIRTH CONTROL COUNSELING: Primary | ICD-10-CM

## 2022-09-08 DIAGNOSIS — Z97.5 NEXPLANON IN PLACE: ICD-10-CM

## 2022-09-08 LAB
HCG UR QL: NEGATIVE
INTERNAL QC OK POCT: NORMAL
POCT KIT EXPIRATION DATE: NORMAL
POCT KIT LOT NUMBER: NORMAL

## 2022-09-08 PROCEDURE — 81025 URINE PREGNANCY TEST: CPT | Performed by: MIDWIFE

## 2022-09-08 PROCEDURE — G0463 HOSPITAL OUTPT CLINIC VISIT: HCPCS

## 2022-09-08 PROCEDURE — 11981 INSERTION DRUG DLVR IMPLANT: CPT | Performed by: MIDWIFE

## 2022-09-08 PROCEDURE — 250N000011 HC RX IP 250 OP 636: Performed by: MIDWIFE

## 2022-09-08 RX ADMIN — ETONOGESTREL 68 MG: 68 IMPLANT SUBCUTANEOUS at 16:46

## 2022-09-08 NOTE — LETTER
9/8/2022       RE: Lena Quesada  [address]  [facility] [address]     Dear Colleague,    Thank you for referring your patient, Lena Quesada, to the [facility] [place] at [facility]. Please see a copy of my visit note below.    Chief Complaint:  Contraceptive counseling    Subjective: Patient is a 37 year old No obstetric history on file. who presents to discuss birth control options.       Never been on birth control. No migraine, non smoker, no personal or family h/o DVT. Elevated blood pressure today. Reviewed no changes to contraception options with gastric sleeve since it is not malabsorptive procedure. Pregnancy rates higher after bariatric surgery.     Patient's last menstrual period was 08/16/2022 (exact date). Not currently sexually. Lost 160# total, 60# since the bariatric surgery. Feeling excited to start dating and hoping to be sexually active.      Answers for HPI/ROS submitted by the patient on 9/7/2022  DAVID 7 TOTAL SCORE: 0  General Symptoms: No  Skin Symptoms: Yes  HENT Symptoms: No  EYE SYMPTOMS: No  HEART SYMPTOMS: No  LUNG SYMPTOMS: No  INTESTINAL SYMPTOMS: No  URINARY SYMPTOMS: No  GYNECOLOGIC SYMPTOMS: No  BREAST SYMPTOMS: No  SKELETAL SYMPTOMS: No  BLOOD SYMPTOMS: No  NERVOUS SYSTEM SYMPTOMS: No  MENTAL HEALTH SYMPTOMS: No  Changes in hair: Yes  Changes in moles/birth marks: No  Itching: No  Rashes: No  Changes in nails: No  Acne: No  Hair in places you don't want it: No  Change in facial hair: No  Warts: No  Non-healing sores: No  Scarring: No  Flaking of skin: No  Color changes of hands/feet in cold : No  Sun sensitivity: No  Skin thickening: No      No current outpatient medications on file.     Current Facility-Administered Medications   Medication     etonogestrel (NEXPLANON) subdermal implant 68 mg     Past Medical History:   Diagnosis Date     Arthritis     chondromalacia of knee (right).  "injections.     Cholelithiasis      Chondromalacia of patellofemoral joint        Objective:  Vitals:    22 1535   BP: (!) 157/89   Pulse: 77   Weight: 140 kg (308 lb 11.2 oz)   Height: 1.702 m (5' 7\")       Nexplanon Insertion:    Is a pregnancy test required: Yes.  Was it positive or negative?  Negative  Was a consent obtained?  Yes    Subjective: Lena Quesada is a 37 year old  presents for Nexplanon.    Patient has been given the opportunity to ask questions about all forms of birth control, including all options appropriate for Lena Quesada. Discussed that no method of birth control, except abstinence is 100% effective against pregnancy or sexually transmitted infection.     Lena Quesada understands she may have the Nexplanon removed at any time and it should be removed by a health care provider.    The entire insertion procedure was reviewed with the patient, including care after placement.    Patient's last menstrual period was 2022 (exact date). Last sexual activity: long time ago. No allergy to betadine or shellfish. Patient declines STD screening  HCG Qual Urine   Date Value Ref Range Status   2022 Negative Negative Final       BP (!) 157/89   Pulse 77   Ht 1.702 m (5' 7\")   Wt 140 kg (308 lb 11.2 oz)   LMP 2022 (Exact Date)   Breastfeeding No   BMI 48.35 kg/m      PROCEDURE NOTE: -- Nexplanon Insertion    Reason for Insertion: contraception    Patient was placed supine with left arm exposed.  Kenzie was made 8-10 cm above medial epicondyle and a guiding kenzie 4 cm above the first.  Arm was prepped with Betadine. Insertion point was anesthetized with 2.5 mL 1% lidocaine. After stretching the skin with thumb and index finger around the insertion site, skin punctured with the tip of the needle inserted at 30 degrees and then lowered to horizontal position. The needle was then advanced to its full length. Applicator was then stabilized and slider was unlocked. " Slider was pulled back until it stopped and then removed.    Correct placement of the implant was confirmed by palpation in the patient's arm and visualizing the purple top of the obturator.   Bandage and pressure dressing applied to insertion site.    Lot # [id number]  Exp: 7/3/2024    EBL: minimal    Complications: none    ASSESSMENT:     ICD-10-CM    1. Birth control counseling  Z30.09    2. Screening examination for venereal disease  Z11.3 Chlamydia trachomatis PCR     Neisseria gonorrhoeae PCR   3. Nexplanon insertion  Z30.017 hCG qual urine POCT     etonogestrel (NEXPLANON) subdermal implant 68 mg        PLAN:    Given 's handouts, including when to have Nexplanon removed, list of danger s/sx, side effects and follow up recommended. Encouraged condom use for prevention of STD. Back up contraception advised for 7 days. Advised to call for any fever, for prolonged or severe pain or bleeding, abnormal vaginal dischage. She was advised to use pain medications (ibuprofen) as needed for mild to moderate pain.     Ordered chlamydia and gonorrhea tests as future lab order. Pt can return to any [place] lab to complete these once sexually active. Reviewed best if 2 weeks after incident of sexual intercourse. If symptoms, schedule appt.     CATERINA Garcia CNM

## 2022-09-08 NOTE — PROGRESS NOTES
"Chief Complaint:  Contraceptive counseling    Subjective: Patient is a 37 year old No obstetric history on file. who presents to discuss birth control options.       Never been on birth control. No migraine, non smoker, no personal or family h/o DVT. Elevated blood pressure today. Reviewed no changes to contraception options with gastric sleeve since it is not malabsorptive procedure. Pregnancy rates higher after bariatric surgery.     Patient's last menstrual period was 08/16/2022 (exact date). Not currently sexually. Lost 160# total, 60# since the bariatric surgery. Feeling excited to start dating and hoping to be sexually active.      Answers for HPI/ROS submitted by the patient on 9/7/2022  DAVID 7 TOTAL SCORE: 0  General Symptoms: No  Skin Symptoms: Yes  HENT Symptoms: No  EYE SYMPTOMS: No  HEART SYMPTOMS: No  LUNG SYMPTOMS: No  INTESTINAL SYMPTOMS: No  URINARY SYMPTOMS: No  GYNECOLOGIC SYMPTOMS: No  BREAST SYMPTOMS: No  SKELETAL SYMPTOMS: No  BLOOD SYMPTOMS: No  NERVOUS SYSTEM SYMPTOMS: No  MENTAL HEALTH SYMPTOMS: No  Changes in hair: Yes  Changes in moles/birth marks: No  Itching: No  Rashes: No  Changes in nails: No  Acne: No  Hair in places you don't want it: No  Change in facial hair: No  Warts: No  Non-healing sores: No  Scarring: No  Flaking of skin: No  Color changes of hands/feet in cold : No  Sun sensitivity: No  Skin thickening: No      No current outpatient medications on file.     Current Facility-Administered Medications   Medication     etonogestrel (NEXPLANON) subdermal implant 68 mg     Past Medical History:   Diagnosis Date     Arthritis     chondromalacia of knee (right). injections.     Cholelithiasis      Chondromalacia of patellofemoral joint        Objective:  Vitals:    09/08/22 1535   BP: (!) 157/89   Pulse: 77   Weight: 140 kg (308 lb 11.2 oz)   Height: 1.702 m (5' 7\")       Nexplanon Insertion:    Is a pregnancy test required: Yes.  Was it positive or negative?  Negative  Was a consent " "obtained?  Yes    Subjective: Lena Quesada is a 37 year old  presents for Nexplanon.    Patient has been given the opportunity to ask questions about all forms of birth control, including all options appropriate for Lena Quesada. Discussed that no method of birth control, except abstinence is 100% effective against pregnancy or sexually transmitted infection.     Lena Quesada understands she may have the Nexplanon removed at any time and it should be removed by a health care provider.    The entire insertion procedure was reviewed with the patient, including care after placement.    Patient's last menstrual period was 2022 (exact date). Last sexual activity: long time ago. No allergy to betadine or shellfish. Patient declines STD screening  HCG Qual Urine   Date Value Ref Range Status   2022 Negative Negative Final       BP (!) 157/89   Pulse 77   Ht 1.702 m (5' 7\")   Wt 140 kg (308 lb 11.2 oz)   LMP 2022 (Exact Date)   Breastfeeding No   BMI 48.35 kg/m      PROCEDURE NOTE: -- Nexplanon Insertion    Reason for Insertion: contraception    Patient was placed supine with left arm exposed.  Kenzie was made 8-10 cm above medial epicondyle and a guiding kenzie 4 cm above the first.  Arm was prepped with Betadine. Insertion point was anesthetized with 2.5 mL 1% lidocaine. After stretching the skin with thumb and index finger around the insertion site, skin punctured with the tip of the needle inserted at 30 degrees and then lowered to horizontal position. The needle was then advanced to its full length. Applicator was then stabilized and slider was unlocked. Slider was pulled back until it stopped and then removed.    Correct placement of the implant was confirmed by palpation in the patient's arm and visualizing the purple top of the obturator.   Bandage and pressure dressing applied to insertion site.    Lot # [id number]  Exp: 7/3/2024    EBL: minimal    Complications: " none    ASSESSMENT:     ICD-10-CM    1. Birth control counseling  Z30.09    2. Screening examination for venereal disease  Z11.3 Chlamydia trachomatis PCR     Neisseria gonorrhoeae PCR   3. Nexplanon insertion  Z30.017 hCG qual urine POCT     etonogestrel (NEXPLANON) subdermal implant 68 mg        PLAN:    Given 's handouts, including when to have Nexplanon removed, list of danger s/sx, side effects and follow up recommended. Encouraged condom use for prevention of STD. Back up contraception advised for 7 days. Advised to call for any fever, for prolonged or severe pain or bleeding, abnormal vaginal dischage. She was advised to use pain medications (ibuprofen) as needed for mild to moderate pain.     Ordered chlamydia and gonorrhea tests as future lab order. Pt can return to any [place] lab to complete these once sexually active. Reviewed best if 2 weeks after incident of sexual intercourse. If symptoms, schedule appt.     CATERINA Garcia CNM

## 2022-09-10 ENCOUNTER — LAB (OUTPATIENT)
Dept: LAB | Facility: HOSPITAL | Age: 37
End: 2022-09-10
Payer: COMMERCIAL

## 2022-09-10 DIAGNOSIS — E55.9 VITAMIN D DEFICIENCY: ICD-10-CM

## 2022-09-10 DIAGNOSIS — Z98.84 S/P BARIATRIC SURGERY: ICD-10-CM

## 2022-09-10 DIAGNOSIS — E53.8 VITAMIN B12 DEFICIENCY (NON ANEMIC): ICD-10-CM

## 2022-09-10 DIAGNOSIS — K91.2 POSTOPERATIVE MALABSORPTION: ICD-10-CM

## 2022-09-10 LAB
ALBUMIN SERPL-MCNC: 3.9 G/DL (ref 3.5–5)
ALP SERPL-CCNC: 121 U/L (ref 45–120)
ALT SERPL W P-5'-P-CCNC: 41 U/L (ref 0–45)
ANION GAP SERPL CALCULATED.3IONS-SCNC: 8 MMOL/L (ref 5–18)
AST SERPL W P-5'-P-CCNC: 29 U/L (ref 0–40)
BILIRUB SERPL-MCNC: 0.7 MG/DL (ref 0–1)
BUN SERPL-MCNC: 6 MG/DL (ref 8–22)
CALCIUM SERPL-MCNC: 9.2 MG/DL (ref 8.5–10.5)
CHLORIDE BLD-SCNC: 105 MMOL/L (ref 98–107)
CHOLEST SERPL-MCNC: 145 MG/DL
CO2 SERPL-SCNC: 26 MMOL/L (ref 22–31)
CREAT SERPL-MCNC: 0.74 MG/DL (ref 0.6–1.1)
ERYTHROCYTE [DISTWIDTH] IN BLOOD BY AUTOMATED COUNT: 13 % (ref 10–15)
FASTING STATUS PATIENT QL REPORTED: YES
FERRITIN SERPL-MCNC: 112 NG/ML (ref 6–175)
FOLATE SERPL-MCNC: 13.6 NG/ML (ref 4.6–34.8)
GFR SERPL CREATININE-BSD FRML MDRD: >90 ML/MIN/1.73M2
GLUCOSE BLD-MCNC: 98 MG/DL (ref 70–125)
HBA1C MFR BLD: 5.2 %
HCT VFR BLD AUTO: 40.2 % (ref 35–47)
HDLC SERPL-MCNC: 56 MG/DL
HGB BLD-MCNC: 13.6 G/DL (ref 11.7–15.7)
LDLC SERPL CALC-MCNC: 72 MG/DL
MCH RBC QN AUTO: 30.9 PG (ref 26.5–33)
MCHC RBC AUTO-ENTMCNC: 33.8 G/DL (ref 31.5–36.5)
MCV RBC AUTO: 91 FL (ref 78–100)
PLATELET # BLD AUTO: 166 10E3/UL (ref 150–450)
POTASSIUM BLD-SCNC: 3.6 MMOL/L (ref 3.5–5)
PROT SERPL-MCNC: 6.8 G/DL (ref 6–8)
PTH-INTACT SERPL-MCNC: 41 PG/ML (ref 15–65)
RBC # BLD AUTO: 4.4 10E6/UL (ref 3.8–5.2)
SODIUM SERPL-SCNC: 139 MMOL/L (ref 136–145)
TRIGL SERPL-MCNC: 87 MG/DL
WBC # BLD AUTO: 6.4 10E3/UL (ref 4–11)

## 2022-09-10 PROCEDURE — 83970 ASSAY OF PARATHORMONE: CPT

## 2022-09-10 PROCEDURE — 82746 ASSAY OF FOLIC ACID SERUM: CPT

## 2022-09-10 PROCEDURE — 82607 VITAMIN B-12: CPT

## 2022-09-10 PROCEDURE — 82306 VITAMIN D 25 HYDROXY: CPT

## 2022-09-10 PROCEDURE — 84425 ASSAY OF VITAMIN B-1: CPT

## 2022-09-10 PROCEDURE — 83036 HEMOGLOBIN GLYCOSYLATED A1C: CPT

## 2022-09-10 PROCEDURE — 80053 COMPREHEN METABOLIC PANEL: CPT

## 2022-09-10 PROCEDURE — 84630 ASSAY OF ZINC: CPT

## 2022-09-10 PROCEDURE — 85027 COMPLETE CBC AUTOMATED: CPT

## 2022-09-10 PROCEDURE — 84590 ASSAY OF VITAMIN A: CPT

## 2022-09-10 PROCEDURE — 36415 COLL VENOUS BLD VENIPUNCTURE: CPT

## 2022-09-10 PROCEDURE — 80061 LIPID PANEL: CPT

## 2022-09-10 PROCEDURE — 82728 ASSAY OF FERRITIN: CPT

## 2022-09-11 LAB — VIT B12 SERPL-MCNC: 2185 PG/ML (ref 232–1245)

## 2022-09-12 LAB — ZINC SERPL-MCNC: 77 UG/DL

## 2022-09-13 LAB
ANNOTATION COMMENT IMP: NORMAL
RETINYL PALMITATE SERPL-MCNC: <0.02 MG/L
VIT A SERPL-MCNC: 0.37 MG/L

## 2022-09-14 LAB — VIT B1 PYROPHOSHATE BLD-SCNC: 135 NMOL/L

## 2022-09-16 LAB
DEPRECATED CALCIDIOL+CALCIFEROL SERPL-MC: <66 UG/L (ref 20–75)
VITAMIN D2 SERPL-MCNC: <5 UG/L
VITAMIN D3 SERPL-MCNC: 61 UG/L

## 2022-09-28 ENCOUNTER — TELEPHONE (OUTPATIENT)
Dept: SURGERY | Facility: CLINIC | Age: 37
End: 2022-09-28

## 2022-09-28 ENCOUNTER — VIRTUAL VISIT (OUTPATIENT)
Dept: SURGERY | Facility: CLINIC | Age: 37
End: 2022-09-28
Payer: COMMERCIAL

## 2022-09-28 DIAGNOSIS — K91.2 POSTOPERATIVE MALABSORPTION: Primary | ICD-10-CM

## 2022-09-28 DIAGNOSIS — E67.8 HYPERVITAMINOSIS: ICD-10-CM

## 2022-09-28 DIAGNOSIS — Z98.84 S/P LAPAROSCOPIC SLEEVE GASTRECTOMY: ICD-10-CM

## 2022-09-28 PROCEDURE — 99214 OFFICE O/P EST MOD 30 MIN: CPT | Mod: 95 | Performed by: EMERGENCY MEDICINE

## 2022-09-28 NOTE — PROGRESS NOTES
Bariatric Follow Up Visit with a History of Previous Bariatric Surgery   8:16 AM start.  8:49 AM finish of video visit.    Date of visit: 9/27/2022  Physician: Wai Anders MD, MD  Primary Care Provider:  Jazz Abbottshanon Quesada   37 year old  female    Date of Surgery: 3/24/22  Initial Weight: 399 lbs w/ preoperative weight loss using metformin/ozempic therapy  Initial BMI: 62.5  Today's Weight:   Wt Readings from Last 1 Encounters:   09/08/22 140 kg (308 lb 11.2 oz)     Weight history:   Wt Readings from Last 4 Encounters:   09/08/22 140 kg (308 lb 11.2 oz)   04/01/22 (!) 155.3 kg (342 lb 6.4 oz)   03/24/22 (!) 163.5 kg (360 lb 6.4 oz)   03/07/22 (!) 169 kg (372 lb 9.6 oz)      There is no height or weight on file to calculate BMI.      Assessment and Plan     Assessment: Lena is a 37 year old year old female who is six months s/p  Sleeve Gastrectomy with Dr. Rodriguez.  Weight reduction has been over 25% (104 lbs reported)total body weight from her introductory weight, on pace thus far for expected results.   Her nutrition/vitamin support has been excellent and her 9/10/22 labs showed good vitamin use, Her B12 frequency will be reduced given the excess found and given her sleeve surgery/age we'll reduce calcium to once daily.    Lena Quesada feels as if she has not fully achieved the goals she hoped to accomplish through bariatric surgery and weight loss but is on track.    No diagnosis found.    No current outpatient medications on file.    Plan:  Plan:  1. Great work with reported weight at 295 lbs today, you're already over 25% total body weight reduction from your introductory weight. Continue good nutrition/meal timing and given your heavier than average gym routine, considering a post workout protein/carb snack or protein drink would be reasonable.     2. You can reduce your B12 to once weekly at the 5000mcg/dose strength that you use. Feel free to skip the rest of this week to  "help levels come down a bit.    3. You can reduce calcium to once daily now.     4. Continue fitness focus and plan an adventure/trip/event that promotes continued focus on strength/endurance or the ability to move/walk for prolonged periods of times. Hikes,city tours, walking events/cycling events or holidays are a great way to enjoy your fitter body.     5. Follow up with dietician in 2-3 months and with me in 6. If any struggle w/ appetite control, let me know but I'd expected continued and gradual weight reduction over the next 2-5 months and then stabilizing.  Return in about 6 months (around 3/28/2023) for Follow up, with me.    Bariatric Surgery Review     Interim History/LifeChanges: doing well.    Patient Concerns: hair loss.  Appetite (1-10): good control  GERD: no issues.   Reviewed whether any need/indication for screening EGD today and we will deferred.  Typically, a screening EGD is recommend post op year 2-3 if no symptoms to assess health of esophagus/bariatric surgery and sooner if difficult to control GERD or persistent pain/dysphagia sx despite behavior modification.    Medication changes: nexplanon placed.     Vitamin Intake:   B-12   5000mcg daily, will reduce to once weekly at this higher strength.   MVI  Flintstones complete   Vitamin D  5000IU daily   Calcium   calcium citrate two 600mg, will reduce to once daily.     Other  previously using some fiber.            Reports 295 lbs today.  LABS: \"Reviewed    Nausea no  Vomiting no  Constipation irregularly earlier on. Better now.   Diarrhea no  Rashes no. Using compression clothes.  Hair Loss yes, heavy recently.  Reactive Hypoglycemia possibly one episode.  Light Headedness no   Moods worried about hair loss.      Most recent labs:  Lab Results   Component Value Date    WBC 6.4 09/10/2022    HGB 13.6 09/10/2022    HCT 40.2 09/10/2022    MCV 91 09/10/2022     09/10/2022     Lab Results   Component Value Date    CHOL 145 09/10/2022 " "    Lab Results   Component Value Date    HDL 56 09/10/2022     No components found for: LDLCALC  Lab Results   Component Value Date    TRIG 87 09/10/2022     No results found for: CHOLHDL  Lab Results   Component Value Date    ALT 41 09/10/2022    AST 29 09/10/2022    ALKPHOS 121 (H) 09/10/2022     No results found for: HGBA1C  Lab Results   Component Value Date    B12 2,185 (H) 09/10/2022     No components found for: VITDT1  Lab Results   Component Value Date    BHAKTI 112 09/10/2022     Lab Results   Component Value Date    PTHI 41 09/10/2022     Lab Results   Component Value Date    ZN 77.0 09/10/2022     Lab Results   Component Value Date    VIB1WB 135 09/10/2022     Lab Results   Component Value Date    TSH 2.10 06/11/2021     No results found for: TEST    Habits:  Tobacco/Nicotine/THC exposure? no   NSAID use? no   Alcohol use? None yet. Waiting for opportunity.   Caffeine Habits? None regularly.       Exercise Routine: working out daily and walks 30 minutes does light weights and now biking. 5-6 miles on fat burner mode at the [employer].  3 meals/day? Yes. Some craving  Protein 60-80g/day? Struggled the last month, started using protein shake again.  Water Separate from meals? yes  Calorie Containing Beverages: no  Restaurant eating/wk: rarely  Sleep Habits:  Variable hours, goes to bed around time to get 10 hours. Lives down down.  CPAP Use? n/a  Contraception: nexplanon  DEXA:not needed..  Discussed annual screening to start at age 45 and continue to age 55 if scoring \"low risk\". DEXA scan recommended at age 55 regardless as long as at least 2 years have transpired from their bariatric surgery.    Social History     Social History     Socioeconomic History     Marital status: Single     Spouse name: Not on file     Number of children: Not on file     Years of education: Not on file     Highest education level: Not on file   Occupational History     Not on file   Tobacco Use     Smoking status: Never Smoker     " Smokeless tobacco: Never Used   Vaping Use     Vaping Use: Never used   Substance and Sexual Activity     Alcohol use: Not Currently     Drug use: Never     Sexual activity: Yes     Partners: Male     Birth control/protection: Condom   Other Topics Concern     Parent/sibling w/ CABG, MI or angioplasty before 65F 55M? Not Asked   Social History Narrative     Not on file     Social Determinants of Health     Financial Resource Strain: Not on file   Food Insecurity: Not on file   Transportation Needs: Not on file   Physical Activity: Not on file   Stress: Not on file   Social Connections: Not on file   Intimate Partner Violence: Not on file   Housing Stability: Not on file       Past Medical History     Past Medical History:   Diagnosis Date     Arthritis     chondromalacia of knee (right). injections.     Cholelithiasis      Chondromalacia of patellofemoral joint      Problem List     Patient Active Problem List   Diagnosis     Morbid obesity (H)     Chondromalacia of patellofemoral joint     Morbid obesity due to excess calories (H)     Nexplanon in place      Medications     [unfilled]  Surgical History     Past Surgical History  She has a past surgical history that includes Tympanoplasty; [place] Tooth Extraction; Cholecystectomy (2005); and Laparoscopic gastric sleeve (N/A, 3/24/2022).    Objective-Exam     Constitutional:  LMP 08/16/2022 (Exact Date)   [unfilled]   General:  Pleasant and in no acute distress   Eyes:  EOMI  ENT:  Airway patent    Neck:  Respiratory: Normal respiratory effort, no cough, .  CV:  n/a  Gastrointestinal: obese.  Musculoskeletal: muscle mass WNL  Skin: color without pallor,  hair without apparent thinning but reported losses.,   Psychiatric: alert and oriented X3, mood and affect normal    Counseling     We reviewed the important post op bariatric recommendations:  -eating 3 meals daily  -eating protein first, getting >60gm protein daily  -eating slowly, chewing food  well  -avoiding/limiting calorie containing beverages  -drinking water 15-30 minutes before or after meals  -limiting restaurant or cafeteria eating to twice a week or less    We discussed the importance of restorative sleep and stress management in maintaining a healthy weight.  We discussed the National Weight Control Registry healthy weight maintenance strategies and ways to optimize metabolism.  We discussed the importance of physical activity including cardiovascular and strength training in maintaining a healthier weight.    We discussed the importance of life-long vitamin supplementation and life-long  follow-up.    Lena was reminded that, to avoid marginal ulcers she should avoid tobacco at all, alcohol in excess, caffeine in excess, and NSAIDS (unless indicated for cardioprotection or othewise and opposed by a PPI).    Wai Anders MD    [facility].  2022  8:57 PM  [phone] (clinic phone)  [phone] (fax)    No images are attached to the encounter.  Medical Decision Makin minutes spent on the date of the encounter doing chart review, history and exam, documentation and further activities per the note

## 2022-09-28 NOTE — TELEPHONE ENCOUNTER
ELIO Paredes to schedule her 9 month dietician video visit with Rosalva as well as 1yr Annual with Dr. Chago Brewster

## 2022-09-28 NOTE — LETTER
9/28/2022         RE: Lena Quesada  [address]  [facility] [address]        Dear Colleague,    Thank you for referring your patient, Lena Quesada, to the [facility]. Please see a copy of my visit note below.    Bariatric Follow Up Visit with a History of Previous Bariatric Surgery   8:16 AM start.  8:49 AM finish of video visit.    Date of visit: 9/27/2022  Physician: Wai Anders MD, MD  Primary Care Provider:  Jazz Abbott NAM Quesada   37 year old  female    Date of Surgery: 3/24/22  Initial Weight: 399 lbs w/ preoperative weight loss using metformin/ozempic therapy  Initial BMI: 62.5  Today's Weight:   Wt Readings from Last 1 Encounters:   09/08/22 140 kg (308 lb 11.2 oz)     Weight history:   Wt Readings from Last 4 Encounters:   09/08/22 140 kg (308 lb 11.2 oz)   04/01/22 (!) 155.3 kg (342 lb 6.4 oz)   03/24/22 (!) 163.5 kg (360 lb 6.4 oz)   03/07/22 (!) 169 kg (372 lb 9.6 oz)      There is no height or weight on file to calculate BMI.      Assessment and Plan     Assessment: Lena is a 37 year old year old female who is six months s/p  Sleeve Gastrectomy with Dr. Rodriguez.  Weight reduction has been over 25% (104 lbs reported)total body weight from her introductory weight, on pace thus far for expected results.   Her nutrition/vitamin support has been excellent and her 9/10/22 labs showed good vitamin use, Her B12 frequency will be reduced given the excess found and given her sleeve surgery/age we'll reduce calcium to once daily.    Lena Quesada feels as if she has not fully achieved the goals she hoped to accomplish through bariatric surgery and weight loss but is on track.    No diagnosis found.    No current outpatient medications on file.    Plan:  Plan:  1. Great work with reported weight at 295 lbs today, you're already over 25% total body weight reduction from your introductory weight. Continue good  "nutrition/meal timing and given your heavier than average gym routine, considering a post workout protein/carb snack or protein drink would be reasonable.     2. You can reduce your B12 to once weekly at the 5000mcg/dose strength that you use. Feel free to skip the rest of this week to help levels come down a bit.    3. You can reduce calcium to once daily now.     4. Continue fitness focus and plan an adventure/trip/event that promotes continued focus on strength/endurance or the ability to move/walk for prolonged periods of times. Hikes,city tours, walking events/cycling events or holidays are a great way to enjoy your fitter body.     5. Follow up with dietician in 2-3 months and with me in 6. If any struggle w/ appetite control, let me know but I'd expected continued and gradual weight reduction over the next 2-5 months and then stabilizing.  Return in about 6 months (around 3/28/2023) for Follow up, with me.    Bariatric Surgery Review     Interim History/LifeChanges: doing well.    Patient Concerns: hair loss.  Appetite (1-10): good control  GERD: no issues.   Reviewed whether any need/indication for screening EGD today and we will deferred.  Typically, a screening EGD is recommend post op year 2-3 if no symptoms to assess health of esophagus/bariatric surgery and sooner if difficult to control GERD or persistent pain/dysphagia sx despite behavior modification.    Medication changes: nexplanon placed.     Vitamin Intake:   B-12   5000mcg daily, will reduce to once weekly at this higher strength.   MVI  Flintstones complete   Vitamin D  5000IU daily   Calcium   calcium citrate two 600mg, will reduce to once daily.     Other  previously using some fiber.            Reports 295 lbs today.  LABS: \"Reviewed    Nausea no  Vomiting no  Constipation irregularly earlier on. Better now.   Diarrhea no  Rashes no. Using compression clothes.  Hair Loss yes, heavy recently.  Reactive Hypoglycemia possibly one " "episode.  Light Headedness no   Moods worried about hair loss.      Most recent labs:  Lab Results   Component Value Date    WBC 6.4 09/10/2022    HGB 13.6 09/10/2022    HCT 40.2 09/10/2022    MCV 91 09/10/2022     09/10/2022     Lab Results   Component Value Date    CHOL 145 09/10/2022     Lab Results   Component Value Date    HDL 56 09/10/2022     No components found for: LDLCALC  Lab Results   Component Value Date    TRIG 87 09/10/2022     No results found for: CHOLHDL  Lab Results   Component Value Date    ALT 41 09/10/2022    AST 29 09/10/2022    ALKPHOS 121 (H) 09/10/2022     No results found for: HGBA1C  Lab Results   Component Value Date    B12 2,185 (H) 09/10/2022     No components found for: VITDT1  Lab Results   Component Value Date    BHAKTI 112 09/10/2022     Lab Results   Component Value Date    PTHI 41 09/10/2022     Lab Results   Component Value Date    ZN 77.0 09/10/2022     Lab Results   Component Value Date    VIB1WB 135 09/10/2022     Lab Results   Component Value Date    TSH 2.10 06/11/2021     No results found for: TEST    Habits:  Tobacco/Nicotine/THC exposure? no   NSAID use? no   Alcohol use? None yet. Waiting for opportunity.   Caffeine Habits? None regularly.       Exercise Routine: working out daily and walks 30 minutes does light weights and now biking. 5-6 miles on fat burner mode at the Gym.  3 meals/day? Yes. Some craving  Protein 60-80g/day? Struggled the last month, started using protein shake again.  Water Separate from meals? yes  Calorie Containing Beverages: no  Restaurant eating/wk: rarely  Sleep Habits:  Variable hours, goes to bed around time to get 10 hours. Lives down down.  CPAP Use? n/a  Contraception: nexplanon  DEXA:not needed..  Discussed annual screening to start at age 45 and continue to age 55 if scoring \"low risk\". DEXA scan recommended at age 55 regardless as long as at least 2 years have transpired from their bariatric surgery.    Social History     Social " History     Socioeconomic History     Marital status: Single     Spouse name: Not on file     Number of children: Not on file     Years of education: Not on file     Highest education level: Not on file   Occupational History     Not on file   Tobacco Use     Smoking status: Never Smoker     Smokeless tobacco: Never Used   Vaping Use     Vaping Use: Never used   Substance and Sexual Activity     Alcohol use: Not Currently     Drug use: Never     Sexual activity: Yes     Partners: Male     Birth control/protection: Condom   Other Topics Concern     Parent/sibling w/ CABG, MI or angioplasty before 65F 55M? Not Asked   Social History Narrative     Not on file     Social Determinants of Health     Financial Resource Strain: Not on file   Food Insecurity: Not on file   Transportation Needs: Not on file   Physical Activity: Not on file   Stress: Not on file   Social Connections: Not on file   Intimate Partner Violence: Not on file   Housing Stability: Not on file       Past Medical History     Past Medical History:   Diagnosis Date     Arthritis     chondromalacia of knee (right). injections.     Cholelithiasis      Chondromalacia of patellofemoral joint      Problem List     Patient Active Problem List   Diagnosis     Morbid obesity (H)     Chondromalacia of patellofemoral joint     Morbid obesity due to excess calories (H)     Nexplanon in place      Medications     [unfilled]  Surgical History     Past Surgical History  She has a past surgical history that includes Tympanoplasty; [place] Tooth Extraction; Cholecystectomy (2005); and Laparoscopic gastric sleeve (N/A, 3/24/2022).    Objective-Exam     Constitutional:  LMP 08/16/2022 (Exact Date)   [unfilled]   General:  Pleasant and in no acute distress   Eyes:  EOMI  ENT:  Airway patent    Neck:  Respiratory: Normal respiratory effort, no cough, .  CV:  n/a  Gastrointestinal: obese.  Musculoskeletal: muscle mass WNL  Skin: color without pallor,  hair without  apparent thinning but reported losses.,   Psychiatric: alert and oriented X3, mood and affect normal    Counseling     We reviewed the important post op bariatric recommendations:  -eating 3 meals daily  -eating protein first, getting >60gm protein daily  -eating slowly, chewing food well  -avoiding/limiting calorie containing beverages  -drinking water 15-30 minutes before or after meals  -limiting restaurant or cafeteria eating to twice a week or less    We discussed the importance of restorative sleep and stress management in maintaining a healthy weight.  We discussed the National Weight Control Registry healthy weight maintenance strategies and ways to optimize metabolism.  We discussed the importance of physical activity including cardiovascular and strength training in maintaining a healthier weight.    We discussed the importance of life-long vitamin supplementation and life-long  follow-up.    Lena was reminded that, to avoid marginal ulcers she should avoid tobacco at all, alcohol in excess, caffeine in excess, and NSAIDS (unless indicated for cardioprotection or othewise and opposed by a PPI).    Wai Anders MD    [facility].  2022  8:57 PM  [phone] (clinic phone)  [phone] (fax)    No images are attached to the encounter.  Medical Decision Makin minutes spent on the date of the encounter doing chart review, history and exam, documentation and further activities per the note      Again, thank you for allowing me to participate in the care of your patient.        Sincerely,        Wai Anders MD

## 2022-09-28 NOTE — PATIENT INSTRUCTIONS
Plan:  Great work with reported weight at 295 lbs today, you're already over 25% total body weight reduction from your introductory weight. Continue good nutrition/meal timing and given your heavier than average gym routine, considering a post workout protein/carb snack or protein drink would be reasonable.     2. You can reduce your B12 to once weekly at the 5000mcg/dose strength that you use. Feel free to skip the rest of this week to help levels come down a bit.    3. You can reduce calcium to once daily now.     4. Continue fitness focus and plan an adventure/trip/event that promotes continued focus on strength/endurance or the ability to move/walk for prolonged periods of times. Hikes,city tours, walking events/cycling events or holidays are a great way to enjoy your fitter body.     5. Follow up with dietician in 2-3 months and with me in 6. If any struggle w/ appetite control, let me know but I'd expected continued and gradual weight reduction over the next 2-5 months and then stabilizing.      [facility]  Nutritional Guidelines  Gastric Sleeve 6 Months Post Op    General Guidelines and Helpful Hints:  Eat 3 meals per day + protein supplement(s). No snacks between meals.  Do not skip meals.  This can cause overeating at the next meal and will prevent adequate protein and nutritional intake.  Aim for 60-80 grams of protein per day.   Always eat your protein first. This assists with optimal nutrition and helps you stay full longer.  To achieve daily protein goals, it is recommended to drink approved protein supplement between meals.  Follow appropriate portion size: Use measuring cups to be accurate.    Months Post Op Portion Size per Meal   6 months 1/2 cup   7-8 months 1/2 - 2/3 cup   8 -9 months 2/3 - 3/4 cup   10-12 months 3/4 - 1 cup   12 months and beyond 1 cup maximum     Continue to use saucer/salad plates, infant/toddler silverware to keep portion sizes small and take small bites.  Eat  S-L-O-W-L-Y to make each meal last 20-30 minutes. Always stop eating when satisfied.  Continue to use caution with foods containing skins, peels or membranes. Chew well!  Aim for 64 oz. of calorie-free fluids daily.  Continue to avoid caffeine, carbonation and alcohol.  Remember to avoid drinking during meals, 15-30 minutes before and 30 minutes after.  Aim for 30-60 minutes of physical activity most days of the week.  If having trouble tolerating meat, try using a crock-pot, tinfoil tent, steamer or other moist cooking method to create tender meats. Add broth or low-fat gravy to help meat stay moist.   Avoid high sugar and high fat foods to prevent high calorie intake. This will reduce your rate of weight loss.  Check nutrition labels for less than 10 grams of sugar and less than 10 grams of fat per serving.  Continue Taking Vitamins/Minerals:  7760-3569 mcg of Sublingual B-12 daily  1 Multivitamin with Iron twice daily (chewable or swallow tabs)  500-600 mg Calcium Citrate twice daily (chewable or swallow tabs)  5000 IU Vitamin D3 daily    Sample Grocery List    Protein:  Fat free Greek or light yogurt (less than 10 grams sugar)  Fat free or low-fat cottage cheese  String cheese or reduced fat cheese slices  Tuna, salmon, crab, egg, or chicken salad made with light or fat free mayonnaise  Egg or Egg Substitute  Lean/extra lean turkey, beef, bison, venison (ground, sirloin, round, flank)  Pork loin or tenderloin (grilled, baked, broiled)  Fish such as salmon, tuna, trout, tilapia, etc. (grilled, baked, broiled)  Tender cuts of lean (skinless) turkey or chicken  Lean deli meats: turkey, lean ham, chicken, lean roast beef  Beans such as kidney, garbanzo, black, bahena, or low-fat/fat free refried beans  Peanut butter (natural preferred). Limit to 1 Tbsp. per day.  Low-fat meatloaf (made with lean ground beef or turkey)  Sloppy Joes made with low-sugar ketchup and lean ground beef or turkey  Soy or vegetable protein  (i.e. vegan crumbles, soy/veggie burger, tofu)  Hummus    Vegetables:  Fresh: cooked or raw (as tolerated)  Frozen vegetables  Canned vegetables (low sodium or no salt added, rinse before cooking/eating)  (Ok to have skins/peels/membranes/seeds - just chew well)    Fruits:  Fresh fruit  Frozen fruit (no sugar added)  Canned fruit (packed in its own juice, NOT syrup)  (Ok to have skins/peels/membranes/seeds - just chew well)    Starch:  Unsweetened whole-grain hot cereal (or high fiber cold cereal, dry)  Toasted whole wheat bread or [place] Thins  Whole grain crackers  Baked/boiled/mashed potato/sweet potato  Cooked whole grain pasta, brown rice, or other cooked whole grains  Starchy vegetables: corn, peas, winter squash    Protein Supplement:   Ready to drink protein shake with:  15-30 grams protein per serving  Less than 10 grams total carbohydrate per serving   Protein powder mixed with:   Skim or 1% milk  Low fat or fat free Lactaid milk, plain or no sugar added soymilk  Water     Fats: (use in moderation)  1 teaspoon of soft tub margarine  1 teaspoon olive oil, canola oil, or peanut oil  1 tablespoon of low-fat harvey or salad dressing     Sample Menu for 6 months after Gastric Sleeve    You do NOT need to eat/drink the full portion sizes listed below  Always stop when you are satisfied  Breakfast 6 Tbsp. 1% cottage cheese   2 Tbsp. peaches    Lunch 2 oz. lean hamburger or veggie burger  1-2 tsp. salsa   Supplement Approved Protein Shake   (Have between meals throughout the day)   Dinner 6 Tbsp. chicken breast  1-2 Tbsp. green beans     Breakfast 2 Eggs or   cup scrambled egg substitute product   Lunch 7 Tbsp. low-fat Sloppy Jakob mixture   2 high fiber crackers   Supplement Approved Protein Shake   (Have between meals throughout the day)   Dinner 6 Tbsp. grilled, broiled, or baked lemon pepper salmon  2 Tbsp. asparagus     Breakfast 6 Tbsp. light yogurt with 2 Tbsp. Grape Nuts or high fiber cereal    Lunch   cup  of chili made with extra lean ground beef and kidney beans   Dinner 5 Tbsp. pork loin made in a crock pot  2 Tbsp. cooked broccoli  1 Tbsp. baked potato with 2 sprays of spray margarine    Supplement Approved Protein Shake   (Have between meals throughout the day)     Breakfast 6 Tbsp. lean ham  2 Tbsp. seedless melon   Lunch 7 Tbsp. diced turkey with 1 teaspoon low-fat gravy  1 Tbsp. green beans   Dinner 6 Tbsp. extra lean ground beef mixed with low sugar spaghetti sauce  2 Tbsp. whole wheat pasta   Supplement Approved Protein Shake   (Have between meals throughout the day)     Breakfast 2 oz turkey or soy based sausage phani    Lunch 6 Tbsp. low-fat cottage cheese  2 Tbsp. pineapple   Supplement Approved Protein Shake   (Have between meals throughout the day)   Dinner 7 Tbsp. beef tenderloin  1 Tbsp. asparagus     Breakfast 1/2 of a whole wheat English Muffin (toasted)  1 Tbsp. creamy natural peanut butter   Lunch   cup of black bean soup with 1 Tbsp. low fat shredded cheese   Dinner 7 Tbsp. low-fat turkey meat loaf   1 Tbsp. cooked carrots   Supplement Approved Protein Shake   (Have between meals throughout the day)     Breakfast 6 Tbsp. scrambled egg or scrambled egg substitute  1 Tbsp. finely chopped bell pepper  1 Tbsp. low fat shredded cheese   Lunch 7 Tbsp. lean diced ham  1 Tbsp. mandarin oranges   Supplement Approved Protein Shake   (Have between meals throughout the day)   Dinner 6 Tbsp. flaked fish   2 Tbsp. mashed sweet potato          LEAN PROTEIN SOURCES  Getting 20-30 grams of protein, 3 meals daily, is appropriate for most people, some need more but more than about 40 grams per meal is not useful.  General rule is drinking one ounce of water per gram of protein eaten over the course of the day:  70 grams of protein each day, drink 70 oz of water.  Protein Source Portion Calories Grams of Protein                           Nonfat, plain Greek yogurt    (10 grams sugar or less) 3/4 cup (6 oz)   12-17   Light Yogurt (10 grams sugar or less) 3/4 cup (6 oz)  6-8   Protein Shake 1 shake 110-180 15-30   Skim/1% Milk or lactose-free milk 1 cup ( 8 oz)  8   Plain or light, flavored soymilk 1 cup  7-8   Plain or light, hemp milk 1 cup 110 6   Fat Free or 1% Cottage Cheese 1/2 cup 90 15   Part skim ricotta cheese 1/2 cup 100 14   Part skim or reduced fat cheese slices 1 ounce 65-80 8     Mozzarella String Cheese 1 80 8   Canned tuna, chicken, crab or salmon  (canned in water)  1/2 cup 100 15-20   White fish (broiled, grilled, baked) 3 ounces 100 21   [place]/Tuna (broiled, grilled, baked) 3 ounces 150-180 21   Shrimp, Scallops, Lobster, Crab 3 ounces 100 21   Pork loin, Pork Tenderloin 3 ounces 150 21   Boneless, skinless chicken /turkey breast                          (broiled, grilled, baked) 3 ounces 120 21   [place], [place], [place], and Venison 3 ounces 120 21   Lean cuts of red meat and pork (sirloin,   round, tenderloin, flank, ground 93%-96%) 3 ounces 170 21   Lean or Extra Lean Ground Turkey 1/2 cup 150 20   90-95% Lean Rufus Burger 1 phani 140-180 21   Low-fat casserole with lean meat 3/4 cup 200 17   Luncheon Meats                                                        (turkey, lean ham, roast beef, chicken) 3 ounces 100 21   Egg (boiled, poached, scrambled) 1 Egg 60 7   Egg Substitute 1/2 cup 70 10   Nuts (limit to 1 serving per day)  3 Tbsp. 150 7   Nut [place] (peanut, almond)  Limit to 1 serving or less daily 1 Tbsp. 90 4   Soy Burger (varies) 1  15   Garbanzo, Black, [place]on Beans 1/2 cup 110 7   Refried Beans 1/2 cup 100 7   Kidney and Lima beans 1/2 cup 110 7   Tempeh 3 oz 175 18   Vegan crumbles 1/2 cup 100 14   Tofu 1/2 cup 110 14   Chili (beans and extra lean beef or turkey) 1 cup 200 23   Lentil Stew/Soup 1 cup 150 12   Black Bean Soup 1 cup 175 12

## 2022-10-01 ENCOUNTER — HEALTH MAINTENANCE LETTER (OUTPATIENT)
Age: 37
End: 2022-10-01

## 2022-12-13 ENCOUNTER — VIRTUAL VISIT (OUTPATIENT)
Dept: SURGERY | Facility: CLINIC | Age: 37
End: 2022-12-13
Payer: COMMERCIAL

## 2022-12-13 DIAGNOSIS — Z98.84 S/P LAPAROSCOPIC SLEEVE GASTRECTOMY: Primary | ICD-10-CM

## 2022-12-13 DIAGNOSIS — K91.2 POSTOPERATIVE MALABSORPTION: ICD-10-CM

## 2022-12-13 DIAGNOSIS — Z71.3 NUTRITIONAL COUNSELING: ICD-10-CM

## 2022-12-13 PROCEDURE — 97803 MED NUTRITION INDIV SUBSEQ: CPT | Mod: 95 | Performed by: DIETITIAN, REGISTERED

## 2022-12-13 NOTE — PROGRESS NOTES
"Lena Quesada is a 37 year old who is being evaluated via a billable video visit.    How would you like to obtain your AVS? MyChart  If the video visit is dropped, the invitation should be resent by: Send to e-mail at: [email or web address]  Will anyone else be joining your video visit? No  {If patient encounters technical issues they should call [phone]      Post-op Surgical Weight Loss Diet Evaluation     Assessment:  Pt presents for 9 Months post-op RD visit, s/p LSG on 3/24/2022 with Dr. Rodriguez. Today we reviewed current eating habits and level of physical activity, and instructed on the changes that are required for successful bariatric outcomes.    Patient Progress: going well, no concerns.  Still noticing that she wants to snack in between.      Initial weight: 372.6 lbs  Current weight: 282 lbs   Weight change: 90.6 lbs (down)    There is no height or weight on file to calculate BMI.    Patient Active Problem List   Diagnosis     Morbid obesity (H)     Chondromalacia of patellofemoral joint     Morbid obesity due to excess calories (H)     Nexplanon in place        Vitamins   Multi Vit with Iron: yes  Calcium Citrate: yes  B12: yes  D3: yes    Do you experience hunger? Noticing that she wants to snack in between meals  Do you have \"dumping\" syndrome?No   Do you experience any reflux or discomfort with eating? No    Diet Recall/Time:   Breakfast: yogurt (15 g), Protein Shake (30 g)  Lunch: 2-3 oz of chicken  Dinner: 2-3 oz fish or red meat (stew meat) with mushrooms    Typical Snacks: trying to avoid    Estimated protein intake: 60-80 grams    Estimated portion size per meals:1/2-3/4 cup/meal      Meal Duration:15 minutes (does stop when she is full)    Fluid-meal separation:  Fluids are  30min before and 30 minutes after meals.    Fluid Intake  Water: aiming for a gallon/day (occasional Tino)    Exercise: gym (treadmill or biking) daily, strength training/weights      PES " "statement:      (NC-1.4) Altered GI Function related to Alteration in gastrointestinal tract structure and/or function/ Decreased functional length of the GI tract as evidenced by Weight loss of 24% initial body weight; sleeve gastrectomy    Intervention    Discussion  1. Discussed 9 Months Post-Op Nutritional Guidelines for LSG  2. Recommended to consume 15-20gm protein at 3 meals daily, along with protein supplement/\"planned protein containing snack\" of 15-30gm protein, to reach goal of 60-80 gm protein daily.  3. Educated on post-op vitamin regimen: Multi Vit + iron 2x/day, calcium citrate 400-600 mg 2x/day, 3192-4186 mcg of Sublingual B-12 daily, and 5000 IU Vitamin D3 daily (MVI and calcium can be taken at the same time BID)  4. Reviewed lean protein sources  5. Bariatric Plate Method-  including lean/low fat protein at each meal, including a vegetable/fruit, and limiting carbohydrate intake to less than 25% of plate volume.     Instructions  1. Include 15-20gm protein at each meal, along with protein supplement/\"planned protein containing snack\" of 15-30gm protein, to reach goal of 60-80 gm protein daily.  2. Increase fluid intake to 64oz daily: choose plain or calorie/carbonation-free beverages.  3. Incorporate daily structured activity, 30-60 minutes most days of the week  4. Recommended pt to start taking: Multi Vit + iron 2x/day, calcium citrate 400-600 mg 2x/day, 2847-8701 mcg of Sublingual B-12 daily, and 5000 IU Vitamin D3 daily. (MVI and calcium can be taken at the same time)  5. Read food labels more consistently: keeping total fat grams <10, total sugar grams <10, fiber >3gm per serving.  6. Increase vegetable/fruit intake, by having a vegetable or fruit with each meal daily.  7. Practice plate method: 1/2 plate lean/low fat protein source, vegetable/fruit, <25% of plate complex carbohydrates.  8. Separate fluids 30 minutes before/after meal times.  9. Practice eating off of smaller plates/bowls, " chewing to applesauce consistency, taking 20-30 minutes to eat in a calm/relaxed environment without distractions of tv/email/cell phone.    Handouts provided:  9 Months Post-Op Nutritional Guidelines for LSG    Assessment/Plan:    Pt to follow up for 1 year post-op visit with bariatrician       Video-Visit Details    Type of service:  Video Visit    Video Start Time (time video started): 7:26 am    Video End Time (time video stopped): 7:46 am    Originating Location (pt. Location): Home        Distant Location (provider location):  Off-site    Mode of Communication:  Video Conference via [facility]    Physician has received verbal consent for a Video Visit from the patient? Yes    Rosalva Shin, RD

## 2022-12-13 NOTE — LETTER
"    12/13/2022         RE: Lena Quesada  [address]  [place] [place] [facility] [address]        Dear Colleague,    Thank you for referring your patient, Lena Quesada, to the [facility] [place]. Please see a copy of my visit note below.    Lena Quesada is a 37 year old who is being evaluated via a billable video visit.    How would you like to obtain your AVS? MyChart  If the video visit is dropped, the invitation should be resent by: Send to e-mail at: [email or web address]  Will anyone else be joining your video visit? No  {If patient encounters technical issues they should call [phone]      Post-op Surgical Weight Loss Diet Evaluation     Assessment:  Pt presents for 9 Months post-op RD visit, s/p LSG on 3/24/2022 with Dr. Rodriguez. Today we reviewed current eating habits and level of physical activity, and instructed on the changes that are required for successful bariatric outcomes.    Patient Progress: going well, no concerns.  Still noticing that she wants to snack in between.      Initial weight: 372.6 lbs  Current weight: 282 lbs   Weight change: 90.6 lbs (down)    There is no height or weight on file to calculate BMI.    Patient Active Problem List   Diagnosis     Morbid obesity (H)     Chondromalacia of patellofemoral joint     Morbid obesity due to excess calories (H)     Nexplanon in place        Vitamins   Multi Vit with Iron: yes  Calcium Citrate: yes  B12: yes  D3: yes    Do you experience hunger? Noticing that she wants to snack in between meals  Do you have \"dumping\" syndrome?No   Do you experience any reflux or discomfort with eating? No    Diet Recall/Time:   Breakfast: yogurt (15 g), Protein Shake (30 g)  Lunch: 2-3 oz of chicken  Dinner: 2-3 oz fish or red meat (stew meat) with mushrooms    Typical Snacks: trying to avoid    Estimated protein intake: 60-80 grams    Estimated portion size per meals:1/2-3/4 " "cup/meal      Meal Duration:15 minutes (does stop when she is full)    Fluid-meal separation:  Fluids are  30min before and 30 minutes after meals.    Fluid Intake  Water: aiming for a gallon/day (occasional Tino)    Exercise: gym (treadmill or biking) daily, strength training/weights      PES statement:      (NC-1.4) Altered GI Function related to Alteration in gastrointestinal tract structure and/or function/ Decreased functional length of the GI tract as evidenced by Weight loss of 24% initial body weight; sleeve gastrectomy    Intervention    Discussion  1. Discussed 9 Months Post-Op Nutritional Guidelines for LSG  2. Recommended to consume 15-20gm protein at 3 meals daily, along with protein supplement/\"planned protein containing snack\" of 15-30gm protein, to reach goal of 60-80 gm protein daily.  3. Educated on post-op vitamin regimen: Multi Vit + iron 2x/day, calcium citrate 400-600 mg 2x/day, 9568-8150 mcg of Sublingual B-12 daily, and 5000 IU Vitamin D3 daily (MVI and calcium can be taken at the same time BID)  4. Reviewed lean protein sources  5. Bariatric Plate Method-  including lean/low fat protein at each meal, including a vegetable/fruit, and limiting carbohydrate intake to less than 25% of plate volume.     Instructions  1. Include 15-20gm protein at each meal, along with protein supplement/\"planned protein containing snack\" of 15-30gm protein, to reach goal of 60-80 gm protein daily.  2. Increase fluid intake to 64oz daily: choose plain or calorie/carbonation-free beverages.  3. Incorporate daily structured activity, 30-60 minutes most days of the week  4. Recommended pt to start taking: Multi Vit + iron 2x/day, calcium citrate 400-600 mg 2x/day, 3625-1160 mcg of Sublingual B-12 daily, and 5000 IU Vitamin D3 daily. (MVI and calcium can be taken at the same time)  5. Read food labels more consistently: keeping total fat grams <10, total sugar grams <10, fiber >3gm per serving.  6. Increase " vegetable/fruit intake, by having a vegetable or fruit with each meal daily.  7. Practice plate method: 1/2 plate lean/low fat protein source, vegetable/fruit, <25% of plate complex carbohydrates.  8. Separate fluids 30 minutes before/after meal times.  9. Practice eating off of smaller plates/bowls, chewing to applesauce consistency, taking 20-30 minutes to eat in a calm/relaxed environment without distractions of tv/email/cell phone.    Handouts provided:  9 Months Post-Op Nutritional Guidelines for LSG    Assessment/Plan:    Pt to follow up for 1 year post-op visit with bariatrician       Video-Visit Details    Type of service:  Video Visit    Video Start Time (time video started): 7:26 am    Video End Time (time video stopped): 7:46 am    Originating Location (pt. Location): Home        Distant Location (provider location):  Off-site    Mode of Communication:  Video Conference via [facility]    Physician has received verbal consent for a Video Visit from the patient? Yes    oRsalva Shin RD              Again, thank you for allowing me to participate in the care of your patient.        Sincerely,        Rosalva Shin RD

## 2023-02-07 ENCOUNTER — MYC MEDICAL ADVICE (OUTPATIENT)
Dept: FAMILY MEDICINE | Facility: CLINIC | Age: 38
End: 2023-02-07
Payer: COMMERCIAL

## 2023-02-07 DIAGNOSIS — F32.1 CURRENT MODERATE EPISODE OF MAJOR DEPRESSIVE DISORDER WITHOUT PRIOR EPISODE (H): ICD-10-CM

## 2023-02-07 DIAGNOSIS — F41.9 ANXIETY: Primary | ICD-10-CM

## 2023-02-07 DIAGNOSIS — Z98.84 S/P GASTRIC BYPASS: ICD-10-CM

## 2023-02-07 NOTE — TELEPHONE ENCOUNTER
S-(situation):   After surgery I ve noticed I ve become more anxious and looking for recommendations of someone to talk to. I reached out to my bariatric doctor and he directed me to my primary care provider. During the surgery preparation process I did meet with a therapist but since surgery 11 months ago, I haven t talked with anyone and I m looking to have some help with the emotions and feelings and everything associated with the surgery. I am looking for someone different than who I spoke with before surgery, and not sure the process or what I need to do and was directed to you. Any help to get started would be great. I m struggling with the emotions and mental part of life.     B-(background):   Patient was seeing therapist as per guidelines for bariatric surgery.  Patient has not been on prescribed mental health medications.  Patient wanting to see a new therapist and possible a psychiatrist for medications    A-(assessment):   Patient declined triage.  Patient is not suicidal and does not have thoughts of harm to self or others.  Since bariatric surgery she has depression and anxiety coping with her different / new body/ self image.   Depression and anxiety has been worse in the last 3 - 4 months and has talked to her family who suggested that she see someone.  Patient now gets anxious about things that she cannot control like driving in the snow / bad weather  Patient has good family support.    R-(recommendations):   Discussed with patient that there is walkin care at [facility] and gave her address, hours and location.  Also gave patient several resources and phone numbers for mental health  Also gave patient suicide crisis phone numbers.  Also discussed with patient to contact her health insurance for in network providers    Patient will call clinic back if a referral is needed.  Patient was last seen on 3/7/22 by Dr Escalera.  Message routed to Dr Escalera for review / JOYCE Cheung  MARCELLA  [facility]                    To: [facility] FAMILY MEDICINE/OB SUPPORT POOL      From: Lena Quesada      Created: 2/7/2023 8:59 AM              After surgery I ve noticed I ve become more anxious and looking for recommendations of someone to talk to. I reached out to my bariatric doctor and he directed me to my primary care provider. During the surgery preparation process I did meet with a therapist but since surgery 11 months ago, I haven t talked with anyone and I m looking to have some help with the emotions and feelings and everything associated with the surgery. I am looking for someone different than who I spoke with before surgery, and not sure the process or what I need to do and was directed to you. Any help to get started would be great. I m struggling with the emotions and mental part of life.   Thank you  Lena Quesada

## 2023-02-09 ENCOUNTER — TELEPHONE (OUTPATIENT)
Dept: PSYCHIATRY | Facility: CLINIC | Age: 38
End: 2023-02-09
Payer: COMMERCIAL

## 2023-02-09 NOTE — TELEPHONE ENCOUNTER
PSYCHIATRY CLINIC PHONE INTAKE     SERVICES REQUESTED / INTERESTED IN          Med Management and therapy    Presenting Problem and Brief History                              What would you like to be seen for? (brief description):    Pt hasn't been diagnosed but worked with a counselor in the past. Pt's biggest concern is her high anxiety. She worries about things she doesn't have control over, and it affects her daily life, such as feeling panicked. She had bariatric surgery 11 months ago and has been very emotional. She finds it hard to navigate managing her emotions without using food. It feels like a roller coaster, one day she feels fine, and the next she gets down on herself. Currently not taking any mental health medications. She stated it takes awhile to sleep at night, and gets roughly 4-6 hours of sleep.     Have you received a mental health diagnosis? No  Which one (s): NA  Is there any history of developmental delay?  No   Are you currently seeing a mental health provider?  No            Who / month last seen:  NA  Do you have mental health records elsewhere?  Pt's last provider was a counselor in [place] in 2015  Will you sign a release so we can obtain them?  Yes    Have you ever been hospitalized for psychiatric reasons?  No  Describe:  NA    Do you have current thoughts of self-harm?  No    Do you currently have thoughts of harming others?  No    Do you have any safety concerns? No   If yes to these, offer to reach out to a  for follow up.      Substance Use History     Do you have any history of alcohol / illicit drug use?  No  Describe:  NA  Have you ever received treatment for this?  No    Describe:  NA     Social History     Who is the patient's a guardian?  No    Name / number: NA  Have you had an ACT team in last 12 months?  No  Describe: NA   OK to leave a detailed voicemail?  Yes    Would you be interested in learning more about research opportunities for which you or your child may  qualify? We can connect you with a team member for more information.  Yes  If yes, send an inbasket message to Suad Matias    Medical/ Surgical History                                   Patient Active Problem List   Diagnosis     Morbid obesity (H)     Chondromalacia of patellofemoral joint     Morbid obesity due to excess calories (H)     Nexplanon in place           Medications             No current outpatient medications on file.         DISPOSITION      2/9/23 Intake complete. Scheduled for AGE w/ Hawa Robles, on 3/27/23 at 10:00am and CBT eval scheduled on 3/7/23 w/ Diana Genao at 2:00pm.     Rosalva Meadows, .  - Lead

## 2023-02-21 ENCOUNTER — MYC MEDICAL ADVICE (OUTPATIENT)
Dept: SURGERY | Facility: CLINIC | Age: 38
End: 2023-02-21
Payer: COMMERCIAL

## 2023-02-21 DIAGNOSIS — Z98.84 S/P LAPAROSCOPIC SLEEVE GASTRECTOMY: Primary | ICD-10-CM

## 2023-02-21 DIAGNOSIS — Z90.3 POSTGASTRECTOMY MALABSORPTION: ICD-10-CM

## 2023-02-21 DIAGNOSIS — K91.2 POSTOPERATIVE MALABSORPTION: ICD-10-CM

## 2023-02-21 DIAGNOSIS — K91.2 POSTGASTRECTOMY MALABSORPTION: ICD-10-CM

## 2023-02-21 NOTE — TELEPHONE ENCOUNTER
12 months post op lab orders placed for patient in preparation for appointment with  in March.    Hawa Trevino RN, CBN  [facility]  P [phone]  F [phone]

## 2023-02-28 ASSESSMENT — ANXIETY QUESTIONNAIRES
IF YOU CHECKED OFF ANY PROBLEMS ON THIS QUESTIONNAIRE, HOW DIFFICULT HAVE THESE PROBLEMS MADE IT FOR YOU TO DO YOUR WORK, TAKE CARE OF THINGS AT HOME, OR GET ALONG WITH OTHER PEOPLE: NOT DIFFICULT AT ALL
GAD7 TOTAL SCORE: 14
2. NOT BEING ABLE TO STOP OR CONTROL WORRYING: NEARLY EVERY DAY
7. FEELING AFRAID AS IF SOMETHING AWFUL MIGHT HAPPEN: SEVERAL DAYS
6. BECOMING EASILY ANNOYED OR IRRITABLE: SEVERAL DAYS
3. WORRYING TOO MUCH ABOUT DIFFERENT THINGS: NEARLY EVERY DAY
4. TROUBLE RELAXING: MORE THAN HALF THE DAYS
5. BEING SO RESTLESS THAT IT IS HARD TO SIT STILL: SEVERAL DAYS
8. IF YOU CHECKED OFF ANY PROBLEMS, HOW DIFFICULT HAVE THESE MADE IT FOR YOU TO DO YOUR WORK, TAKE CARE OF THINGS AT HOME, OR GET ALONG WITH OTHER PEOPLE?: NOT DIFFICULT AT ALL
GAD7 TOTAL SCORE: 14
GAD7 TOTAL SCORE: 14
1. FEELING NERVOUS, ANXIOUS, OR ON EDGE: NEARLY EVERY DAY
7. FEELING AFRAID AS IF SOMETHING AWFUL MIGHT HAPPEN: SEVERAL DAYS

## 2023-03-03 ENCOUNTER — LAB (OUTPATIENT)
Dept: LAB | Facility: HOSPITAL | Age: 38
End: 2023-03-03
Payer: COMMERCIAL

## 2023-03-03 DIAGNOSIS — Z90.3 POSTGASTRECTOMY MALABSORPTION: ICD-10-CM

## 2023-03-03 DIAGNOSIS — K91.2 POSTGASTRECTOMY MALABSORPTION: ICD-10-CM

## 2023-03-03 DIAGNOSIS — Z98.84 S/P LAPAROSCOPIC SLEEVE GASTRECTOMY: ICD-10-CM

## 2023-03-03 DIAGNOSIS — K91.2 POSTOPERATIVE MALABSORPTION: ICD-10-CM

## 2023-03-03 LAB
ALBUMIN SERPL BCG-MCNC: 4.1 G/DL (ref 3.5–5.2)
ALP SERPL-CCNC: 295 U/L (ref 35–104)
ALT SERPL W P-5'-P-CCNC: 101 U/L (ref 10–35)
ANION GAP SERPL CALCULATED.3IONS-SCNC: 11 MMOL/L (ref 7–15)
AST SERPL W P-5'-P-CCNC: 83 U/L (ref 10–35)
BILIRUB SERPL-MCNC: 0.4 MG/DL
BUN SERPL-MCNC: 13.4 MG/DL (ref 6–20)
CALCIUM SERPL-MCNC: 9.2 MG/DL (ref 8.6–10)
CHLORIDE SERPL-SCNC: 103 MMOL/L (ref 98–107)
CREAT SERPL-MCNC: 0.67 MG/DL (ref 0.51–0.95)
DEPRECATED HCO3 PLAS-SCNC: 24 MMOL/L (ref 22–29)
ERYTHROCYTE [DISTWIDTH] IN BLOOD BY AUTOMATED COUNT: 12.9 % (ref 10–15)
FERRITIN SERPL-MCNC: 177 NG/ML (ref 6–175)
FOLATE SERPL-MCNC: 12 NG/ML (ref 4.6–34.8)
GFR SERPL CREATININE-BSD FRML MDRD: >90 ML/MIN/1.73M2
GLUCOSE SERPL-MCNC: 101 MG/DL (ref 70–99)
HBA1C MFR BLD: 5.3 %
HCT VFR BLD AUTO: 38.6 % (ref 35–47)
HGB BLD-MCNC: 12.9 G/DL (ref 11.7–15.7)
MCH RBC QN AUTO: 30.5 PG (ref 26.5–33)
MCHC RBC AUTO-ENTMCNC: 33.4 G/DL (ref 31.5–36.5)
MCV RBC AUTO: 91 FL (ref 78–100)
PLATELET # BLD AUTO: 76 10E3/UL (ref 150–450)
POTASSIUM SERPL-SCNC: 4 MMOL/L (ref 3.4–5.3)
PROT SERPL-MCNC: 6.9 G/DL (ref 6.4–8.3)
PTH-INTACT SERPL-MCNC: 28 PG/ML (ref 15–65)
RBC # BLD AUTO: 4.23 10E6/UL (ref 3.8–5.2)
SODIUM SERPL-SCNC: 138 MMOL/L (ref 136–145)
VIT B12 SERPL-MCNC: 1073 PG/ML (ref 232–1245)
WBC # BLD AUTO: 6.4 10E3/UL (ref 4–11)

## 2023-03-03 PROCEDURE — 84630 ASSAY OF ZINC: CPT

## 2023-03-03 PROCEDURE — 82746 ASSAY OF FOLIC ACID SERUM: CPT

## 2023-03-03 PROCEDURE — 82607 VITAMIN B-12: CPT

## 2023-03-03 PROCEDURE — 82728 ASSAY OF FERRITIN: CPT

## 2023-03-03 PROCEDURE — 84425 ASSAY OF VITAMIN B-1: CPT

## 2023-03-03 PROCEDURE — 84590 ASSAY OF VITAMIN A: CPT

## 2023-03-03 PROCEDURE — 80053 COMPREHEN METABOLIC PANEL: CPT

## 2023-03-03 PROCEDURE — 85027 COMPLETE CBC AUTOMATED: CPT

## 2023-03-03 PROCEDURE — 82306 VITAMIN D 25 HYDROXY: CPT

## 2023-03-03 PROCEDURE — 83970 ASSAY OF PARATHORMONE: CPT

## 2023-03-03 PROCEDURE — 36415 COLL VENOUS BLD VENIPUNCTURE: CPT

## 2023-03-03 PROCEDURE — 83036 HEMOGLOBIN GLYCOSYLATED A1C: CPT

## 2023-03-04 LAB — DEPRECATED CALCIDIOL+CALCIFEROL SERPL-MC: 45 UG/L (ref 20–75)

## 2023-03-06 LAB — ZINC SERPL-MCNC: 43.9 UG/DL

## 2023-03-07 ENCOUNTER — VIRTUAL VISIT (OUTPATIENT)
Dept: PSYCHIATRY | Facility: CLINIC | Age: 38
End: 2023-03-07
Attending: PSYCHOLOGIST
Payer: COMMERCIAL

## 2023-03-07 DIAGNOSIS — F43.22 ADJUSTMENT DISORDER WITH ANXIOUS MOOD: Primary | ICD-10-CM

## 2023-03-07 LAB
ANNOTATION COMMENT IMP: NORMAL
RETINYL PALMITATE SERPL-MCNC: <0.02 MG/L
VIT A SERPL-MCNC: 0.39 MG/L

## 2023-03-07 PROCEDURE — 90791 PSYCH DIAGNOSTIC EVALUATION: CPT | Mod: VID | Performed by: STUDENT IN AN ORGANIZED HEALTH CARE EDUCATION/TRAINING PROGRAM

## 2023-03-07 NOTE — PROGRESS NOTES
"Department of Psychiatry   [facility]  Non-Medical Diagnostic Evaluation    Video- Visit Details  Type of service:  video visit for mental health treatment.  Time of service:    Date:  03/07/2023    Video Start Time:  2:00pm        Video End Time:  2:53pm    Reason for video visit: COVID-19   Originating Site (patient location):  Patient's home  Distant Site (provider location):  Psychiatry Clinic  Mode of Communication:  Video Conference via [employer]    As the provider I attest to compliance with applicable laws and regulations related to telemedicine.    Date of Service: Mar 7, 2023  Time of interview with patient: Start Time: 2:00 Stop Time: 2:53  Provider: Diana Genao MD  Psychiatrist: None  PCP: Jazz Abbott  Other Providers: Wai Anders - Bariatrics  Referred by PCP    Identifying Data:  Lena Quesada is a 37 year old  woman who prefers the name of \"Lena.\"    Sources of Information: Gathered by clinical interview, self-report forms, and chart review. The patient was a good historian.    Diagnosis: Adjustment disorder with anxiety    CHIEF COMPLAINT     Worsening anxiety      HISTORY OF PRESENT ILLNESS        Lena is a 37 year old woman with a reported past mental health history of \"depressed state\" and anxiety. She is not aware of any formal psychiatric diagnoses including MDD or DAVID. She has done therapy before (weekly for a couple months back in 2015 in [place]) for \"a depressive state\" with SI. She did find this \"kind of\" helpful, she stopped when she felt better and exhuasted her insurance coverage. She has not experienced SI since. She has never been on psychiatric medications before.     She brought up two main concerns that she'd like to discuss:   1) Negative body image  In March 2022, she had weight loss surgery. Beforehand, she had 6-8 months of anxiety about the upcoming surgery, and met with a therapy 3 times for \"state of mind\" approval as a standard part of the process. " "Now, for last 4 months, she described having a \"marshall in my mind\" when dealing with situations \"as a different person\" [since weight loss]. Where she used to not have a negative body image, now she does. Intellectually, she understands that she's made tremendous progress, but lately it's been hard to look in the mirror and see more work to do. She also reached a bit of a weight loss plateau in December, which was disheartening. She states her goals are to learn new coping skills aside from eating, and decrease negative body image thoughts and rumination on weight.    2) Increasing anxiety about various things in various aspects of her life, \"about things I have no control over.\" For example, anxiety about driving in the snow that causes her to cancel plans. It's gotten to the point that the anxiety feels debilitating and she wonders if medication would be helpful. Denies previous anxiety diagnosis, but she thinks she's anxiety for quite some time but masks it well with humor. Wonders if the root of her anxiety is related to control.     She's been using her support system for these concerns, but feels like it began to be too much her mom suggested \"something more.\" She's very close with parents, talks to mom twice a day every day. At times it can be difficult to communicate needs and boundaries.     Moving April 1 to a new apartment, which she's excited about.     PSYCHIATRIC AND DIAGNOSTIC INTERVIEW     Depression: For the past two weeks, Lena Quesada reports insomnia/difficulties with sleep, feeling bad about self. Patient denied suicidal ideation and other depressive symptoms.     Sleep: Difficulty staying asleep, not falling asleep. No caffeine intake. Main barrier is anxiety, uncontrollable thoughts and worry.    Eating Disorder: In the past, used eating as main coping mechanism. Endorses binge ED in the past, but no symptoms now. Does occasionally feel triggered by foods. She endorses \"possibly\" being " "extreme about her diet now, although it's notably difficult to assess given the medical dietary recommendations following bariatric surgery.     History of Depression (prior episodes): No history of diagnosed MDD. She has felt depressed before with SI in 2015, none since. No previous SA or SIB.     Leann/hypomania: Denies    Panic:  Denies     Agoraphobia: Denies    Social anxiety: Denies    Obsessions: Denies    Compulsions: Denies    Trauma history: Denies     PTSD:  Denies     Generalized Anxiety: Excessive worry about several routine things, anxieties and worries present most days, difficulty controlling worries. During times of anxiety, pt endorsed feeling restless/ on edge, difficulty relaxing, insomnia.    Psychosis: Pt denied delusions, auditory hallucinations with/without commands, and visual hallucinations, disorganized behavior, disorganized speech (difficulty communicating).    Antisocial Personality: None    Substance use history:  Denies  Tobacco: Never  Alcohol: About 1 drink per month, no concerns   Cannabis: Never  Other Illicit Drugs: Never      SAFETY ASSESSMENT     Suicide:  Level of immediate risk: Low  Ideation/Plan/Intent: None  Deterrents: No SI, future oriented, no previous behaviors, close with family    Self-injurious Behaviors None  Suicide Attempt None    Homicide/Violence:  Assessed level of immediate risk: Low  Denies ideation, plan, and intent.      PSYCHIATRIC AND SUBSTANCE USE TREATMENT HISTORY     Current psychiatric medications: never  Current major medical issues: see notes by medical providers.    Psychiatric treatment history:     Psych Inpatient Hospitalizations: none    ECT: none    Outpatient Programs: none    Individual Therapy: She has done therapy before (weekly for a couple months back in 2015 in [place]) for \"a depressive state\" with SI. She did find this \"kind of\" helpful, she stopped when she felt better and exhuasted her insurance coverage. She has not experienced SI " "since.    Substance use treatment history: none    SOCIAL AND FAMILY HISTORY     Deferred - will address at next visit, please see following note for details.    MENTAL STATUS EXAM                                                                                       Alertness: alert  and oriented  Appearance: well groomed, dressed casually  Behavior/Demeanor: cooperative, pleasant, calm and engaged, with good  eye contact   Speech: normal and regular rate and rhythm   Language: intact. Preferred language identified as English.  Psychomotor: normal or unremarkable  Mood: \"good\" \"anxious\"  Affect: full range and appropriate; was congruent to mood; was congruent to content  Thought Process/Associations: Linear, logical, no loose associations  Thought Content: Denies suicidal ideation, violent ideation and delusions  Perception: Denies auditory hallucinations and visual hallucinations  Insight: excellent  Judgment: good  Cognition: (6) does  appear grossly intact; formal cognitive testing was not done  Gait and Station: Virtual visit    ASSESSMENT DATA                                                                                      DAVID-7 SCORE 9/7/2022 2/28/2023   Total Score 0 (minimal anxiety) 14 (moderate anxiety)   Total Score 0 14     ASSESSMENT SUMMARY     Lena Quesada is a 37 year old woman with reported history of anxiety and \"depressive state\" (no formal diagnoses to her knowledge) who presented to [facility] care for CBT addressing anxiety and negative body image. Lena was referred by her PCP and at the suggestion of her bariatric team and mom. Lena presented today as a good historian who has excellent insight in her current circumstances. Diagnosis of adjustment disorder with anxiety seems supported by patient report and collateral records.  Differential diagnosis includes DAVID as well.  Further diagnostic clarification will come with time.  There are no medical comorbidities which impact this " treatment.     Psychosocial factors contributing to presentation include recent bariatric surgery and significant weight loss in the last year, which has brought with it new body image challenges and increasing generalized anxiety. She also mentions longstanding challenges with setting boundaries, especially with her parents who she is very close with. These concerns have affected her daily life in interpersonal relationships and self-esteem. Her primary goals are to create healthier thought patterns, gain new coping skills, and decrease anxiety levels.     Lena agrees to treatment with cognitive behavior therapy with the capacity to do so. Agrees to call clinic for any problems. The patient understands to call 911 or come to the nearest ED if life threatening or urgent symptoms present.     Plan     - Weekly CBT therapy with writer.  - RTC: 1 week    Patient case discussed with my supervisor who was not present for the visit.  Diana Genao MD    I did not see this pt directly. This pt was discussed with me in individual psychotherapy supervision, and I agree with the plan as documented.     Donna Burns, Ph.D., L.P.

## 2023-03-08 NOTE — PROGRESS NOTES
Bariatric Follow Up Visit with a History of Previous Bariatric Surgery     Date of visit: 3/9/2023  Physician: Wai Anders MD, MD  Primary Care Provider:  Jazz Abbott NAM Quesada   37 year old  female    Date of Surgery: 3/24/22  Initial Weight: 399 lbs  Initial BMI: 62.5  Today's Weight:   Wt Readings from Last 1 Encounters:   03/09/23 128.2 kg (282 lb 9.6 oz)     Weight history:   Wt Readings from Last 4 Encounters:   03/09/23 128.2 kg (282 lb 9.6 oz)   09/08/22 140 kg (308 lb 11.2 oz)   04/01/22 (!) 155.3 kg (342 lb 6.4 oz)   03/24/22 (!) 163.5 kg (360 lb 6.4 oz)      Body mass index is 44.26 kg/m .      Assessment and Plan     Assessment: Lena is a 37 year old year old female who is one year s/p  Sleeve Gastrectomy with Dr. Rodriguez. Weight is down 117 lbs, a 29.3% total body weight reduction and she is right on track with expected weight loss.  Her 3/3/23 labs show some elevated ALT/AST and alkphos (previous cholecystectomy).  Zinc levels are a bit low and on review of her vitamin strength she has been using only 12mg/serving in her multivitamin and only once daily dosing. We'll boost zinc the next few weeks and shoot for 24-30mg/day of supplementation from her double dose multivitamin (Equate complete adult works well, 2 daily or she'll switch to [employer] Just One a Day to support her needs better). Thrombocytopenia on labs today will be rechecked with referral to Hematology if persistent or worsening. Lab error versus possible transient drop from January illness that is now recovering versus autoimmune/other issue. She reports no recent illness but COVID about 8 weeks ago. No bruising/purpura/petechiae or clotting sx.  Not known to be pregnant but will check HPT. Non smoker. Using birth control.  No previous deficiencies in our records.     She's having breakthrough hunger and has stalled out weight loss at about 29% total body weight reduction and given her BMI of 44.3 we'll ramp up  medication support for her continued weight management with Wegovy this year with goal of 2.4mg/week by July and 9 months of therapy with possible extension if well tolerated/efficacious.    Lena Quesada feels as if she hasn[t fully achieved the goals she hoped to accomplish through bariatric surgery and weight loss.    No diagnosis found.      Current Outpatient Medications:      Alpha Lipoic Ac-Biotin-Keratin (BIOTIN-KERATIN-ALPHA LIPOIC AC PO), Take 1 capsule by mouth daily 10,000 mg, Disp: , Rfl:      Calcium Citrate-Vitamin D (CALCIUM CITRATE + PO), Take 1 chew tab by mouth daily 600mg, Disp: , Rfl:      Cholecalciferol (VITAMIN D-3) 125 MCG (5000 UT) TABS, Take 1 tablet by mouth daily, Disp: , Rfl:      Cyanocobalamin 5000 MCG SUBL, Place 1 tablet under the tongue once a week, Disp: , Rfl:      Pediatric Multivitamins-Iron (CHEWABLE BRADLEY/IRON CHILDRENS PO), Take 1 tablet by mouth daily, Disp: , Rfl:     Plan:  1. Great work on 29% total body weight reduction this year. Right on track but given stalling over the last 3 months, we'll ramp up medication support with Wegovy to complement your mindful bariatric method (see handouts below).    2. Wegovy ramps up monthly until up to 2.4mg/week dose:  0.25mg/week for 4 weeks then 0.5mg/week for 4 weeks then 1mg/week for 4 weeks then 1.7mg/week for 4 weeks then 2.4mg/week for 9 months. Stop if rash/throat swelling, severe nausea with vomiting or abdominal pains. Hydrate well between meals and consider an evening dose of Psyllium Husk/metamucil to keep stools soft. Aiming for 80oz of water daily. Check out Wegovy.com web site for patient resources.  3. Recheck with me in 3 months and dietician in 2 months on how things are going.  I recommend using a food tracker for the next 2-3 weeks to record everything that you're having and making sure you're hitting your 70-80grams/day protein intake.  4. Continue excellent workout regimen.  5. Recheck platelets and liver  "tests in a week. If platelets are not trending up, then you can follow up with your primary care for another check up or consider Hematology check if platelets are trending down still.   6. Liver tests may have been transiently elevated still from COVID this winter versus working out but we'll check again to make sure normalizing and consider ultrasound to check liver health if worsening.  Some effect from birth control can produce such changes as well.  7. Check home pregnancy test before starting Wegovy. Don't start if pregnant.   8. Continue excellent workouts and getting strength work in first and finishing with aerobic work will make more efficient use of time/development.   9.omeprazole for mild GERD that may worsen on Wegovy. Take in the evening, 20mg. We'll likely schedule endoscopy to check health of esophagus at year 2 visit if things are well controlled.     No follow-ups on file.    Bariatric Surgery Review     Interim History/LifeChanges: weight has stalled out since December.    Patient Concerns: getting 1000kcal/day working out regularly. Still feeling hungry and interested in appetite support.  Appetite (1-10): high, even 20 minutes after eating  GERD:     Reviewed whether any need/indication for screening EGD today and we will deferred until 2 year visit if omeprazole start well tolerated/efficacious.  Typically, a screening EGD is recommend post op year 2-3 if no symptoms to assess health of esophagus/bariatric surgery and sooner if difficult to control GERD or persistent pain/dysphagia sx despite behavior modification.    Medication changes: none.    Vitamin Intake:   B-12   yes   MVI  Equate chew tab: 12mg zinc but has been only taking one.    Vitamin D  yes   Calcium   once daily.     Other  no herbals. Hair skin and nails.         Using birth control.    No menses since December.  COVID in January.  LABS: \"Reviewed    Nausea no  Vomiting no  Constipation mildly for her, using shake in AM gets " "her going.  Diarrhea no  Rashes no  Hair Loss no  Reactive Hypoglycemia n/a  Light Headedness rarely will get dizziness, transient for 10 seconds.    Moods good. Frustrated with weight stability the last few months.      Most recent labs:  Lab Results   Component Value Date    WBC 6.4 03/03/2023    HGB 12.9 03/03/2023    HCT 38.6 03/03/2023    MCV 91 03/03/2023    PLT 76 (L) 03/03/2023     Lab Results   Component Value Date    CHOL 145 09/10/2022     Lab Results   Component Value Date    HDL 56 09/10/2022     No components found for: LDLCALC  Lab Results   Component Value Date    TRIG 87 09/10/2022     No results found for: CHOLHDL  Lab Results   Component Value Date     (H) 03/03/2023    AST 83 (H) 03/03/2023    ALKPHOS 295 (H) 03/03/2023     No results found for: HGBA1C  Lab Results   Component Value Date    B12 1,073 03/03/2023     No components found for: VITDT1  Lab Results   Component Value Date    BHAKTI 177 (H) 03/03/2023     Lab Results   Component Value Date    PTHI 28 03/03/2023     Lab Results   Component Value Date    ZN 43.9 (L) 03/03/2023     Lab Results   Component Value Date    VIB1WB 135 09/10/2022     Lab Results   Component Value Date    TSH 2.10 06/11/2021     No results found for: TEST    Habits:  Tobacco/Nicotine/THC exposure? no   NSAID use? None in house anymore.    Alcohol use? Rare, half a drink at [place]   Caffeine Habits? no       Exercise Routine: nearly daily at gym.   3 meals/day? yes  Protein 60-80g/day? yes  Water Separate from meals? yes  Calorie Containing Beverages: no  Restaurant eating/wk: n/a  Sleep Habits:  4-5 hours only. Tosses and turns. Will be moving in April. Hot apartment 79 degrees likely has impact but moving  CPAP Use? n/a  Contraception: yes.   DEXA:n/a.  Discussed annual screening to start at age 45 and continue to age 55 if scoring \"low risk\". DEXA scan recommended at age 55 regardless as long as at least 2 years have transpired from their bariatric " surgery.  Getting therapist this month to help with stress and to get help with anxiety.   Social History     Social History     Socioeconomic History     Marital status: Single     Spouse name: Not on file     Number of children: Not on file     Years of education: Not on file     Highest education level: Not on file   Occupational History     Not on file   Tobacco Use     Smoking status: Never     Smokeless tobacco: Never   Vaping Use     Vaping Use: Never used   Substance and Sexual Activity     Alcohol use: Not Currently     Drug use: Never     Sexual activity: Yes     Partners: Male     Birth control/protection: Condom   Other Topics Concern     Parent/sibling w/ CABG, MI or angioplasty before 65F 55M? Not Asked   Social History Narrative     Not on file     Social Determinants of Health     Financial Resource Strain: Not on file   Food Insecurity: Not on file   Transportation Needs: Not on file   Physical Activity: Not on file   Stress: Not on file   Social Connections: Not on file   Intimate Partner Violence: Not on file   Housing Stability: Not on file       Past Medical History     Past Medical History:   Diagnosis Date     Arthritis     chondromalacia of knee (right). injections.     Cholelithiasis      Chondromalacia of patellofemoral joint      Past Surgical History:   Procedure Laterality Date     CHOLECYSTECTOMY  2005     LAPAROSCOPIC GASTRIC SLEEVE N/A 3/24/2022    Procedure: GASTRECTOMY, SLEEVE, LAPAROSCOPIC;  Surgeon: Bobby Rodriguez MD;  Location: [facility] OR     TYMPANOPLASTY       WISDOM TOOTH EXTRACTION         Problem List     Patient Active Problem List   Diagnosis     Morbid obesity (H)     Chondromalacia of patellofemoral joint     Morbid obesity due to excess calories (H)     Nexplanon in place      Medications     [unfilled]  Surgical History     Past Surgical History  She has a past surgical history that includes Tympanoplasty; [place] Tooth Extraction; Cholecystectomy  "(); and Laparoscopic gastric sleeve (N/A, 3/24/2022).    Objective-Exam     Constitutional:  Ht 1.702 m (5' 7\")   Wt 128.2 kg (282 lb 9.6 oz)   BMI 44.26 kg/m    [unfilled]   General:  Pleasant and in no acute distress   Eyes:  EOMI  ENT:  Airway patent    Neck:  Respiratory: Normal respiratory effort, no cough, .  CV:  n/a  Gastrointestinal: n/a  Musculoskeletal: muscle mass WNL  Skin: color wihtout pallor or jaundice evident hair thick/long,   Psychiatric: alert and oriented X3, mood and affect normal    Counseling     We reviewed the important post op bariatric recommendations:  -eating 3 meals daily  -eating protein first, getting >60gm protein daily  -eating slowly, chewing food well  -avoiding/limiting calorie containing beverages  -drinking water 15-30 minutes before or after meals  -limiting restaurant or cafeteria eating to twice a week or less    We discussed the importance of restorative sleep and stress management in maintaining a healthy weight.  We discussed the National Weight Control Registry healthy weight maintenance strategies and ways to optimize metabolism.  We discussed the importance of physical activity including cardiovascular and strength training in maintaining a healthier weight.    We discussed the importance of life-long vitamin supplementation and life-long  follow-up.    Lena was reminded that, to avoid marginal ulcers she should avoid tobacco at all, alcohol in excess, caffeine in excess, and NSAIDS (unless indicated for cardioprotection or othewise and opposed by a PPI).    Wai Anders MD    [facility].  3/8/2023  4:17 PM  [phone] (clinic phone)  [phone] (fax)    No images are attached to the encounter.  Medical Decision Makin minutes spent on the date of the encounter doing chart review, history and exam, documentation and further activities per the note    Lena Quesada is 37 year old  female who presents for a billable video " visit today.    How would you like to obtain your AVS? MyChart  If dropped from the video visit, the video invitation should be resent by: Send to e-mail at: [email or web address]  Will anyone else be joining your video visit? No      Video Start Time: 8:00 am    Are there any specific questions or needs that you would like addressed at your visit today?     Annual 1 year post-op LSG. Labs in chart. Pt is only taking vitamins.     Pt feels like shes been in a 4 month weight loss stall. Shes been exercising, lifting weights and eating what shes supposed to be eating. Her last weight loss drop was around Jan but shes still hovering over the 280# kenzie.    Offered RD appt to pt but she sees one on her own.     Teodora Snyder CMA  [facility]  [facility] [place] [address]  Office: [phone]  Fax: [phone]          Teodora Snyder CMA  [facility]  [facility] [place] [address]  Office: [phone]  Fax: [phone]          Provider Notes:       Video-Visit Details    Type of service:  Video Visit    Platform used for Video Visit: [employer]    Video End Time (time video stopped): 8:43 AM    Originating Location (pt. Location): Home        Distant Location (provider location):  On-site    Distant Location (provider location):  [facility] [place]

## 2023-03-09 ENCOUNTER — VIRTUAL VISIT (OUTPATIENT)
Dept: SURGERY | Facility: CLINIC | Age: 38
End: 2023-03-09
Payer: COMMERCIAL

## 2023-03-09 VITALS — BODY MASS INDEX: 44.36 KG/M2 | WEIGHT: 282.6 LBS | HEIGHT: 67 IN

## 2023-03-09 DIAGNOSIS — E66.813 CLASS 3 SEVERE OBESITY DUE TO EXCESS CALORIES WITH SERIOUS COMORBIDITY AND BODY MASS INDEX (BMI) OF 40.0 TO 44.9 IN ADULT (H): Primary | ICD-10-CM

## 2023-03-09 DIAGNOSIS — R10.13 DYSPEPSIA: ICD-10-CM

## 2023-03-09 DIAGNOSIS — D69.6 THROMBOCYTOPENIA (H): ICD-10-CM

## 2023-03-09 DIAGNOSIS — K91.2 POSTOPERATIVE MALABSORPTION: ICD-10-CM

## 2023-03-09 DIAGNOSIS — Z98.84 S/P LAPAROSCOPIC SLEEVE GASTRECTOMY: ICD-10-CM

## 2023-03-09 DIAGNOSIS — E66.01 CLASS 3 SEVERE OBESITY DUE TO EXCESS CALORIES WITH SERIOUS COMORBIDITY AND BODY MASS INDEX (BMI) OF 40.0 TO 44.9 IN ADULT (H): Primary | ICD-10-CM

## 2023-03-09 DIAGNOSIS — E60 LOW ZINC LEVEL: ICD-10-CM

## 2023-03-09 DIAGNOSIS — R74.01 ELEVATED TRANSAMINASE LEVEL: ICD-10-CM

## 2023-03-09 LAB — VIT B1 PYROPHOSHATE BLD-SCNC: 181 NMOL/L

## 2023-03-09 PROCEDURE — 99215 OFFICE O/P EST HI 40 MIN: CPT | Mod: VID | Performed by: EMERGENCY MEDICINE

## 2023-03-09 RX ORDER — SEMAGLUTIDE 1 MG/.5ML
1 INJECTION, SOLUTION SUBCUTANEOUS
Qty: 2 ML | Refills: 0 | Status: SHIPPED | OUTPATIENT
Start: 2023-03-09 | End: 2023-04-06

## 2023-03-09 RX ORDER — SEMAGLUTIDE 0.25 MG/.5ML
0.25 INJECTION, SOLUTION SUBCUTANEOUS WEEKLY
Qty: 2 ML | Refills: 0 | Status: SHIPPED | OUTPATIENT
Start: 2023-03-09 | End: 2023-04-06

## 2023-03-09 RX ORDER — ZINC GLUCONATE 50 MG
50 TABLET ORAL DAILY
Qty: 21 TABLET | Refills: 0 | Status: SHIPPED | OUTPATIENT
Start: 2023-03-09 | End: 2023-03-30

## 2023-03-09 RX ORDER — SEMAGLUTIDE 0.5 MG/.5ML
0.5 INJECTION, SOLUTION SUBCUTANEOUS
Qty: 2 ML | Refills: 0 | Status: SHIPPED | OUTPATIENT
Start: 2023-03-09 | End: 2023-04-06

## 2023-03-09 RX ORDER — SEMAGLUTIDE 2.4 MG/.75ML
2.4 INJECTION, SOLUTION SUBCUTANEOUS
Qty: 9 ML | Refills: 2 | Status: SHIPPED | OUTPATIENT
Start: 2023-03-09 | End: 2023-11-09

## 2023-03-09 RX ORDER — SEMAGLUTIDE 1.7 MG/.75ML
1.7 INJECTION, SOLUTION SUBCUTANEOUS
Qty: 3 ML | Refills: 0 | Status: SHIPPED | OUTPATIENT
Start: 2023-03-09 | End: 2023-04-06

## 2023-03-09 NOTE — PATIENT INSTRUCTIONS
Plan:  1. Great work on 29% total body weight reduction this year. Right on track but given stalling over the last 3 months, we'll ramp up medication support with Wegovy to complement your mindful bariatric method (see handouts below).    2. Wegovy ramps up monthly until up to 2.4mg/week dose:  0.25mg/week for 4 weeks then 0.5mg/week for 4 weeks then 1mg/week for 4 weeks then 1.7mg/week for 4 weeks then 2.4mg/week for 9 months. Stop if rash/throat swelling, severe nausea with vomiting or abdominal pains. Hydrate well between meals and consider an evening dose of Psyllium Husk/metamucil to keep stools soft. Aiming for 80oz of water daily. Check out Wegovy.com web site for patient resources.  3. Recheck with me in 3 months and dietician in 2 months on how things are going.  I recommend using a food tracker for the next 2-3 weeks to record everything that you're having and making sure you're hitting your 70-80grams/day protein intake.  4. Continue excellent workout regimen.  5. Recheck platelets and liver tests in a week. If platelets are not trending up, then you can follow up with your primary care for another check up or consider Hematology check if platelets are trending down still.   6. Liver tests may have been transiently elevated still from COVID this winter versus working out but we'll check again to make sure normalizing and consider ultrasound to check liver health if worsening.  Some effect from birth control can produce such changes as well.  7. Check home pregnancy test before starting Wegovy. Don't start if pregnant.   8. Continue excellent workouts and getting strength work in first and finishing with aerobic work will make more efficient use of time/development.   9.omeprazole for mild GERD that may worsen on Wegovy. Take in the evening, 20mg. We'll likely schedule endoscopy to check health of esophagus at year 2 visit if things are well controlled.       [facility]  Nutritional  Guidelines  Gastric Sleeve 12 Months Post Op    General Guidelines and Helpful Hints:  Eat 3 meals per day + protein supplement(s). No snacks between meals.  Do not skip meals.  This can cause overeating at the next meal and will prevent adequate protein and nutritional intake.  Aim for 60-80 grams of protein per day.  Always eat your protein first. This assists with optimal nutrition and helps you stay full longer.  Depending on your portion size, you may need to drink approved protein supplement between meals to achieve protein goals. Follow recommendations of your Dietitian.   Eat your protein first, and then follow with fiber.   It is not necessary to count your fiber, but 15-20 grams per day is recommended.    Add fiber by including fruits, vegetables, whole grains, and beans.   Portions should be about 1 cup per meal. Use measuring cups to be accurate.  Continue to use saucer/salad plates, infant/toddler silverware to keep portion sizes small and take small bites.  Eat [id number] to make each meal last 20-30 minutes. Always stop eating when satisfied.  Continue to use caution with foods containing skins, peels or membranes. Chew well!  Aim for 64 oz. of calorie-free fluids daily.  Continue to avoid caffeine and carbonation. If you choose to drink alcohol, do so in moderation.   Remember to avoid drinking during meals, 15-30 minutes before and 30 minutes after.  Exercise is dutta for continued weight loss and weight maintenance. Aim for 30-60 minutes of physical activity most days of the week. Include cardiovascular and strength training.  If having trouble tolerating meat, try using a crock-pot, tinfoil tent, steamer or other moist cooking method to create tender meats. Add broth or low-fat gravy to help meat stay moist.   Avoid high sugar and high fat foods to prevent high calorie intake. This will reduce your rate of weight loss and can cause weight regain.   Check nutrition labels for less than 10 grams of  sugar and less than 10 grams of fat per serving.  Continue Taking Vitamins/Minerals:  9502-4770 mcg of Sublingual B-12 daily  1 Multivitamin with Iron twice daily (chewable or swallow tabs)  500-600 mg Calcium Citrate twice daily (chewable or swallow tabs)  5000 IU Vitamin D3 daily    Sample Grocery List    Protein:  Fat free Greek or light yogurt (less than 10 grams sugar)  Fat free or low-fat cottage cheese  String cheese or reduced fat cheese slices  Tuna, salmon, crab, egg, or chicken salad made with light or fat free mayonnaise  Egg or Egg Substitute  Lean/extra lean turkey, beef, bison, venison (ground, sirloin, round, flank)  Pork loin or tenderloin (grilled, baked, broiled)  Fish such as salmon, tuna, trout, tilapia, etc. (grilled, baked, broiled)  Tender cuts of lean (skinless) turkey or chicken  Lean deli meats: turkey, lean ham, chicken, lean roast beef  Beans such as kidney, garbanzo, black, bahena, or low-fat/fat free refried beans  Peanut butter (natural preferred). Limit to 1 Tbsp. per day.  Low-fat meatloaf (made with lean ground beef or turkey)  Sloppy Joes made with low-sugar ketchup and lean ground beef or turkey  Soy or vegetable protein (i.e. vegan crumbles, soy/veggie burger, tofu)  Hummus    Vegetables:  Fresh: cooked or raw (as tolerated)  Frozen vegetables  Canned vegetables (low sodium or no salt added, rinse before cooking/eating)  (Ok to have skins/peels/membranes/seeds - just chew well)    Fruits:  Fresh fruit  Frozen fruit (no sugar added)  Canned fruit (packed in its own juice, NOT syrup)  (Ok to have skins/peels/membranes/seeds - just chew well)    Starch:  Unsweetened whole-grain hot cereal (or high fiber cold cereal, dry)  Toasted whole wheat bread or [place] Thins  Whole grain crackers  Baked/boiled/mashed potato/sweet potato  Cooked whole grain pasta, brown rice, or other cooked whole grains  Starchy vegetables: corn, peas, winter squash    Protein Supplement:   Ready to drink  protein shake with:  15-30 grams protein per serving  Less than 10 grams total carbohydrate per serving   Protein powder mixed with:   Skim or 1% milk  Low fat or fat free Lactaid milk, plain or no sugar added soymilk  Water     Fats: (use in moderation)  1 teaspoon of soft tub margarine  1 teaspoon olive oil, canola oil, or peanut oil  1 tablespoon of low-fat harvey or salad dressing     Sample Menu for 12 months after Gastric Sleeve    You do NOT need to eat/drink the full portion sizes listed below  Always stop when you are satisfied    Breakfast   cup 1% cottage cheese     cup diced peaches   Lunch   slice whole grain bread/toast with 1 tsp. light harvey  2 oz. sliced lean turkey, ham, or chicken    cup carrots   Supplement Approved protein supplement (as needed between meals)   Dinner   cup 93% lean ground beef mixed with 2 Tbsp. marinara sauce     cup green beans    cup whole grain pasta     Breakfast   cup egg substitute or 2 egg whites, scrambled   1 oz low fat shredded cheese    cup sautéed chopped vegetables mixed in   Lunch 1 cup chili made with lean ground beef or turkey   Supplement 6 oz light Greek yogurt (as needed)   Dinner 3 oz  grilled, broiled, or baked lemon pepper salmon  2 Tbsp. grilled asparagus  2 Tbsp. baked sweet potato     Breakfast   cup whole grain oatmeal made with skim milk and unflavored protein powder added    cup blueberries       Lunch 3 oz  meatloaf made with lean ground turkey    cup steamed broccoli   Dinner 3 oz pork loin made in a crock pot seasoned with a spice rub    cup cooked carrots   Supplement Approved protein supplement (as needed between meals)     Breakfast   cup egg scramble made with egg substitute and turkey sausage    whole grain English muffin with 1 teaspoon low sugar jelly   Lunch 3 oz seasoned, skinless grilled chicken     cup grilled vegetables   Dinner 2 oz lean beef    cup brown rice    cup strawberries   Supplement 1 string cheese (as needed)     Breakfast 6  ounces light Greek yogurt    cup unsweetened mixed berries   Lunch 3 oz shrimp, with low-sugar cocktail sauce for dipping    cup pea pods   Supplement   cup fat free cottage cheese (as needed)   Dinner 3 oz tender turkey breast (made in crock pot for extra moisture)    of a small whole wheat dinner roll     Breakfast     cup low fat cottage cheese    cup strawberries   Lunch   cup black bean soup  4 whole grain crackers   Supplement 1 cup skim milk with scoop of protein powder added (as needed)   Dinner 3 oz. grilled tilapia with lemon pepper seasoning    cup grilled bell peppers     Breakfast 2 ounces turkey sausage phani    whole wheat English muffin   Supplement Approved protein supplement (as needed between meals)   Lunch 3 oz lean ham, turkey, or chicken     cup tomatoes   Dinner 2 oz. sirloin steak    cup mixed vegetables    cup brown rice      LEAN PROTEIN SOURCES  Getting 20-30 grams of protein, 3 meals daily, is appropriate for most people, some need more but more than about 40 grams per meal is not useful.  General rule is drinking one ounce of water per gram of protein eaten over the course of the day:  70 grams of protein each day, drink 70 oz of water.  Protein Source Portion Calories Grams of Protein                           Nonfat, plain Greek yogurt    (10 grams sugar or less) 3/4 cup (6 oz)  12-17   Light Yogurt (10 grams sugar or less) 3/4 cup (6 oz)  6-8   Protein Shake 1 shake 110-180 15-30   Skim/1% Milk or lactose-free milk 1 cup ( 8 oz)  8   Plain or light, flavored soymilk 1 cup  7-8   Plain or light, hemp milk 1 cup 110 6   Fat Free or 1% Cottage Cheese 1/2 cup 90 15   Part skim ricotta cheese 1/2 cup 100 14   Part skim or reduced fat cheese slices 1 ounce 65-80 8     Mozzarella String Cheese 1 80 8   Canned tuna, chicken, crab or salmon  (canned in water)  1/2 cup 100 15-20   White fish (broiled, grilled, baked) 3 ounces 100 21   [place]/Tuna (broiled, grilled,  baked) 3 ounces 150-180 21   Shrimp, Scallops, Lobster, Crab 3 ounces 100 21   Pork loin, Pork Tenderloin 3 ounces 150 21   Boneless, skinless chicken /turkey breast                          (broiled, grilled, baked) 3 ounces 120 21   [place], [place], [place], and Venison 3 ounces 120 21   Lean cuts of red meat and pork (sirloin,   round, tenderloin, flank, ground 93%-96%) 3 ounces 170 21   Lean or Extra Lean Ground Turkey 1/2 cup 150 20   90-95% Lean [place] Burger 1 phani 140-180 21   Low-fat casserole with lean meat 3/4 cup 200 17   Luncheon Meats                                                        (turkey, lean ham, roast beef, chicken) 3 ounces 100 21   Egg (boiled, poached, scrambled) 1 Egg 60 7   Egg Substitute 1/2 cup 70 10   Nuts (limit to 1 serving per day)  3 Tbsp. 150 7   Nut [place] (peanut, almond)  Limit to 1 serving or less daily 1 Tbsp. 90 4   Soy Burger (varies) 1  15   Garbanzo, Black, [place] Beans 1/2 cup 110 7   Refried Beans 1/2 cup 100 7   Kidney and Lima beans 1/2 cup 110 7   Tempeh 3 oz 175 18   Vegan crumbles 1/2 cup 100 14   Tofu 1/2 cup 110 14   Chili (beans and extra lean beef or turkey) 1 cup 200 23   Lentil Stew/Soup 1 cup 150 12   Black Bean Soup 1 cup 175 12           MEDICATIONS FOR WEIGHT LOSS  There are several medications available to assist us in weight loss.  By themselves, without a mindful change in diet and increase in movement/activity these medications are disappointing in their results. However, combined with a closely monitored program of diet change and exercise they can be very effective in controlling appetite and boosting initial weight loss.  All weight loss medications need continual re-evaluation for efficacy as their side effects and health benefits fail to be worthwhile if a person is not continuing to lose weight or in maintaining their healthy weight.  Some weight loss medications are scheduled drugs, meaning there is at least a theoretical possibility for  developing addiction to them, but in practice this is rare.  We do anticipate coming off meds in the future- after stabilization of weight loss is assurred.  Finally, a tolerance can develop and people s perceived efficacy of medication can diminish.  In communication with your physician, it may be appropriate to intermittently take a break from these medications and then restart again (few weeks off then restart again) if a plateau is reached that cannot be broken through.  Each person can respond to a medication differently and to be a good option for you, it will need to be affordable, effective and well tolerated with minimal side effects.    In most cases, weight loss progress after one month and three months will be obtained and if a patient is not reaching the satisfactory progress towards weight loss, the medications may be discontinued.  The thought is that if a person is taking a weight loss medication and not receiving the potential health benefit of that drug, the side effects are not worthwhile and use should be discontinued.  On the flip side, there are many people on some weight loss medications for years because it continues to be an effective tool in their weight management and they are tolerating the medication without any long-term side effects.  Each person's response and purpose will be evaluated.              GLP1 Agonists:  Liraglutide (Victoza/Saxenda), Semaglutide (Ozempic/Wegovy):   Part of the family of Glucagon Like Peptide Agonists, these medications directly suppresses appetite and are often used by diabetic patients due to improvements in glucose/insulin balance.  They also slow how quickly the stomach empties, increasing fullness. They may be hard to get covered for non diabetics and some plans have exclusions for weight loss purposes.  Currently, these are  injectable medications delivered via autoinjector pen. It can be very costly without insurance coverage (over $500/month).   Small risks for pancreatitis exists and dose should be held if increased mid abdominal pain/burning. It is not to be used if previous Multiple Endocrine Neoplasia. In rodents, may increase risk of thyroid tumors and not indicated for anyone with a history of medullary thyroid cancer as a result.  If changes in voice/swallowing should be discontinued. Reliable birth control required in women. [email or web address], Wegovy.[email or web address] has more information on these medications.

## 2023-03-09 NOTE — LETTER
3/9/2023         RE: Lena Quesada  [address]  [facility] [address]        Dear Colleague,    Thank you for referring your patient, Lena Quesada, to the [facility] [place]. Please see a copy of my visit note below.    Bariatric Follow Up Visit with a History of Previous Bariatric Surgery     Date of visit: 3/9/2023  Physician: Wai Anders MD, MD  Primary Care Provider:  Jazz Abbott  Lena Quesada   37 year old  female    Date of Surgery: 3/24/22  Initial Weight: 399 lbs  Initial BMI: 62.5  Today's Weight:   Wt Readings from Last 1 Encounters:   03/09/23 128.2 kg (282 lb 9.6 oz)     Weight history:   Wt Readings from Last 4 Encounters:   03/09/23 128.2 kg (282 lb 9.6 oz)   09/08/22 140 kg (308 lb 11.2 oz)   04/01/22 (!) 155.3 kg (342 lb 6.4 oz)   03/24/22 (!) 163.5 kg (360 lb 6.4 oz)      Body mass index is 44.26 kg/m .      Assessment and Plan     Assessment: Lena is a 37 year old year old female who is one year s/p  Sleeve Gastrectomy with Dr. Rodriguez. Weight is down 117 lbs, a 29.3% total body weight reduction and she is right on track with expected weight loss.  Her 3/3/23 labs show some elevated ALT/AST and alkphos (previous cholecystectomy).  Zinc levels are a bit low and on review of her vitamin strength she has been using only 12mg/serving in her multivitamin and only once daily dosing. We'll boost zinc the next few weeks and shoot for 24-30mg/day of supplementation from her double dose multivitamin (Equate complete adult works well, 2 daily or she'll switch to BariLife Just One a Day to support her needs better). Thrombocytopenia on labs today will be rechecked with referral to Hematology if persistent or worsening. Lab error versus possible transient drop from January illness that is now recovering versus autoimmune/other issue. She reports no recent illness but COVID about 8 weeks ago. No  bruising/purpura/petechiae or clotting sx.  Not known to be pregnant but will check HPT. Non smoker. Using birth control.  No previous deficiencies in our records.     She's having breakthrough hunger and has stalled out weight loss at about 29% total body weight reduction and given her BMI of 44.3 we'll ramp up medication support for her continued weight management with Wegovy this year with goal of 2.4mg/week by July and 9 months of therapy with possible extension if well tolerated/efficacious.    Lena Quesada feels as if she hasn[t fully achieved the goals she hoped to accomplish through bariatric surgery and weight loss.    No diagnosis found.      Current Outpatient Medications:      Alpha Lipoic Ac-Biotin-Keratin (BIOTIN-KERATIN-ALPHA LIPOIC AC PO), Take 1 capsule by mouth daily 10,000 mg, Disp: , Rfl:      Calcium Citrate-Vitamin D (CALCIUM CITRATE + PO), Take 1 chew tab by mouth daily 600mg, Disp: , Rfl:      Cholecalciferol (VITAMIN D-3) 125 MCG (5000 UT) TABS, Take 1 tablet by mouth daily, Disp: , Rfl:      Cyanocobalamin 5000 MCG SUBL, Place 1 tablet under the tongue once a week, Disp: , Rfl:      Pediatric Multivitamins-Iron (CHEWABLE BRADLEY/IRON CHILDRENS PO), Take 1 tablet by mouth daily, Disp: , Rfl:     Plan:  1. Great work on 29% total body weight reduction this year. Right on track but given stalling over the last 3 months, we'll ramp up medication support with Wegovy to complement your mindful bariatric method (see handouts below).    2. Wegovy ramps up monthly until up to 2.4mg/week dose:  0.25mg/week for 4 weeks then 0.5mg/week for 4 weeks then 1mg/week for 4 weeks then 1.7mg/week for 4 weeks then 2.4mg/week for 9 months. Stop if rash/throat swelling, severe nausea with vomiting or abdominal pains. Hydrate well between meals and consider an evening dose of Psyllium Husk/metamucil to keep stools soft. Aiming for 80oz of water daily. Check out Wegovy.com web site for patient resources.  3.  Recheck with me in 3 months and dietician in 2 months on how things are going.  I recommend using a food tracker for the next 2-3 weeks to record everything that you're having and making sure you're hitting your 70-80grams/day protein intake.  4. Continue excellent workout regimen.  5. Recheck platelets and liver tests in a week. If platelets are not trending up, then you can follow up with your primary care for another check up or consider Hematology check if platelets are trending down still.   6. Liver tests may have been transiently elevated still from COVID this winter versus working out but we'll check again to make sure normalizing and consider ultrasound to check liver health if worsening.  Some effect from birth control can produce such changes as well.  7. Check home pregnancy test before starting Wegovy. Don't start if pregnant.   8. Continue excellent workouts and getting strength work in first and finishing with aerobic work will make more efficient use of time/development.   9.omeprazole for mild GERD that may worsen on Wegovy. Take in the evening, 20mg. We'll likely schedule endoscopy to check health of esophagus at year 2 visit if things are well controlled.     No follow-ups on file.    Bariatric Surgery Review     Interim History/LifeChanges: weight has stalled out since December.    Patient Concerns: getting 1000kcal/day working out regularly. Still feeling hungry and interested in appetite support.  Appetite (1-10): high, even 20 minutes after eating  GERD:     Reviewed whether any need/indication for screening EGD today and we will deferred until 2 year visit if omeprazole start well tolerated/efficacious.  Typically, a screening EGD is recommend post op year 2-3 if no symptoms to assess health of esophagus/bariatric surgery and sooner if difficult to control GERD or persistent pain/dysphagia sx despite behavior modification.    Medication changes: none.    Vitamin Intake:   B-12   yes   MVI   "Equate chew tab: 12mg zinc but has been only taking one.    Vitamin D  yes   Calcium   once daily.     Other  no herbals. Hair skin and nails.         Using birth control.    No menses since December.  COVID in January.  LABS: \"Reviewed    Nausea no  Vomiting no  Constipation mildly for her, using shake in AM gets her going.  Diarrhea no  Rashes no  Hair Loss no  Reactive Hypoglycemia n/a  Light Headedness rarely will get dizziness, transient for 10 seconds.    Moods good. Frustrated with weight stability the last few months.      Most recent labs:  Lab Results   Component Value Date    WBC 6.4 03/03/2023    HGB 12.9 03/03/2023    HCT 38.6 03/03/2023    MCV 91 03/03/2023    PLT 76 (L) 03/03/2023     Lab Results   Component Value Date    CHOL 145 09/10/2022     Lab Results   Component Value Date    HDL 56 09/10/2022     No components found for: LDLCALC  Lab Results   Component Value Date    TRIG 87 09/10/2022     No results found for: CHOLHDL  Lab Results   Component Value Date     (H) 03/03/2023    AST 83 (H) 03/03/2023    ALKPHOS 295 (H) 03/03/2023     No results found for: HGBA1C  Lab Results   Component Value Date    B12 1,073 03/03/2023     No components found for: VITDT1  Lab Results   Component Value Date    BHAKTI 177 (H) 03/03/2023     Lab Results   Component Value Date    PTHI 28 03/03/2023     Lab Results   Component Value Date    ZN 43.9 (L) 03/03/2023     Lab Results   Component Value Date    VIB1WB 135 09/10/2022     Lab Results   Component Value Date    TSH 2.10 06/11/2021     No results found for: TEST    Habits:  Tobacco/Nicotine/THC exposure? no   NSAID use? None in house anymore.    Alcohol use? Rare, half a drink at [facility]   Caffeine Habits? no       Exercise Routine: nearly daily at gym.   3 meals/day? yes  Protein 60-80g/day? yes  Water Separate from meals? yes  Calorie Containing Beverages: no  Restaurant eating/wk: n/a  Sleep Habits:  4-5 hours only. Tosses and turns. Will be moving in " "April. Hot apartment 79 degrees likely has impact but moving  CPAP Use? n/a  Contraception: yes.   DEXA:n/a.  Discussed annual screening to start at age 45 and continue to age 55 if scoring \"low risk\". DEXA scan recommended at age 55 regardless as long as at least 2 years have transpired from their bariatric surgery.  Getting therapist this month to help with stress and to get help with anxiety.   Social History     Social History     Socioeconomic History     Marital status: Single     Spouse name: Not on file     Number of children: Not on file     Years of education: Not on file     Highest education level: Not on file   Occupational History     Not on file   Tobacco Use     Smoking status: Never     Smokeless tobacco: Never   Vaping Use     Vaping Use: Never used   Substance and Sexual Activity     Alcohol use: Not Currently     Drug use: Never     Sexual activity: Yes     Partners: Male     Birth control/protection: Condom   Other Topics Concern     Parent/sibling w/ CABG, MI or angioplasty before 65F 55M? Not Asked   Social History Narrative     Not on file     Social Determinants of Health     Financial Resource Strain: Not on file   Food Insecurity: Not on file   Transportation Needs: Not on file   Physical Activity: Not on file   Stress: Not on file   Social Connections: Not on file   Intimate Partner Violence: Not on file   Housing Stability: Not on file       Past Medical History     Past Medical History:   Diagnosis Date     Arthritis     chondromalacia of knee (right). injections.     Cholelithiasis      Chondromalacia of patellofemoral joint      Past Surgical History:   Procedure Laterality Date     CHOLECYSTECTOMY  2005     LAPAROSCOPIC GASTRIC SLEEVE N/A 3/24/2022    Procedure: GASTRECTOMY, SLEEVE, LAPAROSCOPIC;  Surgeon: Bobby Rodriguez MD;  Location: [facility]     TYMPANOPLASTY       WISDOM TOOTH EXTRACTION         Problem List     Patient Active Problem List   Diagnosis     Morbid obesity " "(H)     Chondromalacia of patellofemoral joint     Morbid obesity due to excess calories (H)     Nexplanon in place      Medications     [unfilled]  Surgical History     Past Surgical History  She has a past surgical history that includes Tympanoplasty; [place] Tooth Extraction; Cholecystectomy (2005); and Laparoscopic gastric sleeve (N/A, 3/24/2022).    Objective-Exam     Constitutional:  Ht 1.702 m (5' 7\")   Wt 128.2 kg (282 lb 9.6 oz)   BMI 44.26 kg/m    [unfilled]   General:  Pleasant and in no acute distress   Eyes:  EOMI  ENT:  Airway patent    Neck:  Respiratory: Normal respiratory effort, no cough, .  CV:  n/a  Gastrointestinal: n/a  Musculoskeletal: muscle mass WNL  Skin: color wihtout pallor or jaundice evident hair thick/long,   Psychiatric: alert and oriented X3, mood and affect normal    Counseling     We reviewed the important post op bariatric recommendations:  -eating 3 meals daily  -eating protein first, getting >60gm protein daily  -eating slowly, chewing food well  -avoiding/limiting calorie containing beverages  -drinking water 15-30 minutes before or after meals  -limiting restaurant or cafeteria eating to twice a week or less    We discussed the importance of restorative sleep and stress management in maintaining a healthy weight.  We discussed the National Weight Control Registry healthy weight maintenance strategies and ways to optimize metabolism.  We discussed the importance of physical activity including cardiovascular and strength training in maintaining a healthier weight.    We discussed the importance of life-long vitamin supplementation and life-long  follow-up.    Lena was reminded that, to avoid marginal ulcers she should avoid tobacco at all, alcohol in excess, caffeine in excess, and NSAIDS (unless indicated for cardioprotection or othewise and opposed by a PPI).    Wai Anders MD    [facility].  3/8/2023  4:17 PM  [phone] (clinic " phone)  [phone] (fax)    No images are attached to the encounter.  Medical Decision Makin minutes spent on the date of the encounter doing chart review, history and exam, documentation and further activities per the note    Lena Quesada is 37 year old  female who presents for a billable video visit today.    How would you like to obtain your AVS? MyChart  If dropped from the video visit, the video invitation should be resent by: Send to e-mail at: [email or web address]  Will anyone else be joining your video visit? No      Video Start Time: 8:00 am    Are there any specific questions or needs that you would like addressed at your visit today?     Annual 1 year post-op LSG. Labs in chart. Pt is only taking vitamins.     Pt feels like shes been in a 4 month weight loss stall. Shes been exercising, lifting weights and eating what shes supposed to be eating. Her last weight loss drop was around  but shes still hovering over the 280# kenzie.    Offered RD appt to pt but she sees one on her own.     Teodora Snyder CMA  [facility]  [facility] [place]  [place] [address]  Office: [phone]  Fax: [phone]          Teodora Snyder CMA  [facility]  [facility] [place] [address]  Office: [phone]  Fax: [phone]          Provider Notes:       Video-Visit Details    Type of service:  Video Visit    Platform used for Video Visit: Sensing Electromagnetic Plus    Video End Time (time video stopped): 8:43 AM    Originating Location (pt. Location): Home        Distant Location (provider location):  On-site    Distant Location (provider location):  [facility] [place]         Again, thank you for allowing me to participate in  the care of your patient.        Sincerely,        Wai Anders MD

## 2023-03-14 ENCOUNTER — VIRTUAL VISIT (OUTPATIENT)
Dept: PSYCHIATRY | Facility: CLINIC | Age: 38
End: 2023-03-14
Attending: PSYCHOLOGIST
Payer: COMMERCIAL

## 2023-03-14 DIAGNOSIS — F43.22 ADJUSTMENT DISORDER WITH ANXIOUS MOOD: Primary | ICD-10-CM

## 2023-03-14 PROCEDURE — 90837 PSYTX W PT 60 MINUTES: CPT | Mod: VID | Performed by: STUDENT IN AN ORGANIZED HEALTH CARE EDUCATION/TRAINING PROGRAM

## 2023-03-14 NOTE — PROGRESS NOTES
"Video- Visit Details  Type of service:  video visit for mental health treatment.  Time of service:    Date:  03/14/2023    Video Start Time:  2:01 PM        Video End Time:  2:56    Reason for video visit: COVID-19  Originating Site (patient location):  Patient's home  Distant Site (provider location):  Psychiatry Clinic  Mode of Communication:  Video Conference via AmWell    As the provider I attest to compliance with applicable laws and regulations related to telemedicine.    OUTPATIENT PSYCHOTHERAPY PROGRESS NOTE    Client Name: Lena Quesada   YOB: 1985 (37 year old)   Date of Service:  Mar 14, 2023  Time of Service: 2:00 (60 minutes)  Service Type(s): 99783 psychotherapy (53-60 min. with patient and/or family)    Diagnoses: Adjustment disorder with anxiety    Individuals Present:   Client attended alone    Treatment goal(s) being addressed:  1) Decreasing anxiety levels  2) Creating more positive and constructive thought patterns    Subjective:  -Lena states it's been an eventful 24h. She had been dating someone who's been distant and somewhat flaky, and now found out that he has been seeing many other people at the same time. She was able to address the situation directly and constructively. While she doesn't blame herself for the way things ended, she does wish she was able to see the \"red flags\" sooner. We discussed the challenges of \"playing \" in dating relationships, and instead proposed assessing the relationship from a perspective of \"are my needs being met?\"  -Gathered psychosocial history - detailed below  -Her biggest goal in therapy is to \"change thinking patterns\" that are not helpful to her  -Discussed thoughts/behaviors/feelings triangle  -HW: Record thinking patterns using three columns: situation, thought, feeling    Upbringing: Lena Quesada was [place] in [place] [place]. Moved to [place] 6 years ago to help brother with his new restaurant " "and baby. Moved to [place] [facility] after 1 year, then [facility], and soon [place].   Family:   -Brother: 5 years older, close and good relationship, has 3 kids, complicated dynamics with his ex wife, new wife, and strain on Lena's relationship with him. Little space/openness for discussion about this.  -Parents (66y and 68y):  for 45y, Lena's best friends, talks to mom 2x daily, Lena feels some pressure and expectation to maintain a parent-child relationship  Development: met developmental milestones on time. No learning accommodations.  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    Current Living Situation/Family Relationships: Apartment, alone, no pets.  Leisure time and interests: Gym, sporting events, friends, movies. Not many friends in [place], home more than she'd like to be.  Exercise: Daily. Lifting weights, walk/bike afterwards. Endorses healthy relationship with exercise.   Children:  none  Marital status: Single. 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  Cultural/ Social/ Spiritual/ Advent Support: Grew up Latter day. Started to question bertin in college on a mission trip. Occasionally goes to Sikhism with family.   Legal History: No    Family psychiatric history: PGM: anxiety and depression no suicides.     Treatment:   CBT    Assessment and Progress:   Lena hopes to address negative thought patterns, and anxiety with weekly CBT. This week was our second visit, and we continued to gather intake data and briefly began discussing fundamentals of CBT. She plans to keep a log of unhelpful thoughts, identifying the situation, thought, and feelings.    No safety concerns including SI, SIB, and HI.    Plan:   -HW as discussed above  -Next therapy " appointment has been scheduled for next week to continue work on treatment goals.    Psychotherapy services during this visit included myself and the patient. Patient agrees with treatment plan. Discussed case with supervisor who also agreed with the treatment plan. Unable to sign in person due to visit being conducted through telehealth.     Diana eGnao MD  PGY-2 Psychiatry Resident    I did not see this pt directly. This pt was discussed with me in individual psychotherapy supervision, and I agree with the plan as documented.     Donna Burns, Ph.D., L.P.

## 2023-03-21 ENCOUNTER — VIRTUAL VISIT (OUTPATIENT)
Dept: PSYCHIATRY | Facility: CLINIC | Age: 38
End: 2023-03-21
Attending: PSYCHOLOGIST
Payer: COMMERCIAL

## 2023-03-21 DIAGNOSIS — F43.22 ADJUSTMENT DISORDER WITH ANXIOUS MOOD: Primary | ICD-10-CM

## 2023-03-21 PROCEDURE — 90837 PSYTX W PT 60 MINUTES: CPT | Mod: VID | Performed by: STUDENT IN AN ORGANIZED HEALTH CARE EDUCATION/TRAINING PROGRAM

## 2023-03-21 NOTE — PROGRESS NOTES
"Video- Visit Details  Type of service:  video visit for mental health treatment.  Time of service:    Date:  03/21/2023    Video Start Time:  1:59        Video End Time:  2:52    Reason for video visit: COVID-19  Originating Site (patient location):  Patient's home  Distant Site (provider location):  Psychiatry Clinic  Mode of Communication:  Video Conference via AmWell    As the provider I attest to compliance with applicable laws and regulations related to telemedicine.    OUTPATIENT PSYCHOTHERAPY PROGRESS NOTE    Client Name: Lena Quesada   YOB: 1985 (37 year old)   Date of Service:  Mar 21, 2023  Time of Service: 2:00 (60 minutes)  Service Type(s): 61714 psychotherapy (53-60 min. with patient and/or family)    Diagnoses: Adjustment disorder with anxiety    Individuals Present:   Client attended alone    Treatment goal(s) being addressed:  1) Decreasing anxiety levels  2) Creating more positive and constructive thought patterns    Subjective:  Lena reports feeling pretty good today, denies any lingering thoughts on last week's visit. As discussed last week, she identified an anxious, unhelpful thought and noted the situation and feeling associated. Situation: needing to drive home and it's snowing. Thought: Am I going to make it home safely? Feeling: Anxious, scared, worry.    She also reported starting a medication on Friday that's made her nauseous and lose her appetite. Reports losing 8 pounds in the last week. She attributes starting to feel better mentally to passing the plateau in her weight loss. Endorses less anxiety the last few days.     We investigated this connection between weight loss and mood.   -Not losing weight leads to negative thoughts: I did something wrong, surgery failed, worried about going back to where she was before the surgery, blaming herself for any dietary \"mistakes\".   -Gaining some weight back would be shameful. She'd feel like she was failing other people or " "proving right those who said surgery wouldn't be successful. Letting other people down \"would hurt me, it would break my heart.\"   -She's always thought she needed to be a certain way to make people like her. Feels this is further magnified as a heavier person, \"harder to get people to like you.\" Has felt used and like people were disingenuous to her in the past.     Core belief:   \"I need to behave and look a certain way so that people will like me. If people don't like me, I will be alone and not okay.\"    Evidence for:  -Childhood best friend who used her for gossip, if I'd been better I would have been included  -Skinny, celiae moms successful in skin care rep  -Dating: society says you need to look a certain way to be attractive    Evidence against:   -Parents and brother would still value her on a bad day  -Sometimes even being a certain way can't make someone like her. Example: dating melissa   -Choosing to live alone right now, choosing to be alone on Sundays -  and doing well   -Not many friends right now, and not falling apart  -Going on dates (although still doubts people's genuine interest in her)  -Even when you can please people, those people may still not fulfill your needs    Also discussed various emotional scars related to this topic (treatments from friends and family reinforcing the core belief). Plan to discuss these further next session    Treatment:   CBT    Assessment and Progress:   Lena hopes to address negative thought patterns, and anxiety with weekly CBT. This week, we explored the core belief that may be driving a lot of anxiety and negative thinking patterns. Several difficult memories came up, plan to explore further next session. Encouraged to continue journaling situation/thought/feeling and noticing evidence for and against core belief.     No safety concerns including SI, SIB, and HI.    Plan:   -HW as above  -Next therapy appointment has been scheduled for next week to continue " work on treatment goals.    Psychotherapy services during this visit included myself and the patient. Patient agrees with treatment plan. Discussed case with supervisor who also agreed with the treatment plan. Unable to sign in person due to visit being conducted through telehealth.     Diana Genao MD  PGY-2 Psychiatry Resident    I did not see this pt directly. This pt was discussed with me in individual psychotherapy supervision, and I agree with the plan as documented.     Donna Burns, Ph.D., L.P.

## 2023-03-27 ENCOUNTER — VIRTUAL VISIT (OUTPATIENT)
Dept: PSYCHIATRY | Facility: CLINIC | Age: 38
End: 2023-03-27
Attending: NURSE PRACTITIONER
Payer: COMMERCIAL

## 2023-03-27 DIAGNOSIS — F32.1 CURRENT MODERATE EPISODE OF MAJOR DEPRESSIVE DISORDER WITHOUT PRIOR EPISODE (H): ICD-10-CM

## 2023-03-27 DIAGNOSIS — F41.9 ANXIETY: ICD-10-CM

## 2023-03-27 DIAGNOSIS — Z98.84 S/P GASTRIC BYPASS: ICD-10-CM

## 2023-03-27 PROCEDURE — 90792 PSYCH DIAG EVAL W/MED SRVCS: CPT | Mod: VID | Performed by: NURSE PRACTITIONER

## 2023-03-27 RX ORDER — SERTRALINE HYDROCHLORIDE 25 MG/1
TABLET, FILM COATED ORAL
Qty: 30 TABLET | Refills: 0 | Status: SHIPPED | OUTPATIENT
Start: 2023-03-27 | End: 2023-04-24

## 2023-03-27 ASSESSMENT — ANXIETY QUESTIONNAIRES
GAD7 TOTAL SCORE: 10
GAD7 TOTAL SCORE: 10
1. FEELING NERVOUS, ANXIOUS, OR ON EDGE: MORE THAN HALF THE DAYS
4. TROUBLE RELAXING: SEVERAL DAYS
2. NOT BEING ABLE TO STOP OR CONTROL WORRYING: SEVERAL DAYS
5. BEING SO RESTLESS THAT IT IS HARD TO SIT STILL: SEVERAL DAYS
GAD7 TOTAL SCORE: 10
6. BECOMING EASILY ANNOYED OR IRRITABLE: SEVERAL DAYS
8. IF YOU CHECKED OFF ANY PROBLEMS, HOW DIFFICULT HAVE THESE MADE IT FOR YOU TO DO YOUR WORK, TAKE CARE OF THINGS AT HOME, OR GET ALONG WITH OTHER PEOPLE?: SOMEWHAT DIFFICULT
7. FEELING AFRAID AS IF SOMETHING AWFUL MIGHT HAPPEN: SEVERAL DAYS
IF YOU CHECKED OFF ANY PROBLEMS ON THIS QUESTIONNAIRE, HOW DIFFICULT HAVE THESE PROBLEMS MADE IT FOR YOU TO DO YOUR WORK, TAKE CARE OF THINGS AT HOME, OR GET ALONG WITH OTHER PEOPLE: SOMEWHAT DIFFICULT
7. FEELING AFRAID AS IF SOMETHING AWFUL MIGHT HAPPEN: SEVERAL DAYS
3. WORRYING TOO MUCH ABOUT DIFFERENT THINGS: NEARLY EVERY DAY

## 2023-03-27 ASSESSMENT — PATIENT HEALTH QUESTIONNAIRE - PHQ9
10. IF YOU CHECKED OFF ANY PROBLEMS, HOW DIFFICULT HAVE THESE PROBLEMS MADE IT FOR YOU TO DO YOUR WORK, TAKE CARE OF THINGS AT HOME, OR GET ALONG WITH OTHER PEOPLE: SOMEWHAT DIFFICULT
SUM OF ALL RESPONSES TO PHQ QUESTIONS 1-9: 9
SUM OF ALL RESPONSES TO PHQ QUESTIONS 1-9: 9

## 2023-03-27 NOTE — NURSING NOTE
Is the patient currently in the state of [place]? YES    Visit mode:VIDEO    If the visit is dropped, the patient can be reconnected by: VIDEO VISIT: Text to cell phone: [phone]    Will anyone else be joining the visit? NO      How would you like to obtain your AVS? MyChart    Are changes needed to the allergy or medication list? NO    Reason for visit: Medication for anxiety.

## 2023-03-27 NOTE — PATIENT INSTRUCTIONS
**For crisis resources, please see the information at the end of this document**   Patient Education    Thank you for coming to the [facility] [place] [facility].     Lab Testing:  If you had lab testing today and your results are reassuring or normal they will be mailed to you or sent through [employer] within 7 days. If the lab tests need quick action we will call you with the results. The phone number we will call with results is # [phone]. If this is not the best number please call our clinic and change the number.     Medication Refills:  If you need any refills please call your pharmacy and they will contact us. Our fax number for refills is [phone].   Three business days of notice are needed for general medication refill requests.   Five business days of notice are needed for controlled substance refill requests.   If you need to change to a different pharmacy, please contact the new pharmacy directly. The new pharmacy will help you get your medications transferred.     Contact Us:  Please call [phone] during business hours (8-5:00 M-F).   If you have medication related questions after clinic hours, or on the weekend, please call [phone].     Financial Assistance [phone]   Medical Records [phone]       MENTAL HEALTH CRISIS RESOURCES:  For a emergency help, please call 911 or go to the nearest Emergency Department.     Emergency Walk-In Options:   [facility] Unit @ [facility] ([place]): [phone] - Specialized mental health emergency area designed to be calming  [facility] ([place]): [phone]  [facility] ([place]): [phone]  [facility]): [phone]    [facility] Crisis Information:   [place]: [phone]  [place]: [phone]  [place] ([place] - Adult: [phone]     Child: [phone]  [place] - Adult: [phone]     Child: [phone]  [place]:  [phone]  List of all [facility] resources:   https://[place].gov/dhs/people-we-serve/adults/health-care/mental-health/resources/crisis-contacts.jsp    National Crisis Information:   Crisis Text Line: Text  [place]  to 068312  Suicide & Crisis Lifeline: 988  National Suicide Prevention Lifeline: 1-462-251-TALK (1-350.963.5983)       For online chat options, visit https://suicidepreventionlifeline.org/chat/  Poison Control Center: 3-226-948-0880  Trans Lifeline: 2-513-219-8442 - Hotline for transgender people of all ages  [employer]: 4-650-571-4591 - Hotline for LGBT youth     For Non-Emergency Support:   Fast Tracker: Mental Health & Substance Use Disorder Resources -   https://www.[employer].org/

## 2023-03-28 ENCOUNTER — VIRTUAL VISIT (OUTPATIENT)
Dept: PSYCHIATRY | Facility: CLINIC | Age: 38
End: 2023-03-28
Attending: PSYCHOLOGIST
Payer: COMMERCIAL

## 2023-03-28 DIAGNOSIS — F43.22 ADJUSTMENT DISORDER WITH ANXIOUS MOOD: Primary | ICD-10-CM

## 2023-03-28 PROCEDURE — 90837 PSYTX W PT 60 MINUTES: CPT | Mod: VID | Performed by: STUDENT IN AN ORGANIZED HEALTH CARE EDUCATION/TRAINING PROGRAM

## 2023-03-28 NOTE — PROGRESS NOTES
"Video- Visit Details  Type of service:  video visit for mental health treatment.  Time of service:    Date:  03/28/2023    Video Start Time:  2:01        Video End Time:  2:55    Reason for video visit: COVID-19  Originating Site (patient location):  Patient's home  Distant Site (provider location):  Psychiatry Clinic  Mode of Communication:  Video Conference via [employer]    As the provider I attest to compliance with applicable laws and regulations related to telemedicine.    OUTPATIENT PSYCHOTHERAPY PROGRESS NOTE    Client Name: Lena Quesada   YOB: 1985 (37 year old)   Date of Service:  Mar 21, 2023  Time of Service: 2:00 (60 minutes)  Service Type(s): 51982 psychotherapy (53-60 min. with patient and/or family)    Diagnoses: Adjustment disorder with anxiety    Individuals Present:   Client attended alone    Treatment goal(s) being addressed:  1) Decreasing anxiety levels  2) Creating more positive and constructive thought patterns    Subjective:  Anxiety has been \"okay\" lately. Life is somewhat stressful with moving apartments this weekend and ongoing challenges her supervisor at work with an unprofessional/unapproachable/micromanaging/unreasonable leadership approach. Lena established with psychiatric NP, Hawa Robles, yesterday and feels good about starting sertraline.    Since our last session, Lena has been thinking more about the core belief of \"If people don't like me I'm not okay.\" Further, she's also realized that she feels the need to justify any statement/decision someone may disagree with, so that she a) doesn't get talked out of it, and b) doesn't feel judged and in turn  herself. This fear of judgement and rejection is felt most strongly with her mom, who has indeed done this many times in the past. When she's brought things up with her mom in the past, her mom either places blame/judgment on her (ex. Asking what Lena did to deserve being bullied) or somewhat exaggeratedly " "blames herself (ex \"I guess I'm just a bad mom then\"). Lena feels it's difficult to come to her mom for support and end up either needing to defend herself or reassure her mom. Lena feels the only way to avoid these uncomfortable, disappointing situations is to just not say anything upsetting or agree with her. She also mentioned that she and her mom became close after her brother left for college, and she always has a fear of losing this closeness with her mom again.     Lena's ultimate goal is to \"feel comfortable and confident enough that regardless of what other people think, I'll know I'm okay.\" To work towards that goal, she prefers to focus on behaviors rather than thoughts this week.    HW: Try not agreeing with something small a friend says. Notice both their reaction and your own.     Treatment:   CBT    Assessment and Progress:   Lena hopes to address negative thought patterns, and anxiety with weekly CBT. This week, we further explored the core belief that may be driving a lot of anxiety and negative thinking patterns. We also continue to process difficult memories from her childhood and process the nuances of her relationship with her mom. Lena was encouraged to practice being in situations where she may disagree with someone or someone may  her, and to notice how that feels and the other person's reaction.    No safety concerns including SI, SIB, and HI.    Plan:   -HW as above  -Next therapy appointment has been scheduled for next week to continue work on treatment goals.    Psychotherapy services during this visit included myself and the patient. Patient agrees with treatment plan. Discussed case with supervisor who also agreed with the treatment plan. Unable to sign in person due to visit being conducted through telehealth.     Diana Genao MD  PGY-2 Psychiatry Resident    I did not see this pt directly. This pt was discussed with me in individual psychotherapy supervision, and I " agree with the plan as documented.     Donna Burns, Ph.D., L.P.

## 2023-04-04 ENCOUNTER — VIRTUAL VISIT (OUTPATIENT)
Dept: PSYCHIATRY | Facility: CLINIC | Age: 38
End: 2023-04-04
Attending: PSYCHOLOGIST
Payer: COMMERCIAL

## 2023-04-04 ENCOUNTER — VIRTUAL VISIT (OUTPATIENT)
Dept: SURGERY | Facility: CLINIC | Age: 38
End: 2023-04-04
Payer: COMMERCIAL

## 2023-04-04 DIAGNOSIS — K91.2 POSTGASTRECTOMY MALABSORPTION: ICD-10-CM

## 2023-04-04 DIAGNOSIS — Z90.3 POSTGASTRECTOMY MALABSORPTION: ICD-10-CM

## 2023-04-04 DIAGNOSIS — Z98.84 S/P LAPAROSCOPIC SLEEVE GASTRECTOMY: Primary | ICD-10-CM

## 2023-04-04 DIAGNOSIS — F43.22 ADJUSTMENT DISORDER WITH ANXIOUS MOOD: Primary | ICD-10-CM

## 2023-04-04 DIAGNOSIS — E66.01 CLASS 3 SEVERE OBESITY DUE TO EXCESS CALORIES WITH SERIOUS COMORBIDITY AND BODY MASS INDEX (BMI) OF 40.0 TO 44.9 IN ADULT (H): ICD-10-CM

## 2023-04-04 DIAGNOSIS — E66.813 CLASS 3 SEVERE OBESITY DUE TO EXCESS CALORIES WITH SERIOUS COMORBIDITY AND BODY MASS INDEX (BMI) OF 40.0 TO 44.9 IN ADULT (H): ICD-10-CM

## 2023-04-04 DIAGNOSIS — Z71.3 NUTRITIONAL COUNSELING: ICD-10-CM

## 2023-04-04 PROCEDURE — 90837 PSYTX W PT 60 MINUTES: CPT | Mod: VID | Performed by: STUDENT IN AN ORGANIZED HEALTH CARE EDUCATION/TRAINING PROGRAM

## 2023-04-04 PROCEDURE — 97803 MED NUTRITION INDIV SUBSEQ: CPT | Mod: VID | Performed by: DIETITIAN, REGISTERED

## 2023-04-04 NOTE — PROGRESS NOTES
"Video- Visit Details  Type of service:  video visit for mental health treatment.  Time of service:    Date:  04/04/2023    Video Start Time:  1:59        Video End Time:  2:52    Reason for video visit: COVID-19  Originating Site (patient location):  Patient's home  Distant Site (provider location):  Psychiatry Clinic  Mode of Communication:  Video Conference via [employer]    As the provider I attest to compliance with applicable laws and regulations related to telemedicine.    OUTPATIENT PSYCHOTHERAPY PROGRESS NOTE    Client Name: Lena Quesada   YOB: 1985 (37 year old)   Date of Service:  April 4, 2023  Time of Service: 2:00 (60 minutes)  Service Type(s): 58584 psychotherapy (53-60 min. with patient and/or family)    Diagnoses: Adjustment disorder with anxiety    Individuals Present:   Client attended alone    Treatment goal(s) being addressed:  1) Decreasing anxiety levels  2) Creating more positive and constructive thought patterns    Subjective:  Moved last weekend, which went well. She's been liking her new apartment overall so far. Started sertraline 50mg this week, which is going well without any side effects.     Her HW from last week was to \"experiement\" with disagreeing with someone. She did this on a first date with someone who was questioning/judging her decision to move to her new apartment. She was initially nervous to put her foot down, but then realized \"I don't care\" and that it was counterproductive to talk about.     She's now realizing that it's not so much the actual disagreeing that's challenging, but rather anxiety and lack of confidence in deciding when to agree/disagree. Once she's decided to disagree, she feels confident in carrying that out.     We discussed confidence, which is something she's always struggled with. She gave examples like not walking into a bar first, negative body image, and discomfort with accepting compliments.     Another example she shared was a trip " "to [place] where a friend persistently pressured her to drink after her surgery. She felt frustrated with the constant pressure. She held strong in saying no to drinking. Reflecting on how this instance relates to her core belief, it partially confirmed her fear because it was still lonely. That being said, she still feels she made the right decision.     \"I need to learn how to be okay when things aren't going perfectly.\"    HW: Continue \"behavioral experiments\" - trust your gut instinct on when to agree/disagree with someone, and see how it goes.    Treatment:   CBT    Assessment and Progress:   Lena hopes to address negative thought patterns, and anxiety with weekly CBT. This week, we discussed behavioral experiments that may support/not support her core belief that may be driving a lot of anxiety and negative thinking patterns. Lena was encouraged to continue \"behavioral experiments\" and trusting her gut instinct on when to agree/disagree with someone.    No safety concerns including SI, SIB, and HI.    Plan:   -HW as above  -Next therapy appointment has been scheduled for next week to continue work on treatment goals.    Psychotherapy services during this visit included myself and the patient. Patient agrees with treatment plan. Discussed case with supervisor who also agreed with the treatment plan. Unable to sign in person due to visit being conducted through telehealth.     Diana Genao MD  PGY-2 Psychiatry Resident    I did not see this pt directly. This pt was discussed with me in individual psychotherapy supervision, and I agree with the plan as documented.     Donna Burns, Ph.D., L.P.  "

## 2023-04-04 NOTE — PROGRESS NOTES
"Lnea Quesada is a 37 year old who is being evaluated via a billable video visit.      How would you like to obtain your AVS? MyChart  If the video visit is dropped, the invitation should be resent by: Send to e-mail at: [email or web address]  Will anyone else be joining your video visit? No  {If patient encounters technical issues they should call [phone] :[id number]      Post-op Surgical Weight Loss Diet Evaluation     Assessment:  Pt presents for 1 year post-op RD visit, s/p LSG on 3/24/2022 with Dr. Rodriguez. Today we reviewed current eating habits and level of physical activity, and instructed on the changes that are required for successful bariatric outcomes. Patient has been started on Wegovy to help assist with further weight loss.     Patient Progress: Has been struggling with some nausea with the Wegovy injections    Initial weight: 372.6 lbs  Current weight: 265.8 lbs  Weight change: 106.8 lbs (down)    There is no height or weight on file to calculate BMI.    Patient Active Problem List   Diagnosis     Morbid obesity (H)     Chondromalacia of patellofemoral joint     Morbid obesity due to excess calories (H)     Nexplanon in place        Diabetes: No     Vitamins   Multi Vit with Iron: yes  Calcium Citrate: yes  B12: yes  D3: yes  Zinc: yes-due to lower levels    Do you experience hunger? Yes-increased appetite more recently, however Wegovy has significantly reduced her appetite over the past 3 weeks.  Do you have \"dumping\" syndrome?Yes-once     Nausea: yes    Diet Recall/Time:   Breakfast: greek yogurt   Lunch: chicken (1-2 oz)  Dinner: chicken (1-2 oz)    Typical Snacks: String Cheese    Proteins/Veg/Fruits/CHO (NOT well tolerated): spicy foods    Estimated protein intake: 40-50 grams      Meal Duration:20-30 minutes     Fluid-meal separation:  Fluids are  30min before and 30 minutes after meals.    Fluid Intake  Water: at least 64 oz/day, occasional Crystal Light     Exercise: Gym " "daily-weight lifting and biking or treadmill      PES statement:      (NC-1.4) Altered GI Function related to Alteration in gastrointestinal tract structure and/or function/ Decreased functional length of the GI tract as evidenced by Weight loss of 29% initial body weight; Gastric bypass surgery; sleeve gastrectomy    (NI-5.7.1) Inadequate protein intake related to Gastric bypass causing increased nutrient needs due to malabsorption/ Decreased ability to consume sufficient protein as evidenced by Edema, poor musculature, dull skin, thin and fragile hair; and Estimated intake of protein insufficient to meet requirements    Intervention    Discussion  1. Recommended to consume 15-20gm protein at 3 meals daily, along with protein supplement/\"planned protein containing snack\" of 15-30gm protein, to reach goal of 60-80 gm protein daily.  2. Educated on post-op vitamin regimen: Multi Vit + iron 2x/day, calcium citrate 400-600 mg 2x/day, 4776-0197 mcg of Sublingual B-12 daily, and 5000 IU Vitamin D3 daily (MVI and calcium can be taken at the same time BID)  3. Reviewed lean protein sources  4. Bariatric Plate Method-  including lean/low fat protein at each meal, including a vegetable/fruit, and limiting carbohydrate intake to less than 25% of plate volume.     Instructions  1. Include 15-20gm protein at each meal, along with protein supplement/\"planned protein containing snack\" of 15-30gm protein, to reach goal of 60-80 gm protein daily.  2. Increase fluid intake to 64oz daily: choose plain or calorie/carbonation-free beverages.  3. Incorporate daily structured activity, 30-60 minutes most days of the week  4. Recommended pt to start taking: Multi Vit + iron 2x/day, calcium citrate 400-600 mg 2x/day, 3845-0712 mcg of Sublingual B-12 daily, and 5000 IU Vitamin D3 daily. (MVI and calcium can be taken at the same time)  5. Read food labels more consistently: keeping total fat grams <10, total sugar grams <10, fiber >3gm per " serving.  6. Increase vegetable/fruit intake, by having a vegetable or fruit with each meal daily.  7. Practice plate method: 1/2 plate lean/low fat protein source, vegetable/fruit, <25% of plate complex carbohydrates.  8. Separate fluids 30 minutes before/after meal times.  9. Practice eating off of smaller plates/bowls, chewing to applesauce consistency, taking 20-30 minutes to eat in a calm/relaxed environment without distractions of tv/email/cell phone.    Handouts provided:  None    Assessment/Plan:    Pt to follow up in 2 months with the bariatrician     Video-Visit Details    Type of service:  Video Visit    Video Start Time (time video started): 7:46 am    Video End Time (time video stopped): 8:11 am    Originating Location (pt. Location): Home        Distant Location (provider location):  Off-site    Mode of Communication:  Video Conference via [facility]    Physician has received verbal consent for a Video Visit from the patient? Yes    Rosalva Shin, RD

## 2023-04-04 NOTE — LETTER
"    4/4/2023         RE: Lena Quesada  [address]  [place] [address]        Dear Colleague,    Thank you for referring your patient, Lena Quesada, to the [facility] [place]. Please see a copy of my visit note below.    Lena Quesada is a 37 year old who is being evaluated via a billable video visit.      How would you like to obtain your AVS? MyChart  If the video visit is dropped, the invitation should be resent by: Send to e-mail at: [email or web address]  Will anyone else be joining your video visit? No  {If patient encounters technical issues they should call [phone]      Post-op Surgical Weight Loss Diet Evaluation     Assessment:  Pt presents for 1 year post-op RD visit, s/p LSG on 3/24/2022 with Dr. Rodriguez. Today we reviewed current eating habits and level of physical activity, and instructed on the changes that are required for successful bariatric outcomes. Patient has been started on Wegovy to help assist with further weight loss.     Patient Progress: Has been struggling with some nausea with the Wegovy injections    Initial weight: 372.6 lbs  Current weight: 265.8 lbs  Weight change: 106.8 lbs (down)    There is no height or weight on file to calculate BMI.    Patient Active Problem List   Diagnosis     Morbid obesity (H)     Chondromalacia of patellofemoral joint     Morbid obesity due to excess calories (H)     Nexplanon in place        Diabetes: No     Vitamins   Multi Vit with Iron: yes  Calcium Citrate: yes  B12: yes  D3: yes  Zinc: yes-due to lower levels    Do you experience hunger? Yes-increased appetite more recently, however Wegovy has significantly reduced her appetite over the past 3 weeks.  Do you have \"dumping\" syndrome?Yes-once     Nausea: yes    Diet Recall/Time:   Breakfast: greek yogurt   Lunch: chicken (1-2 oz)  Dinner: chicken (1-2 oz)    Typical Snacks: String " "Cheese    Proteins/Veg/Fruits/CHO (NOT well tolerated): spicy foods    Estimated protein intake: 40-50 grams      Meal Duration:20-30 minutes     Fluid-meal separation:  Fluids are  30min before and 30 minutes after meals.    Fluid Intake  Water: at least 64 oz/day, occasional Crystal Light     Exercise: Gym daily-weight lifting and biking or treadmill      PES statement:      (NC-1.4) Altered GI Function related to Alteration in gastrointestinal tract structure and/or function/ Decreased functional length of the GI tract as evidenced by Weight loss of 29% initial body weight; Gastric bypass surgery; sleeve gastrectomy    (NI-5.7.1) Inadequate protein intake related to Gastric bypass causing increased nutrient needs due to malabsorption/ Decreased ability to consume sufficient protein as evidenced by Edema, poor musculature, dull skin, thin and fragile hair; and Estimated intake of protein insufficient to meet requirements    Intervention    Discussion  1. Recommended to consume 15-20gm protein at 3 meals daily, along with protein supplement/\"planned protein containing snack\" of 15-30gm protein, to reach goal of 60-80 gm protein daily.  2. Educated on post-op vitamin regimen: Multi Vit + iron 2x/day, calcium citrate 400-600 mg 2x/day, 6369-2740 mcg of Sublingual B-12 daily, and 5000 IU Vitamin D3 daily (MVI and calcium can be taken at the same time BID)  3. Reviewed lean protein sources  4. Bariatric Plate Method-  including lean/low fat protein at each meal, including a vegetable/fruit, and limiting carbohydrate intake to less than 25% of plate volume.     Instructions  1. Include 15-20gm protein at each meal, along with protein supplement/\"planned protein containing snack\" of 15-30gm protein, to reach goal of 60-80 gm protein daily.  2. Increase fluid intake to 64oz daily: choose plain or calorie/carbonation-free beverages.  3. Incorporate daily structured activity, 30-60 minutes most days of the " week  4. Recommended pt to start taking: Multi Vit + iron 2x/day, calcium citrate 400-600 mg 2x/day, 3664-1769 mcg of Sublingual B-12 daily, and 5000 IU Vitamin D3 daily. (MVI and calcium can be taken at the same time)  5. Read food labels more consistently: keeping total fat grams <10, total sugar grams <10, fiber >3gm per serving.  6. Increase vegetable/fruit intake, by having a vegetable or fruit with each meal daily.  7. Practice plate method: 1/2 plate lean/low fat protein source, vegetable/fruit, <25% of plate complex carbohydrates.  8. Separate fluids 30 minutes before/after meal times.  9. Practice eating off of smaller plates/bowls, chewing to applesauce consistency, taking 20-30 minutes to eat in a calm/relaxed environment without distractions of tv/email/cell phone.    Handouts provided:  None    Assessment/Plan:    Pt to follow up in 2 months with the bariatrician     Video-Visit Details    Type of service:  Video Visit    Video Start Time (time video started): 7:46 am    Video End Time (time video stopped): 8:11 am    Originating Location (pt. Location): Home        Distant Location (provider location):  Off-site    Mode of Communication:  Video Conference via [facility]    Physician has received verbal consent for a Video Visit from the patient? Yes    Rosalva Shin RD              Again, thank you for allowing me to participate in the care of your patient.        Sincerely,        Rosalva Shin RD

## 2023-04-11 ENCOUNTER — VIRTUAL VISIT (OUTPATIENT)
Dept: PSYCHIATRY | Facility: CLINIC | Age: 38
End: 2023-04-11
Attending: PSYCHOLOGIST
Payer: COMMERCIAL

## 2023-04-11 DIAGNOSIS — F43.22 ADJUSTMENT DISORDER WITH ANXIOUS MOOD: Primary | ICD-10-CM

## 2023-04-11 PROCEDURE — 90837 PSYTX W PT 60 MINUTES: CPT | Mod: VID | Performed by: STUDENT IN AN ORGANIZED HEALTH CARE EDUCATION/TRAINING PROGRAM

## 2023-04-11 NOTE — PROGRESS NOTES
"Video- Visit Details  Type of service:  video visit for mental health treatment.  Time of service:    Date:  04/11/2023    Video Start Time:  2:00        Video End Time:  2:57    Reason for video visit: COVID-19  Originating Site (patient location):  Patient's home  Distant Site (provider location):  Psychiatry Clinic  Mode of Communication:  Video Conference via AmWell    As the provider I attest to compliance with applicable laws and regulations related to telemedicine.    OUTPATIENT PSYCHOTHERAPY PROGRESS NOTE    Client Name: Lena Quesada   YOB: 1985 (37 year old)   Date of Service:  April 11, 2023  Time of Service: 2:00 (60 minutes)  Service Type(s): 45024 psychotherapy (53-60 min. with patient and/or family)    Diagnoses: Adjustment disorder with anxiety    Individuals Present:   Client attended alone    Treatment goal(s) being addressed:  1) Decreasing anxiety levels  2) Creating more positive and constructive thought patterns    Subjective:  Lena reports feeling \"good\" today. She visited family in [place] for [facility]. Discussed several different topics today:  -Anxiety \"pretty good, less intense\" and she thinks the medication is helping but continues to have generalized worries.  -Work is \"the same.\" Issues with condescending boss. Now doesn't dwell on things after work.   -Confidence in self and decision making: Learning to trust her gut, gave example about man she's dating with some red flags. She also trusted herself and valued her self worth in [facility] when her Aunt Rachael was treating Lena ybarra. She feels good about making that decision, enjoys feeling like she's in control of how she spends her time. Another example of this is talking less to the friend who pressured her to drink in [place].  -Core belief: \"I need to act/look a certain way for people to like me\" - relates back to friendships from high school. Notes that if she had to go back to high " "school, she would do what she wanted to do, speak her mind more, and befriend people that were genuine, be more involved, try to prevent weight loss \"to prevent everything that goes with it.\"     HW: Continue \"behavioral experiments\" - trust your gut instinct on when to agree/disagree with someone, and see how it goes.    Treatment:   CBT    Assessment and Progress:   Lena hopes to address negative thought patterns, and anxiety with weekly CBT. This week, we discussed anxiety, interpersonal challenges at work, confidence, and ongoing behavioral experiments related to anxiety and negative thinking patterns. Lena was encouraged to continue \"behavioral experiments\" and trusting her gut instinct on when to agree/disagree with someone.    No safety concerns including SI, SIB, and HI.    Plan:   -HW as above  -Next therapy appointment has been scheduled for next week to continue work on treatment goals.    Psychotherapy services during this visit included myself and the patient. Patient agrees with treatment plan. Discussed case with supervisor who also agreed with the treatment plan. Unable to sign in person due to visit being conducted through telehealth.     Diana Genao MD  PGY-2 Psychiatry Resident    I did not see this pt directly. This pt was discussed with me in individual psychotherapy supervision, and I agree with the plan as documented.     Donna Burns, Ph.D., L.P.  "

## 2023-04-14 ENCOUNTER — VIRTUAL VISIT (OUTPATIENT)
Dept: PSYCHIATRY | Facility: CLINIC | Age: 38
End: 2023-04-14
Attending: PSYCHOLOGIST
Payer: COMMERCIAL

## 2023-04-14 DIAGNOSIS — F43.22 ADJUSTMENT DISORDER WITH ANXIOUS MOOD: Primary | ICD-10-CM

## 2023-04-14 DIAGNOSIS — F41.9 ANXIETY: ICD-10-CM

## 2023-04-14 PROCEDURE — 90791 PSYCH DIAGNOSTIC EVALUATION: CPT | Mod: VID | Performed by: PSYCHOLOGIST

## 2023-04-18 ENCOUNTER — VIRTUAL VISIT (OUTPATIENT)
Dept: PSYCHIATRY | Facility: CLINIC | Age: 38
End: 2023-04-18
Attending: PSYCHOLOGIST
Payer: COMMERCIAL

## 2023-04-18 DIAGNOSIS — F43.22 ADJUSTMENT DISORDER WITH ANXIOUS MOOD: Primary | ICD-10-CM

## 2023-04-18 PROCEDURE — 90837 PSYTX W PT 60 MINUTES: CPT | Mod: VID | Performed by: STUDENT IN AN ORGANIZED HEALTH CARE EDUCATION/TRAINING PROGRAM
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
## 2023-04-20 ENCOUNTER — VIRTUAL VISIT (OUTPATIENT)
Dept: PSYCHIATRY | Facility: CLINIC | Age: 38
End: 2023-04-20
Attending: PSYCHOLOGIST
Payer: COMMERCIAL

## 2023-04-20 DIAGNOSIS — F32.1 CURRENT MODERATE EPISODE OF MAJOR DEPRESSIVE DISORDER WITHOUT PRIOR EPISODE (H): Primary | ICD-10-CM

## 2023-04-20 PROCEDURE — 90853 GROUP PSYCHOTHERAPY: CPT | Mod: 52 | Performed by: PSYCHOLOGIST

## 2023-04-20 NOTE — PROGRESS NOTES
[facility]iatry Clinic     Video Visit Details: Group therapy 75 min  Type of service: video visit for group psychotherapy  Date of service: Apr 20, 2023  Video Start Time:  10:30 am      Video End Time:  11:45 am    Reason for video visit: COVID-19 and patient convenience  Originating Site (patient location):  Patient's home  Distant Site (provider location):  On site  Mode of Communication:  Video Conference via Zoom                         Group Psychotherapy: Number of participants: 2 ; 2 other group memver clled the [facility]     Group Facilitators: Aislinn Bailey, Gisselle Madera and Bella Lea     Diagnosis:   Encounter Diagnosis   Name Primary?     Current moderate episode of major depressive disorder without prior episode (H) Yes        Reviewed homework and what was left over from last session.     HW: N/A, new to group.        Living situation: Lives alone with no pets.   Birth order: Youngest of 2, brother is 5 years older.   Hobbies: Enjoys sports.   Goals: Reduce anxiety, change mindset in regards to body image and negativity. Also interested in making friends.       Issues discussed in group:      - Anxiety with triggers that are perceived as benign and stress is out of proportion. Shared coping through contingency planning. Framed as a form of control.   - Individual therapy: experiences that different group members have had. Sharing therapist with another group member, she was okay with this and offered reassurance to other group member.   - Eating disorders: Shared experience of continued struggles with body image even after weight loss.       Assessment: Lena arrived on time to the session and was actively engaged in the discussion.  We spent much of the time discussing individual therapy and anxiety. She was also supportive and understanding with other group members.      Homework: Make it to next group.       Unfinished business: Sensitivity to rejection  (Having to act or wear an emotional mask).       Plan: Continue group therapy weekly to work on goals. See treatment plan for detail.

## 2023-04-24 ENCOUNTER — VIRTUAL VISIT (OUTPATIENT)
Dept: PSYCHIATRY | Facility: CLINIC | Age: 38
End: 2023-04-24
Attending: NURSE PRACTITIONER
Payer: COMMERCIAL

## 2023-04-24 DIAGNOSIS — F41.9 ANXIETY: ICD-10-CM

## 2023-04-24 PROCEDURE — 99214 OFFICE O/P EST MOD 30 MIN: CPT | Mod: VID | Performed by: NURSE PRACTITIONER

## 2023-04-24 RX ORDER — SERTRALINE HYDROCHLORIDE 25 MG/1
TABLET, FILM COATED ORAL
Qty: 30 TABLET | Refills: 1 | Status: SHIPPED | OUTPATIENT
Start: 2023-04-24 | End: 2023-06-19
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
## 2023-04-24 NOTE — NURSING NOTE
Is the patient currently in the state of [place]? YES    Visit mode:VIDEO    If the visit is dropped, the patient can be reconnected by: VIDEO VISIT: Send to e-mail at: [email or web address]    Will anyone else be joining the visit? NO      How would you like to obtain your AVS? MyChart    Are changes needed to the allergy or medication list? NO    Reason for visit: Video Visit (Follow up)    Cony WEBER

## 2023-04-25 ENCOUNTER — VIRTUAL VISIT (OUTPATIENT)
Dept: PSYCHIATRY | Facility: CLINIC | Age: 38
End: 2023-04-25
Attending: PSYCHOLOGIST
Payer: COMMERCIAL

## 2023-04-25 DIAGNOSIS — F43.22 ADJUSTMENT DISORDER WITH ANXIOUS MOOD: Primary | ICD-10-CM

## 2023-04-25 PROCEDURE — 90837 PSYTX W PT 60 MINUTES: CPT | Mod: VID | Performed by: STUDENT IN AN ORGANIZED HEALTH CARE EDUCATION/TRAINING PROGRAM
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
## 2023-05-02 ENCOUNTER — VIRTUAL VISIT (OUTPATIENT)
Dept: PSYCHIATRY | Facility: CLINIC | Age: 38
End: 2023-05-02
Attending: PSYCHOLOGIST
Payer: COMMERCIAL

## 2023-05-02 DIAGNOSIS — F43.22 ADJUSTMENT DISORDER WITH ANXIOUS MOOD: Primary | ICD-10-CM

## 2023-05-02 PROCEDURE — 90837 PSYTX W PT 60 MINUTES: CPT | Mod: VID | Performed by: STUDENT IN AN ORGANIZED HEALTH CARE EDUCATION/TRAINING PROGRAM

## 2023-05-02 NOTE — PROGRESS NOTES
"Video- Visit Details  Type of service:  video visit for mental health treatment.  Time of service:    Date:  05/02/2023    Video Start Time:  2:00        Video End Time:  2:55    Reason for video visit: COVID-19  Originating Site (patient location):  Patient's home  Distant Site (provider location):  Psychiatry Clinic  Mode of Communication:  Video Conference via [employer]Well    As the provider I attest to compliance with applicable laws and regulations related to telemedicine.    OUTPATIENT PSYCHOTHERAPY PROGRESS NOTE    Client Name: Lena Quesada   YOB: 1985 (37 year old)   Date of Service:  May 2, 2023  Time of Service: 2:00 (60 minutes)  Service Type(s): 05114 psychotherapy (53-60 min. with patient and/or family)    Diagnoses: Adjustment disorder with anxiety    Individuals Present:   Client attended alone    Treatment goal(s) being addressed:  1) Decreasing anxiety levels  2) Creating more positive and constructive thought patterns    Subjective:  -Group therapy: Declined, feeling good about having \"stuck to my guns\" and communicated how she was feeling   -Discussed anxious planning (ex. Details about an upcoming business trip) and the discomfort with feeling out of control. Lena relates this back to the lack of control she used to have about eating. She also notes that her Mom modeled this kind of extensive planning growing up. Lena's goal is to \"be okay with not knowing what the plan is.\" As a starting exercise, she's unsure of something she could try not planning.   -Mom told her that her new clothes look and fit better than her old clothes, which she took as a compliment. New clothes give her tangible evidence of improvement, but continues to have dysmorphic feelings.   -Things have been good with mom.    -Work has been great, boss was out last week. New metric rolled out, so far going well.   -General anxiety: better, less worries about things she used to worry about before (someone coming " into her apartment, her car being okay, etc ). Attributes this mainly to sertraline.    HW: notice and record instances when you notice yourself planning excessively    Treatment:   CBT    Assessment and Progress:   Lena hopes to address negative thought patterns, and anxiety with weekly CBT. She continues to work on challenging negative thought patterns, self-worth, and anxiety. Anxiety overall improving, especially since starting Zoloft. No safety concerns including SI, SIB, and HI.    Plan:   -Continue to work on challenging unhelpful thoughts and behaviors  -Next therapy appointment has been scheduled for 2 weeks to continue work on treatment goals.    Psychotherapy services during this visit included myself and the patient. Patient agrees with treatment plan. Discussed case with supervisor who also agreed with the treatment plan. Unable to sign in person due to visit being conducted through telehealth.     Diana Genao MD  PGY-2 Psychiatry Resident    I did not see this pt directly. This pt was discussed with me in individual psychotherapy supervision, and I agree with the plan as documented.     Donna Burns, Ph.D., L.P.

## 2023-05-14 ENCOUNTER — HEALTH MAINTENANCE LETTER (OUTPATIENT)
Age: 38
End: 2023-05-14

## 2023-05-16 ENCOUNTER — VIRTUAL VISIT (OUTPATIENT)
Dept: PSYCHIATRY | Facility: CLINIC | Age: 38
End: 2023-05-16
Attending: PSYCHOLOGIST
Payer: COMMERCIAL

## 2023-05-16 DIAGNOSIS — F43.22 ADJUSTMENT DISORDER WITH ANXIOUS MOOD: Primary | ICD-10-CM

## 2023-05-16 PROCEDURE — 90837 PSYTX W PT 60 MINUTES: CPT | Mod: VID | Performed by: STUDENT IN AN ORGANIZED HEALTH CARE EDUCATION/TRAINING PROGRAM
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
## 2023-05-23 ENCOUNTER — VIRTUAL VISIT (OUTPATIENT)
Dept: PSYCHIATRY | Facility: CLINIC | Age: 38
End: 2023-05-23
Attending: PSYCHOLOGIST
Payer: COMMERCIAL

## 2023-05-23 DIAGNOSIS — F43.22 ADJUSTMENT DISORDER WITH ANXIOUS MOOD: Primary | ICD-10-CM

## 2023-05-23 PROCEDURE — 90837 PSYTX W PT 60 MINUTES: CPT | Mod: U7 | Performed by: STUDENT IN AN ORGANIZED HEALTH CARE EDUCATION/TRAINING PROGRAM
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
## 2023-05-30 ENCOUNTER — VIRTUAL VISIT (OUTPATIENT)
Dept: PSYCHIATRY | Facility: CLINIC | Age: 38
End: 2023-05-30
Attending: PSYCHOLOGIST
Payer: COMMERCIAL

## 2023-05-30 DIAGNOSIS — F43.22 ADJUSTMENT DISORDER WITH ANXIOUS MOOD: Primary | ICD-10-CM

## 2023-05-30 PROCEDURE — 90837 PSYTX W PT 60 MINUTES: CPT | Mod: VID | Performed by: STUDENT IN AN ORGANIZED HEALTH CARE EDUCATION/TRAINING PROGRAM
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
## 2023-06-06 ENCOUNTER — VIRTUAL VISIT (OUTPATIENT)
Dept: PSYCHIATRY | Facility: CLINIC | Age: 38
End: 2023-06-06
Attending: PSYCHOLOGIST
Payer: COMMERCIAL

## 2023-06-06 DIAGNOSIS — F43.22 ADJUSTMENT DISORDER WITH ANXIOUS MOOD: Primary | ICD-10-CM

## 2023-06-06 PROCEDURE — 90837 PSYTX W PT 60 MINUTES: CPT | Mod: VID | Performed by: STUDENT IN AN ORGANIZED HEALTH CARE EDUCATION/TRAINING PROGRAM
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
## 2023-06-13 ENCOUNTER — VIRTUAL VISIT (OUTPATIENT)
Dept: PSYCHIATRY | Facility: CLINIC | Age: 38
End: 2023-06-13
Attending: PSYCHOLOGIST
Payer: COMMERCIAL

## 2023-06-13 DIAGNOSIS — F43.22 ADJUSTMENT DISORDER WITH ANXIOUS MOOD: Primary | ICD-10-CM

## 2023-06-13 PROCEDURE — 90837 PSYTX W PT 60 MINUTES: CPT | Mod: VID | Performed by: STUDENT IN AN ORGANIZED HEALTH CARE EDUCATION/TRAINING PROGRAM

## 2023-06-13 NOTE — PROGRESS NOTES
"Video- Visit Details  Type of service:  video visit for mental health treatment.  Time of service:    Date:  06/13/2023    Video Start Time:  2:00       Video End Time:  2:54    Reason for video visit: COVID-19   Originating Site (patient location): Patient's home  Distant Site (provider location):  [facility]  Mode of Communication:  Video Conference via [employer]Well    As the provider I attest to compliance with applicable laws and regulations related to telemedicine.    OUTPATIENT PSYCHOTHERAPY PROGRESS NOTE    Client Name: Lena Quesada   YOB: 1985 (37 year old)   Date of Service:  June 13th, 2023  Time of Service: 2:00 (60 minutes)  Service Type(s): 97144 psychotherapy (53-60 min. with patient and/or family)    Diagnoses: Adjustment disorder with anxiety    Individuals Present:   Client attended alone    Treatment goal(s) being addressed:  1) Decreasing anxiety levels  2) Creating more positive and constructive thought patterns  3) Increasing self-esteem, self-confidence, and self-worth    Subjective:  -Mom coming for girls night tonight, looking forward to that. [place]   -Worked on \"How Low Self Esteem Begins\" and \"How Low Self Esteem is Maintained\" worksheets. During second worksheet, she noticed feelings of defensiveness and felt like the activity was judging and blaming her for her situation. Lena was tearful expressing that the negative experiences she's had related to her weight were not her fault. Lena noticed the instinct to disengage with the activity when it made her feels this way. She was open to exploring this feeling more, and \"sit in the discomfort.\" Discussed that this is true, that she did not do anything to deserve these judgments and self-doubts that were planted in her, AND only she can challenge these thoughts.     HW: Continue to think about topics discussed, add to worksheet as needed    Treatment:   CBT    Assessment and " Progress:   Lena hopes to address negative thought patterns, and anxiety with weekly CBT. She continues to work on challenging negative thought patterns, self-worth, and anxiety. She has been working hard at integrating themes discussed during sessions into everyday life, which seems to be really paying off. Anxiety overall improving, especially since starting Zoloft. No safety concerns including SI, SIB, and HI.    Plan:   -HW as above  -Next therapy appointment has been scheduled for 2 weeks to continue work on treatment goals.    Psychotherapy services during this visit included myself and the patient. Patient agrees with treatment plan. Discussed case with supervisor who also agreed with the treatment plan. Unable to sign in person due to visit being conducted through telehealth.     Diana Genao MD  PGY-2 Psychiatry Resident    I did not see this pt directly. This pt was discussed with me in individual psychotherapy supervision, and I agree with the plan as documented.     Donna Burns, Ph.D., L.P.

## 2023-06-19 ENCOUNTER — VIRTUAL VISIT (OUTPATIENT)
Dept: PSYCHIATRY | Facility: CLINIC | Age: 38
End: 2023-06-19
Attending: NURSE PRACTITIONER
Payer: COMMERCIAL

## 2023-06-19 DIAGNOSIS — F41.9 ANXIETY: ICD-10-CM

## 2023-06-19 PROCEDURE — 99214 OFFICE O/P EST MOD 30 MIN: CPT | Mod: VID | Performed by: NURSE PRACTITIONER

## 2023-06-19 RX ORDER — SERTRALINE HYDROCHLORIDE 25 MG/1
TABLET, FILM COATED ORAL
Qty: 90 TABLET | Refills: 0 | Status: SHIPPED | OUTPATIENT
Start: 2023-06-19 | End: 2023-09-18

## 2023-06-19 NOTE — PROGRESS NOTES
"Virtual Visit Details    Type of service:  Video Visit     Originating Location (pt. Location): Home  Distant Location (provider location):  Off-site  Platform used for Video Visit: [facility]ry [facility]  PSYCHIATRIC PROGRESS NOTE       Lena Quesada is a 37 year old female who prefers the name Lena and pronouns she, her.  Therapist: weekly with Dr. Genao  PCP: Jazz Abbott  Other Providers: None    Pertinent Background: Describes her childhood as steady, stable. Onset of bullying then anxiety then depression in middle school. G[place] \"[place] The biggest girl, I always asked myself who's going to say something today?\". Her ability to cope with judgment from her peers improved in high school. [place] more free to be herself in college. Bariatric surgery in March 2022.      Psych critical item history includes trauma history (bullying, social exclusion), emotional eating, body dysmorphia before and after bariatric surgery.     Interim History      The patient is a good historian and reports good treatment adherence.    Last seen on 4/24/2023 when she chose to continue sertraline 25mg daily    Describes her mood as good.  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[employer], she processes provider enrollment and in   - discussed her fears of job loss with her boss after hearing their metrics would double in early May  - working part time as a   - joined a nearby gym, getting out walking  - planning for a fall trip to [place] with her Mom  - coping skills include taking a moment, breathing, consider her response vs reacting, making a plan  - support from her parents  - enjoys watching sports, seeing " games in person ([employer]), being with her two nieces and one nephew and two friends, watching familiar shows  - planning to join a gym near her house                  .   RECENT PSYCH ROS:   Depression: endorses insignificant depression   Anxiety: reduced anxiety with therapy, sertraline; less worry overall, fewer worries about other's perceptions     ADVERSE SIDE EFFECTS: none  MEDICAL CONCERNS: no menstrual cycle since Dec 2022 though she senses the emotional aspects of her cycle monthly, considers panniculectomy.      APPETITE: following bariatric surgery in Mar 2022, she lost 100# before surgery, 282# in March 2023, estimates weighing about 255-260# in the last 6 weeks  - she'd like to lose to 200#   - participates with Weight Watchers     SLEEP: sleeping 7-8 hours, no naps, vivid dreams continue       RECENT SUBSTANCE USE:     Alcohol- limits alcohol, disliked how she felt with a beer over the last weekend  Caffeine- avoids, dislikes coffee, no soda since 3/2022, prefers water    Social/ Family History                   FINANCIAL SUPPORT- working at [employer] in  for provider enrollment, part time, she's a consultant for [employer]  FAMILY/ CHILDREN/ RELATIONSHIPS- no kids, never        LIVING SITUATION- lives alone   LEGAL- None     EARLY HISTORY/ EDUCATION- born and raised in [place], [place]. Born to  parents. She is youngest of two, brother Sidney (b. 1980). Graduated with BS in physical education (K-12) with coaching minor from [employer]. Enrolled in Masters classes in business, exploring career options, through [employer].     CULTURAL/ SOCIAL/ SPIRITUAL SUPPORT- support from her parents, identifies as Baptist who has questions        TRAUMA HISTORY- extensive bullying  FEELS SAFE AT HOME- Yes  FAMILY MENTAL HEALTH HISTORY- PGM- anxiety, depression    Medical / Surgical History                                 Patient Active Problem List   Diagnosis     Morbid obesity (H)      Chondromalacia of patellofemoral joint     Morbid obesity due to excess calories (H)     Nexplanon in place        Past Surgical History:   Procedure Laterality Date     CHOLECYSTECTOMY  2005     LAPAROSCOPIC GASTRIC SLEEVE N/A 3/24/2022    Procedure: GASTRECTOMY, SLEEVE, LAPAROSCOPIC;  Surgeon: Bobby Rodriguez MD;  Location: [facility] [place] OR     TYMPANOPLASTY       WISDOM TOOTH EXTRACTION          Medical Review of Systems              A comprehensive review of systems was performed and is negative other than noted in the HPI.    Pregnant or breastfeeding- no      Contraception- nexplanon    Denies TBI/LOC, seizures.    Allergy    Patient has no known allergies.  Current Medications        Current Outpatient Medications   Medication Sig Dispense Refill     Alpha Lipoic Ac-Biotin-Keratin (BIOTIN-KERATIN-ALPHA LIPOIC AC PO) Take 1 capsule by mouth daily 10,000 mg       Calcium Citrate-Vitamin D (CALCIUM CITRATE + PO) Take 1 chew tab by mouth daily 600mg       Cholecalciferol (VITAMIN D-3) 125 MCG (5000 UT) TABS Take 1 tablet by mouth daily       Cyanocobalamin 5000 MCG SUBL Place 1 tablet under the tongue once a week       omeprazole (PRILOSEC) 20 MG DR capsule Take 1 capsule (20 mg) by mouth daily for 180 days 90 capsule 1     Pediatric Multivitamins-Iron (CHEWABLE BRADLEY/IRON CHILDRENS PO) Take 1 tablet by mouth daily       Semaglutide-Weight Management (WEGOVY) 2.4 MG/0.75ML pen Inject 2.4 mg Subcutaneous every 7 days for 270 days 4 pens 9 mL 2     sertraline (ZOLOFT) 25 MG tablet Take one 25mg tab daily 30 tablet 1     Vitals          There were no vitals taken for this visit.   Mental Status Exam          Alertness: alert  and oriented  Appearance: adequately groomed  Behavior/Demeanor: cooperative, pleasant and calm, with good  eye contact   Speech: normal and regular rate and rhythm  Language: no problems  Psychomotor: normal or unremarkable  Mood: description consistent with euthymia  Affect: full range and  appropriate; was congruent to mood; was congruent to content  Thought Process/Associations: unremarkable  Thought Content:  Reports none;  Denies suicidal ideation, violent ideation, delusions, preoccupations, obsessions , phobia , magical thinking, over-valued ideas and paranoid ideation  Perception:  Reports none;  Denies auditory hallucinations, visual hallucinations, visual distortion seen as shadows , depersonalization and derealization  Insight: fair  Judgment: good  Cognition: (6) does  appear grossly intact; formal cognitive testing was not done  Gait/Station and/or Muscle Strength/Tone: N/A    Labs and Data                          Rating Scales:    N/A    PHQ9 Today:        3/27/2023     7:10 AM   PHQ   PHQ-9 Total Score 9   Q9: Thoughts of better off dead/self-harm past 2 weeks Not at all     Diagnosis      impression includes mild MDD, adjustment disorder with anxiety     Assessment          TODAY, the following items were reviewed:    : 3/2023    PSYCHOTROPIC DRUG INTERACTIONS:    None with sertraline, Wegovy, Nexplanon, Prilosec     MANAGEMENT:  Monitoring for adverse effects, routine vitals, using lowest therapeutic dose of [psychotropics] and patient is aware of risks    Plan                                                                                                                   1) she chooses to continue sertraline 25mg daily  2) active in weekly therapy with Dr. Genao    RTC: 12 weeks, sooner as needed    CRISIS NUMBERS:   Provided routinely in AVS.    Treatment Risk Statement:  The patient understands the risks, benefits, adverse effects and alternatives. Agrees to treatment with the capacity to do so. No medical contraindications to treatment. Agrees to call clinic for any problems. The patient understands to call 911 or go to the nearest ED if life threatening or urgent symptoms occur.     WHODAS 2.0  TODAY total score = N/A; [a 12-item WHODAS 2.0 assessment was not completed by the  pt today and/or recorded in EPIC].    PROVIDER:  CATERINA Matos CNP

## 2023-06-21 ENCOUNTER — VIRTUAL VISIT (OUTPATIENT)
Dept: SURGERY | Facility: CLINIC | Age: 38
End: 2023-06-21
Payer: COMMERCIAL

## 2023-06-21 VITALS — HEIGHT: 67 IN | BODY MASS INDEX: 40.49 KG/M2 | WEIGHT: 258 LBS

## 2023-06-21 DIAGNOSIS — Z98.84 S/P LAPAROSCOPIC SLEEVE GASTRECTOMY: ICD-10-CM

## 2023-06-21 DIAGNOSIS — E66.01 CLASS 3 SEVERE OBESITY DUE TO EXCESS CALORIES WITH SERIOUS COMORBIDITY AND BODY MASS INDEX (BMI) OF 40.0 TO 44.9 IN ADULT (H): Primary | ICD-10-CM

## 2023-06-21 DIAGNOSIS — E66.813 CLASS 3 SEVERE OBESITY DUE TO EXCESS CALORIES WITH SERIOUS COMORBIDITY AND BODY MASS INDEX (BMI) OF 40.0 TO 44.9 IN ADULT (H): Primary | ICD-10-CM

## 2023-06-21 PROCEDURE — 99214 OFFICE O/P EST MOD 30 MIN: CPT | Mod: VID | Performed by: EMERGENCY MEDICINE

## 2023-06-21 NOTE — PROGRESS NOTES
Lena Quesada is 37 year old  female who presents for a billable video visit today.    How would you like to obtain your AVS? MyChart  If dropped from the video visit, the video invitation should be resent by: Text to cell phone: [phone]  Will anyone else be joining your video visit? No      Video Start Time: 8:30am    Are there any specific questions or needs that you would like addressed at your visit today? Patient finished with Prilosec and is wondering if should continue. Also does not feel Wegovy being as effective as it was previously. Patient notes that she is frustrated.    Provider Notes:   Bariatric Follow Up Visit with a History of Previous Bariatric Surgery     Date of visit: 6/21/2023  Physician: Wai Anders MD, MD  Primary Care Provider:  Jazz Abbott NAM Quesada   37 year old  female    Date of Surgery: 3/24/23  Initial Weight: 399 lbs  Initial BMI: 62.5  Today's Weight:   Wt Readings from Last 1 Encounters:   06/21/23 117 kg (258 lb)     Weight history:   Wt Readings from Last 4 Encounters:   06/21/23 117 kg (258 lb)   03/09/23 128.2 kg (282 lb 9.6 oz)   09/08/22 140 kg (308 lb 11.2 oz)   04/01/22 (!) 155.3 kg (342 lb 6.4 oz)      Body mass index is 40.41 kg/m .      Assessment and Plan     Assessment: Lena is a 37 year old year old female who is 15 months s/p  Sleeve Gastrectomy with Dr. Rodriguez.  She's following up on medication support/progress today and shows good continued weight reduction as noted above, down another 24 lbs from March visit and now down 141 lbs from preoperative weight, a 35.3% total body weight reduction.  Wegovy 2.4mg/week has been tolerated well and efficacious. We'll continue to use through this year.    Lena Quesada feels as if she in on track to  achieve the goals she hoped to accomplish through bariatric surgery and weight loss.    Encounter Diagnoses   Name Primary?     Class 3 severe obesity due to excess calories with serious  "comorbidity and body mass index (BMI) of 40.0 to 44.9 in adult (H) Yes     S/P laparoscopic sleeve gastrectomy          Current Outpatient Medications:      Alpha Lipoic Ac-Biotin-Keratin (BIOTIN-KERATIN-ALPHA LIPOIC AC PO), Take 1 capsule by mouth daily 10,000 mg, Disp: , Rfl:      Calcium Citrate-Vitamin D (CALCIUM CITRATE + PO), Take 1 chew tab by mouth daily 600mg, Disp: , Rfl:      Cholecalciferol (VITAMIN D-3) 125 MCG (5000 UT) TABS, Take 1 tablet by mouth daily, Disp: , Rfl:      Cyanocobalamin 5000 MCG SUBL, Place 1 tablet under the tongue once a week, Disp: , Rfl:      Pediatric Multivitamins-Iron (CHEWABLE BRADLEY/IRON CHILDRENS PO), Take 1 tablet by mouth daily, Disp: , Rfl:      Semaglutide-Weight Management (WEGOVY) 2.4 MG/0.75ML pen, Inject 2.4 mg Subcutaneous every 7 days for 270 days 4 pens, Disp: 9 mL, Rfl: 2     sertraline (ZOLOFT) 25 MG tablet, Take one 25mg tab daily, Disp: 90 tablet, Rfl: 0     omeprazole (PRILOSEC) 20 MG DR capsule, Take 1 capsule (20 mg) by mouth daily for 180 days (Patient not taking: Reported on 6/21/2023), Disp: 90 capsule, Rfl: 1    Plan:    Return in about 4 months (around 10/21/2023) for Follow up, with me.    Bariatric Surgery Review     Interim History/LifeChanges: \"Logic brain understands stalls in weight loss happens\".  Has been in the 256-260 lb range.  Wegovy helped break through 280s this spring.   Tracking protein regularly and getting close to 80grams/day. Has now joined gym and is doing resistance training followed by treadmill.   Protein shake//bars supplement.  Sedentary job, will get up, sometimes gets urges/snacks. Nuts/string cheese.   Workouts are 4-6 days weekly.  Reviewed balanced diet approach and she's going to follow up this Sept w/ Dietician to discuss how it's going as she's been a bit too fearful of healthy carbohydrates and would do well to have more diversity in her diet.    Patient Concerns: checking in, plateau.  Appetite (1-10): good  GERD: no.  " "  Reviewed whether any need/indication for screening EGD today and we will deferred.  Typically, a screening EGD is recommend post op year 2-3 if no symptoms to assess health of esophagus/bariatric surgery and sooner if difficult to control GERD or persistent pain/dysphagia sx despite behavior modification.    Medication changes: no    Vitamin Intake:   B-12   yes   MVI  yes   Vitamin D  yes   Calcium   yes     Other  n/a              LABS: will be due in 7 months or so.     Nausea no  Vomiting no  Constipation no  Diarrhea no  Rashes no        Most recent labs:  Lab Results   Component Value Date    WBC 6.4 03/03/2023    HGB 12.9 03/03/2023    HCT 38.6 03/03/2023    MCV 91 03/03/2023    PLT 76 (L) 03/03/2023     Lab Results   Component Value Date    CHOL 145 09/10/2022     Lab Results   Component Value Date    HDL 56 09/10/2022     No components found for: LDLCALC  Lab Results   Component Value Date    TRIG 87 09/10/2022     No results found for: CHOLHDL  Lab Results   Component Value Date     (H) 03/03/2023    AST 83 (H) 03/03/2023    ALKPHOS 295 (H) 03/03/2023     No results found for: HGBA1C  Lab Results   Component Value Date    B12 1,073 03/03/2023     No components found for: VITDT1  Lab Results   Component Value Date    BHAKTI 177 (H) 03/03/2023     Lab Results   Component Value Date    PTHI 28 03/03/2023     Lab Results   Component Value Date    ZN 43.9 (L) 03/03/2023     Lab Results   Component Value Date    VIB1WB 181 (H) 03/03/2023     Lab Results   Component Value Date    TSH 2.10 06/11/2021     No results found for: TEST    Habits:      Exercise Routine: see above, regularly going  3 meals/day? yes  Protein 60-80g/day? yes  Water Separate from meals? yes  Calorie Containing Beverages: rare apart from protein shakes  Restaurant eating/wk: n/a  Sleep Habits:  n/a  CPAP Use? n/a  Contraception: n/a  DEXA:n/a.  Discussed annual screening to start at age 45 and continue to age 55 if scoring \"low risk\". " DEXA scan recommended at age 55 regardless as long as at least 2 years have transpired from their bariatric surgery.    Social History     Social History     Socioeconomic History     Marital status: Single     Spouse name: Not on file     Number of children: Not on file     Years of education: Not on file     Highest education level: Not on file   Occupational History     Not on file   Tobacco Use     Smoking status: Never     Smokeless tobacco: Never   Vaping Use     Vaping Use: Never used   Substance and Sexual Activity     Alcohol use: Not Currently     Drug use: Never     Sexual activity: Yes     Partners: Male     Birth control/protection: Condom   Other Topics Concern     Parent/sibling w/ CABG, MI or angioplasty before 65F 55M? Not Asked   Social History Narrative     Not on file     Social Determinants of Health     Financial Resource Strain: Not on file   Food Insecurity: Not on file   Transportation Needs: Not on file   Physical Activity: Not on file   Stress: Not on file   Social Connections: Not on file   Intimate Partner Violence: Not on file   Housing Stability: Not on file       Past Medical History     Past Medical History:   Diagnosis Date     Arthritis     chondromalacia of knee (right). injections.     Cholelithiasis      Chondromalacia of patellofemoral joint      Morbid obesity (H) 12/16/2020     Morbid obesity due to excess calories (H) 3/24/2022     Nexplanon in place  9/8/2022    Remove within 5 years, 9/2027     Past Surgical History:   Procedure Laterality Date     CHOLECYSTECTOMY  2005     LAPAROSCOPIC GASTRIC SLEEVE N/A 3/24/2022    Procedure: GASTRECTOMY, SLEEVE, LAPAROSCOPIC;  Surgeon: Bobby Rodrigeuz MD;  Location: [facility] [place] OR     TYMPANOPLASTY       WISDOM TOOTH EXTRACTION         Problem List     Patient Active Problem List   Diagnosis     Morbid obesity (H)     Chondromalacia of patellofemoral joint     Morbid obesity due to excess calories (H)     Nexplanon in place   "    Medications     [unfilled]  Surgical History     Past Surgical History  She has a past surgical history that includes Tympanoplasty; [place] Tooth Extraction; Cholecystectomy (); and Laparoscopic gastric sleeve (N/A, 3/24/2022).    Objective-Exam     Constitutional:  Ht 1.702 m (5' 7\")   Wt 117 kg (258 lb)   BMI 40.41 kg/m    [unfilled]   General:  Pleasant and in no acute distress   Eyes:  EOMI  ENT:  Airway patent, thin neck    Neck:  Respiratory: Normal respiratory effort, no cough, .  CV:    Gastrointestinal:   Musculoskeletal: muscle mass WNL  Skin: color without pallor,  hair thick/black,   Psychiatric: alert and oriented X3, mood and affect normal    Counseling     We reviewed the important post op bariatric recommendations:  -eating 3 meals daily  -eating protein first, getting >60gm protein daily  -eating slowly, chewing food well  -avoiding/limiting calorie containing beverages  -drinking water 15-30 minutes before or after meals  -limiting restaurant or cafeteria eating to twice a week or less    Discussed fueling workouts and diversity of diet with protein anchoring meal nutrition.    Wai Anders MD    [facility].  2023  8:34 AM  [phone] (clinic phone)  [phone] (fax)    No images are attached to the encounter.  Medical Decision Makin minutes spent by me on the date of the encounter doing chart review, history and exam, documentation and further activities per the note      Video-Visit Details    Type of service:  Video Visit    Platform used for Video Visit: [employer]    Video End Time (time video stopped): 9:01 AM    Originating Location (pt. Location): Home        Distant Location (provider location):  On-site    Distant Location (provider location):  [facility] SURGERY [facility] AND BARIATRICS CARE [facility]"

## 2023-06-21 NOTE — PATIENT INSTRUCTIONS
Plan:  Down over 35% from our introductory visit, 141 lbs and about to drop down another risk category of excess weight. Amazing job!  [place] meal in protein first but having some diversity of nutrients/complex carbohydrates and whole grains are reasonable and encouraged given your extensive workout regimen.  Continue great use of the gym  Wegovy 2.4mg/week tolerated well and efficacious. I anticipate this plateau to break soon. Anticipate using through December '23.  Follow up with dietician as planned in Sept and with me in October    .

## 2023-06-21 NOTE — LETTER
6/21/2023         RE: Lena Quesada  [address] [place] [address] [place] [address]        Dear Colleague,    Thank you for referring your patient, Lena Quesada, to the [facility] [place]. Please see a copy of my visit note below.    Lena Quesada is 37 year old  female who presents for a billable video visit today.    How would you like to obtain your AVS? MyChart  If dropped from the video visit, the video invitation should be resent by: Text to cell phone: [phone]  Will anyone else be joining your video visit? No      Video Start Time: 8:30am    Are there any specific questions or needs that you would like addressed at your visit today? Patient finished with Prilosec and is wondering if should continue. Also does not feel Wegovy being as effective as it was previously. Patient notes that she is frustrated.    Provider Notes:   Bariatric Follow Up Visit with a History of Previous Bariatric Surgery     Date of visit: 6/21/2023  Physician: Wai Anders MD, MD  Primary Care Provider:  Jazz Abbott  Lena Quesada   37 year old  female    Date of Surgery: 3/24/23  Initial Weight: 399 lbs  Initial BMI: 62.5  Today's Weight:   Wt Readings from Last 1 Encounters:   06/21/23 117 kg (258 lb)     Weight history:   Wt Readings from Last 4 Encounters:   06/21/23 117 kg (258 lb)   03/09/23 128.2 kg (282 lb 9.6 oz)   09/08/22 140 kg (308 lb 11.2 oz)   04/01/22 (!) 155.3 kg (342 lb 6.4 oz)      Body mass index is 40.41 kg/m .      Assessment and Plan     Assessment: Lena is a 37 year old year old female who is 15 months s/p  Sleeve Gastrectomy with Dr. Rodriguez.  She's following up on medication support/progress today and shows good continued weight reduction as noted above, down another 24 lbs from March visit and now down 141 lbs from preoperative weight, a 35.3% total body weight reduction.  Wegovy 2.4mg/week has been tolerated well  "and efficacious. We'll continue to use through this year.    Lena Quesada feels as if she in on track to  achieve the goals she hoped to accomplish through bariatric surgery and weight loss.    Encounter Diagnoses   Name Primary?     Class 3 severe obesity due to excess calories with serious comorbidity and body mass index (BMI) of 40.0 to 44.9 in adult (H) Yes     S/P laparoscopic sleeve gastrectomy          Current Outpatient Medications:      Alpha Lipoic Ac-Biotin-Keratin (BIOTIN-KERATIN-ALPHA LIPOIC AC PO), Take 1 capsule by mouth daily 10,000 mg, Disp: , Rfl:      Calcium Citrate-Vitamin D (CALCIUM CITRATE + PO), Take 1 chew tab by mouth daily 600mg, Disp: , Rfl:      Cholecalciferol (VITAMIN D-3) 125 MCG (5000 UT) TABS, Take 1 tablet by mouth daily, Disp: , Rfl:      Cyanocobalamin 5000 MCG SUBL, Place 1 tablet under the tongue once a week, Disp: , Rfl:      Pediatric Multivitamins-Iron (CHEWABLE BRADLEY/IRON CHILDRENS PO), Take 1 tablet by mouth daily, Disp: , Rfl:      Semaglutide-Weight Management (WEGOVY) 2.4 MG/0.75ML pen, Inject 2.4 mg Subcutaneous every 7 days for 270 days 4 pens, Disp: 9 mL, Rfl: 2     sertraline (ZOLOFT) 25 MG tablet, Take one 25mg tab daily, Disp: 90 tablet, Rfl: 0     omeprazole (PRILOSEC) 20 MG DR capsule, Take 1 capsule (20 mg) by mouth daily for 180 days (Patient not taking: Reported on 6/21/2023), Disp: 90 capsule, Rfl: 1    Plan:    Return in about 4 months (around 10/21/2023) for Follow up, with me.    Bariatric Surgery Review     Interim History/LifeChanges: \"Logic brain understands stalls in weight loss happens\".  Has been in the 256-260 lb range.  Wegovy helped break through 280s this spring.   Tracking protein regularly and getting close to 80grams/day. Has now joined gym and is doing resistance training followed by treadmill.   Protein shake//bars supplement.  Sedentary job, will get up, sometimes gets urges/snacks. Nuts/string cheese.   Workouts are 4-6 days " weekly.  Reviewed balanced diet approach and she's going to follow up this Sept w/ Dietician to discuss how it's going as she's been a bit too fearful of healthy carbohydrates and would do well to have more diversity in her diet.    Patient Concerns: checking in, plateau.  Appetite (1-10): good  GERD: no.    Reviewed whether any need/indication for screening EGD today and we will deferred.  Typically, a screening EGD is recommend post op year 2-3 if no symptoms to assess health of esophagus/bariatric surgery and sooner if difficult to control GERD or persistent pain/dysphagia sx despite behavior modification.    Medication changes: no    Vitamin Intake:   B-12   yes   MVI  yes   Vitamin D  yes   Calcium   yes     Other  n/a              LABS: will be due in 7 months or so.     Nausea no  Vomiting no  Constipation no  Diarrhea no  Rashes no        Most recent labs:  Lab Results   Component Value Date    WBC 6.4 03/03/2023    HGB 12.9 03/03/2023    HCT 38.6 03/03/2023    MCV 91 03/03/2023    PLT 76 (L) 03/03/2023     Lab Results   Component Value Date    CHOL 145 09/10/2022     Lab Results   Component Value Date    HDL 56 09/10/2022     No components found for: LDLCALC  Lab Results   Component Value Date    TRIG 87 09/10/2022     No results found for: CHOLHDL  Lab Results   Component Value Date     (H) 03/03/2023    AST 83 (H) 03/03/2023    ALKPHOS 295 (H) 03/03/2023     No results found for: HGBA1C  Lab Results   Component Value Date    B12 1,073 03/03/2023     No components found for: VITDT1  Lab Results   Component Value Date    BHAKTI 177 (H) 03/03/2023     Lab Results   Component Value Date    PTHI 28 03/03/2023     Lab Results   Component Value Date    ZN 43.9 (L) 03/03/2023     Lab Results   Component Value Date    VIB1WB 181 (H) 03/03/2023     Lab Results   Component Value Date    TSH 2.10 06/11/2021     No results found for: TEST    Habits:      Exercise Routine: see above, regularly going  3 meals/day?  "yes  Protein 60-80g/day? yes  Water Separate from meals? yes  Calorie Containing Beverages: rare apart from protein shakes  Restaurant eating/wk: n/a  Sleep Habits:  n/a  CPAP Use? n/a  Contraception: n/a  DEXA:n/a.  Discussed annual screening to start at age 45 and continue to age 55 if scoring \"low risk\". DEXA scan recommended at age 55 regardless as long as at least 2 years have transpired from their bariatric surgery.    Social History     Social History     Socioeconomic History     Marital status: Single     Spouse name: Not on file     Number of children: Not on file     Years of education: Not on file     Highest education level: Not on file   Occupational History     Not on file   Tobacco Use     Smoking status: Never     Smokeless tobacco: Never   Vaping Use     Vaping Use: Never used   Substance and Sexual Activity     Alcohol use: Not Currently     Drug use: Never     Sexual activity: Yes     Partners: Male     Birth control/protection: Condom   Other Topics Concern     Parent/sibling w/ CABG, MI or angioplasty before 65F 55M? Not Asked   Social History Narrative     Not on file     Social Determinants of Health     Financial Resource Strain: Not on file   Food Insecurity: Not on file   Transportation Needs: Not on file   Physical Activity: Not on file   Stress: Not on file   Social Connections: Not on file   Intimate Partner Violence: Not on file   Housing Stability: Not on file       Past Medical History     Past Medical History:   Diagnosis Date     Arthritis     chondromalacia of knee (right). injections.     Cholelithiasis      Chondromalacia of patellofemoral joint      Morbid obesity (H) 12/16/2020     Morbid obesity due to excess calories (H) 3/24/2022     Nexplanon in place  9/8/2022    Remove within 5 years, 9/2027     Past Surgical History:   Procedure Laterality Date     CHOLECYSTECTOMY  2005     LAPAROSCOPIC GASTRIC SLEEVE N/A 3/24/2022    Procedure: GASTRECTOMY, SLEEVE, LAPAROSCOPIC;  " "Surgeon: Bobby Rodriguez MD;  Location: [facility] OR     TYMPANOPLASTY       WISDOM TOOTH EXTRACTION         Problem List     Patient Active Problem List   Diagnosis     Morbid obesity (H)     Chondromalacia of patellofemoral joint     Morbid obesity due to excess calories (H)     Nexplanon in place      Medications     [unfilled]  Surgical History     Past Surgical History  She has a past surgical history that includes Tympanoplasty; Ethel Tooth Extraction; Cholecystectomy (); and Laparoscopic gastric sleeve (N/A, 3/24/2022).    Objective-Exam     Constitutional:  Ht 1.702 m (5' 7\")   Wt 117 kg (258 lb)   BMI 40.41 kg/m    [unfilled]   General:  Pleasant and in no acute distress   Eyes:  EOMI  ENT:  Airway patent, thin neck    Neck:  Respiratory: Normal respiratory effort, no cough, .  CV:    Gastrointestinal:   Musculoskeletal: muscle mass WNL  Skin: color without pallor,  hair thick/black,   Psychiatric: alert and oriented X3, mood and affect normal    Counseling     We reviewed the important post op bariatric recommendations:  -eating 3 meals daily  -eating protein first, getting >60gm protein daily  -eating slowly, chewing food well  -avoiding/limiting calorie containing beverages  -drinking water 15-30 minutes before or after meals  -limiting restaurant or cafeteria eating to twice a week or less    Discussed fueling workouts and diversity of diet with protein anchoring meal nutrition.    Wai Anders MD    [facility].  2023  8:34 AM  [phone] (clinic phone)  [phone] (fax)    No images are attached to the encounter.  Medical Decision Makin minutes spent by me on the date of the encounter doing chart review, history and exam, documentation and further activities per the note      Video-Visit Details    Type of service:  Video Visit    Platform used for Video Visit: [employer]    Video End Time (time video stopped): 9:01 AM    Originating Location " (pt. Location): Home        Distant Location (provider location):  On-site    Distant Location (provider location):  [facility] SURGERY [facility] AND BARIATRICS [facility]       Again, thank you for allowing me to participate in the care of your patient.        Sincerely,        Wai Anders MD

## 2023-06-27 ENCOUNTER — VIRTUAL VISIT (OUTPATIENT)
Dept: PSYCHIATRY | Facility: CLINIC | Age: 38
End: 2023-06-27
Attending: PSYCHOLOGIST
Payer: COMMERCIAL

## 2023-06-27 DIAGNOSIS — F43.22 ADJUSTMENT DISORDER WITH ANXIOUS MOOD: Primary | ICD-10-CM

## 2023-06-27 PROCEDURE — 90837 PSYTX W PT 60 MINUTES: CPT | Mod: VID | Performed by: STUDENT IN AN ORGANIZED HEALTH CARE EDUCATION/TRAINING PROGRAM

## 2023-06-27 NOTE — PROGRESS NOTES
"Video- Visit Details  Type of service:  video visit for mental health treatment.  Time of service:    Date:  06/27/2023    Video Start Time:  1:59       Video End Time:  2:52    Reason for video visit: COVID-19   Originating Site (patient location): Patient's home  Distant Site (provider location):  [facility]  Mode of Communication:  Video Conference via [employer]    As the provider I attest to compliance with applicable laws and regulations related to telemedicine.    OUTPATIENT PSYCHOTHERAPY PROGRESS NOTE    Client Name: Lena Quesada   YOB: 1985 (37 year old)   Date of Service:  June 27th, 2023  Time of Service: 2:00 (60 minutes)  Service Type(s): 05370 psychotherapy (53-60 min. with patient and/or family)    Diagnoses: Adjustment disorder with anxiety    Individuals Present:   Client attended alone    Treatment goal(s) being addressed:  1) Decreasing anxiety levels  2) Creating more positive and constructive thought patterns  3) Increasing self-esteem, self-confidence, and self-worth    Subjective:  -Dad won $15,000 at [employer]  -Has been seeing family, went to movies, [place] [employer], her bday coming up 7/5  -Positive worksheet started, plans to continue before next session  -Discussed dynamics with friend who can be annoying, invalidating  -Discussed validation (internal and external) in sharing her story, efforts, and changes on social media    HW: Complete \"Positive Qualities Record\" worksheet    Treatment:   CBT    Assessment and Progress:   Lena hopes to address negative thought patterns, and anxiety with weekly CBT. She continues to work on challenging negative thought patterns, self-worth, and anxiety. She has been working hard at integrating themes discussed during sessions into everyday life, which seems to be really paying off. Anxiety overall improving, especially since starting Zoloft. No safety concerns including SI, SIB, and HI.    Plan:   -HW as above  -Next " therapy appointment has been scheduled for 2 weeks to continue work on treatment goals.     Psychotherapy services during this visit included myself and the patient. Patient agrees with treatment plan. Discussed case with supervisor who also agreed with the treatment plan. Unable to sign in person due to visit being conducted through telehealth.     Diana Genao MD  PGY-2 Psychiatry Resident    I did not see this pt directly. This pt was discussed with me in individual psychotherapy supervision, and I agree with the plan as documented.     Donna Burns, Ph.D., L.P.

## 2023-07-14 ENCOUNTER — VIRTUAL VISIT (OUTPATIENT)
Dept: PSYCHIATRY | Facility: CLINIC | Age: 38
End: 2023-07-14
Attending: PSYCHOLOGIST
Payer: COMMERCIAL

## 2023-07-14 DIAGNOSIS — F43.22 ADJUSTMENT DISORDER WITH ANXIOUS MOOD: Primary | ICD-10-CM

## 2023-07-14 PROCEDURE — 90837 PSYTX W PT 60 MINUTES: CPT | Mod: VID | Performed by: STUDENT IN AN ORGANIZED HEALTH CARE EDUCATION/TRAINING PROGRAM

## 2023-07-14 NOTE — PROGRESS NOTES
"Video- Visit Details  Type of service:  video visit for mental health treatment.  Time of service:    Date:  07/14/2023    Video Start Time:  8:08       Video End Time:  9:04    Reason for video visit: COVID-19   Originating Site (patient location): Patient's home  Distant Site (provider location):  [facility]  Mode of Communication:  Video Conference via [employer]Well    As the provider I attest to compliance with applicable laws and regulations related to telemedicine.    OUTPATIENT PSYCHOTHERAPY PROGRESS NOTE    Client Name: Lena Quesada   YOB: 1985 (38 years old)   Date of Service:  July 14th, 2023  Time of Service: 2:00 (60 minutes)  Service Type(s): 49555 psychotherapy (53-60 min. with patient and/or family)    Diagnoses: Adjustment disorder with anxiety    Individuals Present:   Client attended alone    Treatment goal(s) being addressed:  1) Decreasing anxiety levels  2) Creating more positive and constructive thought patterns  3) Increasing self-esteem, self-confidence, and self-worth    Subjective:  -Last few weeks were good. Had a nice time with family over the fourth of July.  -Positive qualities worksheet mostly completed. She was able to identify many things she likes about herself and has a lot of qualities she'd want in a friend. That said, she still thinks her confidence/self esteem could use some work. It's still so closely tied to weight. She has noticed that she's dwelling less on negative thoughts, and is finding some more things that she likes about herself. Is also noticing that she's more outspoken and sticking up for herself more. Doesn't want to spend energy anymore on people who don't have the same qualities. States \"I don't love myself yet, but I'm getting there.\"   -Food used to be coping strategy. Now, relies on friends, family, and therapy. Feels she's been more open with supports lately.   -Open to dating again. Feeling good about sticking to what she's looking for and " "speaking out about it. At times, exhausting.   -Doesn't want to acknowledge wt loss achievements so far, feels like \"I'm not done yet.\"    -8/14 week: Finds out about leadership program at work    Treatment:   CBT    Assessment and Progress:   Lena hopes to address negative thought patterns, and anxiety with weekly CBT. She continues to work on challenging negative thought patterns, self-worth, and anxiety. She has been working hard at integrating themes discussed during sessions into everyday life, which seems to be really paying off. Anxiety overall improving, especially since starting Zoloft. No safety concerns including SI, SIB, and HI.    Plan:   -HW as above  -Next therapy appointment has been scheduled for next week to continue work on treatment goals.     Psychotherapy services during this visit included myself and the patient. Patient agrees with treatment plan. Discussed case with supervisor who also agreed with the treatment plan. Unable to sign in person due to visit being conducted through telehealth.     Diana Genao MD  PGY-2 Psychiatry Resident    I did not see this pt directly. This pt was discussed with me in individual psychotherapy supervision, and I agree with the plan as documented.     Donna Burns, Ph.D., L.P.  "

## 2023-07-21 ENCOUNTER — VIRTUAL VISIT (OUTPATIENT)
Dept: PSYCHIATRY | Facility: CLINIC | Age: 38
End: 2023-07-21
Attending: PSYCHOLOGIST
Payer: COMMERCIAL

## 2023-07-21 DIAGNOSIS — F43.22 ADJUSTMENT DISORDER WITH ANXIOUS MOOD: Primary | ICD-10-CM

## 2023-07-21 PROCEDURE — 90837 PSYTX W PT 60 MINUTES: CPT | Mod: U7 | Performed by: STUDENT IN AN ORGANIZED HEALTH CARE EDUCATION/TRAINING PROGRAM
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
## 2023-07-28 ENCOUNTER — VIRTUAL VISIT (OUTPATIENT)
Dept: PSYCHIATRY | Facility: CLINIC | Age: 38
End: 2023-07-28
Attending: PSYCHOLOGIST
Payer: COMMERCIAL

## 2023-07-28 DIAGNOSIS — F43.22 ADJUSTMENT DISORDER WITH ANXIOUS MOOD: Primary | ICD-10-CM

## 2023-07-28 PROCEDURE — 90837 PSYTX W PT 60 MINUTES: CPT | Mod: VID | Performed by: STUDENT IN AN ORGANIZED HEALTH CARE EDUCATION/TRAINING PROGRAM

## 2023-07-28 NOTE — PROGRESS NOTES
Video- Visit Details  Type of service:  video visit for mental health treatment.  Time of service:    Date:  07/28/2023    Video Start Time:  8:10       Video End Time:  9:04    Reason for video visit: COVID-19   Originating Site (patient location): Patient's home  Distant Site (provider location):  On-Site - [facility]  Mode of Communication:  Video Conference via [employer]Well    As the provider I attest to compliance with applicable laws and regulations related to telemedicine.    OUTPATIENT PSYCHOTHERAPY PROGRESS NOTE    Client Name: Lena Quesada   YOB: 1985 (38 years old)   Date of Service:  July 28, 2023  Time of Service: 2:00 (60 minutes)  Service Type(s): 21768 psychotherapy (53-60 min. with patient and/or family)    Diagnoses: Adjustment disorder with anxiety    Individuals Present:   Client attended alone    Treatment goal(s) being addressed:  1) Decreasing anxiety levels  2) Creating more positive and constructive thought patterns  3) Increasing self-esteem, self-confidence, and self-worth    Subjective:  -Busy week at work.   -Starting to wake up at 3am, unsure why  -For most of the visit, discussed her desire to not congratulate herself or enjoy the progress she's made until she's at her goal weight. Ultimately, she fears this would decrease motivation.   -Lena feels she's learning to decouple weight on the scale from self worth. For example, appreciating how body composition changes independently of the scale  -Self confidence still difficult. Hard to accept compliments (questioning authenticity, awkward, doesn't want to accept praise and lose motivation)  -Discussed black and white thinking, encouraged to embrace the gray. For example, not going to the gym when mentally and emotionally exhausted.     Treatment:   CBT    Treatment Plan          SYMPTOMS; PROBLEMS   MEASURABLE GOALS;    FUNCTIONAL IMPROVEMENT INTERVENTIONS;   GAINS MADE DISCHARGE CRITERIA   Low self-esteem  Improved self-confidence, intrinsic sense of self-worth, self-love CBT therapy No longer bothersome   Negative thought patterns Ability to recognize and redirect negative thought patterns CBT therapy More positive, constructive thinking     Date of most recent treatment plan: July 28, 2023  Date next treatment plan due: 3 months    Psychotherapy services during this visit included myself and the patient. Discussed case with supervisor who also agreed with the treatment plan. Unable to sign in person due to visit being conducted through telehealth due to COVID-19.       Assessment and Progress:   Lena hopes to address negative thought patterns, and anxiety with weekly CBT. She continues to work on challenging negative thought patterns, self-worth, and anxiety. She has been working hard at integrating themes discussed during sessions into everyday life, which seems to be really paying off. Anxiety overall improving, especially since starting Zoloft. No safety concerns including SI, SIB, and HI.    Plan:   -Next therapy appointment has been scheduled for next week to continue work on treatment goals.     Psychotherapy services during this visit included myself and the patient. Patient agrees with treatment plan. Discussed case with supervisor who also agreed with the treatment plan. Unable to sign in person due to visit being conducted through telehealth.     Diana Genao MD  PGY-2 Psychiatry Resident    I did not see this pt directly. This pt was discussed with me in individual psychotherapy supervision, and I agree with the plan as documented.     Donna Burns, Ph.D., L.P.

## 2023-08-04 ENCOUNTER — VIRTUAL VISIT (OUTPATIENT)
Dept: PSYCHIATRY | Facility: CLINIC | Age: 38
End: 2023-08-04
Attending: PSYCHOLOGIST
Payer: COMMERCIAL

## 2023-08-04 DIAGNOSIS — F43.22 ADJUSTMENT DISORDER WITH ANXIOUS MOOD: Primary | ICD-10-CM

## 2023-08-04 PROCEDURE — 90837 PSYTX W PT 60 MINUTES: CPT | Mod: VID | Performed by: STUDENT IN AN ORGANIZED HEALTH CARE EDUCATION/TRAINING PROGRAM
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
## 2023-08-10 NOTE — PROGRESS NOTES
Video- Visit Details  Type of service:  video visit for mental health treatment.  Time of service:  Date:  08/11/2023  Video Start Time:  8:09       Video End Time:  9:01    Reason for video visit: COVID-19   Originating Site (patient location): Patient's home  Distant Site (provider location):  On-Site - [facility]  Mode of Communication:  Video Conference via AmWell    As the provider I attest to compliance with applicable laws and regulations related to telemedicine.    OUTPATIENT PSYCHOTHERAPY PROGRESS NOTE    Client Name: Lena Quesada   YOB: 1985 (38 years old)   Date of Service: August 11, 2023  Time of Service: 2:00 (60 minutes)  Service Type(s): 427820 psychotherapy (52 min. with patient and/or family)    Diagnoses: Adjustment disorder with anxiety    Individuals Present:   Client attended alone    Treatment goal(s) being addressed:  1) Decreasing anxiety levels  2) Creating more positive and constructive thought patterns  3) Increasing self-esteem, self-confidence, and self-worth    Subjective:  -Took today off from work, going to her parents for the weekend. This week a lot of stress at work, more stress with supervisor. Working on applying. Discussed how job stress affects MH.   -Mom has a hard time understanding how past events still affect people in the present. Discussed identifying when she's needing a mom vs needing a friend.  -Still has fear of judgement from others.   -Interested in psychodynamic therapy in the future    Next steps: Continue to process past trauma (primarily bullying/exclusion/rejection related to weight)    Treatment:   CBT    Treatment Plan          SYMPTOMS; PROBLEMS   MEASURABLE GOALS;    FUNCTIONAL IMPROVEMENT INTERVENTIONS;   GAINS MADE DISCHARGE CRITERIA   Low self-esteem Improved self-confidence, intrinsic sense of self-worth, self-love CBT therapy No longer bothersome   Negative thought patterns Ability to recognize and redirect negative  thought patterns CBT therapy More positive, constructive thinking     Date of most recent treatment plan: July 28, 2023  Date next treatment plan due: 10/28/23    Psychotherapy services during this visit included myself and the patient. Discussed case with supervisor who also agreed with the treatment plan. Unable to sign in person due to visit being conducted through telehealth due to COVID-19.     Assessment and Progress:   Lena hopes to address negative thought patterns, and anxiety with weekly CBT, and at some point psychodynamic therapy. She continues to work on challenging negative thought patterns, self-worth, and anxiety. She has been working hard at integrating themes discussed during sessions into everyday life, which seems to be really paying off. Anxiety overall improving, especially since starting Zoloft. No safety concerns including SI, SIB, and HI.    Plan:   -Next therapy appointment has been scheduled for next week to continue work on treatment goals.     Psychotherapy services during this visit included myself and the patient. Patient agrees with treatment plan. Discussed case with supervisor who also agreed with the treatment plan. Unable to sign in person due to visit being conducted through telehealth.     Diana Genao MD  PGY-2 Psychiatry Resident    I did not see this pt directly. This pt was discussed with me in individual psychotherapy supervision, and I agree with the plan as documented.     Donna Burns, Ph.D., L.P.

## 2023-08-11 ENCOUNTER — VIRTUAL VISIT (OUTPATIENT)
Dept: PSYCHIATRY | Facility: CLINIC | Age: 38
End: 2023-08-11
Attending: PSYCHOLOGIST
Payer: COMMERCIAL

## 2023-08-11 DIAGNOSIS — F43.22 ADJUSTMENT DISORDER WITH ANXIOUS MOOD: Primary | ICD-10-CM

## 2023-08-11 PROCEDURE — 90834 PSYTX W PT 45 MINUTES: CPT | Mod: VID | Performed by: STUDENT IN AN ORGANIZED HEALTH CARE EDUCATION/TRAINING PROGRAM

## 2023-08-18 ENCOUNTER — VIRTUAL VISIT (OUTPATIENT)
Dept: PSYCHIATRY | Facility: CLINIC | Age: 38
End: 2023-08-18
Attending: PSYCHOLOGIST
Payer: COMMERCIAL

## 2023-08-18 DIAGNOSIS — F43.22 ADJUSTMENT DISORDER WITH ANXIOUS MOOD: Primary | ICD-10-CM

## 2023-08-18 PROCEDURE — 90837 PSYTX W PT 60 MINUTES: CPT | Mod: VID | Performed by: STUDENT IN AN ORGANIZED HEALTH CARE EDUCATION/TRAINING PROGRAM
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
## 2023-08-24 NOTE — PROGRESS NOTES
Video- Visit Details  Type of service:  video visit for mental health treatment.  Time of service:  Date:  08/25/2023  Video Start Time:  8:14      Video End Time:  9:08    Reason for video visit: COVID-19   Originating Site (patient location): Patient's home  Distant Site (provider location):  On-Site - [facility]  Mode of Communication:  Video Conference via AmWell    As the provider I attest to compliance with applicable laws and regulations related to telemedicine.    OUTPATIENT PSYCHOTHERAPY PROGRESS NOTE    Client Name: Lena Quesada   YOB: 1985 (38 years old)   Date of Service: 08/25/2023  Time of Service: 8:10 (60 minutes)  Service Type(s): 2739878 psychotherapy (53+ min. with patient and/or family)    Diagnoses: Adjustment disorder with anxiety    Individuals Present:   Client attended alone    Treatment goal(s) being addressed:  1) Decreasing anxiety levels  2) Creating more positive and constructive thought patterns  3) Increasing self-esteem, self-confidence, and self-worth    Subjective:  -Starting to feel a little more anxious lately. Wondering if sertraline dose should be increased. Thinks stress from work, not getting leadership thing might be contributing, unsure about when COLA raise may come, concerned about job security. Discussed displacement of work stress onto other areas of her life.   -Feeling more confident, enjoying going to the gym.  -Had a 1:1 meeting with her boss last week, feels proud of herself for communicating openly about feeling micromanaged. Feels like she listened and took things constructively.   -Tired of feeling like she's carrying the team on her back. Still looking for other jobs  -No dating updates. Discussed what she's looking for - stability, a family  -No updates on friend, Maty. Haven't been talking as much.    Next steps: Continue to process past trauma (primarily bullying/exclusion/rejection related to weight)    Treatment:    CBT    Treatment Plan          SYMPTOMS; PROBLEMS   MEASURABLE GOALS;    FUNCTIONAL IMPROVEMENT INTERVENTIONS;   GAINS MADE DISCHARGE CRITERIA   Low self-esteem Improved self-confidence, intrinsic sense of self-worth, self-love CBT therapy No longer bothersome   Negative thought patterns Ability to recognize and redirect negative thought patterns CBT therapy More positive, constructive thinking     Date of most recent treatment plan: July 28, 2023  Date next treatment plan due: 10/28/23    Psychotherapy services during this visit included myself and the patient. Discussed case with supervisor who also agreed with the treatment plan. Unable to sign in person due to visit being conducted through telehealth due to COVID-19.     Assessment and Progress:   Lena hopes to address negative thought patterns, and anxiety with weekly CBT, and at some point psychodynamic therapy. She continues to work on challenging negative thought patterns, self-worth, and anxiety. She has been working hard at integrating themes discussed during sessions into everyday life, which seems to be really paying off. Anxiety has overall improved since starting Zoloft, but now seems to be increasing a bit. Lena plans to follow up with her psychiatrist about this. No safety concerns including SI, SIB, and HI.    Plan:   -Next therapy appointment has been scheduled for next week to continue work on treatment goals.     Psychotherapy services during this visit included myself and the patient. Patient agrees with treatment plan. Discussed case with supervisor who also agreed with the treatment plan. Unable to sign in person due to visit being conducted through telehealth.     Diana Genao MD  PGY-2 Psychiatry Resident    I did not see this pt directly. This pt was discussed with me in individual psychotherapy supervision, and I agree with the plan as documented.     Donna Burns, Ph.D., L.P.

## 2023-08-25 ENCOUNTER — VIRTUAL VISIT (OUTPATIENT)
Dept: PSYCHIATRY | Facility: CLINIC | Age: 38
End: 2023-08-25
Attending: PSYCHOLOGIST
Payer: COMMERCIAL

## 2023-08-25 DIAGNOSIS — F43.22 ADJUSTMENT DISORDER WITH ANXIOUS MOOD: Primary | ICD-10-CM

## 2023-08-25 PROCEDURE — 90837 PSYTX W PT 60 MINUTES: CPT | Mod: VID | Performed by: STUDENT IN AN ORGANIZED HEALTH CARE EDUCATION/TRAINING PROGRAM

## 2023-09-01 ENCOUNTER — VIRTUAL VISIT (OUTPATIENT)
Dept: PSYCHIATRY | Facility: CLINIC | Age: 38
End: 2023-09-01
Attending: PSYCHOLOGIST
Payer: COMMERCIAL

## 2023-09-01 DIAGNOSIS — F41.1 GENERALIZED ANXIETY DISORDER: Primary | ICD-10-CM

## 2023-09-01 PROCEDURE — 90834 PSYTX W PT 45 MINUTES: CPT | Mod: VID | Performed by: STUDENT IN AN ORGANIZED HEALTH CARE EDUCATION/TRAINING PROGRAM

## 2023-09-01 NOTE — PROGRESS NOTES
Video- Visit Details  Type of service:  video visit for mental health treatment.  Time of service:  Date:  09/01/2023  Video Start Time:  8:10      Video End Time:  9:02    Reason for video visit: COVID-19   Originating Site (patient location): Patient's home  Distant Site (provider location):  On-Site - [facility]  Mode of Communication:  Video Conference via AmWell    As the provider I attest to compliance with applicable laws and regulations related to telemedicine.    OUTPATIENT PSYCHOTHERAPY PROGRESS NOTE    Client Name: Lena Quesada   YOB: 1985 (38 years old)   Date of Service: 09/01/2023  Time of Service: 8:10 (60 minutes)  Service Type(s): [place] PSYCHOTHERAPY W PATIENT 38-52 MINUTES [[id number]]     Diagnoses: DAVID    Individuals Present:   Client attended alone    Treatment goal(s) being addressed:  1) Decreasing anxiety levels  2) Creating more positive and constructive thought patterns  3) Increasing self-esteem, self-confidence, and self-worth    Subjective:  -Concerned about weight gain from sertraline, which she heard about on a commercial. Found conflicting information about this online.  -Constantly wondering why she's not losing weight. Has lost 15 lbs since April. Really frustrated. Looking for a reason why when she feels like she's doing all of the right things.  -Bariatric doctor has said that the surgery has done as much as it can do, and that remaining weight will be lost due to diet and exercise. She's really frustrated with how long this stall has lasted.   -Wants to be okay with where she's at now. Struggling to be in the moment and not worry about big picture, future worries.    Brainstormed things that contribute to present happiness:  -Doing what she wants to do - less people pleasing  -More self-care: hygiene, exercise, feeling emotions,   -Wants to find friends and a , but also enjoying independence and doesn't want to feel like she needs these things to  be happy.     HW: Think about what would make life fulfilling now, not big-picture in the future.     Treatment:   CBT    Treatment Plan          SYMPTOMS; PROBLEMS   MEASURABLE GOALS;    FUNCTIONAL IMPROVEMENT INTERVENTIONS;   GAINS MADE DISCHARGE CRITERIA   Low self-esteem Improved self-confidence, intrinsic sense of self-worth, self-love CBT therapy No longer bothersome   Negative thought patterns Ability to recognize and redirect negative thought patterns CBT therapy More positive, constructive thinking     Date of most recent treatment plan: July 28, 2023  Date next treatment plan due: 10/28/23    Psychotherapy services during this visit included myself and the patient. Discussed case with supervisor who also agreed with the treatment plan. Unable to sign in person due to visit being conducted through telehealth due to COVID-19.     Assessment and Progress:   Lena hopes to address negative thought patterns, and anxiety with weekly CBT, and at some point psychodynamic therapy. She continues to work on challenging negative thought patterns, self-worth, and anxiety. She has been working hard at integrating themes discussed during sessions into everyday life, which seems to be really paying off. Anxiety has overall improved since starting Zoloft, but now seems to be increasing a bit. Lena plans to follow up with her psychiatrist about this. No safety concerns including SI, SIB, and HI.    Plan:   -Next therapy appointment has been scheduled for next week to continue work on treatment goals.     Psychotherapy services during this visit included myself and the patient. Patient agrees with treatment plan. Discussed case with supervisor who also agreed with the treatment plan. Unable to sign in person due to visit being conducted through telehealth.     Diana Genao MD  PGY-2 Psychiatry Resident    I did not see this pt directly. This pt was discussed with me in individual psychotherapy supervision, and I agree  with the plan as documented.     Donna Burns, Ph.D., L.P.

## 2023-09-07 NOTE — PROGRESS NOTES
"Video- Visit Details  Type of service:  video visit for mental health treatment  Time of service:  Date:  09/08/2023  Video Start Time:  8:10      Video End Time:  9:03    Reason for video visit: COVID-19   Originating Site (patient location): Patient's home  Distant Site (provider location):  On-Site - [facility]  Mode of Communication:  Video Conference via AmWell    As the provider I attest to compliance with applicable laws and regulations related to telemedicine.    OUTPATIENT PSYCHOTHERAPY PROGRESS NOTE    Client Name: Lena Quesada   YOB: 1985 (38 years old)   Date of Service: 09/01/2023  Time of Service: 8:10 (60 minutes)  Service Type(s): [place] PSYCHOTHERAPY W PATIENT 53 OR > MINUTES [[id number]]     Diagnoses: DAVID    Individuals Present:   Client attended alone    Treatment goal(s) being addressed:  1) Decreasing anxiety levels  2) Creating more positive and constructive thought patterns  3) Increasing self-esteem, self-confidence, and self-worth    Subjective:  -Thought the reactions of others would be the most important, now prioritizing her own   -Feels like she's taking back power that she had previously given to others   -how to be happy along the journey, not just the destination  -talking more about weight stigma  -starting to gradually break down defense mode    At goal weight:  -Confidence  -Feeling like she fits in: Will tell by conversations of others. Shopping in \"normal\" stores. Scale down. Compliments from others.     HW: Think about what would make life fulfilling now, not big-picture in the future.     Treatment:   CBT    Treatment Plan          SYMPTOMS; PROBLEMS   MEASURABLE GOALS;    FUNCTIONAL IMPROVEMENT INTERVENTIONS;   GAINS MADE DISCHARGE CRITERIA   Low self-esteem Improved self-confidence, intrinsic sense of self-worth, self-love CBT therapy No longer bothersome   Negative thought patterns Ability to recognize and redirect negative thought patterns CBT therapy " More positive, constructive thinking     Date of most recent treatment plan: July 28, 2023  Date next treatment plan due: 10/28/23    Psychotherapy services during this visit included myself and the patient. Discussed case with supervisor who also agreed with the treatment plan. Unable to sign in person due to visit being conducted through telehealth due to COVID-19.     Assessment and Progress:   Lena hopes to address negative thought patterns, and anxiety with weekly CBT, and at some point psychodynamic therapy. She continues to work on challenging negative thought patterns, self-worth, and anxiety. She has been working hard at integrating themes discussed during sessions into everyday life, which seems to be really paying off. Anxiety has overall improved since starting Zoloft, but now seems to be increasing a bit. Lena plans to follow up with her psychiatrist about this. No safety concerns including SI, SIB, and HI.    Plan:   -Next therapy appointment has been scheduled for next week to continue work on treatment goals.     Psychotherapy services during this visit included myself and the patient. Patient agrees with treatment plan. Discussed case with supervisor who also agreed with the treatment plan. Unable to sign in person due to visit being conducted through telehealth.     Diana Genao MD  PGY-3 Psychiatry Resident    I did not see this pt directly. This pt was discussed with me in individual psychotherapy supervision, and I agree with the plan as documented.     Donna Burns, Ph.D., L.P.

## 2023-09-08 ENCOUNTER — VIRTUAL VISIT (OUTPATIENT)
Dept: PSYCHIATRY | Facility: CLINIC | Age: 38
End: 2023-09-08
Attending: PSYCHOLOGIST
Payer: COMMERCIAL

## 2023-09-08 DIAGNOSIS — F41.1 GENERALIZED ANXIETY DISORDER: Primary | ICD-10-CM

## 2023-09-08 PROCEDURE — 90837 PSYTX W PT 60 MINUTES: CPT | Mod: VID | Performed by: STUDENT IN AN ORGANIZED HEALTH CARE EDUCATION/TRAINING PROGRAM

## 2023-09-15 ENCOUNTER — VIRTUAL VISIT (OUTPATIENT)
Dept: PSYCHIATRY | Facility: CLINIC | Age: 38
End: 2023-09-15
Attending: PSYCHOLOGIST
Payer: COMMERCIAL

## 2023-09-15 DIAGNOSIS — F41.1 GENERALIZED ANXIETY DISORDER: Primary | ICD-10-CM

## 2023-09-15 PROCEDURE — 90837 PSYTX W PT 60 MINUTES: CPT | Mod: VID | Performed by: STUDENT IN AN ORGANIZED HEALTH CARE EDUCATION/TRAINING PROGRAM

## 2023-09-18 ENCOUNTER — VIRTUAL VISIT (OUTPATIENT)
Dept: PSYCHIATRY | Facility: CLINIC | Age: 38
End: 2023-09-18
Attending: NURSE PRACTITIONER
Payer: COMMERCIAL

## 2023-09-18 DIAGNOSIS — F41.9 ANXIETY: ICD-10-CM

## 2023-09-18 PROCEDURE — 99214 OFFICE O/P EST MOD 30 MIN: CPT | Mod: VID | Performed by: NURSE PRACTITIONER

## 2023-09-18 ASSESSMENT — PAIN SCALES - GENERAL: PAINLEVEL: NO PAIN (0)

## 2023-09-18 NOTE — NURSING NOTE
Is the patient currently in the state of [place]? Yes    Visit mode:VIDEO    If the visit is dropped, the patient can be reconnected by: VIDEO VISIT: Send to e-mail at: [email or web address]    Will anyone else be joining the visit? NO  (If patient encounters technical issues they should call [phone]    How would you like to obtain your AVS? MyChart    Are changes needed to the allergy or medication list? No    Reason for visit: RACHEAL CHEN

## 2023-09-18 NOTE — PROGRESS NOTES
"Virtual Visit Details    Type of service:  Video Visit     Originating Location (pt. Location): Home  Distant Location (provider location):  Off-site  Platform used for Video Visit: [facility]  Psychiatry Clinic  PSYCHIATRIC PROGRESS NOTE       Lena Quesada is a 38 year old female who prefers the name Lena and pronouns she, her.  Therapist: weekly with Dr. Genao  PCP: Jazz Abbott  Other Providers: None    Pertinent Background: Describes her childhood as steady, stable. Onset of bullying then anxiety then depression in middle school. G[place] \"[place] The biggest girl, I always asked myself who's going to say something today?\". Her ability to cope with judgment from her peers improved in high school. [place] more free to be herself in college. Bariatric surgery in March 2022.      Psych critical item history includes trauma history (bullying, social exclusion), emotional eating, body dysmorphia before and after bariatric surgery.     Interim History      The patient is a good historian and reports good treatment adherence.    Last seen on 6/19/2023 when she chose to continue sertraline 25mg daily    Describes her mood as OK.  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[employer], she processes provider enrollment and in   - she's been told she's a top  on her team  - applying for other jobs within the state but she knows each posting can get 100s of applicants  - getting to the gym most days, she's losing inches and building muscle   - coping skills include taking a moment, breathing, consider her response vs reacting, making a plan  - support from her parents, she's growing her social network  - enjoys watching sports, seeing games in person (Timberwolves " games), her two nieces and one nephew and two friends, watching familiar shows                 .   RECENT PSYCH ROS:   Depression: denies significant depression symptoms  Anxiety:utilizing therapy skills effectively, she's managing anxiety with finances and strong budgeting skills, fewer worries about other's perceptions     ADVERSE SIDE EFFECTS: low libido  MEDICAL CONCERNS: her menstrual cycle is consistent, she considers panniculectomy.      APPETITE: with Wegovy, she's eating 900 calories daily after bariatric surgery, 258# in June 2023, 282# in March 2023  - bariatric surgery in Mar 2022  - she'd like to lose to 200#      SLEEP: follows evening routine including meds and shower before bed, sleeping 8-9 hours, vivid dreams continue       RECENT SUBSTANCE USE:     Alcohol- limits alcohol  Caffeine- avoids, dislikes coffee, might drink a 12oz soda 4-5x annually, prefers water    Social/ Family History                   FINANCIAL SUPPORT- working at [employer] in  for provider enrollment, part time, she's a consultant for [employer]  FAMILY/ CHILDREN/ RELATIONSHIPS- no kids, never        LIVING SITUATION- lives alone   LEGAL- None     EARLY HISTORY/ EDUCATION- born and raised in [place], [place]. Born to  parents. She is youngest of two, brother Sidney (b. 1980). Graduated with BS in physical education (K-12) with coaching minor from [employer]. Enrolled in Masters classes in business, exploring career options, through [employer].     CULTURAL/ SOCIAL/ SPIRITUAL SUPPORT- support from her parents, identifies as Latter day who has questions        TRAUMA HISTORY- extensive bullying  FEELS SAFE AT HOME- Yes  FAMILY MENTAL HEALTH HISTORY- PGM- anxiety, depression    Medical / Surgical History                                 Patient Active Problem List   Diagnosis    Morbid obesity (H)    Chondromalacia of patellofemoral joint    Morbid obesity due to excess calories (H)    Nexplanon in place        Past  Surgical History:   Procedure Laterality Date    CHOLECYSTECTOMY  2005    LAPAROSCOPIC GASTRIC SLEEVE N/A 3/24/2022    Procedure: GASTRECTOMY, SLEEVE, LAPAROSCOPIC;  Surgeon: Bobby Rodriguez MD;  Location: [facility] [place] [facility]    TYMPANOPLASTY      WISDOM TOOTH EXTRACTION        Medical Review of Systems              A comprehensive review of systems was performed and is negative other than noted in the HPI.    Pregnant or breastfeeding- no      Contraception- nexplanon    Denies TBI/LOC, seizures.    Allergy    Patient has no known allergies.  Current Medications        Current Outpatient Medications   Medication Sig Dispense Refill    Alpha Lipoic Ac-Biotin-Keratin (BIOTIN-KERATIN-ALPHA LIPOIC AC PO) Take 1 capsule by mouth daily 10,000 mg      Calcium Citrate-Vitamin D (CALCIUM CITRATE + PO) Take 1 chew tab by mouth daily 600mg      Cholecalciferol (VITAMIN D-3) 125 MCG (5000 UT) TABS Take 1 tablet by mouth daily      Cyanocobalamin 5000 MCG SUBL Place 1 tablet under the tongue once a week      Pediatric Multivitamins-Iron (CHEWABLE BRADLEY/IRON CHILDRENS PO) Take 1 tablet by mouth daily      Semaglutide-Weight Management (WEGOVY) 2.4 MG/0.75ML pen Inject 2.4 mg Subcutaneous every 7 days for 270 days 4 pens 9 mL 2    sertraline (ZOLOFT) 25 MG tablet Take one 25mg tab daily 90 tablet 0     Vitals          There were no vitals taken for this visit.   Mental Status Exam          Alertness: alert  and oriented  Appearance: adequately groomed  Behavior/Demeanor: cooperative, pleasant and calm, with good  eye contact   Speech: normal and regular rate and rhythm  Language: no problems  Psychomotor: normal or unremarkable  Mood: description consistent with euthymia  Affect: full range and appropriate; was congruent to mood; was congruent to content  Thought Process/Associations: unremarkable  Thought Content:  Reports none;  Denies suicidal ideation, violent ideation, delusions, preoccupations, obsessions , phobia , magical  thinking, over-valued ideas and paranoid ideation  Perception:  Reports none;  Denies auditory hallucinations, visual hallucinations, visual distortion seen as shadows , depersonalization and derealization  Insight: fair  Judgment: good  Cognition: (6) does  appear grossly intact; formal cognitive testing was not done  Gait/Station and/or Muscle Strength/Tone:  N/A    Labs and Data                          Rating Scales:    N/A    PHQ9 Today:        3/27/2023     7:10 AM   PHQ   PHQ-9 Total Score 9   Q9: Thoughts of better off dead/self-harm past 2 weeks Not at all     Diagnosis      impression includes mild MDD, adjustment disorder with anxiety     Assessment          TODAY, the following items were reviewed:    : 3/2023    PSYCHOTROPIC DRUG INTERACTIONS:    None with sertraline, Wegovy, Nexplanon, Prilosec     MANAGEMENT:  Monitoring for adverse effects, routine vitals, using lowest therapeutic dose of [psychotropics] and patient is aware of risks    Plan                                                                                                                   1) discussed options, risks, benefits including ASE profile, she chooses to trial increasing sertraline from 25mg to 50mg daily  2) active in weekly therapy with Dr. Genao    RTC: 4 weeks, sooner as needed    CRISIS NUMBERS:   Provided routinely in AVS.    Treatment Risk Statement:  The patient understands the risks, benefits, adverse effects and alternatives. Agrees to treatment with the capacity to do so. No medical contraindications to treatment. Agrees to call clinic for any problems. The patient understands to call 911 or go to the nearest ED if life threatening or urgent symptoms occur.     WHODAS 2.0  TODAY total score = N/A; [a 12-item WHODAS 2.0 assessment was not completed by the pt today and/or recorded in EPIC].    PROVIDER:  CATERINA Matos CNP

## 2023-09-18 NOTE — PATIENT INSTRUCTIONS
**For crisis resources, please see the information at the end of this document**   Patient Education    Thank you for coming to the [facility] [place] [facility].     Lab Testing:  If you had lab testing today and your results are reassuring or normal they will be mailed to you or sent through [employer] within 7 days. If the lab tests need quick action we will call you with the results. The phone number we will call with results is # [phone]. If this is not the best number please call our clinic and change the number.     Medication Refills:  If you need any refills please call your pharmacy and they will contact us. Our fax number for refills is [phone].   Three business days of notice are needed for general medication refill requests.   Five business days of notice are needed for controlled substance refill requests.   If you need to change to a different pharmacy, please contact the new pharmacy directly. The new pharmacy will help you get your medications transferred.     Contact Us:  Please call [phone] during business hours (8-5:00 M-F).   If you have medication related questions after clinic hours, or on the weekend, please call [phone].     Financial Assistance [phone]   Medical Records [phone]       MENTAL HEALTH CRISIS RESOURCES:  For a emergency help, please call 911 or go to the nearest Emergency Department.     Emergency Walk-In Options:   EmPATH Unit @ [facility] ([place]): [phone] - Specialized mental health emergency area designed to be calming  [facility] ([place]): [phone]  [facility] ([place]): [phone]  [facility]): [phone]    [facility] Crisis Information:   [place]: [phone]  [place]: [phone]  [place] ([place]) - Adult: [phone]     Child: [phone]  [place] - Adult: [phone]     Child: [phone]  [place]:  [phone]  List of all [facility] resources:   https://[place].gov/dhs/people-we-serve/adults/health-care/mental-health/resources/crisis-contacts.jsp    National Crisis Information:   Crisis Text Line: Text  [place]  to 936353  Suicide & Crisis Lifeline: 988  National Suicide Prevention Lifeline: 2-651-326-TALK (1-267.291.5295)       For online chat options, visit https://suicidepreventionlifeline.org/chat/  Poison Control Center: 6-720-177-7998  Trans Lifeline: 7-649-627-7979 - Hotline for transgender people of all ages  The [employer]: 0-242-852-2356 - Hotline for LGBT youth     For Non-Emergency Support:   Fast Tracker: Mental Health & Substance Use Disorder Resources -   https://www.[facility]eesn.org/

## 2023-10-02 ENCOUNTER — VIRTUAL VISIT (OUTPATIENT)
Dept: SURGERY | Facility: CLINIC | Age: 38
End: 2023-10-02
Payer: COMMERCIAL

## 2023-10-02 DIAGNOSIS — Z71.3 NUTRITIONAL COUNSELING: ICD-10-CM

## 2023-10-02 DIAGNOSIS — Z98.84 S/P LAPAROSCOPIC SLEEVE GASTRECTOMY: Primary | ICD-10-CM

## 2023-10-02 DIAGNOSIS — K91.2 POSTGASTRECTOMY MALABSORPTION: ICD-10-CM

## 2023-10-02 DIAGNOSIS — Z90.3 POSTGASTRECTOMY MALABSORPTION: ICD-10-CM

## 2023-10-02 PROCEDURE — 97803 MED NUTRITION INDIV SUBSEQ: CPT | Mod: VID | Performed by: DIETITIAN, REGISTERED

## 2023-10-02 NOTE — PROGRESS NOTES
Lena Quesada is a 38 year old who is being evaluated via a billable video visit.      How would you like to obtain your AVS? MyChart  If the video visit is dropped, the invitation should be resent by: Send to e-mail at: [email or web address]  Will anyone else be joining your video visit? No  {If patient encounters technical issues they should call [phone]      Post-op Surgical Weight Loss Diet Evaluation     Assessment:  Pt presents for  18 months  post-op RD visit, s/p LSG on 3/24/2022 with Dr. Rodriguez. Today we reviewed current eating habits and level of physical activity, and instructed on the changes that are required for successful bariatric outcomes.    Patient Progress: has been frustrated with lack of weight change on the scale, trying to put less emphasis on the scale and focusing on healthy eating along with increased exercise.  Continues to take Wegovy on a regular basis, which she feels that it is helping.     Initial weight: 372.6 lbs   Current weight: 245.9 lbs   Weight change: 126.7 lbs (down)     There is no height or weight on file to calculate BMI.    Patient Active Problem List   Diagnosis    Morbid obesity (H)    Chondromalacia of patellofemoral joint    Morbid obesity due to excess calories (H)    Nexplanon in place        Diabetes: No     Vitamins   Multi Vit with Iron: yes  Calcium Citrate: yes  B12: yes  D3: yes    Diet Recall/Time:   Breakfast: Greek Yogurt, Beef Stick, String Cheese  Lunch: Protein Bar   Dinner: 3-4 oz of chicken or fish with vegetables (trying to incorporate more often)     Typical Snacks: on occasion-nuts or string cheese     Proteins/Veg/Fruits/CHO (NOT well tolerated): none     Estimated protein intake: 60-80 grams    Meal Duration:20-30 minutes/day    Fluid-meal separation:  Fluids are  30min before and 30 minutes after meals.    Fluid Intake  Water: water at least 68 oz/day       Exercise: gym daily-weight training (rotating legs and  "arms, back and abs), occasional treadmill, dance group 1 time/week (Tuesday nights) for 2 hours      PES statement:      (NC-1.4) Altered GI Function related to Alteration in gastrointestinal tract structure and/or function/ Decreased functional length of the GI tract as evidenced by Weight loss of 34% initial body weight; sleeve gastrectomy    Intervention    Discussion  Discussed 18 Months Post-Op Nutritional Guidelines for LSG  Recommended to consume 15-20gm protein at 3 meals daily, along with protein supplement/\"planned protein containing snack\" of 15-30gm protein, to reach goal of 60-80 gm protein daily.  Educated on post-op vitamin regimen: Multi Vit + iron 2x/day, calcium citrate 400-600 mg 2x/day, 6240-4392 mcg of Sublingual B-12 daily, and 5000 IU Vitamin D3 daily (MVI and calcium can be taken at the same time BID)  Reviewed lean protein sources  Bariatric Plate Method-  including lean/low fat protein at each meal, including a vegetable/fruit, and limiting carbohydrate intake to less than 25% of plate volume.     Instructions  Include 15-20gm protein at each meal, along with protein supplement/\"planned protein containing snack\" of 15-30gm protein, to reach goal of 60-80 gm protein daily.  Increase fluid intake to 64oz daily: choose plain or calorie/carbonation-free beverages.  Incorporate daily structured activity, 30-60 minutes most days of the week  Recommended pt to start taking: Multi Vit + iron 2x/day, calcium citrate 400-600 mg 2x/day, 3486-5177 mcg of Sublingual B-12 daily, and 5000 IU Vitamin D3 daily. (MVI and calcium can be taken at the same time)  Read food labels more consistently: keeping total fat grams <10, total sugar grams <10, fiber >3gm per serving.  Increase vegetable/fruit intake, by having a vegetable or fruit with each meal daily.  Practice plate method: 1/2 plate lean/low fat protein source, vegetable/fruit, <25% of plate complex carbohydrates.  Separate fluids 30 minutes " before/after meal times.  Practice eating off of smaller plates/bowls, chewing to applesauce consistency, taking 20-30 minutes to eat in a calm/relaxed environment without distractions of tv/email/cell phone.    Handouts provided:  18 Months  Post-Op Nutritional Guidelines for LSG    Assessment/Plan:    Pt to follow up for  2 years  post-op visit with bariatrician       Video-Visit Details    Type of service:  Video Visit    Video Start Time (time video started):  1:17 pm     Video End Time (time video stopped):  1:50 pm    Originating Location (pt. Location): Home      Distant Location (provider location):  Off-site    Mode of Communication:  Video Conference via [facility]    Physician has received verbal consent for a Video Visit from the patient? Yes    Rosalva Shin, RD

## 2023-10-02 NOTE — LETTER
10/2/2023         RE: Lena Quesada  [address] [place] [address] [place] [address]        Dear Colleague,    Thank you for referring your patient, Lena Quesada, to the [facility] [place]. Please see a copy of my visit note below.    Lena Quesada is a 38 year old who is being evaluated via a billable video visit.      How would you like to obtain your AVS? MyChart  If the video visit is dropped, the invitation should be resent by: Send to e-mail at: [email or web address]  Will anyone else be joining your video visit? No  {If patient encounters technical issues they should call [phone]      Post-op Surgical Weight Loss Diet Evaluation     Assessment:  Pt presents for  18 months  post-op RD visit, s/p LSG on 3/24/2022 with Dr. Rodriguez. Today we reviewed current eating habits and level of physical activity, and instructed on the changes that are required for successful bariatric outcomes.    Patient Progress: has been frustrated with lack of weight change on the scale, trying to put less emphasis on the scale and focusing on healthy eating along with increased exercise.  Continues to take Wegovy on a regular basis, which she feels that it is helping.     Initial weight: 372.6 lbs   Current weight: 245.9 lbs   Weight change: 126.7 lbs (down)     There is no height or weight on file to calculate BMI.    Patient Active Problem List   Diagnosis     Morbid obesity (H)     Chondromalacia of patellofemoral joint     Morbid obesity due to excess calories (H)     Nexplanon in place        Diabetes: No     Vitamins   Multi Vit with Iron: yes  Calcium Citrate: yes  B12: yes  D3: yes    Diet Recall/Time:   Breakfast: Greek Yogurt, Beef Stick, String Cheese  Lunch: Protein Bar   Dinner: 3-4 oz of chicken or fish with vegetables (trying to incorporate more often)     Typical Snacks: on occasion-nuts or string cheese     Proteins/Veg/Fruits/CHO  "(NOT well tolerated): none     Estimated protein intake: 60-80 grams    Meal Duration:20-30 minutes/day    Fluid-meal separation:  Fluids are  30min before and 30 minutes after meals.    Fluid Intake  Water: water at least 68 oz/day       Exercise: gym daily-weight training (rotating legs and arms, back and abs), occasional treadmill, dance group 1 time/week (Tuesday nights) for 2 hours      PES statement:      (NC-1.4) Altered GI Function related to Alteration in gastrointestinal tract structure and/or function/ Decreased functional length of the GI tract as evidenced by Weight loss of 34% initial body weight; sleeve gastrectomy    Intervention    Discussion  Discussed 18 Months Post-Op Nutritional Guidelines for LSG  Recommended to consume 15-20gm protein at 3 meals daily, along with protein supplement/\"planned protein containing snack\" of 15-30gm protein, to reach goal of 60-80 gm protein daily.  Educated on post-op vitamin regimen: Multi Vit + iron 2x/day, calcium citrate 400-600 mg 2x/day, 9824-1240 mcg of Sublingual B-12 daily, and 5000 IU Vitamin D3 daily (MVI and calcium can be taken at the same time BID)  Reviewed lean protein sources  Bariatric Plate Method-  including lean/low fat protein at each meal, including a vegetable/fruit, and limiting carbohydrate intake to less than 25% of plate volume.     Instructions  Include 15-20gm protein at each meal, along with protein supplement/\"planned protein containing snack\" of 15-30gm protein, to reach goal of 60-80 gm protein daily.  Increase fluid intake to 64oz daily: choose plain or calorie/carbonation-free beverages.  Incorporate daily structured activity, 30-60 minutes most days of the week  Recommended pt to start taking: Multi Vit + iron 2x/day, calcium citrate 400-600 mg 2x/day, 9721-0373 mcg of Sublingual B-12 daily, and 5000 IU Vitamin D3 daily. (MVI and calcium can be taken at the same time)  Read food labels more consistently: keeping total " fat grams <10, total sugar grams <10, fiber >3gm per serving.  Increase vegetable/fruit intake, by having a vegetable or fruit with each meal daily.  Practice plate method: 1/2 plate lean/low fat protein source, vegetable/fruit, <25% of plate complex carbohydrates.  Separate fluids 30 minutes before/after meal times.  Practice eating off of smaller plates/bowls, chewing to applesauce consistency, taking 20-30 minutes to eat in a calm/relaxed environment without distractions of tv/email/cell phone.    Handouts provided:  18 Months  Post-Op Nutritional Guidelines for LSG    Assessment/Plan:    Pt to follow up for  2 years  post-op visit with bariatrician       Video-Visit Details    Type of service:  Video Visit    Video Start Time (time video started):  1:17 pm     Video End Time (time video stopped):  1:50 pm    Originating Location (pt. Location): Home      Distant Location (provider location):  Off-site    Mode of Communication:  Video Conference via [facility]    Physician has received verbal consent for a Video Visit from the patient? Yes    Rosalva Shin RD            Again, thank you for allowing me to participate in the care of your patient.        Sincerely,        Rosalva Shin RD

## 2023-10-18 ENCOUNTER — VIRTUAL VISIT (OUTPATIENT)
Dept: PSYCHIATRY | Facility: CLINIC | Age: 38
End: 2023-10-18
Attending: NURSE PRACTITIONER
Payer: COMMERCIAL

## 2023-10-18 DIAGNOSIS — F41.1 GENERALIZED ANXIETY DISORDER: Primary | ICD-10-CM

## 2023-10-18 DIAGNOSIS — F32.1 CURRENT MODERATE EPISODE OF MAJOR DEPRESSIVE DISORDER WITHOUT PRIOR EPISODE (H): ICD-10-CM

## 2023-10-18 PROCEDURE — 99214 OFFICE O/P EST MOD 30 MIN: CPT | Mod: VID | Performed by: NURSE PRACTITIONER

## 2023-10-18 NOTE — NURSING NOTE
Is the patient currently in the state of [place]? YES    Visit mode:VIDEO    If the visit is dropped, the patient can be reconnected by: VIDEO VISIT: Text to cell phone:   Telephone Information:   Mobile [phone]       Will anyone else be joining the visit? NO  (If patient encounters technical issues they should call [phone]    How would you like to obtain your AVS? MyChart    Are changes needed to the allergy or medication list? No    Reason for visit: No chief complaint on file.    Liliana ARVIZUF
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
## 2023-10-20 ENCOUNTER — VIRTUAL VISIT (OUTPATIENT)
Dept: PSYCHIATRY | Facility: CLINIC | Age: 38
End: 2023-10-20
Attending: PSYCHIATRY & NEUROLOGY
Payer: COMMERCIAL

## 2023-10-20 DIAGNOSIS — F41.1 GENERALIZED ANXIETY DISORDER: Primary | ICD-10-CM

## 2023-10-20 PROCEDURE — 90837 PSYTX W PT 60 MINUTES: CPT | Mod: VID | Performed by: STUDENT IN AN ORGANIZED HEALTH CARE EDUCATION/TRAINING PROGRAM
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
## 2023-10-24 ENCOUNTER — OFFICE VISIT (OUTPATIENT)
Dept: FAMILY MEDICINE | Facility: CLINIC | Age: 38
End: 2023-10-24
Payer: COMMERCIAL

## 2023-10-24 VITALS
HEIGHT: 67 IN | TEMPERATURE: 97.6 F | BODY MASS INDEX: 38.3 KG/M2 | WEIGHT: 244 LBS | OXYGEN SATURATION: 100 % | RESPIRATION RATE: 16 BRPM | SYSTOLIC BLOOD PRESSURE: 125 MMHG | DIASTOLIC BLOOD PRESSURE: 85 MMHG | HEART RATE: 81 BPM

## 2023-10-24 DIAGNOSIS — R74.01 ELEVATED TRANSAMINASE LEVEL: ICD-10-CM

## 2023-10-24 DIAGNOSIS — Z23 NEED FOR COVID-19 VACCINE: ICD-10-CM

## 2023-10-24 DIAGNOSIS — Z98.84 S/P GASTRIC BYPASS: ICD-10-CM

## 2023-10-24 DIAGNOSIS — Z11.59 NEED FOR HEPATITIS C SCREENING TEST: ICD-10-CM

## 2023-10-24 DIAGNOSIS — Z11.3 SCREEN FOR STD (SEXUALLY TRANSMITTED DISEASE): ICD-10-CM

## 2023-10-24 DIAGNOSIS — Z11.4 SCREENING FOR HIV (HUMAN IMMUNODEFICIENCY VIRUS): ICD-10-CM

## 2023-10-24 DIAGNOSIS — Z00.00 ADULT GENERAL MEDICAL EXAM: Primary | ICD-10-CM

## 2023-10-24 DIAGNOSIS — D69.6 THROMBOCYTOPENIA (H): ICD-10-CM

## 2023-10-24 DIAGNOSIS — Z23 NEED FOR IMMUNIZATION AGAINST INFLUENZA: ICD-10-CM

## 2023-10-24 DIAGNOSIS — Z12.4 SCREENING FOR MALIGNANT NEOPLASM OF CERVIX: ICD-10-CM

## 2023-10-24 LAB
BASOPHILS # BLD AUTO: 0 10E3/UL (ref 0–0.2)
BASOPHILS NFR BLD AUTO: 1 %
CLUE CELLS: ABNORMAL
EOSINOPHIL # BLD AUTO: 0.1 10E3/UL (ref 0–0.7)
EOSINOPHIL NFR BLD AUTO: 1 %
ERYTHROCYTE [DISTWIDTH] IN BLOOD BY AUTOMATED COUNT: 12.6 % (ref 10–15)
HCT VFR BLD AUTO: 39.8 % (ref 35–47)
HGB BLD-MCNC: 13.6 G/DL (ref 11.7–15.7)
IMM GRANULOCYTES # BLD: 0 10E3/UL
IMM GRANULOCYTES NFR BLD: 0 %
LYMPHOCYTES # BLD AUTO: 2.2 10E3/UL (ref 0.8–5.3)
LYMPHOCYTES NFR BLD AUTO: 39 %
MCH RBC QN AUTO: 31.1 PG (ref 26.5–33)
MCHC RBC AUTO-ENTMCNC: 34.2 G/DL (ref 31.5–36.5)
MCV RBC AUTO: 91 FL (ref 78–100)
MONOCYTES # BLD AUTO: 0.4 10E3/UL (ref 0–1.3)
MONOCYTES NFR BLD AUTO: 7 %
NEUTROPHILS # BLD AUTO: 3.1 10E3/UL (ref 1.6–8.3)
NEUTROPHILS NFR BLD AUTO: 53 %
PLATELET # BLD AUTO: 257 10E3/UL (ref 150–450)
RBC # BLD AUTO: 4.38 10E6/UL (ref 3.8–5.2)
TRICHOMONAS, WET PREP: ABNORMAL
WBC # BLD AUTO: 5.8 10E3/UL (ref 4–11)
WBC'S/HIGH POWER FIELD, WET PREP: ABNORMAL
YEAST, WET PREP: ABNORMAL

## 2023-10-24 PROCEDURE — 86780 TREPONEMA PALLIDUM: CPT | Performed by: FAMILY MEDICINE

## 2023-10-24 PROCEDURE — G0145 SCR C/V CYTO,THINLAYER,RESCR: HCPCS | Performed by: FAMILY MEDICINE

## 2023-10-24 PROCEDURE — 87624 HPV HI-RISK TYP POOLED RSLT: CPT | Performed by: FAMILY MEDICINE

## 2023-10-24 PROCEDURE — 36415 COLL VENOUS BLD VENIPUNCTURE: CPT | Performed by: FAMILY MEDICINE

## 2023-10-24 PROCEDURE — 90686 IIV4 VACC NO PRSV 0.5 ML IM: CPT | Performed by: FAMILY MEDICINE

## 2023-10-24 PROCEDURE — 85025 COMPLETE CBC W/AUTO DIFF WBC: CPT | Performed by: FAMILY MEDICINE

## 2023-10-24 PROCEDURE — 87389 HIV-1 AG W/HIV-1&-2 AB AG IA: CPT | Performed by: FAMILY MEDICINE

## 2023-10-24 PROCEDURE — 80076 HEPATIC FUNCTION PANEL: CPT | Performed by: FAMILY MEDICINE

## 2023-10-24 PROCEDURE — 99395 PREV VISIT EST AGE 18-39: CPT | Mod: 25 | Performed by: FAMILY MEDICINE

## 2023-10-24 PROCEDURE — 87210 SMEAR WET MOUNT SALINE/INK: CPT | Performed by: FAMILY MEDICINE

## 2023-10-24 PROCEDURE — 87591 N.GONORRHOEAE DNA AMP PROB: CPT | Performed by: FAMILY MEDICINE

## 2023-10-24 PROCEDURE — 91320 SARSCV2 VAC 30MCG TRS-SUC IM: CPT | Performed by: FAMILY MEDICINE

## 2023-10-24 PROCEDURE — 80074 ACUTE HEPATITIS PANEL: CPT | Performed by: FAMILY MEDICINE

## 2023-10-24 PROCEDURE — 90471 IMMUNIZATION ADMIN: CPT | Performed by: FAMILY MEDICINE

## 2023-10-24 PROCEDURE — 90480 ADMN SARSCOV2 VAC 1/ONLY CMP: CPT | Performed by: FAMILY MEDICINE

## 2023-10-24 PROCEDURE — 87491 CHLMYD TRACH DNA AMP PROBE: CPT | Performed by: FAMILY MEDICINE

## 2023-10-24 ASSESSMENT — ENCOUNTER SYMPTOMS
HEARTBURN: 0
CONSTIPATION: 0
SHORTNESS OF BREATH: 0
DIZZINESS: 0
ABDOMINAL PAIN: 0
ARTHRALGIAS: 0
BREAST MASS: 0
NERVOUS/ANXIOUS: 0
FREQUENCY: 0
DYSURIA: 0
HEMATOCHEZIA: 0
SORE THROAT: 0
NAUSEA: 0
EYE PAIN: 0
PARESTHESIAS: 1
HEADACHES: 0
CHILLS: 0
MYALGIAS: 0
COUGH: 0
DIARRHEA: 0
FEVER: 0
JOINT SWELLING: 0
PALPITATIONS: 0
WEAKNESS: 0
HEMATURIA: 0

## 2023-10-24 ASSESSMENT — PATIENT HEALTH QUESTIONNAIRE - PHQ9
SUM OF ALL RESPONSES TO PHQ QUESTIONS 1-9: 1
SUM OF ALL RESPONSES TO PHQ QUESTIONS 1-9: 1

## 2023-10-24 NOTE — COMMUNITY RESOURCES LIST (ENGLISH)
10/24/2023   [facility]  N/A  For questions about this resource list or additional care needs, please contact your primary care clinic or care manager.  Phone: [phone]   Email: N/A   Address: [address] [place] [id number]   Hours: N/A        Food and Nutrition       Food pantry  1  [facility]  [facility] [employer] [facility] - [facility] Food Shelf Distance: 1.5 miles      In-Person   [address] [place] [address]  Language: English, Hmong, Greenlandic, Romansh, Nigerien  Hours: Tue 1:00 PM - 4:00 PM , Wed - Thu 9:00 AM - 4:00 PM  Fees: Free   Phone: (871) 353-5120 Email: info@[employer] Website: https://www.[employer]/     2  Community Emergency Assistance Programs (CEAP) - [facility] - [place] Distance: 1.87 miles      [facility]   [address] [address]  Language: English, Belarusian  Hours: Mon - Tue 9:00 AM - 4:30 PM , Wed 9:00 AM - 7:00 PM , Thu 9:00 AM - 4:30 PM  Fees: Free   Phone: (851) 954-7289 Email: info@[employer] Website: http://www.ceap.org     SNAP application assistance  3  [facility]   Family Wellness (AIFW) Distance: 0.21 miles      In-Person, Phone/Virtual   [address] [place] [address]  Language: Mongolian, Occitan, English, Gujarati, Ro, Syriac, Sinhala, Romanian, Belarusian, Bengali  Hours: Mon - Wed 9:00 AM - 5:00 PM , Thu 12:00 PM - 6:00 PM , Fri 9:00 AM - 5:00 PM , Sun 10:30 AM - 2:00 PM Appt. Only  Fees: Free   Phone: (634) 131-8378 Email: info@[employer]aif[employer].org Website: https://www.sewa-aifw.org/     4  [facility]  [employer] [place] ([facility]) Distance: 1.5 miles      In-Person, Phone/Virtual   [address] [place] [address]  Language: English, Hmong  Hours: Mon - Fri 8:30 AM - 4:30 PM  Fees: Free   Phone: (894) 622-5724 Ext.1 Email: info@[employer].org Website: https://www.[employer].org/     Soup kitchen or free meals  5  [facility] - Serving Up Nutrition  Now for Youth Distance: 1.9 miles      Pickup   [address] [place] [address]  Language: English  Hours: Mon 11:30 AM - 1:30 PM , Wed 11:30 AM - 1:30 PM , Fri 11:30 AM - 1:30 PM  Fees: Free   Phone: (594) 389-9338 Ext.107 Email: [email or web address] Website: [email or web address]     6  [facility] - Family Table Meal Distance: 2.48 miles      Pickup   [address] [place] [address]  Language: English  Hours: Sat 11:30 AM - 12:30 PM  Fees: Free   Phone: (911) 857-9234 Email: [email or web address] Website: http://www.[facility].org/          Important Numbers & Websites       Emergency Services   911  [facility]   311  Poison Control   (860) 758-3485  Suicide Prevention Lifeline   (704) 147-7024 (TALK)  Child Abuse Hotline   (494) 587-3348 (4-A-Child)  Sexual Assault Hotline   (362) 606-5154 (HOPE)  National Runaway Safeline   (224) 679-2443 (RUNAWAY)  All-Options Talkline   (576) 732-1107  Substance Abuse Referral   (380) 849-2083 (HELP)
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
## 2023-10-24 NOTE — COMMUNITY RESOURCES LIST (ENGLISH)
10/24/2023   [facility] - Outpatient Clinics  N/A  For additional resource needs, please contact your health insurance member services or your primary care team.  Phone: [phone]   Email: N/A   Address: [address] [place] [id number]   Hours: N/A        Food and Nutrition       Food pantry  1  [employer] [facility] - [facility] Food Shelf Distance: 1.5 miles      In-Person   [address] [place] [address]  Language: English, Hmong, Alex, Kyrgyz, Yakut  Hours: Tue 1:00 PM - 4:00 PM , Wed - Thu 9:00 AM - 4:00 PM  Fees: Free   Phone: (695) 613-9033 Email: info@Note Website: https://www.Note/     2  Community Emergency Assistance Programs (CEAP) - [facility] - Food Market Distance: 1.87 miles      [facility]   [address] [place] [address]  Language: English, Iraqi  Hours: Mon - Tue 9:00 AM - 4:30 PM , Wed 9:00 AM - 7:00 PM , Thu 9:00 AM - 4:30 PM  Fees: Free   Phone: (677) 502-6105 Email: info@[employer] Website: http://www.ceap.org     SNAP application assistance  3  [employer] [place] Distance: 12.83 miles      Phone/Virtual   [address] [address]  Language: English, Hmong, Malian, Vietnamese, Iraqi  Hours: Mon - Fri 8:30 AM - 4:30 PM  Fees: Free   Phone: (872) 457-3774 Email: helpline@[employer].org Website: https://www.hungersolutions.org/programs/[place]-food-helpline/     4  [place] -  [employer] ([employer]) Distance: 0.21 miles      In-Person, Phone/Virtual   [address] [place] [address]  Language: Kiswahili, Upper sorbian, English, Gujarati, Ro, English, Arabic, Icelandic, Uzbek, Occitan  Hours: Mon - Wed 9:00 AM - 5:00 PM , Thu 12:00 PM - 6:00 PM , Fri 9:00 AM - 5:00 PM , Sun 10:30 AM - 2:00 PM Appt. Only  Fees: Free   Phone: (148) 438-9681 Email: info@[employer].org Website: https://www.[employer].org/     Soup kitchen or free meals  5  [employer] - Serving Up Nutrition Now for Youth Distance: 1.9 miles      Pickup   [address] [place] [address]  Language: English  Hours: Mon 11:30 AM - 1:30 PM , Wed 11:30 AM - 1:30 PM , Fri 11:30 AM - 1:30 PM  Fees: Free   Phone: (531) 375-3730 Ext.107 Email: admin@[facility] Website: http://[facility].org     6  [facility] - Family Table Meal Distance: 2.48 miles      Pickup   [address] [place] [address]  Language: English  Hours: Sat 11:30 AM - 12:30 PM  Fees: Free   Phone: (473) 963-4673 Email: ana@[facility].[employer] Website: http://www.[facility].org/          Important Numbers & Websites       [address].org  Poison Control   (262) 346-7814 [place]  Suicide and Crisis Lifeline   [address] [place], [place] [address]line.org  Childhelp [place] Child Abuse Hotline   149.594.5166 Childhelphotline.org  [place] Sexual Assault Hotline   (993) 277-9005 (HOPE) Rainn.org  National Runaway Safeline   (413) 634-5352 (RUNAWAY) [place] Centerrunaway.org  Pregnancy & Postpartum Support [place]   Call/text 815-845-6744 Ppsupport[place].org  Substance Abuse National Helpline ([facility]   411-726-HELP (3996) Findtreatment.gov  Emergency Services   911

## 2023-10-25 LAB
ALBUMIN SERPL BCG-MCNC: 4.4 G/DL (ref 3.5–5.2)
ALP SERPL-CCNC: 222 U/L (ref 35–104)
ALT SERPL W P-5'-P-CCNC: 72 U/L (ref 0–50)
AST SERPL W P-5'-P-CCNC: 66 U/L (ref 0–45)
BILIRUB DIRECT SERPL-MCNC: <0.2 MG/DL (ref 0–0.3)
BILIRUB SERPL-MCNC: 0.4 MG/DL
HAV IGM SERPL QL IA: NONREACTIVE
HBV CORE IGM SERPL QL IA: NONREACTIVE
HBV SURFACE AG SERPL QL IA: NONREACTIVE
HCV AB SERPL QL IA: NONREACTIVE
HIV 1+2 AB+HIV1 P24 AG SERPL QL IA: NONREACTIVE
PROT SERPL-MCNC: 7.3 G/DL (ref 6.4–8.3)
T PALLIDUM AB SER QL: NONREACTIVE

## 2023-10-26 LAB
C TRACH DNA SPEC QL PROBE+SIG AMP: NEGATIVE
N GONORRHOEA DNA SPEC QL NAA+PROBE: NEGATIVE

## 2023-10-27 LAB
BKR LAB AP GYN ADEQUACY: NORMAL
BKR LAB AP GYN INTERPRETATION: NORMAL
BKR LAB AP HPV REFLEX: NORMAL
BKR LAB AP LMP: NORMAL
BKR LAB AP PREVIOUS ABNORMAL: NORMAL
PATH REPORT.COMMENTS IMP SPEC: NORMAL
PATH REPORT.COMMENTS IMP SPEC: NORMAL
PATH REPORT.RELEVANT HX SPEC: NORMAL

## 2023-10-31 LAB
HUMAN PAPILLOMA VIRUS 16 DNA: NEGATIVE
HUMAN PAPILLOMA VIRUS 18 DNA: NEGATIVE
HUMAN PAPILLOMA VIRUS FINAL DIAGNOSIS: ABNORMAL
HUMAN PAPILLOMA VIRUS OTHER HR: POSITIVE

## 2023-11-01 ENCOUNTER — PATIENT OUTREACH (OUTPATIENT)
Dept: FAMILY MEDICINE | Facility: CLINIC | Age: 38
End: 2023-11-01
Payer: COMMERCIAL

## 2023-11-03 ENCOUNTER — VIRTUAL VISIT (OUTPATIENT)
Dept: PSYCHIATRY | Facility: CLINIC | Age: 38
End: 2023-11-03
Attending: PSYCHIATRY & NEUROLOGY
Payer: COMMERCIAL

## 2023-11-03 DIAGNOSIS — F41.1 GENERALIZED ANXIETY DISORDER: Primary | ICD-10-CM

## 2023-11-03 PROCEDURE — 90834 PSYTX W PT 45 MINUTES: CPT | Mod: VID | Performed by: STUDENT IN AN ORGANIZED HEALTH CARE EDUCATION/TRAINING PROGRAM
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
## 2023-11-08 NOTE — PROGRESS NOTES
Bariatric Follow Up Visit with a History of Previous Bariatric Surgery     Date of visit: 11/8/2023  Physician: Wai Anders MD, MD  Primary Care Provider:  Jazz Abbott NAM Quesada   38 year old  female    Date of Surgery: 3/24/22  Initial Weight: 399 lbs  Initial BMI: 62.5  Today's Weight:   Wt Readings from Last 1 Encounters:   11/09/23 109.3 kg (241 lb)     Weight history:   Wt Readings from Last 4 Encounters:   11/09/23 109.3 kg (241 lb)   10/24/23 110.7 kg (244 lb)   06/21/23 117 kg (258 lb)   03/09/23 128.2 kg (282 lb 9.6 oz)      Body mass index is 37.74 kg/m .      Assessment and Plan     Assessment: Lena is a 38 year old year old female who is approaching 20 months s/p  Sleeve Gastrectomy with Dr. Rodriguez.  She'd had good weight reduction but persistence in Class II obesity for which we initiated support with Wegovy earlier this year at her one year post op visit in March at 282 lbs. In the interim, her weight has continued to come down another 41 lbs.  This represents a 39.5% total body weight reduction from her introductory weight.     Her transaminase elevations in March are improving but remain mildly elevated as of her 10/24/23 labs.  Cholecystectomy can account for some of her alk phos elevation but is a bit higher than average for such elevations and we'll recheck levels in 3 months to make sure the down trend continues. No abdominal pain/vomiting. Some increased strength work could elevated muscular sources of ast/alt but I think this contribution is minimal .     Her Wegovy is well tolerated and helpful for her. We'll refill 9 month supply given the above improvements since starting it earlier this year and her persisting Class II obesity and hx of metabolic syndrome prior to sleeve gastrectomy.    I've encouraged some diversity in her diet as I think her complex carb intake is lower than ideal and may contribute some to her headaches.     Her previous zinc deficiency is in  need of recheck and has been using 2 multivitamins daily to help. Given her increased menses irregularity, we'll check ferritin/zinc when LFTs rechecked..    Lena Quesada feels as if she has achieved the goals she hoped to accomplish through bariatric surgery and weight loss and feels that continued weight reduction is still a desirable focus.    Encounter Diagnoses   Name Primary?    Class 3 severe obesity due to excess calories with serious comorbidity and body mass index (BMI) of 40.0 to 44.9 in adult (H)     Postgastrectomy malabsorption Yes    Nonspecific elevation of levels of transaminase and lactic acid dehydrogenase (LDH)          Current Outpatient Medications:     Semaglutide-Weight Management (WEGOVY) 2.4 MG/0.75ML pen, Inject 2.4 mg Subcutaneous every 7 days 4 pens, Disp: 27 mL, Rfl: 3    Alpha Lipoic Ac-Biotin-Keratin (BIOTIN-KERATIN-ALPHA LIPOIC AC PO), Take 1 capsule by mouth daily 10,000 mg, Disp: , Rfl:     Calcium Citrate-Vitamin D (CALCIUM CITRATE + PO), Take 1 chew tab by mouth daily 600mg, Disp: , Rfl:     Cholecalciferol (VITAMIN D-3) 125 MCG (5000 UT) TABS, Take 1 tablet by mouth daily, Disp: , Rfl:     Cyanocobalamin 5000 MCG SUBL, Place 1 tablet under the tongue once a week, Disp: , Rfl:     Pediatric Multivitamins-Iron (CHEWABLE BRADLEY/IRON CHILDRENS PO), Take 1 tablet by mouth daily, Disp: , Rfl:     sertraline (ZOLOFT) 50 MG tablet, Take one 50mg tab daily, Disp: 90 tablet, Rfl: 0    Plan:    No follow-ups on file.    Bariatric Surgery Review     Interim History/LifeChanges: weight loss is slowing down, but still heading in the right direction.  She does need refills on Wegovy. Getting more headaches lately. Menstrual periods are now more extended and heavier. Nexplanon in place. Followed by PCP for this. Having some Sertraline use.   Doing more lifting.   Patient Concerns: refill needed on Wegovy.  Appetite (1-10): less snackiness on Wegovy  GERD: no.    Reviewed whether any  "need/indication for screening EGD today and we will deferred.  Typically, a screening EGD is recommend post op year 2-3 if no symptoms to assess health of esophagus/bariatric surgery and sooner if difficult to control GERD or persistent pain/dysphagia sx despite behavior modification.    Medication changes:     Vitamin Intake:   B-12   yes   MVI  Two daily, chewable.    Vitamin D  yes   Calcium   N/a     Other  N/a              LABS: \"Reviewed    Nausea rarely  Vomiting no  Constipation no  Diarrhea no  Rashes no  Hair Loss no  Reactive Hypoglycemia no  Light Headedness no   Moods good.      Most recent labs:  Lab Results   Component Value Date    WBC 5.8 10/24/2023    HGB 13.6 10/24/2023    HCT 39.8 10/24/2023    MCV 91 10/24/2023     10/24/2023     Lab Results   Component Value Date    CHOL 145 09/10/2022     Lab Results   Component Value Date    HDL 56 09/10/2022     No components found for: \"LDLCALC\"  Lab Results   Component Value Date    TRIG 87 09/10/2022     No results found for: \"CHOLHDL\"  Lab Results   Component Value Date    ALT 72 (H) 10/24/2023    AST 66 (H) 10/24/2023    ALKPHOS 222 (H) 10/24/2023     No results found for: \"HGBA1C\"  Lab Results   Component Value Date    B12 1,073 03/03/2023     No components found for: \"VITDT1\"  Lab Results   Component Value Date    BHAKTI 177 (H) 03/03/2023     Lab Results   Component Value Date    PTHI 28 03/03/2023     Lab Results   Component Value Date    ZN 43.9 (L) 03/03/2023     Lab Results   Component Value Date    VIB1WB 181 (H) 03/03/2023     Lab Results   Component Value Date    TSH 2.10 06/11/2021     No results found for: \"TEST\"        Social History     Social History     Socioeconomic History    Marital status: Single     Spouse name: Not on file    Number of children: Not on file    Years of education: Not on file    Highest education level: Not on file   Occupational History    Not on file   Tobacco Use    Smoking status: Never    Smokeless tobacco: " Never   Vaping Use    Vaping Use: Never used   Substance and Sexual Activity    Alcohol use: Not Currently    Drug use: Never    Sexual activity: Yes     Partners: Male     Birth control/protection: Condom   Other Topics Concern    Parent/sibling w/ CABG, MI or angioplasty before 65F 55M? Not Asked   Social History Narrative    Not on file     Social Determinants of Health     Financial Resource Strain: Low Risk  (10/24/2023)    Financial Resource Strain     Within the past 12 months, have you or your family members you live with been unable to get utilities (heat, electricity) when it was really needed?: No   Food Insecurity: High Risk (10/24/2023)    Food Insecurity     Within the past 12 months, did you worry that your food would run out before you got money to buy more?: Yes     Within the past 12 months, did the food you bought just not last and you didn t have money to get more?: No   Transportation Needs: Low Risk  (10/24/2023)    Transportation Needs     Within the past 12 months, has lack of transportation kept you from medical appointments, getting your medicines, non-medical meetings or appointments, work, or from getting things that you need?: No   Physical Activity: Not on file   Stress: Not on file   Social Connections: Not on file   Interpersonal Safety: Low Risk  (10/24/2023)    Interpersonal Safety     Do you feel physically and emotionally safe where you currently live?: Yes     Within the past 12 months, have you been hit, slapped, kicked or otherwise physically hurt by someone?: No     Within the past 12 months, have you been humiliated or emotionally abused in other ways by your partner or ex-partner?: No   Housing Stability: Low Risk  (10/24/2023)    Housing Stability     Do you have housing? : Yes     Are you worried about losing your housing?: No       Past Medical History     Past Medical History:   Diagnosis Date    Arthritis     chondromalacia of knee (right). injections.    Cholelithiasis  "    Chondromalacia of patellofemoral joint     Morbid obesity (H) 12/16/2020    Morbid obesity due to excess calories (H) 3/24/2022    Nexplanon in place  9/8/2022    Remove within 5 years, 9/2027     Past Surgical History:   Procedure Laterality Date    CHOLECYSTECTOMY  2005    LAPAROSCOPIC GASTRIC SLEEVE N/A 3/24/2022    Procedure: GASTRECTOMY, SLEEVE, LAPAROSCOPIC;  Surgeon: Bobby Rodriguez MD;  Location: [facility] OR    TYMPANOPLASTY      WISDOM TOOTH EXTRACTION         Problem List     Patient Active Problem List   Diagnosis    Morbid obesity (H)    Chondromalacia of patellofemoral joint    Morbid obesity due to excess calories (H)    Nexplanon in place     Cervical high risk human papillomavirus (HPV) DNA test positive     Medications     [unfilled]  Surgical History     Past Surgical History  She has a past surgical history that includes Tympanoplasty; [place] Tooth Extraction; Cholecystectomy (2005); and Laparoscopic gastric sleeve (N/A, 3/24/2022).    Objective-Exam     Constitutional:  Ht 1.702 m (5' 7.01\")   Wt 109.3 kg (241 lb)   LMP 09/27/2023   BMI 37.74 kg/m    [unfilled]   General:  Pleasant and in no acute distress   Eyes:  EOMI  ENT:  Airway patient    Neck:  Respiratory: Normal respiratory effort, no cough, .  CV:    Gastrointestinal:   Musculoskeletal: muscle mass WNL  Skin: color without pallor or jaundice on video,  hair thick/long,   Psychiatric: alert and oriented X3, mood and affect normal    Counseling     We reviewed the important post op bariatric recommendations:  -eating 3 meals daily  -eating protein first, getting >60gm protein daily  -eating slowly, chewing food well  -avoiding/limiting calorie containing beverages  -drinking water 15-30 minutes before or after meals  -limiting restaurant or cafeteria eating to twice a week or less      We discussed the importance of life-long vitamin supplementation and life-long  follow-up.    Reviewed labs together and follow up " plan.    Wai Anders MD    [facility].  2023  3:18 PM  [phone] (clinic phone)  [phone] (fax)    No images are attached to the encounter.  Medical Decision Makin minutes spent by me on the date of the encounter doing chart review, history and exam, documentation and further activities per the note

## 2023-11-09 ENCOUNTER — VIRTUAL VISIT (OUTPATIENT)
Dept: SURGERY | Facility: CLINIC | Age: 38
End: 2023-11-09
Payer: COMMERCIAL

## 2023-11-09 VITALS — WEIGHT: 241 LBS | BODY MASS INDEX: 37.83 KG/M2 | HEIGHT: 67 IN

## 2023-11-09 DIAGNOSIS — K91.2 POSTGASTRECTOMY MALABSORPTION: Primary | ICD-10-CM

## 2023-11-09 DIAGNOSIS — R74.01 NONSPECIFIC ELEVATION OF LEVELS OF TRANSAMINASE AND LACTIC ACID DEHYDROGENASE (LDH): ICD-10-CM

## 2023-11-09 DIAGNOSIS — E66.01 CLASS 3 SEVERE OBESITY DUE TO EXCESS CALORIES WITH SERIOUS COMORBIDITY AND BODY MASS INDEX (BMI) OF 40.0 TO 44.9 IN ADULT (H): ICD-10-CM

## 2023-11-09 DIAGNOSIS — Z90.3 POSTGASTRECTOMY MALABSORPTION: Primary | ICD-10-CM

## 2023-11-09 DIAGNOSIS — R74.02 NONSPECIFIC ELEVATION OF LEVELS OF TRANSAMINASE AND LACTIC ACID DEHYDROGENASE (LDH): ICD-10-CM

## 2023-11-09 DIAGNOSIS — E66.813 CLASS 3 SEVERE OBESITY DUE TO EXCESS CALORIES WITH SERIOUS COMORBIDITY AND BODY MASS INDEX (BMI) OF 40.0 TO 44.9 IN ADULT (H): ICD-10-CM

## 2023-11-09 PROCEDURE — 99214 OFFICE O/P EST MOD 30 MIN: CPT | Mod: 95 | Performed by: EMERGENCY MEDICINE

## 2023-11-09 RX ORDER — SEMAGLUTIDE 2.4 MG/.75ML
2.4 INJECTION, SOLUTION SUBCUTANEOUS
Qty: 27 ML | Refills: 3 | Status: SHIPPED | OUTPATIENT
Start: 2023-11-09

## 2023-11-09 ASSESSMENT — PAIN SCALES - GENERAL: PAINLEVEL: NO PAIN (0)

## 2023-11-09 NOTE — NURSING NOTE
Is the patient currently in the state of [place]? YES    Visit mode:VIDEO    If the visit is dropped, the patient can be reconnected by: VIDEO VISIT: Text to cell phone:   Telephone Information:   Mobile [phone]       Will anyone else be joining the visit? NO  (If patient encounters technical issues they should call [phone]    How would you like to obtain your AVS? MyChart    Are changes needed to the allergy or medication list? Pt stated no changes to allergies and Pt stated no med changes    Reason for visit: Follow Up    Karly CHEN

## 2023-11-09 NOTE — PROGRESS NOTES
Virtual Visit Details    Type of service:  Video Visit     Originating Location (pt. Location): Home    Distant Location (provider location):  On-site  Platform used for Video Visit: [employer]

## 2023-11-09 NOTE — LETTER
11/9/2023         RE: Lena Quesada  [address] [place] [address] [place] [address]        Dear Colleague,    Thank you for referring your patient, Lena Quesada, to the [facility] [place]. Please see a copy of my visit note below.    Bariatric Follow Up Visit with a History of Previous Bariatric Surgery     Date of visit: 11/8/2023  Physician: Wai Anders MD, MD  Primary Care Provider:  Jazz Abbott  Lena Quesada   38 year old  female    Date of Surgery: 3/24/22  Initial Weight: 399 lbs  Initial BMI: 62.5  Today's Weight:   Wt Readings from Last 1 Encounters:   11/09/23 109.3 kg (241 lb)     Weight history:   Wt Readings from Last 4 Encounters:   11/09/23 109.3 kg (241 lb)   10/24/23 110.7 kg (244 lb)   06/21/23 117 kg (258 lb)   03/09/23 128.2 kg (282 lb 9.6 oz)      Body mass index is 37.74 kg/m .      Assessment and Plan     Assessment: Lena is a 38 year old year old female who is approaching 20 months s/p  Sleeve Gastrectomy with Dr. Rodriguez.  She'd had good weight reduction but persistence in Class II obesity for which we initiated support with Wegovy earlier this year at her one year post op visit in March at 282 lbs. In the interim, her weight has continued to come down another 41 lbs.  This represents a 39.5% total body weight reduction from her introductory weight.     Her transaminase elevations in March are improving but remain mildly elevated as of her 10/24/23 labs.  Cholecystectomy can account for some of her alk phos elevation but is a bit higher than average for such elevations and we'll recheck levels in 3 months to make sure the down trend continues. No abdominal pain/vomiting. Some increased strength work could elevated muscular sources of ast/alt but I think this contribution is minimal .     Her Wegovy is well tolerated and helpful for her. We'll refill 9 month supply given the above improvements since  starting it earlier this year and her persisting Class II obesity and hx of metabolic syndrome prior to sleeve gastrectomy.    I've encouraged some diversity in her diet as I think her complex carb intake is lower than ideal and may contribute some to her headaches.     Her previous zinc deficiency is in need of recheck and has been using 2 multivitamins daily to help. Given her increased menses irregularity, we'll check ferritin/zinc when LFTs rechecked..    Lena Quesada feels as if she has achieved the goals she hoped to accomplish through bariatric surgery and weight loss and feels that continued weight reduction is still a desirable focus.    Encounter Diagnoses   Name Primary?     Class 3 severe obesity due to excess calories with serious comorbidity and body mass index (BMI) of 40.0 to 44.9 in adult (H)      Postgastrectomy malabsorption Yes     Nonspecific elevation of levels of transaminase and lactic acid dehydrogenase (LDH)          Current Outpatient Medications:      Semaglutide-Weight Management (WEGOVY) 2.4 MG/0.75ML pen, Inject 2.4 mg Subcutaneous every 7 days 4 pens, Disp: 27 mL, Rfl: 3     Alpha Lipoic Ac-Biotin-Keratin (BIOTIN-KERATIN-ALPHA LIPOIC AC PO), Take 1 capsule by mouth daily 10,000 mg, Disp: , Rfl:      Calcium Citrate-Vitamin D (CALCIUM CITRATE + PO), Take 1 chew tab by mouth daily 600mg, Disp: , Rfl:      Cholecalciferol (VITAMIN D-3) 125 MCG (5000 UT) TABS, Take 1 tablet by mouth daily, Disp: , Rfl:      Cyanocobalamin 5000 MCG SUBL, Place 1 tablet under the tongue once a week, Disp: , Rfl:      Pediatric Multivitamins-Iron (CHEWABLE BRADLEY/IRON CHILDRENS PO), Take 1 tablet by mouth daily, Disp: , Rfl:      sertraline (ZOLOFT) 50 MG tablet, Take one 50mg tab daily, Disp: 90 tablet, Rfl: 0    Plan:    No follow-ups on file.    Bariatric Surgery Review     Interim History/LifeChanges: weight loss is slowing down, but still heading in the right direction.  She does need refills on  "Wegovy. Getting more headaches lately. Menstrual periods are now more extended and heavier. Nexplanon in place. Followed by PCP for this. Having some Sertraline use.   Doing more lifting.   Patient Concerns: refill needed on Wegovy.  Appetite (1-10): less snackiness on Wegovy  GERD: no.    Reviewed whether any need/indication for screening EGD today and we will deferred.  Typically, a screening EGD is recommend post op year 2-3 if no symptoms to assess health of esophagus/bariatric surgery and sooner if difficult to control GERD or persistent pain/dysphagia sx despite behavior modification.    Medication changes:     Vitamin Intake:   B-12   yes   MVI  Two daily, chewable.    Vitamin D  yes   Calcium   N/a     Other  N/a              LABS: \"Reviewed    Nausea rarely  Vomiting no  Constipation no  Diarrhea no  Rashes no  Hair Loss no  Reactive Hypoglycemia no  Light Headedness no   Moods good.      Most recent labs:  Lab Results   Component Value Date    WBC 5.8 10/24/2023    HGB 13.6 10/24/2023    HCT 39.8 10/24/2023    MCV 91 10/24/2023     10/24/2023     Lab Results   Component Value Date    CHOL 145 09/10/2022     Lab Results   Component Value Date    HDL 56 09/10/2022     No components found for: \"LDLCALC\"  Lab Results   Component Value Date    TRIG 87 09/10/2022     No results found for: \"CHOLHDL\"  Lab Results   Component Value Date    ALT 72 (H) 10/24/2023    AST 66 (H) 10/24/2023    ALKPHOS 222 (H) 10/24/2023     No results found for: \"HGBA1C\"  Lab Results   Component Value Date    B12 1,073 03/03/2023     No components found for: \"VITDT1\"  Lab Results   Component Value Date    BHAKTI 177 (H) 03/03/2023     Lab Results   Component Value Date    PTHI 28 03/03/2023     Lab Results   Component Value Date    ZN 43.9 (L) 03/03/2023     Lab Results   Component Value Date    VIB1WB 181 (H) 03/03/2023     Lab Results   Component Value Date    TSH 2.10 06/11/2021     No results found for: \"TEST\"        Social " History     Social History     Socioeconomic History     Marital status: Single     Spouse name: Not on file     Number of children: Not on file     Years of education: Not on file     Highest education level: Not on file   Occupational History     Not on file   Tobacco Use     Smoking status: Never     Smokeless tobacco: Never   Vaping Use     Vaping Use: Never used   Substance and Sexual Activity     Alcohol use: Not Currently     Drug use: Never     Sexual activity: Yes     Partners: Male     Birth control/protection: Condom   Other Topics Concern     Parent/sibling w/ CABG, MI or angioplasty before 65F 55M? Not Asked   Social History Narrative     Not on file     Social Determinants of Health     Financial Resource Strain: Low Risk  (10/24/2023)    Financial Resource Strain      Within the past 12 months, have you or your family members you live with been unable to get utilities (heat, electricity) when it was really needed?: No   Food Insecurity: High Risk (10/24/2023)    Food Insecurity      Within the past 12 months, did you worry that your food would run out before you got money to buy more?: Yes      Within the past 12 months, did the food you bought just not last and you didn t have money to get more?: No   Transportation Needs: Low Risk  (10/24/2023)    Transportation Needs      Within the past 12 months, has lack of transportation kept you from medical appointments, getting your medicines, non-medical meetings or appointments, work, or from getting things that you need?: No   Physical Activity: Not on file   Stress: Not on file   Social Connections: Not on file   Interpersonal Safety: Low Risk  (10/24/2023)    Interpersonal Safety      Do you feel physically and emotionally safe where you currently live?: Yes      Within the past 12 months, have you been hit, slapped, kicked or otherwise physically hurt by someone?: No      Within the past 12 months, have you been humiliated or emotionally abused in  "other ways by your partner or ex-partner?: No   Housing Stability: Low Risk  (10/24/2023)    Housing Stability      Do you have housing? : Yes      Are you worried about losing your housing?: No       Past Medical History     Past Medical History:   Diagnosis Date     Arthritis     chondromalacia of knee (right). injections.     Cholelithiasis      Chondromalacia of patellofemoral joint      Morbid obesity (H) 12/16/2020     Morbid obesity due to excess calories (H) 3/24/2022     Nexplanon in place  9/8/2022    Remove within 5 years, 9/2027     Past Surgical History:   Procedure Laterality Date     CHOLECYSTECTOMY  2005     LAPAROSCOPIC GASTRIC SLEEVE N/A 3/24/2022    Procedure: GASTRECTOMY, SLEEVE, LAPAROSCOPIC;  Surgeon: Bobby Rodriguez MD;  Location: [facility] OR     TYMPANOPLASTY       WISDOM TOOTH EXTRACTION         Problem List     Patient Active Problem List   Diagnosis     Morbid obesity (H)     Chondromalacia of patellofemoral joint     Morbid obesity due to excess calories (H)     Nexplanon in place      Cervical high risk human papillomavirus (HPV) DNA test positive     Medications     [unfilled]  Surgical History     Past Surgical History  She has a past surgical history that includes Tympanoplasty; [place] Tooth Extraction; Cholecystectomy (2005); and Laparoscopic gastric sleeve (N/A, 3/24/2022).    Objective-Exam     Constitutional:  Ht 1.702 m (5' 7.01\")   Wt 109.3 kg (241 lb)   LMP 09/27/2023   BMI 37.74 kg/m    [unfilled]   General:  Pleasant and in no acute distress   Eyes:  EOMI  ENT:  Airway patient    Neck:  Respiratory: Normal respiratory effort, no cough, .  CV:    Gastrointestinal:   Musculoskeletal: muscle mass WNL  Skin: color without pallor or jaundice on video,  hair thick/long,   Psychiatric: alert and oriented X3, mood and affect normal    Counseling     We reviewed the important post op bariatric recommendations:  -eating 3 meals daily  -eating protein first, getting " >60gm protein daily  -eating slowly, chewing food well  -avoiding/limiting calorie containing beverages  -drinking water 15-30 minutes before or after meals  -limiting restaurant or cafeteria eating to twice a week or less      We discussed the importance of life-long vitamin supplementation and life-long  follow-up.    Reviewed labs together and follow up plan.    Wai Anders MD    [facility].  2023  3:18 PM  [phone] (clinic phone)  [phone] (fax)    No images are attached to the encounter.  Medical Decision Makin minutes spent by me on the date of the encounter doing chart review, history and exam, documentation and further activities per the note    Virtual Visit Details    Type of service:  Video Visit     Originating Location (pt. Location): Home    Distant Location (provider location):  On-site  Platform used for Video Visit: AmWell      Again, thank you for allowing me to participate in the care of your patient.        Sincerely,        Wai Anders MD

## 2023-12-01 ENCOUNTER — VIRTUAL VISIT (OUTPATIENT)
Dept: PSYCHIATRY | Facility: CLINIC | Age: 38
End: 2023-12-01
Attending: PSYCHIATRY & NEUROLOGY
Payer: COMMERCIAL

## 2023-12-01 DIAGNOSIS — F41.1 GENERALIZED ANXIETY DISORDER: Primary | ICD-10-CM

## 2023-12-01 PROCEDURE — 90834 PSYTX W PT 45 MINUTES: CPT | Mod: VID | Performed by: STUDENT IN AN ORGANIZED HEALTH CARE EDUCATION/TRAINING PROGRAM
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
## 2023-12-08 ENCOUNTER — VIRTUAL VISIT (OUTPATIENT)
Dept: PSYCHIATRY | Facility: CLINIC | Age: 38
End: 2023-12-08
Attending: PSYCHIATRY & NEUROLOGY
Payer: COMMERCIAL

## 2023-12-08 DIAGNOSIS — F41.1 GENERALIZED ANXIETY DISORDER: Primary | ICD-10-CM

## 2023-12-08 PROCEDURE — 90837 PSYTX W PT 60 MINUTES: CPT | Mod: VID | Performed by: STUDENT IN AN ORGANIZED HEALTH CARE EDUCATION/TRAINING PROGRAM

## 2023-12-08 NOTE — PROGRESS NOTES
"Video- Visit Details  Type of service:  video visit for mental health treatment  Time of service:  Date:  12/08/2023  Video Start Time: 8:11      Video End Time:  9:07    Reason for video visit: COVID-19   Originating Site (patient location): Patient's home  Distant Site (provider location):  On-Site - [facility]  Mode of Communication:  Video Conference via AmWell    As the provider I attest to compliance with applicable laws and regulations related to telemedicine.    OUTPATIENT PSYCHOTHERAPY PROGRESS NOTE    Client Name: Lena Quesada   YOB: 1985 (38 years old)   Date of Service: 12/08/2023  Time of Service: 8:10 (60 minutes)  Service Type(s): [place] PSYCHOTHERAPY W PATIENT 53 OR > MINUTES [[id number]]     Diagnoses: DAVID    Individuals Present:   Client attended alone    Subjective:  Updates:  -Had fun at niece's birthday party  -Next week, Lena is going to read to her niece's classroom as a surprise   -Hanging out with friend, Kal. Gloria. Talk every day. Wonders if he may have a kid he hasn't told her about.   -Maty was telling her what to do, which was bothersome. Relates to similar situations in the past. Noted that her friendship with Maty has been good practice sticking up for herself, practice choosing when to push back and when to let it go.     -Her ideal version of herself: \"Kind, very outgoing, can light up a room when she walks in, someone people admire, welcoming to everyone, aura of accomplishment, hard work, confidence.\"  -Noticing that none of those are physical traits, which is different than what she think she would have said a while ago. Discussed that they also are fairly centered on others' perceptions of her.   -Feels pitiful, empathy, sympathy towards past self. Didn't have support, coping skills.     Kal  -Met online. Went to a basketball game  -He was in a relationship, so they stopped talking  -Doesn't want to ask about his kid in case he's not ready to " talk about it    Treatment:   Psychodynamic Psychotherapy  - Enhance insight via self-examination  - Explore emotions, especially contradicting/confusing ones  - Identify meaningful past experiences  - Challenge avoidance and resistance  - Encourage nuanced understanding of interpersonal relationships    Psychotherapy services during this visit included myself and the patient. Discussed case with supervisor who also agreed with the treatment plan. Unable to sign in person due to visit being conducted through telehealth due to COVID-19.     Assessment and Progress:   Lena did very well with a course of CBT, and is now doing well further exploring her thoughts/behaviors/feelings with psychodynamic psychotherapy. Key topics are weight, self-esteem/confidence, trauma, and anxiety.     Plan:   -Next therapy appointment has been scheduled for 12/15.     Psychotherapy services during this visit included myself and the patient. Patient agrees with treatment plan. Discussed case with supervisor who also agreed with the treatment plan. Unable to sign in person due to visit being conducted through telehealth.     Diana Genao MD  PGY-3 Psychiatry Resident

## 2023-12-13 ENCOUNTER — VIRTUAL VISIT (OUTPATIENT)
Dept: PSYCHIATRY | Facility: CLINIC | Age: 38
End: 2023-12-13
Attending: NURSE PRACTITIONER
Payer: COMMERCIAL

## 2023-12-13 DIAGNOSIS — F41.9 ANXIETY: ICD-10-CM

## 2023-12-13 PROCEDURE — 99214 OFFICE O/P EST MOD 30 MIN: CPT | Mod: VID | Performed by: NURSE PRACTITIONER

## 2023-12-13 NOTE — NURSING NOTE
Is the patient currently in the state of [place]? YES    Visit mode:VIDEO    If the visit is dropped, the patient can be reconnected by: VIDEO VISIT: Text to cell phone:   Telephone Information:   Mobile [phone]       Will anyone else be joining the visit? NO  (If patient encounters technical issues they should call [phone]    How would you like to obtain your AVS? MyChart    Are changes needed to the allergy or medication list? No    Reason for visit: No chief complaint on file.    Liliana ARVIZUF
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
## 2024-01-05 ENCOUNTER — VIRTUAL VISIT (OUTPATIENT)
Dept: PSYCHIATRY | Facility: CLINIC | Age: 39
End: 2024-01-05
Attending: PSYCHIATRY & NEUROLOGY
Payer: COMMERCIAL

## 2024-01-05 DIAGNOSIS — F41.1 GENERALIZED ANXIETY DISORDER: Primary | ICD-10-CM

## 2024-01-05 PROCEDURE — 90837 PSYTX W PT 60 MINUTES: CPT | Mod: 95 | Performed by: STUDENT IN AN ORGANIZED HEALTH CARE EDUCATION/TRAINING PROGRAM

## 2024-01-05 NOTE — PROGRESS NOTES
"Video- Visit Details  Type of service:  video visit for mental health treatment  Time of service:  Date:  01/05/2024  Video Start Time: 7:52      Video End Time:  8:46    Reason for video visit: COVID-19   Originating Site (patient location): Patient's home  Distant Site (provider location):  On-Site - [facility]  Mode of Communication:  Video Conference via AmWell    As the provider I attest to compliance with applicable laws and regulations related to telemedicine.    OUTPATIENT PSYCHOTHERAPY PROGRESS NOTE    Client Name: Lena Quesada   YOB: 1985 (38 years old)   Date of Service: 01/05/2024  Time of Service: 8:10 (60 minutes)  Service Type(s): [place] PSYCHOTHERAPY W PATIENT 53 OR > MINUTES [[id number]]     Diagnoses: DAVID    Individuals Present:   Client attended alone    Subjective:  Kal  -No longer talking with Kal. Went to a concert with him, and after that he seemed to withdraw.  -Lena texted him directly about his withdrawal and got an unsatisfying response  -\"It's not me. This is a him thing.\"   -She's noticed a pattern that if she starts a relationship, the other person ends it, then they reconnect and she ends it.   -Takeaways:   -no regrets for being open and vulnerable  -Growing up, was insecure. In college, used to \"catfish\" people online using someone else's picture. Did it on and off until 6-7 years ago. Did have one relationship with someone that was . Did meet face to face a few times.   -Damaging and affirming at the same time. People really liked her for her, but it wasn't something that she could find while looking the way she did.   -Still can't imagine loving herself, but is working on accepting herself    Treatment:   Psychodynamic Psychotherapy  - Enhance insight via self-examination  - Explore emotions, especially contradicting/confusing ones  - Identify meaningful past experiences  - Challenge avoidance and resistance  - Encourage nuanced understanding " of interpersonal relationships    Psychotherapy services during this visit included myself and the patient. Discussed case with supervisor who also agreed with the treatment plan. Unable to sign in person due to visit being conducted through telehealth due to COVID-19.     Assessment and Progress:   Lena did very well with a course of CBT, and is now doing well further exploring her thoughts/behaviors/feelings with psychodynamic psychotherapy. Key topics are weight, self-esteem/confidence, trauma, and anxiety.     Plan:   -Next therapy appointment has been scheduled in 1 week.     Psychotherapy services during this visit included myself and the patient. Patient agrees with treatment plan. Discussed case with supervisor who also agreed with the treatment plan. Unable to sign in person due to visit being conducted through telehealth.     Diana Genao MD  PGY-3 Psychiatry Resident

## 2024-01-12 ENCOUNTER — VIRTUAL VISIT (OUTPATIENT)
Dept: PSYCHIATRY | Facility: CLINIC | Age: 39
End: 2024-01-12
Attending: PSYCHIATRY & NEUROLOGY
Payer: COMMERCIAL

## 2024-01-12 DIAGNOSIS — F41.1 GENERALIZED ANXIETY DISORDER: Primary | ICD-10-CM

## 2024-01-12 PROCEDURE — 90834 PSYTX W PT 45 MINUTES: CPT | Mod: 95 | Performed by: STUDENT IN AN ORGANIZED HEALTH CARE EDUCATION/TRAINING PROGRAM

## 2024-01-12 NOTE — PROGRESS NOTES
"Video- Visit Details  Type of service:  video visit for mental health treatment  Time of service:  Date:  01/12/2024  Video Start Time: 7:54      Video End Time:  8:44    Reason for video visit: COVID-19   Originating Site (patient location): Patient's home  Distant Site (provider location):  On-Site - [facility]  Mode of Communication:  Video Conference via AmWell    As the provider I attest to compliance with applicable laws and regulations related to telemedicine.    OUTPATIENT PSYCHOTHERAPY PROGRESS NOTE    Client Name: Lena Quesada   YOB: 1985 (38 years old)   Date of Service: 01/12/2024  Time of Service: 7:50 (60 minutes)  Service Type(s): ID PSYCHOTHERAPY W PATIENT 38-52 MINUTES [[id number]]     Diagnoses: DAVID    Individuals Present:   Client attended alone    Subjective:  -Parents traveling a lot lately. As kids, took a lot of affordable trips.   -Has been driving a lot doing errands, doing \"okay\" with the snow. Feels a little better in her Mom's car.   -Revisiting goals/progress thus far in therapy:   -Thinks she's accomplished being less critical of her physical appearance   -No longer comparing herself to other people going through their own weight loss journeys. More cognitive flexibility.    -Outside of just weight, realizing she's also learned to listen to her gut, not doing what other people tell/want her to do   -Didn't expect therapy to go beyond weight so much   -Friends and family have commented on her growth  -Never thought she'd tell anyone about catfishing in the past. Realized that it was a mistake, she learned from it, realizing why she did it.  -Self compassion as being part of her journey towards self-love  -Looking back on Kal, doesn't think she was being herself in it. Feeling okay about the rejection because she saw it coming.   -Kept it going for the sake of consistency, something he said he was looking for. She's not sure about what was really in it for " her. Thinks she was hopeful that it would turn into something more.     Treatment:   Psychodynamic Psychotherapy  - Enhance insight via self-examination  - Explore emotions, especially contradicting/confusing ones  - Identify meaningful past experiences  - Challenge avoidance and resistance  - Encourage nuanced understanding of interpersonal relationships    Psychotherapy services during this visit included myself and the patient. Discussed case with supervisor who also agreed with the treatment plan. Unable to sign in person due to visit being conducted through telehealth due to COVID-19.     Assessment and Progress:   Lena did very well with a course of CBT, and is now doing well further exploring her thoughts/behaviors/feelings with psychodynamic psychotherapy. Key topics are weight, self-esteem/confidence, trauma, and anxiety.     Plan:   -Next therapy appointment has been scheduled in 2 weeks.     Psychotherapy services during this visit included myself and the patient. Patient agrees with treatment plan. Discussed case with supervisor who also agreed with the treatment plan. Unable to sign in person due to visit being conducted through telehealth.     Diana Genao MD  PGY-3 Psychiatry Resident

## 2024-02-02 ENCOUNTER — VIRTUAL VISIT (OUTPATIENT)
Dept: PSYCHIATRY | Facility: CLINIC | Age: 39
End: 2024-02-02
Attending: PSYCHIATRY & NEUROLOGY
Payer: COMMERCIAL

## 2024-02-02 DIAGNOSIS — F41.1 GENERALIZED ANXIETY DISORDER: Primary | ICD-10-CM

## 2024-02-02 PROCEDURE — 90837 PSYTX W PT 60 MINUTES: CPT | Mod: 95 | Performed by: STUDENT IN AN ORGANIZED HEALTH CARE EDUCATION/TRAINING PROGRAM
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
## 2024-02-09 ENCOUNTER — VIRTUAL VISIT (OUTPATIENT)
Dept: PSYCHIATRY | Facility: CLINIC | Age: 39
End: 2024-02-09
Attending: PSYCHIATRY & NEUROLOGY
Payer: COMMERCIAL

## 2024-02-09 DIAGNOSIS — F41.1 GENERALIZED ANXIETY DISORDER: Primary | ICD-10-CM

## 2024-02-09 PROCEDURE — 90837 PSYTX W PT 60 MINUTES: CPT | Mod: 95 | Performed by: STUDENT IN AN ORGANIZED HEALTH CARE EDUCATION/TRAINING PROGRAM
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
## 2024-02-14 ENCOUNTER — VIRTUAL VISIT (OUTPATIENT)
Dept: PSYCHIATRY | Facility: CLINIC | Age: 39
End: 2024-02-14
Attending: NURSE PRACTITIONER
Payer: COMMERCIAL

## 2024-02-14 DIAGNOSIS — F41.9 ANXIETY: ICD-10-CM

## 2024-02-14 PROCEDURE — 99214 OFFICE O/P EST MOD 30 MIN: CPT | Mod: 95 | Performed by: NURSE PRACTITIONER

## 2024-02-14 NOTE — NURSING NOTE
Is the patient currently in the state of [place]? YES    Visit mode:VIDEO    If the visit is dropped, the patient can be reconnected by: VIDEO VISIT: Send to e-mail at: [email or web address]    Will anyone else be joining the visit? NO  (If patient encounters technical issues they should call [phone]    How would you like to obtain your AVS? MyChart    Are changes needed to the allergy or medication list? No    Reason for visit: RECHECK    Jon CHEN

## 2024-02-14 NOTE — PATIENT INSTRUCTIONS
**For crisis resources, please see the information at the end of this document**   Patient Education    Thank you for coming to the [facility].     Lab Testing:  If you had lab testing today and your results are reassuring or normal they will be mailed to you or sent through [employer] within 7 days. If the lab tests need quick action we will call you with the results. The phone number we will call with results is # [phone]. If this is not the best number please call our clinic and change the number.     Medication Refills:  If you need any refills please call your pharmacy and they will contact us. Our fax number for refills is [phone].   Three business days of notice are needed for general medication refill requests.   Five business days of notice are needed for controlled substance refill requests.   If you need to change to a different pharmacy, please contact the new pharmacy directly. The new pharmacy will help you get your medications transferred.     Contact Us:  Please call [phone] during business hours (8-5:00 M-F).   If you have medication related questions after clinic hours, or on the weekend, please call [phone].     Financial Assistance [phone]   Medical Records [phone]       MENTAL HEALTH CRISIS RESOURCES:  For a emergency help, please call 911 or go to the nearest Emergency Department.     Emergency Walk-In Options:   EmPATH Unit @ [facility] ([place]): [phone] - Specialized mental health emergency area designed to be calming  [facility] ([place]): [phone]  [facility] ([place]): [phone]  [facility]): [phone]    [facility] Crisis Information:   [place]: [phone]  [place]: [phone]  [place] ([place]) - Adult: [phone]     Child: [phone]  [place] - Adult: [phone]     Child: [phone]  [place]:  [phone]  List of all [facility] resources:   https://[place].gov/dhs/people-we-serve/adults/health-care/mental-health/resources/crisis-contacts.jsp    National Crisis Information:   Crisis Text Line: Text  [place]  to 788500  Suicide & Crisis Lifeline: 988  National Suicide Prevention Lifeline: 2-366-076-TALK (1-625.126.1998)       For online chat options, visit https://suicidepreventionlifeline.org/chat/  Poison Control Center: 6-215-633-8246  Trans Lifeline: 4-644-312-3262 - Hotline for transgender people of all ages  The [employer] Project: 7-752-563-5905 - Hotline for LGBT youth     For Non-Emergency Support:   Fast Tracker: Mental Health & Substance Use Disorder Resources -   https://www.[facility].org/
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
## 2024-03-08 ENCOUNTER — VIRTUAL VISIT (OUTPATIENT)
Dept: PSYCHIATRY | Facility: CLINIC | Age: 39
End: 2024-03-08
Attending: PSYCHIATRY & NEUROLOGY
Payer: COMMERCIAL

## 2024-03-08 DIAGNOSIS — F41.1 GENERALIZED ANXIETY DISORDER: Primary | ICD-10-CM

## 2024-03-08 PROCEDURE — 90837 PSYTX W PT 60 MINUTES: CPT | Mod: U7 | Performed by: STUDENT IN AN ORGANIZED HEALTH CARE EDUCATION/TRAINING PROGRAM
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
## 2024-03-15 ENCOUNTER — VIRTUAL VISIT (OUTPATIENT)
Dept: PSYCHIATRY | Facility: CLINIC | Age: 39
End: 2024-03-15
Attending: PSYCHIATRY & NEUROLOGY
Payer: COMMERCIAL

## 2024-03-15 DIAGNOSIS — F41.1 GENERALIZED ANXIETY DISORDER: Primary | ICD-10-CM

## 2024-03-15 PROCEDURE — 90837 PSYTX W PT 60 MINUTES: CPT | Mod: 95 | Performed by: STUDENT IN AN ORGANIZED HEALTH CARE EDUCATION/TRAINING PROGRAM

## 2024-03-15 NOTE — PROGRESS NOTES
"Video- Visit Details  Type of service:  video visit for mental health treatment  Time of service:  Date:  03/15/2024  Video Start Time: 7:54      Video End Time:  8:55    Reason for video visit: COVID-19   Originating Site (patient location): Patient's home  Distant Site (provider location):  On-Site - [facility]  Mode of Communication:  Video Conference via AmWell    As the provider I attest to compliance with applicable laws and regulations related to telemedicine.    OUTPATIENT PSYCHOTHERAPY PROGRESS NOTE    Client Name: Lena Quesada   YOB: 1985 (38 years old)   Date of Service: 03/15/2024  Time of Service: 7:50 (60 minutes)  Service Type(s): [place] PSYCHOTHERAPY W PATIENT 53 OR > MINUTES [[id number]]     Diagnoses: DAVID    Individuals Present:   Client attended alone    Subjective:  -Took today off, so the last day of her old job was yesterday. It was a typical day aside from an exit interview from her supervisor.  -Nervous to start new job on Monday. Still excited for the changes    -Her parents decided to give her their car in exchange for whatever money she sells her current car for. Situation is complicated by a lien she took out on her car years ago. She wasn't ever planning on sharing this, as it was an adult decision she made independently and doesn't want to invite more questions about it. Initially in the session, she had decided not to share lien information with parents, but after discussing pros and cons, she decided that she would. Feels that this is an important step in continuing their adult-adult relationship.       -Wants to take a break from therapy during my leave  -Not interested in group right now  -\"I don't envy people starting families\" - very happy with her current situation    -Uncle still in ICU, still stable. Visited him on Sunday with her mom and aunt. He was awake and on BiPAP, was emotional.     Treatment:   Psychodynamic Psychotherapy  - Enhance insight via " self-examination  - Explore emotions, especially contradicting/confusing ones  - Identify meaningful past experiences  - Challenge avoidance and resistance  - Encourage nuanced understanding of interpersonal relationships    Psychotherapy services during this visit included myself and the patient. Discussed case with supervisor who also agreed with the treatment plan. Unable to sign in person due to visit being conducted through telehealth due to COVID-19.     Assessment and Progress:   Lena did very well with a course of CBT, and is now doing well further exploring her thoughts/behaviors/feelings with psychodynamic psychotherapy. Key topics are weight, self-esteem/confidence, trauma, and anxiety. Today we discussed pros and cons of a difficult decision.     Plan:   -Next therapy appointment has been scheduled in 2 weeks.     Psychotherapy services during this visit included myself and the patient. Patient agrees with treatment plan. Discussed case with supervisor who also agreed with the treatment plan. Unable to sign in person due to visit being conducted through telehealth.     Diana Genao MD  PGY-3 Psychiatry Resident

## 2024-03-29 ENCOUNTER — VIRTUAL VISIT (OUTPATIENT)
Dept: PSYCHIATRY | Facility: CLINIC | Age: 39
End: 2024-03-29
Attending: PSYCHIATRY & NEUROLOGY
Payer: COMMERCIAL

## 2024-03-29 DIAGNOSIS — F41.1 GENERALIZED ANXIETY DISORDER: Primary | ICD-10-CM

## 2024-03-29 PROCEDURE — 90834 PSYTX W PT 45 MINUTES: CPT | Mod: 95 | Performed by: STUDENT IN AN ORGANIZED HEALTH CARE EDUCATION/TRAINING PROGRAM

## 2024-03-29 NOTE — PROGRESS NOTES
"Video- Visit Details  Type of service:  video visit for mental health treatment  Time of service:  Date:  03/29/2024  Video Start Time: 7:55      Video End Time:  8:47    Reason for video visit: COVID-19   Originating Site (patient location): Patient's home  Distant Site (provider location):  On-Site - [facility]  Mode of Communication:  Video Conference via AmWell    As the provider I attest to compliance with applicable laws and regulations related to telemedicine.    OUTPATIENT PSYCHOTHERAPY PROGRESS NOTE    Client Name: Lena Quesada   YOB: 1985 (38 years old)       Diagnoses: DAVID    Individuals Present:   Client attended alone    Subjective:  -New job: feeling a bit overwhelmed, underqualified, insecure. Trying to focus on learning something new every day. Growth is uncomfortable and a gradual process. Feels comfortable in discussing these concerns with her new supervisor.   -Anxiety levels have been \"decent.\"   -Has gained 3 lbs - discussed stress    -Told parents about car. Parents paid off lien because they wanted to sell her car sooner. Lena sold her car. Still jumping through some hoops with the [employer].   -Overall, glad she told her parents about the lien. Proud of how she handled it with them. They seemed respectful of her decision.     Treatment:   Psychodynamic Psychotherapy  - Enhance insight via self-examination  - Explore emotions, especially contradicting/confusing ones  - Identify meaningful past experiences  - Challenge avoidance and resistance  - Encourage nuanced understanding of interpersonal relationships    Psychotherapy services during this visit included myself and the patient. Discussed case with supervisor who also agreed with the treatment plan. Unable to sign in person due to visit being conducted through telehealth due to COVID-19.     Assessment and Progress:   Lena did very well with a course of CBT, and is now doing well further exploring her " thoughts/behaviors/feelings with psychodynamic psychotherapy. Key topics are weight, self-esteem/confidence, trauma, and anxiety. Today we reflected on growth.    Plan:   -Next therapy appointment has been scheduled in 1 week.     Psychotherapy services during this visit included myself and the patient. Patient agrees with treatment plan. Discussed case with supervisor who also agreed with the treatment plan. Unable to sign in person due to visit being conducted through telehealth.     Diana Genao MD  PGY-3 Psychiatry Resident

## 2024-04-05 ENCOUNTER — VIRTUAL VISIT (OUTPATIENT)
Dept: PSYCHIATRY | Facility: CLINIC | Age: 39
End: 2024-04-05
Attending: PSYCHOLOGIST
Payer: COMMERCIAL

## 2024-04-05 DIAGNOSIS — F41.1 GENERALIZED ANXIETY DISORDER: Primary | ICD-10-CM

## 2024-04-05 PROCEDURE — 90834 PSYTX W PT 45 MINUTES: CPT | Mod: 95 | Performed by: STUDENT IN AN ORGANIZED HEALTH CARE EDUCATION/TRAINING PROGRAM
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
## 2024-04-12 ENCOUNTER — VIRTUAL VISIT (OUTPATIENT)
Dept: PSYCHIATRY | Facility: CLINIC | Age: 39
End: 2024-04-12
Attending: PSYCHOLOGIST
Payer: COMMERCIAL

## 2024-04-12 DIAGNOSIS — F41.1 GENERALIZED ANXIETY DISORDER: Primary | ICD-10-CM

## 2024-04-12 PROCEDURE — 90837 PSYTX W PT 60 MINUTES: CPT | Mod: 95 | Performed by: STUDENT IN AN ORGANIZED HEALTH CARE EDUCATION/TRAINING PROGRAM
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
## 2024-04-26 ENCOUNTER — VIRTUAL VISIT (OUTPATIENT)
Dept: PSYCHIATRY | Facility: CLINIC | Age: 39
End: 2024-04-26
Attending: PSYCHOLOGIST
Payer: COMMERCIAL

## 2024-04-26 DIAGNOSIS — F41.1 GENERALIZED ANXIETY DISORDER: Primary | ICD-10-CM

## 2024-04-26 PROCEDURE — 90834 PSYTX W PT 45 MINUTES: CPT | Mod: 95 | Performed by: STUDENT IN AN ORGANIZED HEALTH CARE EDUCATION/TRAINING PROGRAM

## 2024-04-26 NOTE — PROGRESS NOTES
"Video- Visit Details  Type of service:  video visit for mental health treatment  Time of service:  Date:  04/26/2024  Video Start Time: 7:56      Video End Time:  8:46    Reason for video visit: COVID-19   Originating Site (patient location): Patient's home  Distant Site (provider location):  On-Site - [facility]  Mode of Communication:  Video Conference via AmWell    As the provider I attest to compliance with applicable laws and regulations related to telemedicine.    OUTPATIENT PSYCHOTHERAPY PROGRESS NOTE    Client Name: Lena Quesada   YOB: 1985 (38 year old)   Date of Service: 04/26/2024  Service Type(s): [place] PSYCHOTHERAPY W PATIENT 38-52 MINUTES [[id number]]     Diagnoses: DAVID    Individuals Present:   Client attended alone    Subjective:  -Tired this week, felt like a long week   -Work is going well, it's been busy. Feeling much more confident. Still feels like she's doing less than her coworkers, who are going beyond the scope of what's necessary.   -Noticing some personality differences with , who interrupts and disagrees often. Brought it up with supervisor, who has also noticed. Her supervisor said Shreya is \"more than able to not attend meetings that she doesn't need to attend.\" This is different from her last job, where she felt much more connection with her  than her supervisor.   -Notes, it's \"constantly on attack at me,\" referring to her   -Noticing parallels with Maty  -Discussed finding where her power is in these situations    -Discussed money stress, and how different each persons relationships with money is. She may try to communicate this to her friends in advance of their conference.  -Theme: negotiating for what she needs vs maintaining relationships    Treatment:   Psychodynamic Psychotherapy  - Enhance insight via self-examination  - Explore emotions, especially contradicting/confusing ones  - Identify meaningful past experiences  - " Challenge avoidance and resistance  - Encourage nuanced understanding of interpersonal relationships    Psychotherapy services during this visit included myself and the patient. Discussed case with supervisor who also agreed with the treatment plan. Unable to sign in person due to visit being conducted through telehealth due to COVID-19.     Assessment and Progress:   Lena did very well with a course of CBT, and is now doing well further exploring her thoughts/behaviors/feelings with psychodynamic psychotherapy. Key topics are weight, self-esteem/confidence, trauma, and anxiety. Today we reflected on difficult relationship dynamics in work and social settings, and the balance of negotiating for her own needs vs maintaining relationships.    Plan:   -Next therapy appointment has been scheduled in 1 week.     Psychotherapy services during this visit included myself and the patient. Patient agrees with treatment plan. Discussed case with supervisor who also agreed with the treatment plan. Unable to sign in person due to visit being conducted through telehealth.     Diana Genao MD  PGY-3 Psychiatry Resident

## 2024-05-03 ENCOUNTER — VIRTUAL VISIT (OUTPATIENT)
Dept: PSYCHIATRY | Facility: CLINIC | Age: 39
End: 2024-05-03
Attending: PSYCHOLOGIST
Payer: COMMERCIAL

## 2024-05-03 DIAGNOSIS — F41.1 GENERALIZED ANXIETY DISORDER: Primary | ICD-10-CM

## 2024-05-03 PROCEDURE — 90837 PSYTX W PT 60 MINUTES: CPT | Mod: 95 | Performed by: STUDENT IN AN ORGANIZED HEALTH CARE EDUCATION/TRAINING PROGRAM
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
## 2024-05-10 ENCOUNTER — VIRTUAL VISIT (OUTPATIENT)
Dept: PSYCHIATRY | Facility: CLINIC | Age: 39
End: 2024-05-10
Attending: PSYCHOLOGIST
Payer: COMMERCIAL

## 2024-05-10 DIAGNOSIS — F41.1 GENERALIZED ANXIETY DISORDER: Primary | ICD-10-CM

## 2024-05-10 PROCEDURE — 90834 PSYTX W PT 45 MINUTES: CPT | Mod: 95 | Performed by: STUDENT IN AN ORGANIZED HEALTH CARE EDUCATION/TRAINING PROGRAM

## 2024-05-10 NOTE — PROGRESS NOTES
"Video- Visit Details  Type of service:  video visit for mental health treatment  Time of service:  Date:  05/10/2024  Video Start Time: 7:53      Video End Time:  8:45    Reason for video visit: COVID-19   Originating Site (patient location): Patient's home  Distant Site (provider location):  On-Site - [facility]  Mode of Communication:  Video Conference via AmWell    As the provider I attest to compliance with applicable laws and regulations related to telemedicine.    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    -[employer] was \"so good.\" Nancy did really well in her [employer] competition. Didn't interact with Nancy's mom, which has been their working relationship for years.    -Cecelia and Maty both decided to go to the work conference. \"I think I hit a wall\" - plans to make her own plans more independently this year and take on less of the planning role for the group. Feeling good about this.   -Discussed how their friendship started - some elements of chance and feeling like she would accept whoever accepted her    -Getable races next weekend. Will see parents, aunt and uncle.     Treatment:   Psychodynamic Psychotherapy  - Enhance insight via self-examination  - Explore emotions, especially contradicting/confusing ones  - Identify meaningful past experiences  - Challenge avoidance and resistance  - Encourage nuanced understanding of interpersonal relationships    Psychotherapy services during this visit included myself and the patient. Discussed case with supervisor who also agreed with the treatment plan. Unable to sign in " person due to visit being conducted through telehealth due to COVID-19.     Assessment and Progress:   Lena did very well with a course of CBT, and is now doing well further exploring her thoughts/behaviors/feelings with psychodynamic psychotherapy. Key topics are weight, self-esteem/confidence, trauma, and anxiety. Today we discussed life updates and reflected on friendships.    Plan:   -Next therapy appointment has been scheduled in 3 weeks.     Psychotherapy services during this visit included myself and the patient. Patient agrees with treatment plan. Discussed case with supervisor who also agreed with the treatment plan. Unable to sign in person due to visit being conducted through telehealth.     Diana Genao MD  PGY-3 Psychiatry Resident

## 2024-05-15 ENCOUNTER — VIRTUAL VISIT (OUTPATIENT)
Dept: PSYCHIATRY | Facility: CLINIC | Age: 39
End: 2024-05-15
Attending: NURSE PRACTITIONER
Payer: COMMERCIAL

## 2024-05-15 VITALS — WEIGHT: 234 LBS | BODY MASS INDEX: 36.64 KG/M2

## 2024-05-15 DIAGNOSIS — F41.9 ANXIETY: ICD-10-CM

## 2024-05-15 PROCEDURE — 99214 OFFICE O/P EST MOD 30 MIN: CPT | Mod: 95 | Performed by: NURSE PRACTITIONER

## 2024-05-15 ASSESSMENT — PAIN SCALES - GENERAL: PAINLEVEL: NO PAIN (0)

## 2024-05-15 NOTE — NURSING NOTE
Is the patient currently in the state of [place]? YES    Visit mode:VIDEO    If the visit is dropped, the patient can be reconnected by: VIDEO VISIT: Send to e-mail at: [email or web address]    Will anyone else be joining the visit? NO  (If patient encounters technical issues they should call [phone])    How would you like to obtain your AVS? MyChart    Are changes needed to the allergy or medication list? No    Are refills needed on medications prescribed by this physician? Lena would like to talk to provider about any refills    Reason for visit: RACHEAL ARVIZUF

## 2024-05-15 NOTE — PROGRESS NOTES
"Virtual Visit Details    Type of service:  Video Visit     Originating Location (pt. Location): Home  Distant Location (provider location):  On-site  Platform used for Video Visit: [facility]  Psychiatry Clinic    PSYCHIATRIC PROGRESS NOTE       Lena Quesada is a 38 year old female who prefers the name Lena and pronouns she, her.  Therapist: weekly with Dr. Genao  PCP: Jazz Abbott  Other Providers: None    Pertinent Background: Describes her childhood as steady, stable. Onset of bullying then anxiety then depression in middle school. G[place] \"[place]. The biggest girl, I always asked myself who's going to say something today?\". Her ability to cope with judgment from her peers improved in high school. [place] more free to be herself in college. Bariatric surgery in March 2022.      Psych critical item history includes trauma history (bullying, social exclusion), emotional eating, body dysmorphia before and after bariatric surgery.     Interim History      The patient is a good historian and reports good treatment adherence.    Last seen on 2/14/2024 when she chose to continue sertraline 50mg daily.    Describes her mood as good.  - pleased to share her transition to a new role within [employer] in March, she received a significant pay increase  - she has support from her bosses to join a leadership conference  - enjoys her independent work, she's no longer micromanaged    - getting to the gym most days  - starting back with [employer] Master's classes until mid June, she has 6 classes remaining  - she sold her car and bought her Mom's Jeep    - traveling for a skin care convention in Sept 2024  - coping skills include taking a moment, breathing, consider her response vs reacting, making a plan  - support from her parents, she's growing her social network  - enjoys watching sports, seeing games in person (idio), her two nieces and one nephew, two " friends, watching shows                 .   RECENT PSYCH ROS:   Depression: denies significant depression symptoms  Anxiety: using therapy skills to manage anxiety in her career transition; monitoring anxiety about winter driving    ADVERSE SIDE EFFECTS: low libido  MEDICAL CONCERNS: with nexplanon implanted in May 2022, she's had irregular periods, heavy cycle started this week; followed bariatric medicine, considers panniculectomy     APPETITE: with difficulties filling Wegovy, 241# in Nov 2023  - bariatric surgery in Mar 2022, she'd like to lose to 200#      SLEEP: follows evening routine, sleeping well with TR melatonin for 8-9 hours, vivid dreams continue       RECENT SUBSTANCE USE:     Alcohol- limits alcohol  Caffeine- avoids, dislikes coffee, rare soda, prefers water    Social/ Family History                   FINANCIAL SUPPORT- 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 Born to  parents. She is youngest of two, brother Sidney (b. 1980). Graduated with BS in physical education (K-12) with coaching minor from [employer]. Enrolled in Masters classes in business at [employer].     CULTURAL/ SOCIAL/ SPIRITUAL SUPPORT- support from her parents, identifies as Latter day who has questions        TRAUMA HISTORY- extensive bullying  FEELS SAFE AT HOME- Yes  FAMILY MENTAL HEALTH HISTORY- PGM- anxiety, depression    Medical / Surgical History                                 Patient Active Problem List   Diagnosis    Morbid obesity (H)    Chondromalacia of patellofemoral joint    Morbid obesity due to excess calories (H)    Nexplanon in place     Cervical high risk human papillomavirus (HPV) DNA test positive       Past Surgical History:   Procedure Laterality Date    CHOLECYSTECTOMY  2005     LAPAROSCOPIC GASTRIC SLEEVE N/A 3/24/2022    Procedure: GASTRECTOMY, SLEEVE, LAPAROSCOPIC;  Surgeon: Bobby Rodriguez MD;  Location: [facility] OR    TYMPANOPLASTY      WISDOM TOOTH EXTRACTION        Medical Review of Systems              A comprehensive review of systems was performed and is negative other than noted in the HPI.    Pregnant or breastfeeding- no      Contraception- nexplanon    Denies TBI/LOC, seizures.    Allergy    Patient has no known allergies.  Current Medications        Current Outpatient Medications   Medication Sig Dispense Refill    Calcium Citrate-Vitamin D (CALCIUM CITRATE + PO) Take 1 chew tab by mouth daily 600mg      Cholecalciferol (VITAMIN D-3) 125 MCG (5000 UT) TABS Take 1 tablet by mouth daily      Cyanocobalamin 5000 MCG SUBL Place 1 tablet under the tongue once a week      Pediatric Multivitamins-Iron (CHEWABLE BRADLEY/IRON CHILDRENS PO) Take 1 tablet by mouth daily      Semaglutide-Weight Management (WEGOVY) 2.4 MG/0.75ML pen Inject 2.4 mg Subcutaneous every 7 days 4 pens 27 mL 3    sertraline (ZOLOFT) 50 MG tablet Take one 50mg tab daily 90 tablet 0    Alpha Lipoic Ac-Biotin-Keratin (BIOTIN-KERATIN-ALPHA LIPOIC AC PO) Take 1 capsule by mouth daily 10,000 mg       Vitals          Wt 106.1 kg (234 lb)   BMI 36.64 kg/m       Pulse Readings from Last 5 Encounters:   10/24/23 81   09/08/22 77   03/25/22 75   03/07/22 83   10/05/21 101     Wt Readings from Last 5 Encounters:   05/15/24 106.1 kg (234 lb)   11/09/23 109.3 kg (241 lb)   10/24/23 110.7 kg (244 lb)   06/21/23 117 kg (258 lb)   03/09/23 128.2 kg (282 lb 9.6 oz)     BP Readings from Last 5 Encounters:   10/24/23 125/85   09/08/22 (!) 157/89   03/25/22 118/56   03/07/22 (!) 156/94   10/05/21 (!) 141/93     Mental Status Exam          Alertness: alert  and oriented  Appearance: adequately groomed  Behavior/Demeanor: cooperative, pleasant and calm, with good  eye contact   Speech: normal and regular rate and  rhythm  Language: no problems  Psychomotor: normal or unremarkable  Mood: description consistent with euthymia  Affect: full range and appropriate; was congruent to mood; was congruent to content  Thought Process/Associations: unremarkable  Thought Content:  Reports none;  Denies suicidal ideation, violent ideation, delusions, preoccupations, obsessions , phobia , magical thinking, over-valued ideas and paranoid ideation  Perception:  Reports none;  Denies auditory hallucinations, visual hallucinations, visual distortion seen as shadows , depersonalization and derealization  Insight: fair  Judgment: good  Cognition: (6) does  appear grossly intact; formal cognitive testing was not done  Gait/Station and/or Muscle Strength/Tone:  N/A    Labs and Data                          Rating Scales:    N/A    PHQ9 Today:        3/27/2023     7:10 AM 10/24/2023     4:55 PM   PHQ   PHQ-9 Total Score 9 1   Q9: Thoughts of better off dead/self-harm past 2 weeks Not at all Not at all     Diagnosis      mild MDD, adjustment disorder with anxiety     Assessment          TODAY, the following items were reviewed:    : 3/2023    PSYCHOTROPIC DRUG INTERACTIONS:  - none with sertraline, Wegovy, Nexplanon, Prilosec     MANAGEMENT:  Monitoring for adverse effects, routine vitals, using lowest therapeutic dose of [psychotropics] and patient is aware of risks    Plan                                                                                                                   1) she chooses to continue sertraline 50mg daily  2) active in therapy with Dr. Genao    RTC: 12 weeks, sooner as needed    CRISIS NUMBERS:   Provided routinely in AVS.    Treatment Risk Statement:  The patient understands the risks, benefits, adverse effects and alternatives. Agrees to treatment with the capacity to do so. No medical contraindications to treatment. Agrees to call clinic for any problems. The patient understands to call 911 or go to the nearest ED  if life threatening or urgent symptoms occur.     WHODAS 2.0  TODAY total score = N/A; [a 12-item WHODAS 2.0 assessment was not completed by the pt today and/or recorded in EPIC].    PROVIDER:  CATERINA Matos CNP

## 2024-05-15 NOTE — PATIENT INSTRUCTIONS
**For crisis resources, please see the information at the end of this document**   Patient Education    Thank you for coming to the [facility] [place] [facility].     Lab Testing:  If you had lab testing today and your results are reassuring or normal they will be mailed to you or sent through [employer] within 7 days. If the lab tests need quick action we will call you with the results. The phone number we will call with results is # [phone]. If this is not the best number please call our clinic and change the number.     Medication Refills:  If you need any refills please call your pharmacy and they will contact us. Our fax number for refills is [phone].   Three business days of notice are needed for general medication refill requests.   Five business days of notice are needed for controlled substance refill requests.   If you need to change to a different pharmacy, please contact the new pharmacy directly. The new pharmacy will help you get your medications transferred.     Contact Us:  Please call [phone] during business hours (8-5:00 M-F).   If you have medication related questions after clinic hours, or on the weekend, please call [phone].     Financial Assistance [phone]   Medical Records [phone]       MENTAL HEALTH CRISIS RESOURCES:  For a emergency help, please call 911 or go to the nearest Emergency Department.     Emergency Walk-In Options:   EmPATH Unit @ [facility] ([place]): [phone] - Specialized mental health emergency area designed to be calming  [facility] ([place]): [phone]  [facility] ([place]): [phone]  [facility]): [phone]    [facility] Crisis Information:   [place]: [phone]  [place]: [phone]  [place] ([place]) - Adult: [phone]     Child: [phone]  [place] - Adult: [phone]     Child: [phone]  [place]:  [phone]  List of all [facility] resources:   https://[place].gov/dhs/people-we-serve/adults/health-care/mental-health/resources/crisis-contacts.jsp    National Crisis Information:   Crisis Text Line: Text  [place]  to 364513  Suicide & Crisis Lifeline: 988  National Suicide Prevention Lifeline: 8-460-350-TALK (1-236.441.1002)       For online chat options, visit https://suicidepreventionlifeline.org/chat/  Poison Control Center: 9-730-729-3122  Trans Lifeline: 7-355-274-6309 - Hotline for transgender people of all ages  The [employer] Project: 1-461-382-2368 - Hotline for LGBT youth     For Non-Emergency Support:   Fast Tracker: Mental Health & Substance Use Disorder Resources -   https://www.[employer] [employer].org/

## 2024-05-31 ENCOUNTER — VIRTUAL VISIT (OUTPATIENT)
Dept: PSYCHIATRY | Facility: CLINIC | Age: 39
End: 2024-05-31
Attending: PSYCHOLOGIST
Payer: COMMERCIAL

## 2024-05-31 DIAGNOSIS — F41.1 GENERALIZED ANXIETY DISORDER: Primary | ICD-10-CM

## 2024-05-31 PROCEDURE — 90837 PSYTX W PT 60 MINUTES: CPT | Mod: 95 | Performed by: STUDENT IN AN ORGANIZED HEALTH CARE EDUCATION/TRAINING PROGRAM

## 2024-05-31 NOTE — PROGRESS NOTES
"Video- Visit Details  Type of service:  video visit for mental health treatment  Time of service:  Date:  05/31/2024  Video Start Time: 7:52      Video End Time:  8:48    Reason for video visit: COVID-19   Originating Site (patient location): Patient's home  Distant Site (provider location):  On-Site - [facility]  Mode of Communication:  Video Conference via [employer]    As the provider I attest to compliance with applicable laws and regulations related to telemedicine.    OUTPATIENT PSYCHOTHERAPY PROGRESS NOTE    Client Name: Lena Quesada   YOB: 1985 (38 year old)   Date of Service: 05/31/2024  Service Type(s): [place] PSYCHOTHERAPY W PATIENT 53 OR > MINUTES [[id number]]     Diagnoses: DAVID    Individuals Present:   Client attended alone    Subjective:  -Tired today  -Had an in-person work training last week M-W, \"loved it\"  -Had a good weekend with family  -This weekend, going to [place] with brother and family  -Facetimed aunt without her wig for the first time  -Discussed gratitude for supportive family, how she's supportive her nieces and nephew     -Planning photoshoot with Cecelia and Maty in Sept     -Home liked her on fb dating. She's not planning to respond. Not interested in getting back together.   -Learning to feel comfortable being alone  -Parents have told her she's picky. She doesn't think she's picky, but doesn't want to settle.    -Talking to someone online, Alan, for the past 2 weeks. He seems nice and supportive.    -Differences in how her parents approach dating vs norms now    Treatment:   Psychodynamic Psychotherapy  - Enhance insight via self-examination  - Explore emotions, especially contradicting/confusing ones  - Identify meaningful past experiences  - Challenge avoidance and resistance  - Encourage nuanced understanding of interpersonal relationships    Psychotherapy services during this visit included myself and the patient. Discussed case with supervisor who also " agreed with the treatment plan. Unable to sign in person due to visit being conducted through telehealth due to COVID-19.     Assessment and Progress:   Lena did very well with a course of CBT, and is now doing well further exploring her thoughts/behaviors/feelings with psychodynamic psychotherapy. Key topics are weight, self-esteem/confidence, trauma, and anxiety. Today we discussed life updates and reflected on friendships.    Plan:   -Next therapy appointment has been scheduled in 1 week.     Psychotherapy services during this visit included myself and the patient. Patient agrees with treatment plan. Discussed case with supervisor who also agreed with the treatment plan. Unable to sign in person due to visit being conducted through telehealth.     Diana Genao MD  PGY-3 Psychiatry Resident

## 2024-06-07 ENCOUNTER — VIRTUAL VISIT (OUTPATIENT)
Dept: PSYCHIATRY | Facility: CLINIC | Age: 39
End: 2024-06-07
Attending: PSYCHOLOGIST
Payer: COMMERCIAL

## 2024-06-07 DIAGNOSIS — F41.1 GENERALIZED ANXIETY DISORDER: Primary | ICD-10-CM

## 2024-06-07 PROCEDURE — 90834 PSYTX W PT 45 MINUTES: CPT | Mod: 95 | Performed by: STUDENT IN AN ORGANIZED HEALTH CARE EDUCATION/TRAINING PROGRAM

## 2024-06-07 NOTE — PROGRESS NOTES
Video- Visit Details  Type of service:  video visit for mental health treatment  Time of service:  Date:  06/07/2024  Video Start Time: 7:54     Video End Time:  8:44    Reason for video visit: COVID-19   Originating Site (patient location): Patient's home  Distant Site (provider location):  On-Site - [facility]  Mode of Communication:  Video Conference via [employer]    As the provider I attest to compliance with applicable laws and regulations related to telemedicine.    OUTPATIENT PSYCHOTHERAPY PROGRESS NOTE    Client Name: Lena Quesada   YOB: 1985 (38 year old)   Date of Service: 06/07/2024  Service Type(s): [place] PSYCHOTHERAPY W PATIENT 38-52 MINUTES [[id number]]     Diagnoses: DAVID    Individuals Present:   Client attended alone    Subjective:  -Tired today    -Home asked her to text him. Similar patterns of communication repeated. She ended things again. Feels annoyed by it. She thinks that if he reached out again, she wouldn't respond.  -Kind of still talking with Karen torres, probably going to fizzle out. She's invited him to do things multiple times and he hasn't reciprocated.    -Found a new [employer] orth[employer] and went on Sunday. Realized once she was there that their political views were very different from her own. She left custodial through the service. Wants to keep trying to find a good fit.     -Booked hotel for [place]! Got financial charges from Cecelia after asking for them, which was very appreciated.    -Scared that she won't lose more weight. Goal is to get under 200 lbs. Doesn't have a timeline, but does think that if things stop changing, she would plan on reaching out to her bariatric surgeon.   -Trying to focus on non-scale victories like fitting both legs into one leg of old jeans, sharing clothes with her mom, improved mobility     -Feeling good about next few weeks before our next therapy session on 7/1. Nothing coming up that's been weighing on her.    Treatment:   Psychodynamic  Psychotherapy  - Enhance insight via self-examination  - Explore emotions, especially contradicting/confusing ones  - Identify meaningful past experiences  - Challenge avoidance and resistance  - Encourage nuanced understanding of interpersonal relationships    Psychotherapy services during this visit included myself and the patient. Discussed case with supervisor who also agreed with the treatment plan. Unable to sign in person due to visit being conducted through telehealth due to COVID-19.     Assessment and Progress:   Lena did very well with a course of CBT, and is now doing well further exploring her thoughts/behaviors/feelings with psychodynamic psychotherapy. Key topics are weight, self-esteem/confidence, trauma, and anxiety. Today we discussed life updates and reflected on her weight loss journey.    Plan:   -Next therapy appointment has been scheduled for 7/1. Lena does not feel she needs interim therapy before our next session. No safety concerns.    Psychotherapy services during this visit included myself and the patient. Patient agrees with treatment plan. Discussed case with supervisor who also agreed with the treatment plan. Unable to sign in person due to visit being conducted through telehealth.     Diana Genao MD  PGY-3 Psychiatry Resident

## 2024-07-01 ENCOUNTER — VIRTUAL VISIT (OUTPATIENT)
Dept: PSYCHIATRY | Facility: CLINIC | Age: 39
End: 2024-07-01
Attending: PSYCHIATRY & NEUROLOGY
Payer: COMMERCIAL

## 2024-07-01 DIAGNOSIS — F41.1 GENERALIZED ANXIETY DISORDER: Primary | ICD-10-CM

## 2024-07-01 PROCEDURE — 90837 PSYTX W PT 60 MINUTES: CPT | Mod: 95 | Performed by: STUDENT IN AN ORGANIZED HEALTH CARE EDUCATION/TRAINING PROGRAM

## 2024-07-01 NOTE — PROGRESS NOTES
"Video- Visit Details  Type of service:  video visit for mental health treatment  Time of service:  Date:  07/01/2024  Video Start Time: 4:01     Video End Time:  4:56    Reason for video visit: COVID-19   Originating Site (patient location): Patient's home  Distant Site (provider location):  On-Site - [facility]  Mode of Communication:  Video Conference via [employer]    As the provider I attest to compliance with applicable laws and regulations related to telemedicine.    OUTPATIENT PSYCHOTHERAPY PROGRESS NOTE    Client Name: Lena Quesada   YOB: 1985 (38 year old)   Date of Service: 07/01/2024  Service Type(s): [place] PSYCHOTHERAPY W PATIENT 53 OR > MINUTES [[id number]]     Diagnoses: DAVID    Individuals Present:   Client attended alone    Subjective:  -Adebayo reached out to her about 2 weeks ago - texted a wave, then FaceTimed. \"Does he think I'm that insecure that he thinks he can play a mind game?\" He knows she filed police report.   -Proud of herself for not responding to him  -Denies feeling threatened or unsafe. He doesn't know where she lives.   -\"Only emotion\" is surprise. Also expressed curiousity, \"like passing by a car accident\"     -Went to [employer] with old coworker. Had a lot of fun. Was \"drunk\" and reached out to Kal about an inside joke. He called her toxic. She feels \"ew\" and feels like it was some closure on their relationship.  -Had a lot of fun withher friend at the [employer].  -Ran into another friend from work that she had some interest in. They've been texting. He is casually dating other people, which is a \"weird\" adjustment for her. He is not in any relationships right now, but does see himself getting  someday. Discussed what this may look like for her in the future, and how to continue to evaluate whether or not her own needs/wants are being met.     -Found a [employer] that she likes, has gone a few times now. Feels like it's a really good fit. " "    -Going 3 weeks without therapy went well. Kept a mental checklist of things she wanted to bring up.     -Realizing she doesn't always need to be \"on\" and keeping the conversations going.     Treatment:   Psychodynamic Psychotherapy  - Enhance insight via self-examination  - Explore emotions, especially contradicting/confusing ones  - Identify meaningful past experiences  - Challenge avoidance and resistance  - Encourage nuanced understanding of interpersonal relationships    Psychotherapy services during this visit included myself and the patient. Discussed case with supervisor who also agreed with the treatment plan. Unable to sign in person due to visit being conducted through telehealth due to COVID-19.     Assessment and Progress:   Lena did very well with a course of CBT, and is now doing well further exploring her thoughts/behaviors/feelings with psychodynamic psychotherapy. Key topics are weight, self-esteem/confidence, trauma, and anxiety. Today we discussed life updates and reflected on several past and current romantic relationships.    Plan:   -Next therapy appointment has been scheduled for next week. No safety concerns.    Psychotherapy services during this visit included myself and the patient. Patient agrees with treatment plan. Discussed case with supervisor who also agreed with the treatment plan. Unable to sign in person due to visit being conducted through telehealth.     Diana Genao MD  PGY-4 Psychiatry Resident  "

## 2024-07-24 NOTE — PATIENT INSTRUCTIONS
If you have any questions regarding your visit, Please contact your care team.    To Schedule an Appointment 24/7  Call: [phone]  Women s Health  TELEPHONE NUMBER   Jaylan Luque M.D.    Karly-Medical Assistant    Tom Olivares-Surgery Scheduler  [place]- Tuesday-[place]                   7:30 a.m.-3:30 p.m.  Wednesday-[place]             8:00 a.m.-4:30 p.m.  Thursday-[place]     8:00 a.m.-4:00 p.m.  Friday-[place]                       7:30 a.m.-1:00 p.m. [facility]  [address] [place] [address].  [place] [address] [phone] Phone  [phone] Fax    Imaging Scheduling all locations  [phone]      [facility]  [address]  [place], [place] [address] [phone]    [facility]  [address] [place] [address] [phone] ask for Women's Clinic     **Surgeries** Our Surgery Schedulers will contact you to schedule. If you do not receive a call within 3 business days, please call [phone].    Urgent Care locations:  [facility] Monday-Friday                 10 am - 8 pm  Saturday and Sunday        9 am - 5 pm   [phone]   If you need a medication refill, please contact your pharmacy. Please allow 3 business days for your refill to be completed.  As always, Thank you for trusting us with your healthcare needs!  If you have any questions regarding your visit, Please contact your care team.    [employer] [facility]: [phone]    To Schedule an Appointment 24/7  Call: [phone]    see additional instructions from your care team below

## 2024-07-30 ENCOUNTER — OFFICE VISIT (OUTPATIENT)
Dept: OBGYN | Facility: CLINIC | Age: 39
End: 2024-07-30
Payer: COMMERCIAL

## 2024-07-30 VITALS
WEIGHT: 237.8 LBS | BODY MASS INDEX: 37.24 KG/M2 | HEART RATE: 65 BPM | DIASTOLIC BLOOD PRESSURE: 79 MMHG | SYSTOLIC BLOOD PRESSURE: 115 MMHG | OXYGEN SATURATION: 99 %

## 2024-07-30 DIAGNOSIS — N92.1 BREAKTHROUGH BLEEDING ON NEXPLANON: Primary | ICD-10-CM

## 2024-07-30 DIAGNOSIS — Z97.5 BREAKTHROUGH BLEEDING ON NEXPLANON: Primary | ICD-10-CM

## 2024-07-30 PROCEDURE — G2211 COMPLEX E/M VISIT ADD ON: HCPCS | Performed by: OBSTETRICS & GYNECOLOGY

## 2024-07-30 PROCEDURE — 99215 OFFICE O/P EST HI 40 MIN: CPT | Performed by: OBSTETRICS & GYNECOLOGY

## 2024-07-30 RX ORDER — ESTRADIOL 2 MG/1
2 TABLET ORAL DAILY
Qty: 30 TABLET | Refills: 1 | Status: SHIPPED | OUTPATIENT
Start: 2024-07-30

## 2024-07-30 NOTE — PROGRESS NOTES
Lena Quesada is a 39 year old female, .  She presents today with breakthrough bleeding with the Nexplanon for about one year.   She had the Nexplanon inserted almost 2 years ago, in .  She had about 10 months without bleeding after the product was inserted.  She now bleeds 7 to 10 days and then restarts bleeding about 15 to 20 days later.  She is using the thicker, over night pads throughout the day and night and she uses 5 to 6 during the day and 1 at night.  She has had some clots that start about day 4 of 5.   She has associated cramping, before the bleeding starts and then through days 4 or 5.    No aggravating or alleviating factors.    She states she was informed by her PCP to have the Nexplanon removed and reinserted.  She is not currently sexually active, but desires contraception and menstrual regulation.  She is concerned about her weight and the effectiveness of the Nexplanon.      Past Medical History:   Diagnosis Date    Arthritis     chondromalacia of knee (right). injections.    Cholelithiasis     Chondromalacia of patellofemoral joint     Morbid obesity (H) 2020    Morbid obesity due to excess calories (H) 3/24/2022    Nexplanon in place  2022    Remove within 5 years, 2027       Past Surgical History:   Procedure Laterality Date    CHOLECYSTECTOMY      LAPAROSCOPIC GASTRIC SLEEVE N/A 3/24/2022    Procedure: GASTRECTOMY, SLEEVE, LAPAROSCOPIC;  Surgeon: Bobby Rodriguez MD;  Location: [facility]    TYMPANOPLASTY      WISDOM TOOTH EXTRACTION         OB History    Para Term  AB Living   0 0 0 0 0 0   SAB IAB Ectopic Multiple Live Births   0 0 0 0 0        No Known Allergies    Current Outpatient Medications   Medication Sig Dispense Refill    Alpha Lipoic Ac-Biotin-Keratin (BIOTIN-KERATIN-ALPHA LIPOIC AC PO) Take 1 capsule by mouth daily 10,000 mg      Calcium Citrate-Vitamin D (CALCIUM CITRATE + PO) Take 1 chew tab by mouth daily 600mg       Cholecalciferol (VITAMIN D-3) 125 MCG (5000 UT) TABS Take 1 tablet by mouth daily      Cyanocobalamin 5000 MCG SUBL Place 1 tablet under the tongue once a week      estradiol (ESTRACE) 2 MG tablet Take 1 tablet (2 mg) by mouth daily 30 tablet 1    Pediatric Multivitamins-Iron (CHEWABLE BRADLEY/IRON CHILDRENS PO) Take 1 tablet by mouth daily      Semaglutide-Weight Management (WEGOVY) 2.4 MG/0.75ML pen Inject 2.4 mg Subcutaneous every 7 days 4 pens 27 mL 3    sertraline (ZOLOFT) 50 MG tablet Take one 50mg tab daily 90 tablet 0       Social History     Socioeconomic History    Marital status: Single     Spouse name: Not on file    Number of children: Not on file    Years of education: Not on file    Highest education level: Not on file   Occupational History    Not on file   Tobacco Use    Smoking status: Never    Smokeless tobacco: Never   Vaping Use    Vaping status: Never Used   Substance and Sexual Activity    Alcohol use: Not Currently    Drug use: Never    Sexual activity: Yes     Partners: Male     Birth control/protection: Condom   Other Topics Concern    Parent/sibling w/ CABG, MI or angioplasty before 65F 55M? Not Asked   Social History Narrative    Not on file     Social Determinants of Health     Financial Resource Strain: Low Risk  (10/24/2023)    Financial Resource Strain     Within the past 12 months, have you or your family members you live with been unable to get utilities (heat, electricity) when it was really needed?: No   Food Insecurity: High Risk (10/24/2023)    Food Insecurity     Within the past 12 months, did you worry that your food would run out before you got money to buy more?: Yes     Within the past 12 months, did the food you bought just not last and you didn t have money to get more?: No   Transportation Needs: Low Risk  (10/24/2023)    Transportation Needs     Within the past 12 months, has lack of transportation kept you from medical appointments, getting your medicines, non-medical  meetings or appointments, work, or from getting things that you need?: No   Physical Activity: Not on file   Stress: Not on file   Social Connections: Not on file   Interpersonal Safety: Low Risk  (10/24/2023)    Interpersonal Safety     Do you feel physically and emotionally safe where you currently live?: Yes     Within the past 12 months, have you been hit, slapped, kicked or otherwise physically hurt by someone?: No     Within the past 12 months, have you been humiliated or emotionally abused in other ways by your partner or ex-partner?: No   Housing Stability: Low Risk  (10/24/2023)    Housing Stability     Do you have housing? : Yes     Are you worried about losing your housing?: No       Family History   Problem Relation Age of Onset    Arthritis Mother     Obesity Mother     Arthritis Father     Obesity Father     Sleep Apnea Brother     Obesity Brother     Obesity Brother        Review of Systems:  10 point ROS of systems including Constitutional, Eyes, Respiratory, Cardiovascular, Gastroenterology, Genitourinary, Integumentary, Muscularskeletal, Psychiatric were all negative except for pertinent positives noted in my HPI and in the PMH.      Exam  /79 (BP Location: Right arm, Cuff Size: Adult Large)   Pulse 65   Wt 107.9 kg (237 lb 12.8 oz)   LMP 07/28/2024 (Exact Date)   SpO2 99%   BMI 37.24 kg/m    General:  WNWD female, NAD  Alert  Oriented x 3  Gait:  Normal  Skin:  Normal skin turgor  HEENT:  NC/AT, EOMI  Abdomen:  Non-tender, non-distended.  Pelvic exam:  Not performed  Extremities:  No clubbing, no cyanosis and no edema.      Assessment  Breakthrough bleeding with Nexplanon      Plan  I reviewed the Nexplanon use in people with higher BMI.  We discussed the original indication of use of 3 years, in individuals with weight of under 200 pounds, and it was thought that the product should be changed in 2 years for those over 200.   Since the product introduction, information shows that it may  be used for 3 years.    We reviewed the following Up To Date information that confirms it may be used effectively for 3 years, but there is concern about effectiveness after that:   Contraceptive implant -- The LNG and etonogestrel (ENG) contraceptive implants appear to be highly effective in overweight and obese women, although possibly less effective compared with women who weigh less [5]. In a three-year trial of nearly 1000 women randomly assigned to receive the LNG implant, three pregnancies occurred, all in women weighing ?70 kg at conception, for a cumulative pregnancy rate of 0.4 per 100 woman-years for all LNG users [46]. For nearly 1000 women randomly assigned to the ENG implant, there were no pregnancies in women ?70 kg compared with three pregnancies in women <70 kg at conception. Because of the small total number of pregnancies in the study and the relatively small number of women ?70 kg (fewer than 20 percent of subjects), it is unclear whether weight ?70 kg affects contraceptive implant efficacy and whether there is a true difference in efficacy between LNG and ENG implants at weights ?70 kg. The trial data are consistent with pharmacologic studies that report plasma hormone concentrations for both ENG and LNG remain above the contraceptive threshold for the approved duration of the device in women with obesity, although these levels may be lower compared with nonobese women [47-49]. It is likely that there is no large difference in contraceptive implant efficacy between women ?70 kg and those <70 kg. Extending the use of the implant from three to five years has not been sufficiently studied in a population of women weighing over 70 kg; thus, we would not advise extended use of the implant in this population [50].      She has not used any estrogen for the breakthrough bleeding.  She is willing to try it.  The prescription for estradiol 2 mg a day is given for 1 month with 1 refill.  She will give me  a call to let me know how she is doing at the end of the month.  If the breakthrough bleeding continues, however, then we need to re-evaluate for a new option or new Nexplanon.   Questions seem to be answered.     Total time preparing to see patient with reviewing prior encounter and labs, face to face time, coordinating care and documentation, on the same calendar date:  55 minutes.     Jaylan Luque MD

## 2024-08-12 ENCOUNTER — VIRTUAL VISIT (OUTPATIENT)
Dept: PSYCHIATRY | Facility: CLINIC | Age: 39
End: 2024-08-12
Attending: NURSE PRACTITIONER
Payer: COMMERCIAL

## 2024-08-12 DIAGNOSIS — F41.9 ANXIETY: ICD-10-CM

## 2024-08-12 PROCEDURE — 99214 OFFICE O/P EST MOD 30 MIN: CPT | Mod: 95 | Performed by: NURSE PRACTITIONER

## 2024-08-12 NOTE — PROGRESS NOTES
"Virtual Visit Details    Type of service:  Video Visit     Originating Location (pt. Location): Home  Distant Location (provider location):  Off-site  Platform used for Video Visit: [facility]    PSYCHIATRIC PROGRESS NOTE       Lena Quesada is a 39 year old female who prefers the name Lena and pronouns she, her.  Therapist: weekly with Dr. Genao  PCP: Jazz Abbott  Other Providers: None    Pertinent Background: Describes her childhood as steady, stable. Onset of bullying then anxiety then depression in middle school. G[place] \"[place] biggest girl, I always asked myself who's going to say something today?\". Her ability to cope with judgment from her peers improved in high school. [place] more free to be herself in college. Bariatric surgery in March 2022.      Psych critical item history includes trauma history (bullying, social exclusion), emotional eating, body dysmorphia before and after bariatric surgery.     Interim History      The patient is a good historian and reports good treatment adherence.    Last seen on 5/15/2024 when she chose to continue sertraline 50mg daily.    Describes her mood as \"good, navigating how to do therapy with Diana on maternity leave over the last month\".  - taking med daily    - her skin care business changed business models, this may benefit her personally  - she notices communication changes and observes it has impacted some of her partnerships within in the business    - working in a new capacity for [employer], she has 15 cases herself while her outlying peers have 5  - good support from supervisor and manager   - enjoys that she's working independently    - getting to the gym most days  - she's been on two dates with someone, they talk or text everyday, she plans to ask a clarifying question about direction of their relationship  - she has a final later this week, then on a break until " spring semester, she has 5 classes remaining of her [employer] Master's  - she sold her car and bought her Mom's Jeep  - traveling to [place] in Sept for her business     - moving apartments in the same building  - traveling for a skin care convention in Sept 2024  - coping skills include taking a moment, breathing, consider her response vs reacting, making a plan  - support from her parents, she's growing her social network  - enjoys watching sports, seeing [employer] games in person, her two nieces and one nephew, two friends, watching shows                 .   RECENT PSYCH ROS:   Depression: denies significant depression symptoms  Anxiety: anxious about changes with her part time business, catching herself when she wants to stuff emotion  - monitoring anxiety about winter driving    ADVERSE SIDE EFFECTS: low libido  MEDICAL CONCERNS: started estrogen, continues nexplanon (progesterone only) implanted in May 2022; followed bariatric medicine, considers panniculectomy     APPETITE: with Wegovy, 237# in July 2024  - bariatric surgery in Mar 2022, she'd like to lose to 200#      SLEEP: follows evening routine, sleeping well with TR melatonin for 8-9 hours, vivid dreams continue       RECENT SUBSTANCE USE:     Alcohol- limits alcohol  Caffeine- avoids, dislikes coffee, rare soda, prefers water    Social/ Family History                   FINANCIAL SUPPORT- 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 Born to  parents. She is youngest of two, brother Sidney (b. 1980). Graduated with BS in physical education (K-12) with coaching minor from [employer]. Enrolled in Masters classes in business at [employer].     CULTURAL/ SOCIAL/ SPIRITUAL SUPPORT- support from her parents, identifies as Muslim who has  questions        TRAUMA HISTORY- extensive bullying  FEELS SAFE AT HOME- Yes  FAMILY MENTAL HEALTH HISTORY- PGM- anxiety, depression    Medical / Surgical History                                 Patient Active Problem List   Diagnosis    Morbid obesity (H)    Chondromalacia of patellofemoral joint    Morbid obesity due to excess calories (H)    Nexplanon in place     Cervical high risk human papillomavirus (HPV) DNA test positive       Past Surgical History:   Procedure Laterality Date    CHOLECYSTECTOMY  2005    LAPAROSCOPIC GASTRIC SLEEVE N/A 3/24/2022    Procedure: GASTRECTOMY, SLEEVE, LAPAROSCOPIC;  Surgeon: Bobby Rodriguez MD;  Location: [facility]    TYMPANOPLASTY      WISDOM TOOTH EXTRACTION        Medical Review of Systems              A comprehensive review of systems was performed and is negative other than noted in the HPI.    Pregnant or breastfeeding- no      Contraception- nexplanon    Denies TBI/LOC, seizures.    Allergy    Patient has no known allergies.  Current Medications        Current Outpatient Medications   Medication Sig Dispense Refill    Alpha Lipoic Ac-Biotin-Keratin (BIOTIN-KERATIN-ALPHA LIPOIC AC PO) Take 1 capsule by mouth daily 10,000 mg      Calcium Citrate-Vitamin D (CALCIUM CITRATE + PO) Take 1 chew tab by mouth daily 600mg      Cholecalciferol (VITAMIN D-3) 125 MCG (5000 UT) TABS Take 1 tablet by mouth daily      Cyanocobalamin 5000 MCG SUBL Place 1 tablet under the tongue once a week      estradiol (ESTRACE) 2 MG tablet Take 1 tablet (2 mg) by mouth daily 30 tablet 1    Pediatric Multivitamins-Iron (CHEWABLE BRADLEY/IRON CHILDRENS PO) Take 1 tablet by mouth daily      Semaglutide-Weight Management (WEGOVY) 2.4 MG/0.75ML pen Inject 2.4 mg Subcutaneous every 7 days 4 pens 27 mL 3    sertraline (ZOLOFT) 50 MG tablet Take one 50mg tab daily 90 tablet 0     Vitals          LMP 07/28/2024 (Exact Date)      Pulse Readings from Last 5 Encounters:   07/30/24 65   10/24/23 81   09/08/22  77   03/25/22 75   03/07/22 83     Wt Readings from Last 5 Encounters:   07/30/24 107.9 kg (237 lb 12.8 oz)   05/15/24 106.1 kg (234 lb)   11/09/23 109.3 kg (241 lb)   10/24/23 110.7 kg (244 lb)   06/21/23 117 kg (258 lb)     BP Readings from Last 5 Encounters:   07/30/24 115/79   10/24/23 125/85   09/08/22 (!) 157/89   03/25/22 118/56   03/07/22 (!) 156/94     Mental Status Exam          Alertness: alert  and oriented  Appearance: adequately groomed  Behavior/Demeanor: cooperative, pleasant and calm, with good  eye contact   Speech: normal and regular rate and rhythm  Language: no problems  Psychomotor: normal or unremarkable  Mood: description consistent with euthymia  Affect: full range and appropriate; was congruent to mood; was congruent to content  Thought Process/Associations: unremarkable  Thought Content:  Reports none;  Denies suicidal ideation, violent ideation, delusions, preoccupations, obsessions , phobia , magical thinking, over-valued ideas and paranoid ideation  Perception:  Reports none;  Denies auditory hallucinations, visual hallucinations, visual distortion seen as shadows , depersonalization and derealization  Insight: fair  Judgment: good  Cognition: (6) does  appear grossly intact; formal cognitive testing was not done  Gait/Station and/or Muscle Strength/Tone:  N/A    Labs and Data                          Rating Scales:    N/A    PHQ9 Today:        3/27/2023     7:10 AM 10/24/2023     4:55 PM   PHQ   PHQ-9 Total Score 9 1   Q9: Thoughts of better off dead/self-harm past 2 weeks Not at all Not at all     Diagnosis      mild MDD, adjustment disorder with anxiety     Assessment          TODAY, the following items were reviewed:    : 3/2023    PSYCHOTROPIC DRUG INTERACTIONS:  - none with sertraline, Wegovy, Nexplanon, Prilosec     MANAGEMENT:  Monitoring for adverse effects, routine vitals, using lowest therapeutic dose of [psychotropics] and patient is aware of risks    Plan                                                                                                                    1) she chooses to continue sertraline 50mg daily  2) she'll reschedule therapy with Dr. Genao at her return in Nov    RTC: 12 weeks, sooner as needed    CRISIS NUMBERS:   Provided routinely in AVS.    Treatment Risk Statement:  The patient understands the risks, benefits, adverse effects and alternatives. Agrees to treatment with the capacity to do so. No medical contraindications to treatment. Agrees to call clinic for any problems. The patient understands to call 911 or go to the nearest ED if life threatening or urgent symptoms occur.     WHODAS 2.0  TODAY total score = N/A; [a 12-item WHODAS 2.0 assessment was not completed by the pt today and/or recorded in EPIC].    PROVIDER:  CATERINA Matos CNP

## 2024-08-12 NOTE — NURSING NOTE
Current patient location:  [facility]     Is the patient currently in the state of [place]? YES    Visit mode:VIDEO    If the visit is dropped, the patient can be reconnected by: VIDEO VISIT: Text to cell phone:   Telephone Information:   Mobile [phone]       Will anyone else be joining the visit? NO  (If patient encounters technical issues they should call [phone]    How would you like to obtain your AVS? MyChart    Are changes needed to the allergy or medication list? Pt stated no changes to allergies and Pt stated no med changes    Are refills needed on medications prescribed by this physician? NO    Rooming Documentation:  Assigned questionnaire(s) completed      Reason for visit: RECHECK    Yu Jane VVF

## 2024-09-24 ENCOUNTER — PATIENT OUTREACH (OUTPATIENT)
Dept: CARE COORDINATION | Facility: CLINIC | Age: 39
End: 2024-09-24
Payer: COMMERCIAL

## 2024-10-03 ENCOUNTER — PATIENT OUTREACH (OUTPATIENT)
Dept: FAMILY MEDICINE | Facility: CLINIC | Age: 39
End: 2024-10-03
Payer: COMMERCIAL

## 2024-10-03 PROBLEM — R87.810 CERVICAL HIGH RISK HUMAN PAPILLOMAVIRUS (HPV) DNA TEST POSITIVE: Status: ACTIVE | Noted: 2023-11-01

## 2024-10-08 ENCOUNTER — PATIENT OUTREACH (OUTPATIENT)
Dept: CARE COORDINATION | Facility: CLINIC | Age: 39
End: 2024-10-08
Payer: COMMERCIAL

## 2024-10-14 ENCOUNTER — VIRTUAL VISIT (OUTPATIENT)
Dept: PSYCHIATRY | Facility: CLINIC | Age: 39
End: 2024-10-14
Attending: NURSE PRACTITIONER
Payer: COMMERCIAL

## 2024-10-14 VITALS — BODY MASS INDEX: 36.95 KG/M2 | WEIGHT: 236 LBS

## 2024-10-14 DIAGNOSIS — F41.1 GENERALIZED ANXIETY DISORDER: Primary | ICD-10-CM

## 2024-10-14 DIAGNOSIS — F32.1 CURRENT MODERATE EPISODE OF MAJOR DEPRESSIVE DISORDER WITHOUT PRIOR EPISODE (H): ICD-10-CM

## 2024-10-14 PROCEDURE — 99214 OFFICE O/P EST MOD 30 MIN: CPT | Mod: 95 | Performed by: NURSE PRACTITIONER

## 2024-10-14 ASSESSMENT — PAIN SCALES - GENERAL: PAINLEVEL: NO PAIN (0)

## 2024-10-14 NOTE — NURSING NOTE
Current patient location: [address] [place] [id number]    Is the patient currently in the state of [place]? YES    Visit mode:VIDEO    If the visit is dropped, the patient can be reconnected by: VIDEO VISIT: Text to cell phone:   Telephone Information:   Mobile [phone]       Will anyone else be joining the visit? NO  (If patient encounters technical issues they should call [phone])    Are changes needed to the allergy or medication list? No    Are refills needed on medications prescribed by this physician? NO    Rooming Documentation:  Patient will complete questionnaire(s) in [facility]    Reason for visit: RECHECK    Kylie ARVIZUF

## 2024-10-14 NOTE — PATIENT INSTRUCTIONS
**For crisis resources, please see the information at the end of this document**   Patient Education    Thank you for coming to the [facility] [place] [facility].     Lab Testing:  If you had lab testing today and your results are reassuring or normal they will be mailed to you or sent through [employer] within 7 days. If the lab tests need quick action we will call you with the results. The phone number we will call with results is # [phone]. If this is not the best number please call our clinic and change the number.     Medication Refills:  If you need any refills please call your pharmacy and they will contact us. Our fax number for refills is [phone].   Three business days of notice are needed for general medication refill requests.   Five business days of notice are needed for controlled substance refill requests.   If you need to change to a different pharmacy, please contact the new pharmacy directly. The new pharmacy will help you get your medications transferred.     Contact Us:  Please call [phone] during business hours (8-5:00 M-F).   If you have medication related questions after clinic hours, or on the weekend, please call [phone].     Financial Assistance [phone]   Medical Records [phone]       MENTAL HEALTH CRISIS RESOURCES:  For a emergency help, please call 911 or go to the nearest Emergency Department.     Emergency Walk-In Options:   EmPATH Unit @ [facility] ([place]): [phone] - Specialized mental health emergency area designed to be calming  [facility] ([place]): [phone]  [facility] ([place]): [phone]  [facility]): [phone]    [facility] Crisis Information:   [place]: [phone]  [place]: [phone]  [place] ([place]) - Adult: [phone]     Child: [phone]  [place] - Adult: [phone]     Child: [phone]  [place]:  [phone]  List of all [facility] resources:   https://[place].gov/dhs/people-we-serve/adults/health-care/mental-health/resources/crisis-contacts.jsp    National Crisis Information:   Crisis Text Line: Text  [place]  to 064306  Suicide & Crisis Lifeline: 988  National Suicide Prevention Lifeline: 3-729-709-TALK (1-459.866.9257)       For online chat options, visit https://suicidepreventionlifeline.org/chat/  Poison Control Center: 3-961-135-2127  Trans Lifeline: 9-889-280-7269 - Hotline for transgender people of all ages  The [place]: 2-071-585-6682 - Hotline for LGBT youth     For Non-Emergency Support:   Fast Tracker: Mental Health & Substance Use Disorder Resources -   https://www.[employer].org/

## 2024-11-04 ENCOUNTER — OFFICE VISIT (OUTPATIENT)
Dept: FAMILY MEDICINE | Facility: CLINIC | Age: 39
End: 2024-11-04
Payer: COMMERCIAL

## 2024-11-04 VITALS
WEIGHT: 242 LBS | BODY MASS INDEX: 36.68 KG/M2 | TEMPERATURE: 97.8 F | HEART RATE: 78 BPM | DIASTOLIC BLOOD PRESSURE: 80 MMHG | HEIGHT: 68 IN | SYSTOLIC BLOOD PRESSURE: 116 MMHG | RESPIRATION RATE: 20 BRPM | OXYGEN SATURATION: 98 %

## 2024-11-04 DIAGNOSIS — Z23 NEEDS FLU SHOT: ICD-10-CM

## 2024-11-04 DIAGNOSIS — K91.2 POSTSURGICAL MALABSORPTION: ICD-10-CM

## 2024-11-04 DIAGNOSIS — Z12.4 CERVICAL CANCER SCREENING: ICD-10-CM

## 2024-11-04 DIAGNOSIS — Z23 NEED FOR COVID-19 VACCINE: ICD-10-CM

## 2024-11-04 DIAGNOSIS — B96.89 BACTERIAL VAGINOSIS: ICD-10-CM

## 2024-11-04 DIAGNOSIS — B37.2 CANDIDAL INTERTRIGO: ICD-10-CM

## 2024-11-04 DIAGNOSIS — Z12.4 CERVICAL CANCER SCREENING: Primary | ICD-10-CM

## 2024-11-04 DIAGNOSIS — Z00.00 ADULT GENERAL MEDICAL EXAM: Primary | ICD-10-CM

## 2024-11-04 DIAGNOSIS — N89.8 VAGINAL DISCHARGE: ICD-10-CM

## 2024-11-04 DIAGNOSIS — N76.0 BACTERIAL VAGINOSIS: ICD-10-CM

## 2024-11-04 LAB
ALBUMIN SERPL BCG-MCNC: 4.2 G/DL (ref 3.5–5.2)
ALP SERPL-CCNC: 110 U/L (ref 40–150)
ALT SERPL W P-5'-P-CCNC: 67 U/L (ref 0–50)
ANION GAP SERPL CALCULATED.3IONS-SCNC: 10 MMOL/L (ref 7–15)
AST SERPL W P-5'-P-CCNC: 40 U/L (ref 0–45)
BASOPHILS # BLD AUTO: 0 10E3/UL (ref 0–0.2)
BASOPHILS NFR BLD AUTO: 0 %
BILIRUB SERPL-MCNC: 0.5 MG/DL
BUN SERPL-MCNC: 9.4 MG/DL (ref 6–20)
CALCIUM SERPL-MCNC: 9.3 MG/DL (ref 8.8–10.4)
CHLORIDE SERPL-SCNC: 104 MMOL/L (ref 98–107)
CHOLEST SERPL-MCNC: 150 MG/DL
CLUE CELLS: PRESENT
CREAT SERPL-MCNC: 0.8 MG/DL (ref 0.51–0.95)
EGFRCR SERPLBLD CKD-EPI 2021: >90 ML/MIN/1.73M2
EOSINOPHIL # BLD AUTO: 0 10E3/UL (ref 0–0.7)
EOSINOPHIL NFR BLD AUTO: 1 %
ERYTHROCYTE [DISTWIDTH] IN BLOOD BY AUTOMATED COUNT: 12 % (ref 10–15)
EST. AVERAGE GLUCOSE BLD GHB EST-MCNC: 94 MG/DL
FASTING STATUS PATIENT QL REPORTED: YES
FASTING STATUS PATIENT QL REPORTED: YES
GLUCOSE SERPL-MCNC: 96 MG/DL (ref 70–99)
HBA1C MFR BLD: 4.9 % (ref 0–5.6)
HCO3 SERPL-SCNC: 27 MMOL/L (ref 22–29)
HCT VFR BLD AUTO: 39.4 % (ref 35–47)
HDLC SERPL-MCNC: 70 MG/DL
HGB BLD-MCNC: 13.6 G/DL (ref 11.7–15.7)
IMM GRANULOCYTES # BLD: 0 10E3/UL
IMM GRANULOCYTES NFR BLD: 0 %
LDLC SERPL CALC-MCNC: 64 MG/DL
LYMPHOCYTES # BLD AUTO: 1.3 10E3/UL (ref 0.8–5.3)
LYMPHOCYTES NFR BLD AUTO: 27 %
MCH RBC QN AUTO: 30.8 PG (ref 26.5–33)
MCHC RBC AUTO-ENTMCNC: 34.5 G/DL (ref 31.5–36.5)
MCV RBC AUTO: 89 FL (ref 78–100)
MONOCYTES # BLD AUTO: 0.3 10E3/UL (ref 0–1.3)
MONOCYTES NFR BLD AUTO: 7 %
NEUTROPHILS # BLD AUTO: 3.2 10E3/UL (ref 1.6–8.3)
NEUTROPHILS NFR BLD AUTO: 65 %
NONHDLC SERPL-MCNC: 80 MG/DL
PLATELET # BLD AUTO: 235 10E3/UL (ref 150–450)
POTASSIUM SERPL-SCNC: 4 MMOL/L (ref 3.4–5.3)
PROT SERPL-MCNC: 6.9 G/DL (ref 6.4–8.3)
RBC # BLD AUTO: 4.41 10E6/UL (ref 3.8–5.2)
SODIUM SERPL-SCNC: 141 MMOL/L (ref 135–145)
TRICHOMONAS, WET PREP: ABNORMAL
TRIGL SERPL-MCNC: 78 MG/DL
TSH SERPL DL<=0.005 MIU/L-ACNC: 2.22 UIU/ML (ref 0.3–4.2)
VIT B12 SERPL-MCNC: 636 PG/ML (ref 232–1245)
VIT D+METAB SERPL-MCNC: 65 NG/ML (ref 20–50)
WBC # BLD AUTO: 4.9 10E3/UL (ref 4–11)
WBC'S/HIGH POWER FIELD, WET PREP: ABNORMAL
YEAST, WET PREP: ABNORMAL

## 2024-11-04 PROCEDURE — 84443 ASSAY THYROID STIM HORMONE: CPT | Performed by: FAMILY MEDICINE

## 2024-11-04 PROCEDURE — 90656 IIV3 VACC NO PRSV 0.5 ML IM: CPT | Performed by: FAMILY MEDICINE

## 2024-11-04 PROCEDURE — 83036 HEMOGLOBIN GLYCOSYLATED A1C: CPT | Performed by: FAMILY MEDICINE

## 2024-11-04 PROCEDURE — 82306 VITAMIN D 25 HYDROXY: CPT | Performed by: FAMILY MEDICINE

## 2024-11-04 PROCEDURE — 90471 IMMUNIZATION ADMIN: CPT | Performed by: FAMILY MEDICINE

## 2024-11-04 PROCEDURE — 87210 SMEAR WET MOUNT SALINE/INK: CPT | Performed by: FAMILY MEDICINE

## 2024-11-04 PROCEDURE — 80053 COMPREHEN METABOLIC PANEL: CPT | Performed by: FAMILY MEDICINE

## 2024-11-04 PROCEDURE — 36415 COLL VENOUS BLD VENIPUNCTURE: CPT | Performed by: FAMILY MEDICINE

## 2024-11-04 PROCEDURE — 91320 SARSCV2 VAC 30MCG TRS-SUC IM: CPT | Performed by: FAMILY MEDICINE

## 2024-11-04 PROCEDURE — 99395 PREV VISIT EST AGE 18-39: CPT | Mod: 25 | Performed by: FAMILY MEDICINE

## 2024-11-04 PROCEDURE — 85025 COMPLETE CBC W/AUTO DIFF WBC: CPT | Performed by: FAMILY MEDICINE

## 2024-11-04 PROCEDURE — G0145 SCR C/V CYTO,THINLAYER,RESCR: HCPCS | Performed by: FAMILY MEDICINE

## 2024-11-04 PROCEDURE — 99214 OFFICE O/P EST MOD 30 MIN: CPT | Mod: 25 | Performed by: FAMILY MEDICINE

## 2024-11-04 PROCEDURE — 87624 HPV HI-RISK TYP POOLED RSLT: CPT | Performed by: FAMILY MEDICINE

## 2024-11-04 PROCEDURE — 82607 VITAMIN B-12: CPT | Performed by: FAMILY MEDICINE

## 2024-11-04 PROCEDURE — 90480 ADMN SARSCOV2 VAC 1/ONLY CMP: CPT | Performed by: FAMILY MEDICINE

## 2024-11-04 PROCEDURE — 80061 LIPID PANEL: CPT | Performed by: FAMILY MEDICINE

## 2024-11-04 RX ORDER — NYSTATIN 100000 [USP'U]/G
POWDER TOPICAL EVERY 8 HOURS PRN
Qty: 90 G | Refills: 5 | Status: SHIPPED | OUTPATIENT
Start: 2024-11-04

## 2024-11-04 RX ORDER — METRONIDAZOLE 500 MG/1
500 TABLET ORAL 2 TIMES DAILY
Qty: 14 TABLET | Refills: 0 | Status: SHIPPED | OUTPATIENT
Start: 2024-11-04 | End: 2024-11-11

## 2024-11-04 SDOH — HEALTH STABILITY: PHYSICAL HEALTH: ON AVERAGE, HOW MANY MINUTES DO YOU ENGAGE IN EXERCISE AT THIS LEVEL?: 30 MIN

## 2024-11-04 SDOH — HEALTH STABILITY: PHYSICAL HEALTH: ON AVERAGE, HOW MANY DAYS PER WEEK DO YOU ENGAGE IN MODERATE TO STRENUOUS EXERCISE (LIKE A BRISK WALK)?: 5 DAYS

## 2024-11-04 ASSESSMENT — SOCIAL DETERMINANTS OF HEALTH (SDOH): HOW OFTEN DO YOU GET TOGETHER WITH FRIENDS OR RELATIVES?: ONCE A WEEK

## 2024-11-04 ASSESSMENT — PATIENT HEALTH QUESTIONNAIRE - PHQ9
SUM OF ALL RESPONSES TO PHQ QUESTIONS 1-9: 0
10. IF YOU CHECKED OFF ANY PROBLEMS, HOW DIFFICULT HAVE THESE PROBLEMS MADE IT FOR YOU TO DO YOUR WORK, TAKE CARE OF THINGS AT HOME, OR GET ALONG WITH OTHER PEOPLE: NOT DIFFICULT AT ALL
SUM OF ALL RESPONSES TO PHQ QUESTIONS 1-9: 0

## 2024-11-04 NOTE — PROGRESS NOTES
Preventive Care Visit  [facility]  CR K MD LUIS ENRIQUE, Family Medicine  Nov 4, 2024    1. Adult general medical exam (Primary)  This is a 38 yo female here for physical exam     2. Postsurgical malabsorption  Patient has had previous gastrectomy for weight loss - has had good sustained weight loss - check labs related to absorption   - TSH; Future  - CBC with Platelets & Differential; Future  - Vitamin B12; Future  - Vitamin D Deficiency; Future  - Comprehensive metabolic panel (BMP + Alb, Alk Phos, ALT, AST, Total. Bili, TP); Future  - Lipid Profile (Chol, Trig, HDL, LDL calc); Future  - Hemoglobin A1c; Future  - TSH  - CBC with Platelets & Differential  - Vitamin B12  - Vitamin D Deficiency  - Comprehensive metabolic panel (BMP + Alb, Alk Phos, ALT, AST, Total. Bili, TP)  - Lipid Profile (Chol, Trig, HDL, LDL calc)  - Hemoglobin A1c    3. Vaginal discharge  H/o vaginal discharge - wet prep  collected   - Wet prep - Clinic Collect    4. Candidal intertrigo  Patient has red rash in skin folds - consistent with candida - add Nystatin powder -   - nystatin (MYCOSTATIN) 004555 UNIT/GM external powder; Apply topically every 8 hours as needed (red skin).  Dispense: 90 g; Refill: 5    5. Cervical cancer screening  Due for cervix cancer screening - check HPV/pap  - HPV and Gynecologic Cytology Panel - Recommended Age 30 - 65 Years  - Gynecologic Cytology (PAP)    6. Needs flu shot  Due for seasonal flu vaccine - discussed - ordered   - INFLUENZA VACCINE,SPLIT VIRUS,TRIVALENT,PF(FLUZONE)    7. Need for COVID-19 vaccine  Due for COVID-19 vaccine - discussed - ordered   - COVID-19 12+ (PFIZER)    8. Bacterial vaginosis  Wet prep positive for bacterial vaginosis - add Metronidazole    - metroNIDAZOLE (FLAGYL) 500 MG tablet; Take 1 tablet (500 mg) by mouth 2 times daily for 7 days.  Dispense: 14 tablet; Refill: 0      Adan Paredes is a 39 year old, presenting for the  following:  Physical        11/4/2024     7:37 AM   Additional Questions   Roomed by Brigid marquez at [facility]  Complex medical patients that are stuck in hospital, but have needs    Taking Wegovy -   Had better results with Ozempic - presurgery  Now, taking Wegovy - not as helpful -   Has gotten to 3 years post op    Was on estrogen x 30 days - had breakthrough bleeding with Nexplanon   Thinking about removing Nexplanon - not sexually active x > 1 year     Even on Wegovy - no weight loss since June -   Cannot break the 235# barrier -   No matter what she does -   Not gaining - thankful for that       Getting increased redness in apron of abdomen - different spots are problematic now          Health Care Directive  Patient does not have a Health Care Directive: Discussed advance care planning with patient; information given to patient to review.      11/4/2024   General Health   How would you rate your overall physical health? Good   Feel stress (tense, anxious, or unable to sleep) Only a little      (!) STRESS CONCERN      11/4/2024   Nutrition   Three or more servings of calcium each day? (!) NO   Diet: Other   If other, please elaborate: high protein   How many servings of fruit and vegetables per day? (!) 0-1   How many sweetened beverages each day? 0-1            11/4/2024   Exercise   Days per week of moderate/strenous exercise 5 days   Average minutes spent exercising at this level 30 min            11/4/2024   Social Factors   Frequency of gathering with friends or relatives Once a week   Worry food won't last until get money to buy more No   Food not last or not have enough money for food? No   Do you have housing? (Housing is defined as stable permanent housing and does not include staying ouside in a car, in a tent, in an abandoned building, in an overnight shelter, or couch-surfing.) Yes   Are you worried about losing your housing? No   Lack of transportation? No   Unable to get utilities  (heat,electricity)? No            11/4/2024   Dental   Dentist two times every year? Yes            11/4/2024   TB Screening   Were you born outside of the [place]? No          Today's PHQ-9 Score:       11/4/2024     8:49 AM   PHQ-9 SCORE   PHQ-9 Total Score 0         11/4/2024   Substance Use   Alcohol more than 3/day or more than 7/wk No   Do you use any other substances recreationally? No        Social History     Tobacco Use    Smoking status: Never     Passive exposure: Never    Smokeless tobacco: Never   Vaping Use    Vaping status: Never Used   Substance Use Topics    Alcohol use: Not Currently    Drug use: Never          Mammogram Screening - Patient under 40 years of age: Routine Mammogram Screening not recommended.         11/4/2024   STI Screening   New sexual partner(s) since last STI/HIV test? No        History of abnormal Pap smear: No - age 30- 64 PAP with HPV every 5 years recommended        Latest Ref Rng & Units 11/4/2024     8:38 AM 10/24/2023     5:44 PM 10/5/2021     4:33 PM   PAP / HPV   PAP  Negative for Intraepithelial Lesion or Malignancy (NILM)  Negative for Intraepithelial Lesion or Malignancy (NILM)  Negative for Intraepithelial Lesion or Malignancy (NILM)    HPV 16 DNA Negative Negative  Negative  Negative    HPV 18 DNA Negative Negative  Negative  Negative    Other HR HPV Negative Negative  Positive  Negative            11/4/2024   Contraception/Family Planning   Questions about contraception or family planning (!) YES            Reviewed and updated as needed this visit by Provider                    Past Medical History:   Diagnosis Date    Arthritis     chondromalacia of knee (right). injections.    Cholelithiasis     Chondromalacia of patellofemoral joint     Morbid obesity (H) 12/16/2020    Morbid obesity due to excess calories (H) 3/24/2022    Nexplanon in place  9/8/2022    Remove within 5 years, 9/2027     Past Surgical History:   Procedure Laterality Date    CHOLECYSTECTOMY  2005     LAPAROSCOPIC GASTRIC SLEEVE N/A 3/24/2022    Procedure: GASTRECTOMY, SLEEVE, LAPAROSCOPIC;  Surgeon: Bobby Rodriguez MD;  Location: [facility] OR    TYMPANOPLASTY      WISDOM TOOTH EXTRACTION       OB History    Para Term  AB Living   0 0 0 0 0 0   SAB IAB Ectopic Multiple Live Births   0 0 0 0 0     BP Readings from Last 3 Encounters:   24 116/80   24 115/79   10/24/23 125/85    Wt Readings from Last 3 Encounters:   24 109.8 kg (242 lb)   10/14/24 107 kg (236 lb)   24 107.9 kg (237 lb 12.8 oz)                  Patient Active Problem List   Diagnosis    Morbid obesity (H)    Chondromalacia of patellofemoral joint    Morbid obesity due to excess calories (H)    Nexplanon in place     Cervical high risk human papillomavirus (HPV) DNA test positive     Past Surgical History:   Procedure Laterality Date    CHOLECYSTECTOMY      LAPAROSCOPIC GASTRIC SLEEVE N/A 3/24/2022    Procedure: GASTRECTOMY, SLEEVE, LAPAROSCOPIC;  Surgeon: Bobby Rodriguez MD;  Location: [facility] [place] OR    TYMPANOPLASTY      WISDOM TOOTH EXTRACTION         Social History     Tobacco Use    Smoking status: Never     Passive exposure: Never    Smokeless tobacco: Never   Substance Use Topics    Alcohol use: Not Currently     Family History   Problem Relation Age of Onset    Arthritis Mother     Obesity Mother     Arthritis Father     Obesity Father     Sleep Apnea Brother     Obesity Brother     Obesity Brother          Current Outpatient Medications   Medication Sig Dispense Refill    Calcium Citrate-Vitamin D (CALCIUM CITRATE + PO) Take 1 chew tab by mouth daily 600mg      Cholecalciferol (VITAMIN D-3) 125 MCG (5000 UT) TABS Take 1 tablet by mouth daily      Cyanocobalamin 5000 MCG SUBL Place 1 tablet under the tongue once a week      nystatin (MYCOSTATIN) 992823 UNIT/GM external powder Apply topically every 8 hours as needed (red skin). 90 g 5    Pediatric Multivitamins-Iron (CHEWABLE BRADLEY/IRON  "CHILDRENS PO) Take 1 tablet by mouth daily      Semaglutide-Weight Management (WEGOVY) 2.4 MG/0.75ML pen Inject 2.4 mg Subcutaneous every 7 days 4 pens 27 mL 3    sertraline (ZOLOFT) 50 MG tablet Take one 50mg tab daily 90 tablet 0    Alpha Lipoic Ac-Biotin-Keratin (BIOTIN-KERATIN-ALPHA LIPOIC AC PO) Take 1 capsule by mouth daily 10,000 mg (Patient not taking: Reported on 11/4/2024)      estradiol (ESTRACE) 2 MG tablet Take 1 tablet (2 mg) by mouth daily (Patient not taking: Reported on 11/4/2024) 30 tablet 1     No Known Allergies  Recent Labs   Lab Test 11/04/24  0851 10/24/23  1750 03/03/23  1554 09/10/22  0949 03/07/22  1753 06/11/21  1631 01/29/21  0709   A1C 4.9  --  5.3 5.2   < > 6.4*  --    LDL 64  --   --  72  --  88 108   HDL 70  --   --  56  --  53 59   TRIG 78  --   --  87  --  128 92   ALT 67* 72* 101* 41   < > 52* 37   CR 0.80  --  0.67 0.74   < > 0.72 0.86   GFRESTIMATED >90  --  >90 >90   < > >60 >60   GFRESTBLACK  --   --   --   --   --  >60 >60   POTASSIUM 4.0  --  4.0 3.6   < > 4.3 4.1   TSH 2.22  --   --   --   --  2.10 3.76    < > = values in this interval not displayed.        Review of Systems   Constitutional:  Negative for chills and fever.   HENT:  Negative for ear pain and sore throat.    Eyes:  Negative for pain and visual disturbance.   Respiratory:  Negative for cough and shortness of breath.    Cardiovascular:  Negative for chest pain and palpitations.   Gastrointestinal:  Negative for abdominal pain and vomiting.   Genitourinary:  Negative for dysuria and hematuria.   Musculoskeletal:  Negative for arthralgias and back pain.   Skin:  Negative for color change and rash (in skin folds).   Neurological:  Negative for seizures and syncope.   All other systems reviewed and are negative.         Objective    Exam  /80 (BP Location: Right arm, Patient Position: Sitting, Cuff Size: Adult Regular)   Pulse 78   Temp 97.8  F (36.6  C) (Oral)   Resp 20   Ht 1.727 m (5' 8\")   Wt 109.8 " "kg (242 lb)   SpO2 98%   BMI 36.80 kg/m     Estimated body mass index is 36.8 kg/m  as calculated from the following:    Height as of this encounter: 1.727 m (5' 8\").    Weight as of this encounter: 109.8 kg (242 lb).    Physical Exam  Vitals reviewed.   Constitutional:       General: She is not in acute distress.     Appearance: Normal appearance.   HENT:      Head: Normocephalic.      Right Ear: Tympanic membrane, ear canal and external ear normal.      Left Ear: Tympanic membrane, ear canal and external ear normal.      Nose: Nose normal.      Mouth/Throat:      Mouth: Mucous membranes are moist.      Pharynx: No posterior oropharyngeal erythema.   Eyes:      Extraocular Movements: Extraocular movements intact.      Conjunctiva/sclera: Conjunctivae normal.      Pupils: Pupils are equal, round, and reactive to light.   Cardiovascular:      Rate and Rhythm: Normal rate and regular rhythm.      Pulses: Normal pulses.      Heart sounds: Normal heart sounds. No murmur heard.  Pulmonary:      Effort: Pulmonary effort is normal.      Breath sounds: Normal breath sounds.   Abdominal:      Palpations: Abdomen is soft. There is no mass.      Tenderness: There is no abdominal tenderness. There is no guarding or rebound.   Musculoskeletal:         General: No deformity. Normal range of motion.      Cervical back: Normal range of motion and neck supple.   Lymphadenopathy:      Cervical: No cervical adenopathy.   Skin:     General: Skin is warm and dry.      Findings: Rash (red well defined rash in skin folds) present.   Neurological:      General: No focal deficit present.      Mental Status: She is alert.   Psychiatric:         Mood and Affect: Mood normal.         Behavior: Behavior normal.           Prior to immunization administration, verified patients identity using patient s name and date of birth. Please see Immunization Activity for additional information.     Screening Questionnaire for Adult Immunization    Are " you sick today?   No   Do you have allergies to medications, food, a vaccine component or latex?   No   Have you ever had a serious reaction after receiving a vaccination?   No   Do you have a long-term health problem with heart, lung, kidney, or metabolic disease (e.g., diabetes), asthma, a blood disorder, no spleen, complement component deficiency, a cochlear implant, or a spinal fluid leak?  Are you on long-term aspirin therapy?   No   Do you have cancer, leukemia, HIV/AIDS, or any other immune system problem?   No   Do you have a parent, brother, or sister with an immune system problem?   Yes   In the past 3 months, have you taken medications that affect  your immune system, such as prednisone, other steroids, or anticancer drugs; drugs for the treatment of rheumatoid arthritis, Crohn s disease, or psoriasis; or have you had radiation treatments?   No   Have you had a seizure, or a brain or other nervous system problem?   No   During the past year, have you received a transfusion of blood or blood    products, or been given immune (gamma) globulin or antiviral drug?   No   For women: Are you pregnant or is there a chance you could become       pregnant during the next month?   No   Have you received any vaccinations in the past 4 weeks?   No     Immunization questionnaire was positive for at least one answer.  Notified .      Patient instructed to remain in clinic for 15 minutes afterwards, and to report any adverse reactions.     Screening performed by Brigid Sahni MA on 11/4/2024 at 7:41 AM.         Signed Electronically by: CR DUDLEY MD    Answers submitted by the patient for this visit:  Patient Health Questionnaire (Submitted on 11/4/2024)  If you checked off any problems, how difficult have these problems made it for you to do your work, take care of things at home, or get along with other people?: Not difficult at all  PHQ9 TOTAL SCORE: 0

## 2024-11-05 LAB
HPV HR 12 DNA CVX QL NAA+PROBE: NEGATIVE
HPV16 DNA CVX QL NAA+PROBE: NEGATIVE
HPV18 DNA CVX QL NAA+PROBE: NEGATIVE
HUMAN PAPILLOMA VIRUS FINAL DIAGNOSIS: NORMAL

## 2024-11-07 LAB
BKR AP ASSOCIATED HPV REPORT: NORMAL
BKR LAB AP GYN ADEQUACY: NORMAL
BKR LAB AP GYN INTERPRETATION: NORMAL
BKR LAB AP PREVIOUS ABNORMAL: NORMAL
PATH REPORT.COMMENTS IMP SPEC: NORMAL
PATH REPORT.COMMENTS IMP SPEC: NORMAL
PATH REPORT.RELEVANT HX SPEC: NORMAL

## 2024-11-13 ENCOUNTER — PATIENT OUTREACH (OUTPATIENT)
Dept: FAMILY MEDICINE | Facility: CLINIC | Age: 39
End: 2024-11-13
Payer: COMMERCIAL

## 2024-11-17 ASSESSMENT — ENCOUNTER SYMPTOMS
SHORTNESS OF BREATH: 0
VOMITING: 0
HEMATURIA: 0
SEIZURES: 0
SORE THROAT: 0
PALPITATIONS: 0
COLOR CHANGE: 0
FEVER: 0
ARTHRALGIAS: 0
COUGH: 0
DYSURIA: 0
BACK PAIN: 0
EYE PAIN: 0
CHILLS: 0
ABDOMINAL PAIN: 0

## 2024-11-18 ENCOUNTER — VIRTUAL VISIT (OUTPATIENT)
Dept: PSYCHIATRY | Facility: CLINIC | Age: 39
End: 2024-11-18
Attending: PSYCHOLOGIST
Payer: COMMERCIAL

## 2024-11-18 DIAGNOSIS — F41.1 GENERALIZED ANXIETY DISORDER: Primary | ICD-10-CM

## 2024-11-18 PROCEDURE — 90837 PSYTX W PT 60 MINUTES: CPT | Mod: 95 | Performed by: STUDENT IN AN ORGANIZED HEALTH CARE EDUCATION/TRAINING PROGRAM

## 2024-11-18 NOTE — PROGRESS NOTES
"Video- Visit Details  Type of service:  video visit for mental health treatment  Time of service:  Date:  11/18/2024  Video Start Time: 4:00     Video End Time:  4:54    Reason for video visit: COVID-19   Originating Site (patient location): Patient's home  Distant Site (provider location):  On-Site - [facility]  Mode of Communication:  Video Conference via AmWell    As the provider I attest to compliance with applicable laws and regulations related to telemedicine.    OUTPATIENT PSYCHOTHERAPY PROGRESS NOTE    Client Name: Lena Quesada   YOB: 1985 (39 year old)   Date of Service: 11/18/2024  Service Type(s): [place] PSYCHOTHERAPY W PATIENT 53 OR > MINUTES [[id number]]     Diagnoses: DAVID    Individuals Present:   Client attended alone    Subjective:  -Lena looked forward to catching up on the last 4 months, \"you're gonna need a lot of ink\"     -She's proud of herself for being more open with others about how she feels, and accepting their response.   -Mid-July, her [employer] team was dissolved. Impacted her October trip to [place]. Cecelia also cancelled at the last minute, so she was alone with Maty. Brought up how she's been feeling about their relationship, which went as expected. For one month afterwards, Lena wasn't active in their group text. Denies feeling lonely or disappointed in this.   -Leader from [employer] also not talking with her as much now, which Lena feels is related to the loss of their business relationship. Denies feeling upset about this.    -Enjoys spending time with Pooja, they talk and text almost daily. He still doesn't want anything serious, and she still does. She endorsed holding onto some hope that this may change for him, but in the meantime still enjoys his company.    -Joined her Gnosticist choir, which has been really enjoyable.     -Brother had to sell pub. [place] suicidal about stress     Treatment:   Psychodynamic Psychotherapy  - Enhance insight via self-examination  - " Explore emotions, especially contradicting/confusing ones  - Identify meaningful past experiences  - Challenge avoidance and resistance  - Encourage nuanced understanding of interpersonal relationships    Psychotherapy services during this visit included myself and the patient. Discussed case with supervisor who also agreed with the treatment plan. Unable to sign in person due to visit being conducted through telehealth due to COVID-19.     Assessment and Progress:   Lena did very well with a course of CBT, and is now doing well further exploring her thoughts/behaviors/feelings with psychodynamic psychotherapy. Key topics are weight, self-esteem/confidence, trauma, and anxiety. Today we discussed life updates and reflected on how she's felt about those.    Plan:   -Next therapy appointment has been scheduled for next week. No safety concerns.    Psychotherapy services during this visit included myself and the patient. Patient agrees with treatment plan. Discussed case with supervisor who also agreed with the treatment plan. Unable to sign in person due to visit being conducted through telehealth.     Diana Genao MD  PGY-4 Psychiatry Resident

## 2024-11-25 ENCOUNTER — VIRTUAL VISIT (OUTPATIENT)
Dept: PSYCHIATRY | Facility: CLINIC | Age: 39
End: 2024-11-25
Attending: PSYCHOLOGIST
Payer: COMMERCIAL

## 2024-11-25 DIAGNOSIS — F41.1 GENERALIZED ANXIETY DISORDER: Primary | ICD-10-CM

## 2024-11-25 NOTE — PROGRESS NOTES
"Video- Visit Details  Type of service:  video visit for mental health treatment  Time of service:  Date:  11/25/2024  Video Start Time: 4:00     Video End Time:  4:50    Reason for video visit: COVID-19   Originating Site (patient location): Patient's home  Distant Site (provider location):  On-Site - [facility]  Mode of Communication:  Video Conference via AmWell    As the provider I attest to compliance with applicable laws and regulations related to telemedicine.    OUTPATIENT PSYCHOTHERAPY PROGRESS NOTE    Client Name: Lena Quesada   YOB: 1985 (39 year old)   Date of Service: 11/25/2024  Service Type(s): [place] PSYCHOTHERAPY W PATIENT 53 OR > MINUTES [[id number]]     Diagnoses: DAVID    Individuals Present:   Client attended alone    Subjective:  -Leaving for [place] with mom and dad tomorrow    -End of Oct met in person for a work meeting. Felt a little \"attacked\" by other 3 coordinators and their . -Supervisor stepped back from weekly to monthly meetings. Most other coordinators now meet with  weekly. Called out for not meeting with  as much as the others. Was told that it feels \"personal\" and \"hurtful.\" Supervisor recognized that it felt icky and \"did not approve\"  -Told other coordinator that she was feeling that way, and she said they should \"find different seating arrangements.\" [place] dismissive. Lena is still happy with how she handled the situation openly.   -Not feeling weird about Pooja    -Maintained her weight for a year. Happy to have maintained, but feels \"there's still more to lose.\" A friend recently pointed out that she's in the \"best shape of her life\" working out so often, which was kind of a realization moment for Shreya      -Aunt doing well. Uncle doing well.    -Parents: doing well. Stressed about logistics for this holiday trip.        Treatment:   Psychodynamic Psychotherapy  - Enhance insight via self-examination  - Explore emotions, " especially contradicting/confusing ones  - Identify meaningful past experiences  - Challenge avoidance and resistance  - Encourage nuanced understanding of interpersonal relationships    Psychotherapy services during this visit included myself and the patient. Discussed case with supervisor who also agreed with the treatment plan. Unable to sign in person due to visit being conducted through telehealth due to COVID-19.     Assessment and Progress:   Lena did very well with a course of CBT, and is now doing well further exploring her thoughts/behaviors/feelings with psychodynamic psychotherapy. Key topics are weight, self-esteem/confidence, trauma, and anxiety. Today we discussed more life updates and reflected on how she's felt about those.    Plan:   -Next therapy appointment has been scheduled for next week. No safety concerns.    Psychotherapy services during this visit included myself and the patient. Patient agrees with treatment plan. Discussed case with supervisor who also agreed with the treatment plan. Unable to sign in person due to visit being conducted through telehealth.     Diana Genao MD  PGY-4 Psychiatry Resident

## 2024-11-26 ENCOUNTER — TELEPHONE (OUTPATIENT)
Dept: SURGERY | Facility: CLINIC | Age: 39
End: 2024-11-26
Payer: COMMERCIAL

## 2024-11-26 NOTE — TELEPHONE ENCOUNTER
Central Prior Authorization Team - Phone: [phone]     PA Initiation    Medication: WEGOVY 2.4 MG/0.75ML SC SOAJ  Insurance Company: [employer] - Phone [phone] Fax [phone]  Pharmacy Filling the Rx: [facility] - [place] - [address] [facility] AT PORTAL TO REGISTERED [facility] SITES  Filling Pharmacy Phone: [phone]  Filling Pharmacy Fax:    Start Date: 11/26/2024

## 2024-11-26 NOTE — TELEPHONE ENCOUNTER
Prior Authorization Retail Medication Request    Medication/Dose: Wegovy 2.4 mg weekly injections  New/renewal/insurance change PA/secondary ins. PA: Renewal  Current weight on 11/4/2024 - 235 lbs  Starting weight on 3/9/2023 - 282 lb    Insurance   Primary: [facility]  Insurance ID:  [id number]  Key: [id number]    Clinic Information  Preferred routing pool for dept communication: Bariatric Surgery Support Pool [place]

## 2024-11-27 NOTE — TELEPHONE ENCOUNTER
Central Prior Authorization Team - Phone: [phone]     Prior Authorization Approval    Medication: WEGOVY 2.4 MG/0.75ML SC SOAJ  Authorization Effective Date: 11/26/2024  Authorization Expiration Date: 11/26/2025  Approved Dose/Quantity: 3  Reference #:     Insurance Company: [employer] - Phone [phone] Fax [phone]  Expected CoPay: $    CoPay Card Available:      Financial Assistance Needed:   Which Pharmacy is filling the prescription: [facility] [place] - [address] [facility] AT PORTAL TO REGISTERED [facility]  Pharmacy Notified: YES  Patient Notified: YES Instructed pharmacy to notify patient once order is ready.

## 2024-12-02 ENCOUNTER — VIRTUAL VISIT (OUTPATIENT)
Dept: PSYCHIATRY | Facility: CLINIC | Age: 39
End: 2024-12-02
Attending: PSYCHOLOGIST
Payer: COMMERCIAL

## 2024-12-02 DIAGNOSIS — F41.1 GENERALIZED ANXIETY DISORDER: Primary | ICD-10-CM

## 2024-12-02 PROCEDURE — 90834 PSYTX W PT 45 MINUTES: CPT | Mod: U7 | Performed by: STUDENT IN AN ORGANIZED HEALTH CARE EDUCATION/TRAINING PROGRAM

## 2024-12-02 NOTE — PROGRESS NOTES
"Video- Visit Details  Type of service:  video visit for mental health treatment  Time of service:  Date:  2024  Video Start Time: 4:10     Video End Time:  4:55    Reason for video visit: COVID-19   Originating Site (patient location): Patient's home  Distant Site (provider location):  On-Site - [facility]  Mode of Communication:  Video Conference via AmWell    As the provider I attest to compliance with applicable laws and regulations related to telemedicine.    OUTPATIENT PSYCHOTHERAPY PROGRESS NOTE    Client Name: Lena Quesada   YOB: 1985 (39 year old)   Date of Service: 2024  Service Type(s): NC PSYCHOTHERAPY W PATIENT 38-52 MINUTES [[id number]]     Diagnoses: DAVID    Individuals Present:   Client attended alone    Subjective:    [place]   -[place]: \"Awesome, probably the coolest thing I've ever seen\"  -[place] also [place]  -Mom surprised them with tickets to a [place]   -Had a cold, but feeling better now    Holidays  - day: brother, wife, and 3 kids coming over to [place] apartment to go swimming  -[place] and [place], then bigger extended family thing sat  -Aunt had a TIA while on FaceTime - 3rd time hospitalized in 2 weeks  -Mom seems to be accepting the situation. Has also planned out her own .    Friends  -Still taking a \"step back\" from [place]   -One year from now: \"I hope there's a better understanding\" with friends  -Najma and Glenn - local friends. She feels like a low priority, trying to get them to spend time with her is \"exhausting.\" Feels like a micro-rejection.      Treatment:   Psychodynamic Psychotherapy  - Enhance insight via self-examination  - Explore emotions, especially contradicting/confusing ones  - Identify meaningful past experiences  - Challenge avoidance and resistance  - Encourage nuanced understanding of interpersonal relationships    Psychotherapy services during this visit included " myself and the patient. Discussed case with supervisor who also agreed with the treatment plan. Unable to sign in person due to visit being conducted through telehealth due to COVID-19.     Assessment and Progress:   Lena did very well with a course of CBT, and is now doing well further exploring her thoughts/behaviors/feelings with psychodynamic psychotherapy. Key topics are weight, self-esteem/confidence, trauma, and anxiety. Today we discussed more life updates and reflected on friendships.    Plan:   -Next therapy appointment has been scheduled for next week. No safety concerns.    Psychotherapy services during this visit included myself and the patient. Patient agrees with treatment plan. Discussed case with supervisor who also agreed with the treatment plan. Unable to sign in person due to visit being conducted through telehealth.     Diana Genao MD  PGY-4 Psychiatry Resident

## 2024-12-09 ENCOUNTER — VIRTUAL VISIT (OUTPATIENT)
Dept: PSYCHIATRY | Facility: CLINIC | Age: 39
End: 2024-12-09
Attending: NURSE PRACTITIONER
Payer: COMMERCIAL

## 2024-12-09 DIAGNOSIS — F41.9 ANXIETY: ICD-10-CM

## 2024-12-09 RX ORDER — SERTRALINE HYDROCHLORIDE 25 MG/1
25 TABLET, FILM COATED ORAL DAILY
Qty: 90 TABLET | Refills: 0 | Status: SHIPPED | OUTPATIENT
Start: 2024-12-09

## 2024-12-09 NOTE — PROGRESS NOTES
"Virtual Visit Details    Type of service:  Video Visit     Originating Location (pt. Location): Home  Distant Location (provider location):  Off-site  Platform used for Video Visit: [facility]    PSYCHIATRIC PROGRESS NOTE       Lena Quesada is a 39 year old female who prefers the name Lena and pronouns she, her.  Therapist: weekly with Dr. Genao  PCP: Jazz Abbott  Other Providers: None    Pertinent Background: Describes her childhood as steady, stable. Onset of bullying then anxiety then depression in middle school. G[place] \"[place]. The biggest girl, I always asked myself who's going to say something today?\". Her ability to cope with judgment from her peers improved in high school. [place] more free to be herself in college. Bariatric surgery in March 2022.      Psych critical item history includes trauma history (bullying, social exclusion), emotional eating, body dysmorphia before and after bariatric surgery.     Interim History      The patient is a good historian and reports good treatment adherence.    Last seen on 10/14/2024 when she chose to continue sertraline 50mg daily.    Describes her mood as good.   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, Disability, Services Admin), her boss asks her with the caveat that she can say no but is struggling  - navigating issues with colleagues after a late Oct retreat, there's a new process for scheduling meetings    - monitoring physical symptoms of anxiety (jiggling feet and fidgeting hands), anxiety is starting to impact sleep quality  - good support from her supervisor and manager     - she has 5 classes remaining of her [employer] Master's  - working on friendships in therapy    - active with exercise, in her " Confucianist choir, they'll travel to sing at [place] in [facility] in June 2025  - coping skills include taking a moment, breathing, consider her response vs reacting, making a plan  - support from her parents, she's growing her social network  - enjoys watching sports, seeing [employer] games in person, her two nieces and one nephew, two friends, watching shows                 .   RECENT PSYCH ROS:   Depression: denies significant depression symptoms  Anxiety: using therapy skills and still feeling more worried, nervous, anxious about her work results, winter driving    ADVERSE SIDE EFFECTS: low libido  MEDICAL CONCERNS: taking estrogen, continues nexplanon (progesterone only) implanted in May 2022; followed bariatric medicine, considers panniculectomy     APPETITE: with Wegovy, 238# at home in Nov   - bariatric surgery in Mar 2022, she'd like to lose to 200#      SLEEP: taking melatonin TR, follows evening routine, sleeping restlessly over 8-9 hours, vivid dreams continue       RECENT SUBSTANCE USE:     Alcohol- limits alcohol  Caffeine- avoids, dislikes coffee, rare soda, prefers water    Social/ Family History                   FINANCIAL SUPPORT- 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 Born to  parents. She is youngest of two, brother Sidney (b. 1980). Graduated with BS in physical education (K-12) with coaching minor from [employer]. Enrolled in Masters classes in business at [employer].     CULTURAL/ SOCIAL/ SPIRITUAL SUPPORT- support from her parents, identifies as Yarsani who has questions        TRAUMA HISTORY- extensive bullying  FEELS SAFE AT HOME- Yes  FAMILY MENTAL HEALTH HISTORY- PGM- anxiety, depression    Medical / Surgical History                                 Patient  Active Problem List   Diagnosis    Morbid obesity (H)    Chondromalacia of patellofemoral joint    Morbid obesity due to excess calories (H)    Nexplanon in place     Cervical high risk human papillomavirus (HPV) DNA test positive       Past Surgical History:   Procedure Laterality Date    CHOLECYSTECTOMY  2005    LAPAROSCOPIC GASTRIC SLEEVE N/A 3/24/2022    Procedure: GASTRECTOMY, SLEEVE, LAPAROSCOPIC;  Surgeon: Bobby Rodriguez MD;  Location: [facility]    TYMPANOPLASTY      WISDOM TOOTH EXTRACTION        Medical Review of Systems              A comprehensive review of systems was performed and is negative other than noted in the HPI.    Pregnant or breastfeeding- no      Contraception- nexplanon    Denies TBI/LOC, seizures.    Allergy    Patient has no known allergies.  Current Medications        Current Outpatient Medications   Medication Sig Dispense Refill    Alpha Lipoic Ac-Biotin-Keratin (BIOTIN-KERATIN-ALPHA LIPOIC AC PO) Take 1 capsule by mouth daily. 10,000 mg      Calcium Citrate-Vitamin D (CALCIUM CITRATE + PO) Take 1 chew tab by mouth daily 600mg      Cholecalciferol (VITAMIN D-3) 125 MCG (5000 UT) TABS Take 1 tablet by mouth daily      Cyanocobalamin 5000 MCG SUBL Place 1 tablet under the tongue once a week      nystatin (MYCOSTATIN) 410544 UNIT/GM external powder Apply topically every 8 hours as needed (red skin). 90 g 5    Pediatric Multivitamins-Iron (CHEWABLE BRADLEY/IRON CHILDRENS PO) Take 1 tablet by mouth daily      Semaglutide-Weight Management (WEGOVY) 2.4 MG/0.75ML pen Inject 2.4 mg Subcutaneous every 7 days 4 pens 27 mL 3    sertraline (ZOLOFT) 50 MG tablet Take one 50mg tab daily 90 tablet 0    estradiol (ESTRACE) 2 MG tablet Take 1 tablet (2 mg) by mouth daily (Patient not taking: Reported on 11/4/2024) 30 tablet 1     Vitals          There were no vitals taken for this visit.     Pulse Readings from Last 5 Encounters:   11/04/24 78   07/30/24 65   10/24/23 81   09/08/22 77   03/25/22  75     Wt Readings from Last 5 Encounters:   11/26/24 106.6 kg (235 lb)   11/04/24 109.8 kg (242 lb)   10/14/24 107 kg (236 lb)   07/30/24 107.9 kg (237 lb 12.8 oz)   05/15/24 106.1 kg (234 lb)     BP Readings from Last 5 Encounters:   11/04/24 116/80   07/30/24 115/79   10/24/23 125/85   09/08/22 (!) 157/89   03/25/22 118/56     Mental Status Exam          Alertness: alert  and oriented  Appearance: adequately groomed  Behavior/Demeanor: cooperative, pleasant and calm, with good  eye contact   Speech: normal and regular rate and rhythm  Language: no problems  Psychomotor: normal or unremarkable  Mood: description consistent with euthymia  Affect: full range and appropriate; was congruent to mood; was congruent to content  Thought Process/Associations: unremarkable  Thought Content:  Reports none;  Denies suicidal ideation, violent ideation, delusions, preoccupations, obsessions , phobia , magical thinking, over-valued ideas and paranoid ideation  Perception:  Reports none;  Denies auditory hallucinations, visual hallucinations, visual distortion seen as shadows , depersonalization and derealization  Insight: fair  Judgment: good  Cognition: does  appear grossly intact; formal cognitive testing was not done  Gait/Station and/or Muscle Strength/Tone:  N/A    Labs and Data                          Rating Scales:    N/A    PHQ9 Today:        10/24/2023     4:55 PM 11/4/2024     7:29 AM 11/4/2024     8:49 AM   PHQ   PHQ-9 Total Score 1 0  0   Q9: Thoughts of better off dead/self-harm past 2 weeks Not at all  Not at all  Not at all       Patient-reported     Diagnosis      mild MDD, adjustment disorder with anxiety     Assessment          TODAY, the following items were reviewed:    : 3/2023    PSYCHOTROPIC DRUG INTERACTIONS:  - none with sertraline, Wegovy, Nexplanon, Prilosec     MANAGEMENT:  Monitoring for adverse effects, routine vitals, using lowest therapeutic dose of [psychotropics] and patient is aware of  risks    Plan                                                                                                                   1) she chooses to trial increasing sertraline from 50mg to 75mg daily  2) active in therapy with Dr. Genao    RTC: 12 weeks, sooner as needed    CRISIS NUMBERS:   Provided routinely in AVS.    Treatment Risk Statement:  The patient understands the risks, benefits, adverse effects and alternatives. Agrees to treatment with the capacity to do so. No medical contraindications to treatment. Agrees to call clinic for any problems. The patient understands to call 911 or go to the nearest ED if life threatening or urgent symptoms occur.     WHODAS 2.0  TODAY total score = N/A; [a 12-item WHODAS 2.0 assessment was not completed by the pt today and/or recorded in EPIC].    PROVIDER:  CATERINA Matos CNP

## 2024-12-09 NOTE — PATIENT INSTRUCTIONS
**For crisis resources, please see the information at the end of this document**   Patient Education    Thank you for coming to the [facility] [place] [facility].     Lab Testing:  If you had lab testing today and your results are reassuring or normal they will be mailed to you or sent through Kelkoo within 7 days. If the lab tests need quick action we will call you with the results. The phone number we will call with results is # [phone]. If this is not the best number please call our clinic and change the number.     Medication Refills:  If you need any refills please call your pharmacy and they will contact us. Our fax number for refills is [phone].   Three business days of notice are needed for general medication refill requests.   Five business days of notice are needed for controlled substance refill requests.   If you need to change to a different pharmacy, please contact the new pharmacy directly. The new pharmacy will help you get your medications transferred.     Contact Us:  Please call [phone] during business hours (8-5:00 M-F).   If you have medication related questions after clinic hours, or on the weekend, please call [phone].     Financial Assistance [phone]   Medical Records [phone]       MENTAL HEALTH CRISIS RESOURCES:  For a emergency help, please call 911 or go to the nearest Emergency Department.     Emergency Walk-In Options:   EmPATH Unit @ [facility] ([place]): [phone] - Specialized mental health emergency area designed to be calming  [facility] ([place]): [phone]  [facility] ([place]): [phone]  [facility]): [phone]    [facility] Crisis Information:   [place]: [phone]  [place]: [phone]  [place] ([place]) - Adult: [phone]     Child: [phone]  [place] - Adult: [phone]     Child: [phone]  [place]:  [phone]  List of all [facility] resources:   https://[place].gov/dhs/people-we-serve/adults/health-care/mental-health/resources/crisis-contacts.jsp    National Crisis Information:   Crisis Text Line: Text  [place]  to 867296  Suicide & Crisis Lifeline: 988  National Suicide Prevention Lifeline: 2-856-770-TALK (1-254.342.8165)       For online chat options, visit https://suicidepreventionlifeline.org/chat/  Poison Control Center: 3-075-897-8373  Trans Lifeline: 3-966-512-7335 - Hotline for transgender people of all ages  The Michi Project: 5-220-653-4264 - Hotline for LGBT youth     For Non-Emergency Support:   Fast Tracker: Mental Health & Substance Use Disorder Resources -   https://www.Wedding SpotckCORP80n.org/

## 2024-12-18 NOTE — PROGRESS NOTES
"Virtual Visit Details    Type of service:  Video Visit     Originating Location (pt. Location): Home  Distant Location (provider location):  Off-site  Platform used for Video Visit: [facility]  Psychiatry Clinic  MEDICAL DIAGNOSTIC ASSESSMENT     Referred by PCP for evaluation of depression and anxiety.     History was provided by patient who was a good historian.    CARE TEAM:  PCP- Jazz Abbott   Therapist- weekly with Dr. Chadwick BROWNING Sangita is a 37 year old female who prefers the name Lena & pronouns she, her.      CHIEF COMPLAINT                                                           \" Meds. Weight. Anxious about roads, ice. Not seeing myself as the big girl I used to be. \"     HISTORY OF PRESENT ILLNESS   [4, 4]      Denies current psychotropics. She is psychotropic naive.     Medical meds include Nexplanon, Wegovy, biotin, calcium citrate, Prilosec, MV, zinc.     ADVERSE SIDE EFFECTS: N/A  MEDICAL CONCERNS: no menstrual cycle since Dec 2022 though she senses the emotional aspects of her cycle monthly, considers panniculectomy.    Describes her mood as anxious.  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 Onset of bullying then anxiety then depression in middle school. G[place] \"[place]. The biggest girl, I always asked myself who's going " Pt with allergic reaction at school, pt given 0.3 mg EPI, Bendadryl and zyrtec.  Pt feeling much better, no throat itching anymore    "to say something today?\". Her ability to cope with judgment from her peers improved in high school. [place] more free to be herself in college. Bariatric surgery in March 2022.     Psych critical item history includes trauma history (bullying, social exclusion), emotional eating, body dysmorphia before and after bariatric surgery.    .   RECENT PSYCH ROS:   Depression:  PHQ 9, few days of dysphoria and anhedonia, low energy, feelings of worthlessness, speaking/ moving slowly when her mood is low so that other's notice; sleeping too little in a hot apartment, varied appetite  Elevated:  she denies grandiosity, reckless behaviors, decreased sleep need  Psychosis:  none  Anxiety: DAVID 10, excessive worry, feeling fearful and nervous/overwhelmed; worries about other's perceptions, driving in snow/ ice  Trauma Related:  none  Dysregulation:  none  Eating Disorder: denies binging, emotional eating appears secondary to bullying, need to attend to another's emotions    APPETITE: following bariatric surgery in Mar 2022, she lost 100# before surgery (targeting her post surgery diet, stopped alcohol, took Ozempic) then 100# and 29% body fat since surgery; she'd like to weigh 180# or lose another 90#.    SLEEP: most nights, she falls asleep in 2-3 hours, tries to get off screens and ruminates before bed on her plan or the next day, sleeps best right before waking, sleep is difficult in a hot apartment, sleeps in 2-3 hour intervals and able to fall back to sleep, recalls her dreams when she wakes up the next day       RECENT SUBSTANCE USE:     Alcohol- quit drinking socially prior to Mar 2022 bariatric surgery, use was not problematic  Tobacco- none  Caffeine- avoids, dislikes coffee, no soda since before Mar 2022, prefers water  Cannabis- none     Opioids- none      Narcan Kit- N/A  Other illicit drugs- none    PSYCHIATRIC HISTORY                           SIB- no  Suicidal Ideation Hx- one episode of SI in 2015, responded by " seeing a therapist to process her past  Suicide Attempt- #- no, most recent- N/A    Violence/Aggression Hx- no  Psychosis Hx- no  Eating Disorder Hx- see HPI    Psych Hosp- #- no, most recent- N/A   Commitment- no  ECT- no  Outpatient Programs - no    SUBSTANCE USE HISTORY                        Past Use- none reported  Treatment- #, most recent- no  Medical Consequences- no  HIV/Hepatitis- no  Legal Consequences- no    SOCIAL and FAMILY HISTORY      [1ea, 1ea]       patient reported                    FINANCIAL SUPPORT- working at [employer] in  for provider enrollment , part time, 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 Born to  parents. She is youngest of two, brother Sidney (b. 1980). Graduated with BS in physical education (K-12) with coaching minor from [employer]. Enrolled in Masters classes in business, exploring career options, through [employer].    CULTURAL/ SOCIAL/ SPIRITUAL SUPPORT- support from her parents, identifies as Baptist who has questions        TRAUMA HISTORY- extensive bullying  FEELS SAFE AT HOME- Yes  FAMILY MENTAL HEALTH HISTORY- PGM- anxiety, depression    MEDICAL / SURGICAL HISTORY         Pregnant or breastfeeding- no      Contraception- nexplanon    Patient Active Problem List   Diagnosis     Morbid obesity (H)     Chondromalacia of patellofemoral joint     Morbid obesity due to excess calories (H)     Nexplanon in place        Head trauma/ LOC: none  Seizures:none    MEDICAL REVIEW OF SYSTEMS    [2, 10]     A comprehensive review of systems was performed and is negative other than noted in the HPI.    ALLERGY        Patient has no known allergies.    MEDICATIONS          Current Outpatient Medications   Medication Sig Dispense Refill     Alpha Lipoic Ac-Biotin-Keratin  (BIOTIN-KERATIN-ALPHA LIPOIC AC PO) Take 1 capsule by mouth daily 10,000 mg       Calcium Citrate-Vitamin D (CALCIUM CITRATE + PO) Take 1 chew tab by mouth daily 600mg       Cholecalciferol (VITAMIN D-3) 125 MCG (5000 UT) TABS Take 1 tablet by mouth daily       Cyanocobalamin 5000 MCG SUBL Place 1 tablet under the tongue once a week       omeprazole (PRILOSEC) 20 MG DR capsule Take 1 capsule (20 mg) by mouth daily for 180 days 90 capsule 1     Pediatric Multivitamins-Iron (CHEWABLE BRADLEY/IRON CHILDRENS PO) Take 1 tablet by mouth daily       Semaglutide-Weight Management (WEGOVY) 0.25 MG/0.5ML pen Inject 0.25 mg Subcutaneous once a week for 28 days 4 pens. Will ramp up dose monthly if tolerating well to goal of 2.4mg/week eventually. 2 mL 0     Semaglutide-Weight Management (WEGOVY) 0.25 MG/0.5ML pen Inject 0.25 mg Subcutaneous once a week for 28 days 4 pens. Will ramp up dose monthly if tolerating well to goal of 2.4mg/week eventually. 2 mL 0     Semaglutide-Weight Management (WEGOVY) 0.5 MG/0.5ML pen Inject 0.5 mg Subcutaneous every 7 days for 28 days 4 pens 2 mL 0     Semaglutide-Weight Management (WEGOVY) 1 MG/0.5ML pen Inject 1 mg Subcutaneous every 7 days for 28 days 4 pens 2 mL 0     Semaglutide-Weight Management (WEGOVY) 1.7 MG/0.75ML pen Inject 1.7 mg Subcutaneous every 7 days for 28 days 4 pens 3 mL 0     Semaglutide-Weight Management (WEGOVY) 2.4 MG/0.75ML pen Inject 2.4 mg Subcutaneous every 7 days for 270 days 4 pens 9 mL 2     zinc gluconate 50 MG tablet Take 1 tablet (50 mg) by mouth daily for 21 days One daily after supper 21 tablet 0       VITALS       [3, 3]     There were no vitals taken for this visit.     MENTAL STATUS EXAM     [9, 14 cog gs]     Alertness: alert  and oriented  Appearance: adequately groomed  Behavior/Demeanor: cooperative, pleasant and calm, with good  eye contact   Speech: normal and regular rate and rhythm  Language: intact  Psychomotor: normal or unremarkable  Mood:  anxious  Affect: restricted and appropriate; was congruent to mood; was congruent to content  Thought Process/Associations: unremarkable  Thought Content:  Reports none;  Denies suicidal ideation, violent ideation, delusions, preoccupations, obsessions , phobia , magical thinking and over-valued ideas  Perception:  Reports none;  Denies auditory hallucinations, visual hallucinations, visual distortion seen as shadows , depersonalization and derealization  Insight: good  Judgment: good  Cognition: (6) does  appear grossly intact; formal cognitive testing was not done  Gait and Station: N/A    LABS and DATA     Answers for HPI/ROS submitted by the patient on 3/27/2023  If you checked off any problems, how difficult have these problems made it for you to do your work, take care of things at home, or get along with other people?: Somewhat difficult  PHQ9 TOTAL SCORE: 9  DAVID 7 TOTAL SCORE: 10    PHQ9 TODAY =   PHQ 3/27/2023   PHQ-9 Total Score 9   Q9: Thoughts of better off dead/self-harm past 2 weeks Not at all       Recent Labs   Lab Test 03/03/23  1554 09/10/22  0949 03/24/22  1643   CR 0.67 0.74 0.74   GFRESTIMATED >90 >90 >90     Recent Labs   Lab Test 03/03/23  1554 09/10/22  0949 03/07/22  1753   AST 83* 29 31   * 41 44   ALKPHOS 295* 121* 74       PSYCHOTROPIC DRUG INTERACTIONS     None with sertraline, Wegovy, Nexplanon, Prilosec    MANAGEMENT:  Monitoring for adverse effects, routine vitals, using lowest therapeutic dose of [psychotropics] and patient is aware of risks    RISK STATEMENT for SAFETY     Lena Quesada did not appear to be an imminent safety risk to self or others.    PSYCHIATRIC DIAGNOSES                                           impression includes mild MDD, adjustment disorder with anxiety     PLAN        [m2, h3]     1) discussed options, risks, benefits including her psychotropic naive status, ASE/ MOA of antidepressants and first line role for anxiety, PRN med options and risks,  potential utility of buspirone, escitalopram, sertraline, drug interactions; validated her efforts for holistic health, today, she chooses to trial sertraline 12.5mg x 8 days then 25mg daily.    2) [place]:  indicates no file found.    3) established in weekly therapy with Dr. Genao    RTC: 4 weeks, sooner as needed    Psychiatry Individual Psychotherapy Note   Psychotherapy start time - 11:00a  Psychotherapy end time - 11:16a  Date last reviewed - 03/27/23  Subjective: This supportive psychotherapy session addressed issues related to goals of therapy and current psychosocial stressors.   Interactive complexity indicated? No  -The need to manage maladaptive communication (related to, e.g., high anxiety, high reactivity, repeated questions, or disagreement) among participants that complicates delivery of care  Plan: RTC in timeframe noted above  Psychotherapy services during this visit included myself and the patient.   Treatment Plan      SYMPTOMS; PROBLEMS   MEASURABLE GOALS;    FUNCTIONAL IMPROVEMENT / GAINS INTERVENTIONS DISCHARGE CRITERIA   Anxiety: excessive worry   learn best practices for sleep and make a plan to manage 2-3 anxiety-provoking situations Supportive / psychodynamic marked symptom improvement     CRISIS NUMBERS:   Provided routinely in AVS     TREATMENT RISK STATEMENT:  The risks, benefits, alternatives and potential adverse effects have been discussed and are understood by the pt. The pt understands the risks of using street drugs or alcohol. There are no medical contraindications, the pt agrees to treatment with the ability to do so. The pt knows to call the clinic for any problems or to access emergency care if needed.  Medical and substance use concerns are documented above.  Psychotropic drug interaction check was done, including changes made today.    PROVIDER: CATERINA Matos CNP

## 2024-12-23 ENCOUNTER — VIRTUAL VISIT (OUTPATIENT)
Dept: PSYCHIATRY | Facility: CLINIC | Age: 39
End: 2024-12-23
Attending: PSYCHOLOGIST
Payer: COMMERCIAL

## 2024-12-23 DIAGNOSIS — F41.1 GENERALIZED ANXIETY DISORDER: Primary | ICD-10-CM

## 2024-12-23 PROCEDURE — 90834 PSYTX W PT 45 MINUTES: CPT | Mod: 95 | Performed by: STUDENT IN AN ORGANIZED HEALTH CARE EDUCATION/TRAINING PROGRAM

## 2024-12-23 NOTE — PROGRESS NOTES
"-[place] plans. Singing and reading at [employer]. Time with parents. Weekend in [place] with family.   -Play games  -Najma:  friend. A lot of work to keep things going.   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 Met with team in person last Wednesday, got the impression that others didn't get along with her as well.   -Felt better being in person this time compared to last time.     -Maty not talking much in group chat.     -Badgers playing in [employer] in August 2027. Wants to go for 42nd bday with aunt, 50th anniversary.     -Getting a tattoo of starting weight and \"never again\" to celebrate accomplishment of losing weight. Reminder of how far she's come.    -  "

## 2024-12-23 NOTE — PROGRESS NOTES
"Video- Visit Details  Type of service:  video visit for mental health treatment  Time of service:  Date:  12/23/2024  Video Start Time: 3:26     Video End Time:  4:11    Reason for video visit: COVID-19   Originating Site (patient location): Patient's home  Distant Site (provider location):  On-Site - [facility]  Mode of Communication:  Video Conference via AmWell    As the provider I attest to compliance with applicable laws and regulations related to telemedicine.    OUTPATIENT PSYCHOTHERAPY PROGRESS NOTE    Client Name: Lena Quesada   YOB: 1985 (39 year old)   Date of Service: 12/23/2024  Service Type(s): [place] PSYCHOTHERAPY W PATIENT 38-52 MINUTES [[id number]]     Diagnoses: DAVID    Individuals Present:   Client attended alone    Subjective:    -[place] plans: singing and reading at [employer], time with parents, weekend in [place] with family. Their family is very into games, have a lot of fun.  -Najma:  friend. A lot of work to keep things going. Wishes she had more non- friends.  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 Met with team in person last Wednesday, got the impression that others didn't get along with her as well.   -Felt better being in person this time compared to last time.     -Maty not talking much in group chat, neither is she.     -Badgers playing in [employer] in August 2027. Wants to go for 42nd bday with aunt, 50th anniversary.     -Getting a tattoo of starting weight and \"never again\" to celebrate accomplishment of losing weight. Reminder of how far she's come.    -Sometimes realizes that she's going to die one day, increase in anxiety around that. Still has a lot she wants to experience.      Treatment:   Psychodynamic Psychotherapy  - Enhance insight via " self-examination  - Explore emotions, especially contradicting/confusing ones  - Identify meaningful past experiences  - Challenge avoidance and resistance  - Encourage nuanced understanding of interpersonal relationships    Psychotherapy services during this visit included myself and the patient. Discussed case with supervisor who also agreed with the treatment plan. Unable to sign in person due to visit being conducted through telehealth due to COVID-19.     Assessment and Progress:   Lena did very well with a course of CBT, and is now doing well further exploring her thoughts/behaviors/feelings with psychodynamic psychotherapy. Key topics are weight, self-esteem/confidence, trauma, and anxiety. Today we discussed themes of family, holidays, and anxiety.    Plan:   -Next therapy appointment has been scheduled for 1 week. No safety concerns.    Psychotherapy services during this visit included myself and the patient. Patient agrees with treatment plan. Discussed case with supervisor who also agreed with the treatment plan. Unable to sign in person due to visit being conducted through telehealth.     Diana Genao MD  PGY-4 Psychiatry Resident

## 2024-12-30 ENCOUNTER — VIRTUAL VISIT (OUTPATIENT)
Dept: PSYCHIATRY | Facility: CLINIC | Age: 39
End: 2024-12-30
Attending: PSYCHOLOGIST
Payer: COMMERCIAL

## 2024-12-30 DIAGNOSIS — F41.1 GENERALIZED ANXIETY DISORDER: Primary | ICD-10-CM

## 2024-12-30 PROCEDURE — 90837 PSYTX W PT 60 MINUTES: CPT | Mod: 95 | Performed by: STUDENT IN AN ORGANIZED HEALTH CARE EDUCATION/TRAINING PROGRAM

## 2024-12-30 NOTE — PROGRESS NOTES
"Video- Visit Details  Type of service:  video visit for mental health treatment  Time of service:  Date:  12/30/2024  Video Start Time: 4:01     Video End Time:  4:54    Reason for video visit: COVID-19   Originating Site (patient location): Patient's home  Distant Site (provider location):  On-Site - [facility]  Mode of Communication:  Video Conference via AmWell    As the provider I attest to compliance with applicable laws and regulations related to telemedicine.    OUTPATIENT PSYCHOTHERAPY PROGRESS NOTE    Client Name: Lena Quesada   YOB: 1985 (39 year old)   Date of Service: 12/30/2024  Service Type(s): [place] PSYCHOTHERAPY W PATIENT 53 OR > MINUTES [[id number]]     Diagnoses: DAVID    Individuals Present:   Client attended alone    Subjective:  -Holidays were fun, busy.   -Aunt has been struggling to eat post chemo. Lena helped her. Tough seeing a really sick family member, tough to navigate for the first time. \"Aunt is like another mom.\"   -Grateful that her parents have already organized their plans  -Always has been fearful of dying someday    -Still hasn't heard back from supervisor temporary job. Thinks she'd feel \"fine\" if her  got it.    -Has Thursday off this week, [place]    -Anxiety with roads hasn't been too bad. Drove to [place] last weekend.     -Mom said \"I wish we would have done more when you told us you were bullied.\" Lena didn't say \"it's okay\" when in the past she would have jumped to reassurance. Notices that mom seeks reassurance, \"when I'm the one who needs the reassurance from her.\"  -[place] surprised - Mom hasn't ever acknowledged it before. Feels nice to hear.  -Hasn't told her that it feels nice, \"no way\" \"vulnerable\" and worries she would feel bad.   -Wonders if she's trying to treat her mom now how she needed to be treated when she was younger. Needed a friend. Tearful, feeling \"sad for young Lena.\" Sad for a relationship that could " "have been with her mom. Doesn't want to bring something up that could \"be the tipping point\" and bring their relationship backwards. Wants to protect warmth and stability they have right now.   -In the past few years, has realized that everyone needs compassion more than she ever realized before.   -Sees more nuance than in the past - external signs of success (ie being thin) doesn't compute to being happy, and being heavy doesn't equate to being unhappy  -Redefining success - living the life you want to live despite hardships and relatively easily    Treatment:   Psychodynamic Psychotherapy  - Enhance insight via self-examination  - Explore emotions, especially contradicting/confusing ones  - Identify meaningful past experiences  - Challenge avoidance and resistance  - Encourage nuanced understanding of interpersonal relationships    Psychotherapy services during this visit included myself and the patient. Discussed case with supervisor who also agreed with the treatment plan. Unable to sign in person due to visit being conducted through telehealth due to COVID-19.     Assessment and Progress:   Lena did very well with a course of CBT, and is now doing well further exploring her thoughts/behaviors/feelings with psychodynamic psychotherapy. Key topics are weight, self-esteem/confidence, trauma, and anxiety. Today we discussed themes of family (specifically her relationship with her mom) and empathizing with others.     Plan:   -Next therapy appointment has been scheduled for 1 week. No safety concerns.    Psychotherapy services during this visit included myself and the patient. Patient agrees with treatment plan. Discussed case with supervisor who also agreed with the treatment plan. Unable to sign in person due to visit being conducted through telehealth.     Diana Genao MD  PGY-4 Psychiatry Resident  "

## 2025-01-06 ENCOUNTER — VIRTUAL VISIT (OUTPATIENT)
Dept: PSYCHIATRY | Facility: CLINIC | Age: 40
End: 2025-01-06
Attending: PSYCHOLOGIST
Payer: COMMERCIAL

## 2025-01-06 DIAGNOSIS — F41.1 GENERALIZED ANXIETY DISORDER: Primary | ICD-10-CM

## 2025-01-06 NOTE — PROGRESS NOTES
"Video- Visit Details  Type of service:  video visit for mental health treatment  Time of service:  Date:  01/06/2025  Video Start Time: 4:00     Video End Time:  4:50    Reason for video visit: COVID-19   Originating Site (patient location): Patient's home  Distant Site (provider location):  On-Site - [facility]  Mode of Communication:  Video Conference via AmWell    As the provider I attest to compliance with applicable laws and regulations related to telemedicine.    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 She knows the person who did get it, and understands the decision.   -New Years Day: Went to [employer] and won $900, paid off some car debt to her parents  -Pipe burst in bathroom, called emergency . Getting it sorted out with . \"Not my problem to fix.\" Attributes lack of anxiety to increased sertraline dose.    -Dreams about being back in hometown. Common theme is finding a voice. Ivan, close friend who was only nice to her in private. She can't wait for him to see her post-weight loss. Attributes his behaviors entirely to her weight.   -In recent dream, he was her partner on a project. They disagreed, \"I had a voice and he didn't like it.\" Wonders if she's practicing this dynamic.    -In group chat with Maty and Cecelia, learning to let things go. Doesn't feel the need to disagree with everything. Gray area vs black and white. Learning to recognize what \"protect your peace\" means.     -Follow-up about relationship with mom: Remembers thinking \"when she passes, I'll finally be able to breathe.\" Feels like her mom always wanted to be closer to her bother " than to her. Told her mom that she felt like she loved her brother more. Felt lonely.     Treatment:   Psychodynamic Psychotherapy  - Enhance insight via self-examination  - Explore emotions, especially contradicting/confusing ones  - Identify meaningful past experiences  - Challenge avoidance and resistance  - Encourage nuanced understanding of interpersonal relationships    Psychotherapy services during this visit included myself and the patient. Discussed case with supervisor who also agreed with the treatment plan. Unable to sign in person due to visit being conducted through telehealth due to COVID-19.     Assessment and Progress:   Lena did very well with a course of CBT, and is now doing well further exploring her thoughts/behaviors/feelings with psychodynamic psychotherapy. Key topics are weight, self-esteem/confidence, trauma, and anxiety. Today we discussed themes of family (specifically her relationship with her mom) and less black and white thinking.    Plan:   -Next therapy appointment has been scheduled for 1 week. No safety concerns.    Psychotherapy services during this visit included myself and the patient. Patient agrees with treatment plan. Discussed case with supervisor who also agreed with the treatment plan. Unable to sign in person due to visit being conducted through telehealth.     Diana Genao MD  PGY-4 Psychiatry Resident

## 2025-01-13 ENCOUNTER — VIRTUAL VISIT (OUTPATIENT)
Dept: PSYCHIATRY | Facility: CLINIC | Age: 40
End: 2025-01-13
Attending: PSYCHOLOGIST
Payer: COMMERCIAL

## 2025-01-13 DIAGNOSIS — F41.1 GENERALIZED ANXIETY DISORDER: Primary | ICD-10-CM

## 2025-01-13 NOTE — PROGRESS NOTES
"Video- Visit Details  Type of service:  video visit for mental health treatment  Time of service:  Date:  01/13/2025  Video Start Time: 4:00     Video End Time:  4:51    Reason for video visit: COVID-19   Originating Site (patient location): Patient's home  Distant Site (provider location):  On-Site - [facility]  Mode of Communication:  Video Conference via [employer]    As the provider I attest to compliance with applicable laws and regulations related to telemedicine.    OUTPATIENT PSYCHOTHERAPY PROGRESS NOTE    Client Name: Lena Quesada   YOB: 1985 (39 year old)   Date of Service: 01/13/2025  Service Type(s): [place] PSYCHOTHERAPY W PATIENT 38-52 MINUTES [[id number]]     Diagnoses: DAVID    Individuals Present:   Client attended alone    Subjective:  -Got tattoo on Saturday, excited about it.   -Saw van [place] the ditch [place], didn't panic but did decide to turn around  -Next weekend, going to [employer] with neice and nephew   -Always tells nieces and nephews that they can talk to her if bullied. Wishes someone would have been there for her    -Was surprised when some people treated more negatively after her surgery  -Discussed acceptance    -Matched with someone on [employer] last week.  -\"It's gonna happen when it happens\" - discussed 50% effort in her control, 50% effort outside of her control  -Conscious effort to not put edited photos of herself online. Thinking back to catfishing days. Thinks of Michele often and how good their conversations were. They were so intimate emotionally.   -In the end, he drifted away. She always thought he would leave once he found out that she lied.   -Wishes and believes she can find someone like that again    Treatment:   Psychodynamic Psychotherapy  - Enhance insight via self-examination  - Explore emotions, especially contradicting/confusing ones  - Identify meaningful past experiences  - Challenge avoidance and resistance  - Encourage nuanced " understanding of interpersonal relationships    Psychotherapy services during this visit included myself and the patient. Discussed case with supervisor who also agreed with the treatment plan. Unable to sign in person due to visit being conducted through telehealth due to COVID-19.     Assessment and Progress:   Lena did very well with a course of CBT, and is now doing well further exploring her thoughts/behaviors/feelings with psychodynamic psychotherapy. Key topics are weight, self-esteem/confidence, trauma, and anxiety. Today we discussed romantic relationships.    Plan:   -Next therapy appointment has been scheduled for 2 weeks (due to TONYA Jr day). No safety concerns.    Psychotherapy services during this visit included myself and the patient. Patient agrees with treatment plan. Discussed case with supervisor who also agreed with the treatment plan. Unable to sign in person due to visit being conducted through telehealth.     Diana Genao MD  PGY-4 Psychiatry Resident

## 2025-01-29 ENCOUNTER — MYC MEDICAL ADVICE (OUTPATIENT)
Dept: SURGERY | Facility: CLINIC | Age: 40
End: 2025-01-29

## 2025-01-29 ENCOUNTER — OFFICE VISIT (OUTPATIENT)
Dept: SURGERY | Facility: CLINIC | Age: 40
End: 2025-01-29
Payer: COMMERCIAL

## 2025-01-29 VITALS
BODY MASS INDEX: 37.98 KG/M2 | WEIGHT: 242 LBS | SYSTOLIC BLOOD PRESSURE: 118 MMHG | DIASTOLIC BLOOD PRESSURE: 82 MMHG | HEIGHT: 67 IN

## 2025-01-29 DIAGNOSIS — E66.01 CLASS 2 SEVERE OBESITY DUE TO EXCESS CALORIES WITH SERIOUS COMORBIDITY AND BODY MASS INDEX (BMI) OF 37.0 TO 37.9 IN ADULT (H): ICD-10-CM

## 2025-01-29 DIAGNOSIS — E66.812 CLASS 2 SEVERE OBESITY DUE TO EXCESS CALORIES WITH SERIOUS COMORBIDITY AND BODY MASS INDEX (BMI) OF 37.0 TO 37.9 IN ADULT (H): Primary | ICD-10-CM

## 2025-01-29 DIAGNOSIS — E66.812 CLASS 2 SEVERE OBESITY DUE TO EXCESS CALORIES WITH SERIOUS COMORBIDITY AND BODY MASS INDEX (BMI) OF 37.0 TO 37.9 IN ADULT (H): ICD-10-CM

## 2025-01-29 DIAGNOSIS — E66.01 CLASS 2 SEVERE OBESITY DUE TO EXCESS CALORIES WITH SERIOUS COMORBIDITY AND BODY MASS INDEX (BMI) OF 37.0 TO 37.9 IN ADULT (H): Primary | ICD-10-CM

## 2025-01-29 DIAGNOSIS — R74.01 ELEVATED TRANSAMINASE LEVEL: ICD-10-CM

## 2025-01-29 PROCEDURE — 99214 OFFICE O/P EST MOD 30 MIN: CPT | Performed by: EMERGENCY MEDICINE

## 2025-01-29 NOTE — LETTER
1/29/2025      Lena Quesada  [address]  [facility] [address]      Dear Colleague,    Thank you for referring your patient, Lena Quesada, to the [facility] [place]. Please see a copy of my visit note below.    Bariatric Follow Up Visit with a History of Previous Bariatric Surgery     Date of visit: 1/29/2025  Physician: Wai Anders MD, MD  Primary Care Provider:  Jazz Abbott  Lena Quesada   39 year old  female    Date of Surgery: 3/24/22  Initial Weight: 399 lbs  Initial BMI: 62.5 with fatty liver disease Prediabetes (A1c 6.4% 2021). .  Today's Weight:   Wt Readings from Last 1 Encounters:   01/29/25 109.8 kg (242 lb)     Weight history:   Wt Readings from Last 4 Encounters:   01/29/25 109.8 kg (242 lb)   11/26/24 106.6 kg (235 lb)   11/04/24 109.8 kg (242 lb)   07/30/24 107.9 kg (237 lb 12.8 oz)      Body mass index is 37.9 kg/m .      Assessment and Plan     Assessment: Lena is a 39 year old year old female who is approaching 3 years s/p  Sleeve Gastrectomy with Dr. Rodriguez.  In the interim she'd had good weight reduction but persisting Class II obesity for which we'd initiated Wegovy therapy in 2023.  She had an excellent additional weight loss as of 41 lbs as of our 11/09/23 visit, down to 235 lbs. Today, 1/29/25, she  is off her Wegovy as of a week ago and finds her weight hit plateau last year at this dose and is interested in further weight reduction for treatment of her resistant Class II obesity. She appears to have coverage for Zepbound and we'll see if her brain hunger/impulsive eating tendencies (works from home) are better controlled on Zepbound and if not, she's open to consideration of progression of her sleeve to a duodenal switch.  I've advised to exhaust medication support options first before considering progression to a more malabsorptive procedure.    She has significant intertrigo/maceration that would  benefit from Plastics Consult later this year once weight is stable.     Bariatric labs 11/4/24 showed some mild excess of vitamin D normal B12 thyroid A1c, lipids, hemogram but with some mild elevations persisting in ALT, improved from previous tests.  Her weight is down 157 lbs from introductory weight, a 39.3% total body weight reduction, well above average but with some residual Class II obesity, BMI of 37.9    Lena Quesada feels as if she hasn't quite yet achieved the goals she hoped to accomplish through bariatric surgery and weight loss.    No diagnosis found.      Current Outpatient Medications:      Calcium Citrate-Vitamin D (CALCIUM CITRATE + PO), Take 1 chew tab by mouth daily 600mg, Disp: , Rfl:      Cholecalciferol (VITAMIN D-3) 125 MCG (5000 UT) TABS, Take 1 tablet by mouth daily, Disp: , Rfl:      Cyanocobalamin 5000 MCG SUBL, Place 1 tablet under the tongue once a week, Disp: , Rfl:      nystatin (MYCOSTATIN) 537913 UNIT/GM external powder, Apply topically every 8 hours as needed (red skin)., Disp: 90 g, Rfl: 5     Pediatric Multivitamins-Iron (CHEWABLE BRADLEY/IRON CHILDRENS PO), Take 1 tablet by mouth daily, Disp: , Rfl:      sertraline (ZOLOFT) 25 MG tablet, Take 1 tablet (25 mg) by mouth daily., Disp: 90 tablet, Rfl: 0     sertraline (ZOLOFT) 50 MG tablet, Take one 50mg tab daily, Disp: 90 tablet, Rfl: 0     Semaglutide-Weight Management (WEGOVY) 2.4 MG/0.75ML pen, Inject 2.4 mg Subcutaneous every 7 days 4 pens (Patient not taking: Reported on 1/29/2025), Disp: 27 mL, Rfl: 3    Plan:    No follow-ups on file.    Bariatric Surgery Review     Interim History/LifeChanges:  last dose of Wegovy last week, wasn't as efficacious with her brain hunger control and weight has been neutral. She's interested in options and we discussed trial of Zepbound with consideration in the future to progressing her LSG to AVELINO if resistant Class II obesity remains.     Patient Concerns: weight control/appetite  "control.   Appetite (1-10): high, working from home.   GERD: none..    Reviewed whether any need/indication for screening EGD today and we will deferred.  Typically, a screening EGD is recommend post op year 2-3 if no symptoms to assess health of esophagus/bariatric surgery and sooner if difficult to control GERD or persistent pain/dysphagia sx despite behavior modification.    Medication changes: off Wegovy last week.    Vitamin Intake:                LABS: \"Reviewed  No vomiting or constipation on Wegovy. Rarely would use stool softner (used 1x).       Most recent labs:  Lab Results   Component Value Date    WBC 4.9 11/04/2024    HGB 13.6 11/04/2024    HCT 39.4 11/04/2024    MCV 89 11/04/2024     11/04/2024     Lab Results   Component Value Date    CHOL 150 11/04/2024     Lab Results   Component Value Date    HDL 70 11/04/2024     No components found for: \"LDLCALC\"  Lab Results   Component Value Date    TRIG 78 11/04/2024     No results found for: \"CHOLHDL\"  Lab Results   Component Value Date    ALT 67 (H) 11/04/2024    AST 40 11/04/2024    ALKPHOS 110 11/04/2024     No results found for: \"HGBA1C\"  Lab Results   Component Value Date    B12 636 11/04/2024     No components found for: \"VITDT1\"  Lab Results   Component Value Date    BHAKTI 177 (H) 03/03/2023     Lab Results   Component Value Date    PTHI 28 03/03/2023     Lab Results   Component Value Date    ZN 43.9 (L) 03/03/2023     Lab Results   Component Value Date    VIB1WB 181 (H) 03/03/2023     Lab Results   Component Value Date    TSH 2.22 11/04/2024     No results found for: \"TEST\"    Habits:    Fitness routine is daily: weights/walking at the gym. Would love to travel. Would like to run a race someday.     Social History     Social History     Socioeconomic History     Marital status: Single     Spouse name: Not on file     Number of children: Not on file     Years of education: Not on file     Highest education level: Not on file   Occupational " History     Not on file   Tobacco Use     Smoking status: Never     Passive exposure: Never     Smokeless tobacco: Never   Vaping Use     Vaping status: Never Used   Substance and Sexual Activity     Alcohol use: Not Currently     Drug use: Never     Sexual activity: Yes     Partners: Male     Birth control/protection: Condom   Other Topics Concern     Parent/sibling w/ CABG, MI or angioplasty before 65F 55M? Not Asked   Social History Narrative     Not on file     Social Drivers of Health     Financial Resource Strain: Low Risk  (11/4/2024)    Financial Resource Strain      Within the past 12 months, have you or your family members you live with been unable to get utilities (heat, electricity) when it was really needed?: No   Food Insecurity: Low Risk  (11/4/2024)    Food Insecurity      Within the past 12 months, did you worry that your food would run out before you got money to buy more?: No      Within the past 12 months, did the food you bought just not last and you didn t have money to get more?: No   Transportation Needs: Low Risk  (11/4/2024)    Transportation Needs      Within the past 12 months, has lack of transportation kept you from medical appointments, getting your medicines, non-medical meetings or appointments, work, or from getting things that you need?: No   Physical Activity: Sufficiently Active (11/4/2024)    Exercise Vital Sign      Days of Exercise per Week: 5 days      Minutes of Exercise per Session: 30 min   Stress: No Stress Concern Present (11/4/2024)    [employer] - Occupational Stress Questionnaire      Feeling of Stress : Only a little   Social Connections: Unknown (11/4/2024)    Social Connection and Isolation Panel [NHANES]      Frequency of Communication with Friends and Family: Not on file      Frequency of Social Gatherings with Friends and Family: Once a week      Attends Sabianism Services: Not on file      Active Member of Clubs or Organizations:  "Not on file      Attends Club or Organization Meetings: Not on file      Marital Status: Not on file   Interpersonal Safety: Low Risk  (11/4/2024)    Interpersonal Safety      Do you feel physically and emotionally safe where you currently live?: Yes      Within the past 12 months, have you been hit, slapped, kicked or otherwise physically hurt by someone?: No      Within the past 12 months, have you been humiliated or emotionally abused in other ways by your partner or ex-partner?: No   Housing Stability: Low Risk  (11/4/2024)    Housing Stability      Do you have housing? : Yes      Are you worried about losing your housing?: No       Past Medical History     Past Medical History:   Diagnosis Date     Arthritis     chondromalacia of knee (right). injections.     Cholelithiasis      Chondromalacia of patellofemoral joint      Morbid obesity (H) 12/16/2020     Morbid obesity due to excess calories (H) 3/24/2022     Nexplanon in place  9/8/2022    Remove within 5 years, 9/2027     Past Surgical History:   Procedure Laterality Date     CHOLECYSTECTOMY  2005     LAPAROSCOPIC GASTRIC SLEEVE N/A 3/24/2022    Procedure: GASTRECTOMY, SLEEVE, LAPAROSCOPIC;  Surgeon: Bobby Rodriguez MD;  Location: [facility] OR     TYMPANOPLASTY       WISDOM TOOTH EXTRACTION         Problem List     Patient Active Problem List   Diagnosis     Morbid obesity (H)     Chondromalacia of patellofemoral joint     Morbid obesity due to excess calories (H)     Nexplanon in place      Cervical high risk human papillomavirus (HPV) DNA test positive     Medications     [unfilled]  Surgical History     Past Surgical History  She has a past surgical history that includes Tympanoplasty; Frederick Tooth Extraction; Cholecystectomy (2005); and Laparoscopic gastric sleeve (N/A, 3/24/2022).    Objective-Exam     Constitutional:  /82   Ht 1.702 m (5' 7\")   Wt 109.8 kg (242 lb)   BMI 37.90 kg/m    [unfilled]   General:  Pleasant and in no acute " distress   Eyes:  EOMI  ENT:  Airway patent    Neck:  Respiratory: Normal respiratory effort, no cough, .  CV:  RRR  Gastrointestinal: nontender. Abdominal fold rash above pubis with maceration/raw/irritated skin and powder intact.   Musculoskeletal: muscle mass WNL  Skin: color without pallor, hair thick,   Psychiatric: alert and oriented X3, mood and affect normal    Counseling     We reviewed the important post op bariatric recommendations:  -eating 3 meals daily  -eating protein first, getting >60gm protein daily  -eating slowly, chewing food well  -avoiding/limiting calorie containing beverages  -drinking water 15-30 minutes before or after meals  -limiting restaurant or cafeteria eating to twice a week or less    We discussed the importance of restorative sleep and stress management in maintaining a healthy weight.  We discussed the National Weight Control Registry healthy weight maintenance strategies and ways to optimize metabolism.  We discussed the importance of physical activity including cardiovascular and strength training in maintaining a healthier weight.    We discussed the importance of life-long vitamin supplementation and life-long  follow-up.    Lena was reminded that, to avoid marginal ulcers she should avoid tobacco at all, alcohol in excess, caffeine in excess, and NSAIDS (unless indicated for cardioprotection or othewise and opposed by a PPI).    Wai Anders MD    [facility].  2025  7:32 AM  [phone] (clinic phone)  [phone] (fax)    No images are attached to the encounter.  Medical Decision Makin minutes spent by me on the date of the encounter doing chart review, history and exam, documentation and further activities per the note      Again, thank you for allowing me to participate in the care of your patient.        Sincerely,        Wai Anders MD    Electronically signed

## 2025-01-29 NOTE — PROGRESS NOTES
Bariatric Follow Up Visit with a History of Previous Bariatric Surgery     Date of visit: 1/29/2025  Physician: Wai Anders MD, MD  Primary Care Provider:  Jazz Abbott  Lena Quesada   39 year old  female    Date of Surgery: 3/24/22  Initial Weight: 399 lbs  Initial BMI: 62.5 with fatty liver disease Prediabetes (A1c 6.4% 2021). .  Today's Weight:   Wt Readings from Last 1 Encounters:   01/29/25 109.8 kg (242 lb)     Weight history:   Wt Readings from Last 4 Encounters:   01/29/25 109.8 kg (242 lb)   11/26/24 106.6 kg (235 lb)   11/04/24 109.8 kg (242 lb)   07/30/24 107.9 kg (237 lb 12.8 oz)      Body mass index is 37.9 kg/m .      Assessment and Plan     Assessment: Lena is a 39 year old year old female who is approaching 3 years s/p  Sleeve Gastrectomy with Dr. Rodriguez.  In the interim she'd had good weight reduction but persisting Class II obesity for which we'd initiated Wegovy therapy in 2023.  She had an excellent additional weight loss as of 41 lbs as of our 11/09/23 visit, down to 235 lbs. Today, 1/29/25, she  is off her Wegovy as of a week ago and finds her weight hit plateau last year at this dose and is interested in further weight reduction for treatment of her resistant Class II obesity. She appears to have coverage for Zepbound and we'll see if her brain hunger/impulsive eating tendencies (works from home) are better controlled on Zepbound and if not, she's open to consideration of progression of her sleeve to a duodenal switch.  I've advised to exhaust medication support options first before considering progression to a more malabsorptive procedure.    She has significant intertrigo/maceration that would benefit from Plastics Consult later this year once weight is stable.     Bariatric labs 11/4/24 showed some mild excess of vitamin D normal B12 thyroid A1c, lipids, hemogram but with some mild elevations persisting in ALT, improved from previous tests.  Her weight is down 157  lbs from introductory weight, a 39.3% total body weight reduction, well above average but with some residual Class II obesity, BMI of 37.9    Lena Quesada feels as if she hasn't quite yet achieved the goals she hoped to accomplish through bariatric surgery and weight loss.    No diagnosis found.      Current Outpatient Medications:     Calcium Citrate-Vitamin D (CALCIUM CITRATE + PO), Take 1 chew tab by mouth daily 600mg, Disp: , Rfl:     Cholecalciferol (VITAMIN D-3) 125 MCG (5000 UT) TABS, Take 1 tablet by mouth daily, Disp: , Rfl:     Cyanocobalamin 5000 MCG SUBL, Place 1 tablet under the tongue once a week, Disp: , Rfl:     nystatin (MYCOSTATIN) 562016 UNIT/GM external powder, Apply topically every 8 hours as needed (red skin)., Disp: 90 g, Rfl: 5    Pediatric Multivitamins-Iron (CHEWABLE BRADLEY/IRON CHILDRENS PO), Take 1 tablet by mouth daily, Disp: , Rfl:     sertraline (ZOLOFT) 25 MG tablet, Take 1 tablet (25 mg) by mouth daily., Disp: 90 tablet, Rfl: 0    sertraline (ZOLOFT) 50 MG tablet, Take one 50mg tab daily, Disp: 90 tablet, Rfl: 0    Semaglutide-Weight Management (WEGOVY) 2.4 MG/0.75ML pen, Inject 2.4 mg Subcutaneous every 7 days 4 pens (Patient not taking: Reported on 1/29/2025), Disp: 27 mL, Rfl: 3    Plan:    No follow-ups on file.    Bariatric Surgery Review     Interim History/LifeChanges:  last dose of Wegovy last week, wasn't as efficacious with her brain hunger control and weight has been neutral. She's interested in options and we discussed trial of Zepbound with consideration in the future to progressing her LSG to AVELINO if resistant Class II obesity remains.     Patient Concerns: weight control/appetite control.   Appetite (1-10): high, working from home.   GERD: none..    Reviewed whether any need/indication for screening EGD today and we will deferred.  Typically, a screening EGD is recommend post op year 2-3 if no symptoms to assess health of esophagus/bariatric surgery and sooner if  "difficult to control GERD or persistent pain/dysphagia sx despite behavior modification.    Medication changes: off Wegovy last week.    Vitamin Intake:                LABS: \"Reviewed  No vomiting or constipation on Wegovy. Rarely would use stool softner (used 1x).       Most recent labs:  Lab Results   Component Value Date    WBC 4.9 11/04/2024    HGB 13.6 11/04/2024    HCT 39.4 11/04/2024    MCV 89 11/04/2024     11/04/2024     Lab Results   Component Value Date    CHOL 150 11/04/2024     Lab Results   Component Value Date    HDL 70 11/04/2024     No components found for: \"LDLCALC\"  Lab Results   Component Value Date    TRIG 78 11/04/2024     No results found for: \"CHOLHDL\"  Lab Results   Component Value Date    ALT 67 (H) 11/04/2024    AST 40 11/04/2024    ALKPHOS 110 11/04/2024     No results found for: \"HGBA1C\"  Lab Results   Component Value Date    B12 636 11/04/2024     No components found for: \"VITDT1\"  Lab Results   Component Value Date    BHAKTI 177 (H) 03/03/2023     Lab Results   Component Value Date    PTHI 28 03/03/2023     Lab Results   Component Value Date    ZN 43.9 (L) 03/03/2023     Lab Results   Component Value Date    VIB1WB 181 (H) 03/03/2023     Lab Results   Component Value Date    TSH 2.22 11/04/2024     No results found for: \"TEST\"    Habits:    Fitness routine is daily: weights/walking at the gym. Would love to travel. Would like to run a race someday.     Social History     Social History     Socioeconomic History    Marital status: Single     Spouse name: Not on file    Number of children: Not on file    Years of education: Not on file    Highest education level: Not on file   Occupational History    Not on file   Tobacco Use    Smoking status: Never     Passive exposure: Never    Smokeless tobacco: Never   Vaping Use    Vaping status: Never Used   Substance and Sexual Activity    Alcohol use: Not Currently    Drug use: Never    Sexual activity: Yes     Partners: Male     Birth " control/protection: Condom   Other Topics Concern    Parent/sibling w/ CABG, MI or angioplasty before 65F 55M? Not Asked   Social History Narrative    Not on file     Social Drivers of Health     Financial Resource Strain: Low Risk  (11/4/2024)    Financial Resource Strain     Within the past 12 months, have you or your family members you live with been unable to get utilities (heat, electricity) when it was really needed?: No   Food Insecurity: Low Risk  (11/4/2024)    Food Insecurity     Within the past 12 months, did you worry that your food would run out before you got money to buy more?: No     Within the past 12 months, did the food you bought just not last and you didn t have money to get more?: No   Transportation Needs: Low Risk  (11/4/2024)    Transportation Needs     Within the past 12 months, has lack of transportation kept you from medical appointments, getting your medicines, non-medical meetings or appointments, work, or from getting things that you need?: No   Physical Activity: Sufficiently Active (11/4/2024)    Exercise Vital Sign     Days of Exercise per Week: 5 days     Minutes of Exercise per Session: 30 min   Stress: No Stress Concern Present (11/4/2024)    [employer] - Occupational Stress Questionnaire     Feeling of Stress : Only a little   Social Connections: Unknown (11/4/2024)    Social Connection and Isolation Panel [NHANES]     Frequency of Communication with Friends and Family: Not on file     Frequency of Social Gatherings with Friends and Family: Once a week     Attends Gnosticist Services: Not on file     Active Member of Clubs or Organizations: Not on file     Attends Club or Organization Meetings: Not on file     Marital Status: Not on file   Interpersonal Safety: Low Risk  (11/4/2024)    Interpersonal Safety     Do you feel physically and emotionally safe where you currently live?: Yes     Within the past 12 months, have you been hit, slapped, kicked  "or otherwise physically hurt by someone?: No     Within the past 12 months, have you been humiliated or emotionally abused in other ways by your partner or ex-partner?: No   Housing Stability: Low Risk  (11/4/2024)    Housing Stability     Do you have housing? : Yes     Are you worried about losing your housing?: No       Past Medical History     Past Medical History:   Diagnosis Date    Arthritis     chondromalacia of knee (right). injections.    Cholelithiasis     Chondromalacia of patellofemoral joint     Morbid obesity (H) 12/16/2020    Morbid obesity due to excess calories (H) 3/24/2022    Nexplanon in place  9/8/2022    Remove within 5 years, 9/2027     Past Surgical History:   Procedure Laterality Date    CHOLECYSTECTOMY  2005    LAPAROSCOPIC GASTRIC SLEEVE N/A 3/24/2022    Procedure: GASTRECTOMY, SLEEVE, LAPAROSCOPIC;  Surgeon: Bobby Rodriguez MD;  [facility]    TYMPANOPLASTY      WISDOM TOOTH EXTRACTION         Problem List     Patient Active Problem List   Diagnosis    Morbid obesity (H)    Chondromalacia of patellofemoral joint    Morbid obesity due to excess calories (H)    Nexplanon in place     Cervical high risk human papillomavirus (HPV) DNA test positive     Medications     [unfilled]  Surgical History     Past Surgical History  She has a past surgical history that includes Tympanoplasty; [place] Tooth Extraction; Cholecystectomy (2005); and Laparoscopic gastric sleeve (N/A, 3/24/2022).    Objective-Exam     Constitutional:  /82   Ht 1.702 m (5' 7\")   Wt 109.8 kg (242 lb)   BMI 37.90 kg/m    [unfilled]   General:  Pleasant and in no acute distress   Eyes:  EOMI  ENT:  Airway patent    Neck:  Respiratory: Normal respiratory effort, no cough, .  CV:  RRR  Gastrointestinal: nontender. Abdominal fold rash above pubis with maceration/raw/irritated skin and powder intact.   Musculoskeletal: muscle mass WNL  Skin: color without pallor, hair thick,   Psychiatric: alert and " oriented X3, mood and affect normal    Counseling     We reviewed the important post op bariatric recommendations:  -eating 3 meals daily  -eating protein first, getting >60gm protein daily  -eating slowly, chewing food well  -avoiding/limiting calorie containing beverages  -drinking water 15-30 minutes before or after meals  -limiting restaurant or cafeteria eating to twice a week or less    We discussed the importance of restorative sleep and stress management in maintaining a healthy weight.  We discussed the National Weight Control Registry healthy weight maintenance strategies and ways to optimize metabolism.  We discussed the importance of physical activity including cardiovascular and strength training in maintaining a healthier weight.    We discussed the importance of life-long vitamin supplementation and life-long  follow-up.    Lena was reminded that, to avoid marginal ulcers she should avoid tobacco at all, alcohol in excess, caffeine in excess, and NSAIDS (unless indicated for cardioprotection or othewise and opposed by a PPI).    Wai Anders MD    [facility].  2025  7:32 AM  [phone] (clinic phone)  [phone] (fax)    No images are attached to the encounter.  Medical Decision Makin minutes spent by me on the date of the encounter doing chart review, history and exam, documentation and further activities per the note

## 2025-01-29 NOTE — PATIENT INSTRUCTIONS
Plan:    1/ Great work with maintaining over 157 lbs of weight loss, over 39% of your body weight. 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     2. Recheck in 3-5 months to see how your Zepbound dose/response is going.    3. Zinc oxide ointment can help your maceration heal well and protect you better during exercise. Consulting with Plastic surgeon later this year would be reasonable.     4. Continue good vitamin use.         Zepbound/Mounjaro (Tirzepatide) is a very effective satiety boosting appetite suppressant that elevates satiety hormones GLP1 and GIP. It needs to be ramped up slowly to be tolerated adequately.  It helps release insulin in response to food when blood sugar runs higher than normal and is very helpful for diabetics in managing blood sugar levels with low risks for any low blood sugars.  About 1/10 people will not tolerate this medication. Each month, you move up to a higher dose until eventually reaching the 10mg/week dose if tolerated with further ramping to follow if needed. If intolerant or severe side effects, a dose decrease would be wise, so keep me posted if not tolerated the ramping well. This may be a longer term medication based on individual needs/physiology and appetite control.     Injections can be given after cleansing the skin with alcohol prep pad or swab (available OTC).     Stop Zepbound if severe abdominal pain/vomiting/rash/throat swelling or constant nausea that prevents adequate food/water intake. Stop 2-3 weeks prior to any planned general anesthesia surgeries to reduce risk for something called a post operative ileus.     Gallstones can occur in about 1% of patients on this medication so update me if increase right upper abdominal pain after eating.     Start meals with protein first,  separate beverages from meals by 20 minutes and work hard in between meals to get your 64-75 oz of water daily to reduce risks for severe constipation. Consider a fiber supplement like powdered psyllium husk in 12 oz water each night, stool softerners as needed and Miralax or milk of Magnesia if more than 3 days have passed without a Bowel Movement. Some other options include:  For Prevention and Treatment of Constipation when on Semaglutide     From least aggressive to most aggressive:     Move: Wallking is essential - the more we move, the more our bowels move  Water: Drink water - 64oz or more a day  Go when you need to go. Don't wait. The longer you wait, the harder it gets.  Fiber: Fruit, raw veggies, nuts, whole grains, prune each night, flax/iván seeds added into meals can all be helpful.   Stool Softeners: if constipation is mild and for maintenance  Gentle laxatives: Miralax, senokot, dulcolax , Smooth move tea as needed     More aggressive (and typically won't get to this point)  Milk of Magnesia  Mag Citrate (what you drink before a colonoscopy)  Suppositories  Enema      Check out "[employer]" for patient resources.  If you have weekends off, I recommend dosing Friday evenings.     Some people starve on this medication if not mindful about food intake. I recommend starting meals with the protein part of your meal first, chew thoroughly and separate beverages from meals by about 20 minutes to make sure you get your nourishment in first. Include vegetables/complex carbohydrates and unsaturated fat as part of your balanced diet but group these at the end of the meal, after your protein is mostly gone. Satiety will kick in too early if drinking too much with meals and under-nourishment can result.     It's not a bad idea to take a complete multivitamin most days of the week if using this medication. Lower iron content tends to be less constipating.     Adequate hydration is essential for feeling your best,  efficient fat burning, waste elimination and constipation prevention. For those without fluid restrictions due to other disease, the goal is at least one ounce of water per gram of protein consumed with a  minimum of 64oz/day goal.     Pancreatitis is a very rare but potentially serious side effect. Stop Zepbound if severe mid abdominal pain/burning in nature or if unable to eat/drink due to severe nausea/discomfort.   People with strong history of pancreatitis without clear cause should stay clear of this medication as should those planning to get pregnant, those with strong personal or family history for medullary thyroid cancer or Multiple Endocrine Neoplasia (rare).     Stop Zepbound at least 2 weeks prior to any planned surgery.  Stop until fully recovered if unexpected/emergent surgery is needed with anticipation that re-ramping will be needed if off longer than 14-16 days since last dose.    Kind Regards,  Wai Anders MD  [facility]        LEAN PROTEIN SOURCES  Getting 20-30 grams of protein, 3 meals daily, is appropriate for most people, some need more but more than about 40 grams per meal is not useful.  General rule is drinking one ounce of water per gram of protein eaten over the course of the day:  70 grams of protein each day, drink 70 oz of water.  Protein Source Portion Calories Grams of Protein                           Nonfat, plain Greek yogurt    (10 grams sugar or less) 3/4 cup (6 oz)  12-17   Light Yogurt (10 grams sugar or less) 3/4 cup (6 oz)  6-8   Protein Shake 1 shake 110-180 15-30   Skim/1% Milk or lactose-free milk 1 cup ( 8 oz)  8   Plain or light, flavored soymilk 1 cup  7-8   Plain or light, hemp milk 1 cup 110 6   Fat Free or 1% Cottage Cheese 1/2 cup 90 15   Part skim ricotta cheese 1/2 cup 100 14   Part skim or reduced fat cheese slices 1 ounce 65-80 8     Mozzarella String Cheese 1 80 8   Canned tuna, chicken, crab  or salmon  (canned in water)  1/2 cup 100 15-20   White fish (broiled, grilled, baked) 3 ounces 100 21   [place]/Tuna (broiled, grilled, baked) 3 ounces 150-180 21   Shrimp, Scallops, Lobster, Crab 3 ounces 100 21   Pork loin, Pork Tenderloin 3 ounces 150 21   Boneless, skinless chicken /turkey breast                          (broiled, grilled, baked) 3 ounces 120 21   [place], [place], [place], and Venison 3 ounces 120 21   Lean cuts of red meat and pork (sirloin,   round, tenderloin, flank, ground 93%-96%) 3 ounces 170 21   Lean or Extra Lean Ground Turkey 1/2 cup 150 20   90-95% Lean [place] Burger 1 phani 140-180 21   Low-fat casserole with lean meat 3/4 cup 200 17   Luncheon Meats                                                        (turkey, lean ham, roast beef, chicken) 3 ounces 100 21   Egg (boiled, poached, scrambled) 1 Egg 60 7   Egg Substitute 1/2 cup 70 10   Nuts (limit to 1 serving per day)  3 Tbsp. 150 7   Nut [place] (peanut, almond)  Limit to 1 serving or less daily 1 Tbsp. 90 4   Soy Burger (varies) 1  15   Garbanzo, Black, Pinon Beans 1/2 cup 110 7   Refried Beans 1/2 cup 100 7   Kidney and Lima beans 1/2 cup 110 7   Tempeh 3 oz 175 18   Vegan crumbles 1/2 cup 100 14   Tofu 1/2 cup 110 14   Chili (beans and extra lean beef or turkey) 1 cup 200 23   Lentil Stew/Soup 1 cup 150 12   Black Bean Soup 1 cup 175 12       Aiming for 80-85 grams of lean protein daily (max of 100g).     [facility]  Nutritional Guidelines  Gastric Sleeve 18 Months Post Op and Beyond    eneral Guidelines and Helpful Hints:  Eat 3 meals per day + protein supplement(s). No snacks between meals.  Do not skip meals.  This can cause overeating at the next meal and will prevent adequate protein and nutritional intake.  Aim for 60-80 grams of protein per day.  Always eat your protein first. This assists with optimal nutrition and helps you stay full longer.  Depending on your portion size, you may need to drink  approved protein supplement between meals to achieve protein goals. Follow recommendations of your Dietitian.   Eat your protein first, and then follow with fiber.   It is not necessary to count your fiber, but 15-25 grams per day is recommended.    Add fiber by including fruits, vegetables, whole grains, and beans.   Portions should remain about 1 cup per meal. Use measuring cups to be accurate.  Continue to use saucer/salad plates, infant/toddler silverware to keep portion sizes small and take small bites.  Eat S-L-O-W-L-Y to make each meal last 20-30 minutes. Always stop eating when satisfied.  Continue to use caution with foods containing skins, peels or membranes. Chew well!  Aim for 64 oz. of calorie-free fluids daily.  Continue to avoid caffeine and carbonation. If you choose to drink alcohol, do so in moderation.   Remember to avoid drinking during meals, 15-30 minutes before and 30 minutes after.  Exercise is dutta for continued weight loss and weight maintenance. 150 minutes weekly of moderate aerobic activity or 75 minutes of vigorous with 2 days or more a week of strength training. Try to get 20% or more of your steps each day at a brisk pace, as though hurrying to a bus stop. Look to get stronger this year.  If having trouble tolerating meat, try using a crock-pot, tinfoil tent, steamer or other moist cooking method to create tender meats. Add broth or low-fat gravy to help meat stay moist.   Avoid high sugar and high fat foods to prevent dumping syndrome.  Check nutrition labels for less than 10 grams of sugar and less than 10 grams of fat per serving.  Continue Taking Vitamins/Minerals:      Sample Grocery List    Protein:  Fat free Greek or light yogurt (less than 10 grams sugar)  Fat free or low-fat cottage cheese  String cheese or reduced fat cheese slices  Tuna, salmon, crab, egg, or chicken salad made with light or fat free mayonnaise  Egg or Egg Substitute  Lean/extra lean turkey, beef, bison,  venison (ground, sirloin, round, flank)  Pork loin or tenderloin (grilled, baked, broiled)  Fish such as salmon, tuna, trout, tilapia, etc. (grilled, baked, broiled)  Tender cuts of lean (skinless) turkey or chicken  Lean deli meats: turkey, lean ham, chicken, lean roast beef  Beans such as kidney, garbanzo, black, bahena, or low-fat/fat free refried beans  Peanut butter (natural preferred). Limit to 1 Tbsp. per day.  Low-fat meatloaf (made with lean ground beef or turkey)  Sloppy Joes made with low-sugar ketchup and lean ground beef or turkey  Soy or vegetable protein (i.e. vegan crumbles, soy/veggie burger, tofu)  Hummus    Vegetables:  Fresh: cooked or raw (as tolerated)  Frozen vegetables  Canned vegetables (low sodium or no salt added, rinse before cooking/eating)  (Ok to have skins/peels/membranes/seeds - just chew well)    Fruits:  Fresh fruit  Frozen fruit (no sugar added)  Canned fruit (packed in its own juice, NOT syrup)  (Ok to have skins/peels/membranes/seeds - just chew well)    Starch:  Unsweetened whole-grain hot cereal (or high fiber cold cereal, dry)  Toasted whole wheat bread or [place] Thins  Whole grain crackers  Baked /boiled/mashed potato/sweet potato  Cooked whole grain pasta, brown rice, or other cooked whole grains  Starchy vegetables: corn, peas, winter squash    Protein Supplement:   Ready to drink protein shake with:  15-30 grams protein per serving  Less than 10 grams total carbohydrate per serving   Protein powder mixed with:   Skim or 1% milk  Low fat or fat free Lactaid milk, plain or no sugar added soymilk  Water     Fats: (use in moderation)  1 teaspoon of soft tub margarine  1 teaspoon olive oil, canola oil, or peanut oil  1 tablespoon of low-fat [place]vey or salad dressing     Sample Menu for 18+ months after Gastric Sleeve    You do NOT need to eat/drink the full portion sizes listed below  Always stop when you are satisfied    Breakfast   cup 1% cottage cheese     cup mixed berries    Lunch 2 oz lean roast beef on   [place] Thin with 1 tsp. light harvey    small tomato, chopped, mixed with 1 tsp. light vinaigrette dressing   Supplement Approved protein supplement (if needed between meals)   Dinner 2 oz grilled salmon    cup salad greens with 1 tsp. light salad dressing and 1 tsp. ground flax seed    cup quinoa or brown rice     Breakfast   cup egg substitute with   cup sautéed chopped vegetables  2 light [place] Krisp crackers   Lunch Tuna Melt:   cup tuna mixed with 1 tsp. light harvey over   [place] Thin. Top with 2-3 slices cucumber and 1 oz slice of low fat cheese   Supplement 1 cup skim milk (if needed between meals)   Dinner 3 oz  grilled, broiled, or baked seasoned skinless chicken breast    cup asparagus     Breakfast   cup plain oatmeal made with skim or 1% milk with 1 Tbsp. flavored/unflavored protein powder added  1 mozzarella string cheese   Lunch 2 oz deli turkey breast  1/3 cup salad with 1 tsp. light salad dressing, 1/8 of a whole avocado and 1 Tbsp. sunflower seeds   Dinner 3 oz. pork loin made in a crock pot, seasoned with a spice rub    cup cooked carrots   Supplement Approved protein supplement (if needed between meals)     Breakfast 1 cup breakfast casserole made with egg substitute, turkey sausage,  and steamed, chopped bell peppers   Supplement  1 cup light Greek yogurt (if needed between meals)   Lunch 2 oz. teriyaki turkey    cup mashed sweet potato with 1-2 spritzes of spray butter (like [place])    cup fresh pineapple   Dinner 3 oz low fat meatloaf    cup roasted garlic zucchini     Breakfast   cup leftover breakfast casserole    cup no sugar added applesauce with 1 Tbsp. unflavored protein powder and a sprinkle of cinnamon    Lunch 3 oz shrimp with 1-2 Tbsp. low-sugar cocktail sauce for dipping    c. whole wheat pasta drizzled with   tsp. olive oil   Supplement 1 cup skim/1% milk with scoop of protein powder (if needed between meals)   Dinner Grilled, seasoned kebob with 2  oz lean beef and   cup vegetables     Breakfast Breakfast pizza:   [place] Thin spread with 1 Tbsp. low sugar spaghetti sauce,   cup shredded low fat cheese, melted and 1 slice of [place] blakely     cup fresh fruit mixed with chopped almonds   Lunch   cup black bean soup  4-5 whole grain crackers   Dinner 3 oz  tilapia with lemon pepper seasoning    cup stewed tomatoes   Supplement 1 string cheese (if needed between meals)     Breakfast 2 hard boiled eggs (discard 1 egg yolk)    whole wheat English Muffin with 1 tsp. low sugar jelly   Lunch   cup leftover black bean soup topped with 1-2 Tbsp. low fat cheese  2-3 light [place] Krisp crackers   Supplement Approved protein supplement (if needed between meals)   Dinner 3 oz sirloin steak    cup steamed broccoli

## 2025-02-10 ENCOUNTER — VIRTUAL VISIT (OUTPATIENT)
Dept: PSYCHIATRY | Facility: CLINIC | Age: 40
End: 2025-02-10
Attending: PSYCHOLOGIST
Payer: COMMERCIAL

## 2025-02-10 DIAGNOSIS — F41.1 GENERALIZED ANXIETY DISORDER: Primary | ICD-10-CM

## 2025-02-10 NOTE — PROGRESS NOTES
"Video- Visit Details  Type of service:  video visit for mental health treatment  Time of service:  Date:  02/10/2025  Video Start Time: 4:01    Video End Time:  4:52    Reason for video visit: COVID-19   Originating Site (patient location): Patient's home  Distant Site (provider location):  On-Site - [facility]  Mode of Communication:  Video Conference via [employer]    As the provider I attest to compliance with applicable laws and regulations related to telemedicine.    OUTPATIENT PSYCHOTHERAPY PROGRESS NOTE    Client Name: Lena Quesada   YOB: 1985 (39 year old)   Date of Service: 02/10/2025  Service Type(s): PA PSYCHOTHERAPY W PATIENT 38-52 MINUTES [[id number]]     Diagnoses: DAVID    Individuals Present:   Client attended alone    Subjective:  -Started working for [employer], has fun doing it, nice making money.   -Drove in the snowstorm without any anxiety  -Feeling content with life right now, doesn't feel like she needs to lose weight or find someone in order to be happy    -No longer talking to Pooja. Went to dinner with all of the other girls he has been seeing at the same time. Upset about lack of communication.Afterwards, he apologized and they haven't really talked since.  -Blaze, \"no emotion behind it\"  -Endorsed feeling \"bummed\" about losing a friend and \"exhausted\" by dating pool. Feels in some way \"screwed over,\" but knows that's not what actually happened.   -Nice to not put pressure on herself to find someone.     -Enjoying [employer] group, going every week.   -Officially joining [employer] soon    -Somewhat worried about EO's and job security. Ryan funds approved through 2027, possibly 4-5 years beyond that    -Has been thinking about ramping down therapy, doesn't want to stop entirely yet. Feels like she's grown a lot and has many skills to carry forward with her.     Treatment:   Psychodynamic Psychotherapy  - Enhance insight via self-examination  - Explore emotions, especially " contradicting/confusing ones  - Identify meaningful past experiences  - Challenge avoidance and resistance  - Encourage nuanced understanding of interpersonal relationships    Psychotherapy services during this visit included myself and the patient. Discussed case with supervisor who also agreed with the treatment plan. Unable to sign in person due to visit being conducted through telehealth due to COVID-19.     Assessment and Progress:   Lena did very well with a course of CBT, and is now doing well further exploring her thoughts/behaviors/feelings with psychodynamic psychotherapy. Key topics are weight, self-esteem/confidence, trauma, and anxiety. Today we discussed recent social and romantic life, reflected on progress we've made so far, and discussed what therapy may look like going forward. Lena agrees to start tapering therapy to bi-weekly.     Plan:   -Next therapy appointment has been scheduled for 2 weeks. No safety concerns.    Psychotherapy services during this visit included myself and the patient. Patient agrees with treatment plan. Discussed case with supervisor who also agreed with the treatment plan. Unable to sign in person due to visit being conducted through telehealth.     Diana Genao MD  PGY-4 Psychiatry Resident

## 2025-02-26 ENCOUNTER — VIRTUAL VISIT (OUTPATIENT)
Dept: PSYCHIATRY | Facility: CLINIC | Age: 40
End: 2025-02-26
Attending: NURSE PRACTITIONER
Payer: COMMERCIAL

## 2025-02-26 VITALS — WEIGHT: 240 LBS | BODY MASS INDEX: 37.59 KG/M2

## 2025-02-26 DIAGNOSIS — F41.9 ANXIETY: ICD-10-CM

## 2025-02-26 RX ORDER — SERTRALINE HYDROCHLORIDE 25 MG/1
25 TABLET, FILM COATED ORAL DAILY
Qty: 90 TABLET | Refills: 0 | Status: SHIPPED | OUTPATIENT
Start: 2025-02-26

## 2025-02-26 ASSESSMENT — PAIN SCALES - GENERAL: PAINLEVEL_OUTOF10: NO PAIN (0)

## 2025-02-26 NOTE — PATIENT INSTRUCTIONS
**For crisis resources, please see the information at the end of this document**   Patient Education    Thank you for coming to the [facility].     Lab Testing:  If you had lab testing today and your results are reassuring or normal they will be mailed to you or sent through [employer] within 7 days. If the lab tests need quick action we will call you with the results. The phone number we will call with results is # [phone]. If this is not the best number please call our clinic and change the number.     Medication Refills:  If you need any refills please call your pharmacy and they will contact us. Our fax number for refills is [phone].   Three business days of notice are needed for general medication refill requests.   Five business days of notice are needed for controlled substance refill requests.   If you need to change to a different pharmacy, please contact the new pharmacy directly. The new pharmacy will help you get your medications transferred.     Contact Us:  Please call [phone] during business hours (8-5:00 M-F).   If you have medication related questions after clinic hours, or on the weekend, please call [phone].     Financial Assistance [phone]   Medical Records [phone]       MENTAL HEALTH CRISIS RESOURCES:  For a emergency help, please call 911 or go to the nearest Emergency Department.     Emergency Walk-In Options:   EmPATH Unit @ [facility] ([place]): [phone] - Specialized mental health emergency area designed to be calming  [facility] ([place]): [phone]  [facility] ([place]): [phone]  [facility]): [phone]    [facility] Crisis Information:   [place]: [phone]  [place]: [phone]  [place] ([place]) - Adult: [phone]     Child: [phone]  [place] - Adult: [phone]     Child: [phone]  [place]:  [phone]  List of all [facility] resources:   https://[place].gov/dhs/people-we-serve/adults/health-care/mental-health/resources/crisis-contacts.jsp    National Crisis Information:   Crisis Text Line: Text  [place]  to 960596  Suicide & Crisis Lifeline: 988  National Suicide Prevention Lifeline: 9-701-024-TALK (1-929.773.6793)       For online chat options, visit https://suicidepreventionlifeline.org/chat/  Poison Control Center: 5-092-484-1462  Trans Lifeline: 5-141-580-3697 - Hotline for transgender people of all ages  [employer]: 6-620-567-9566 - Hotline for LGBT youth     For Non-Emergency Support:   Fast Tracker: Mental Health & Substance Use Disorder Resources -   https://www.[employer].org/

## 2025-02-26 NOTE — PROGRESS NOTES
"Virtual Visit Details    Type of service:  Video Visit     Originating Location (pt. Location): Home  Distant Location (provider location):  On-site  Platform used for Video Visit: [facility]  Psychiatry Clinic    PSYCHIATRIC PROGRESS NOTE       Lena Quesada is a 39 year old female who prefers the name Lena and pronouns she, her.  Therapist: weekly with Dr. Genao  PCP: Jazz Abbott  Other Providers: None    Pertinent Background: Describes her childhood as steady, stable. Onset of bullying then anxiety then depression in middle school. G[place] \"[place] biggest girl, I always asked myself who's going to say something today?\". Her ability to cope with judgment from her peers improved in high school. [place] more free to be herself in college. Bariatric surgery in March 2022.      Psych critical item history includes trauma history (bullying, social exclusion), emotional eating, body dysmorphia before and after bariatric surgery.     Interim History      The patient is a good historian and reports good treatment adherence.    Last seen on 12/09/2024 when she chose to trial increasing sertraline from 50mg to 75mg daily.    Describes her mood as good.   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, Disability, Services Admin), her boss asks her with the caveat that she can say no but is struggling  - doing better with her colleagues, she had strong communication with a difficult colleague, less resistance from her   - good support from her supervisor and manager     - 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, even drove through a recent " snowstorm  - she paid off her car and putting money in savings, going to a casino occasionally for fund    - starts fall 2026 to finish 5 remaining classes for her [employer] Master's  - working on friendships, relationships in therapy    - joining her [employer] officially after serving in their choir  - seeking a new [employer] do class    - active with exercise, her [employer] choir  - they'll travel to sing at [employer] in [place] [facility] in June 2025  - coping skills include taking a moment, breathing, consider her response vs reacting, making a plan  - support from her parents  - enjoys watching sports, seeing [employer] games in person, her two nieces and one nephew, two friends, watching shows                 .   RECENT PSYCH ROS:   Depression: she denies significant depression symptoms  Anxiety: using therapy skills effectively, her leg is bouncing less  - coping well with winter driving    ADVERSE SIDE EFFECTS: low libido  MEDICAL CONCERNS: taking estrogen, continues nexplanon (progesterone only) implanted in May 2022; followed bariatric medicine, considers panniculectomy     APPETITE: with Wegovy, 240# at home today  - bariatric surgery in Mar 2022, she'd like to lose to 200#      SLEEP: with melatonin TR, follows evening routine by 8p (showers, reads before bed), sleeping 8-9 restless hours, vivid dreams continue which she does not attribute to sertraline       RECENT SUBSTANCE USE:     Alcohol- limits alcohol  Caffeine- avoids, dislikes coffee, rare soda, prefers water    Social/ Family History                   FINANCIAL SUPPORT- 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 Born to  parents. One brother Sidney (b. 1980). Graduated with BS in physical education (K-12) with  coaching minor from [employer]. Enrolled in Masters classes in business at [employer].     CULTURAL/ SOCIAL/ SPIRITUAL SUPPORT- support from her parents, identifies as Judaism who has questions        TRAUMA HISTORY- extensive bullying  FEELS SAFE AT HOME- Yes  FAMILY MENTAL HEALTH HISTORY- PGM- anxiety, depression    Medical / Surgical History                                 Patient Active Problem List   Diagnosis    Morbid obesity (H)    Chondromalacia of patellofemoral joint    Morbid obesity due to excess calories (H)    Nexplanon in place     Cervical high risk human papillomavirus (HPV) DNA test positive       Past Surgical History:   Procedure Laterality Date    CHOLECYSTECTOMY  2005    LAPAROSCOPIC GASTRIC SLEEVE N/A 3/24/2022    Procedure: GASTRECTOMY, SLEEVE, LAPAROSCOPIC;  Surgeon: Bobby Rodriguez MD;  Location: [facility]    TYMPANOPLASTY      WISDOM TOOTH EXTRACTION        Medical Review of Systems              A comprehensive review of systems was performed and is negative other than noted in the HPI.    Pregnant or breastfeeding- no      Contraception- nexplanon    Denies TBI/LOC, seizures.    Allergy    Patient has no known allergies.  Current Medications        Current Outpatient Medications   Medication Sig Dispense Refill    Calcium Citrate-Vitamin D (CALCIUM CITRATE + PO) Take 1 chew tab by mouth daily 600mg      Cholecalciferol (VITAMIN D-3) 125 MCG (5000 UT) TABS Take 1 tablet by mouth daily      Cyanocobalamin 5000 MCG SUBL Place 1 tablet under the tongue once a week      nystatin (MYCOSTATIN) 185030 UNIT/GM external powder Apply topically every 8 hours as needed (red skin). 90 g 5    Pediatric Multivitamins-Iron (CHEWABLE BRADLEY/IRON CHILDRENS PO) Take 1 tablet by mouth daily      sertraline (ZOLOFT) 25 MG tablet Take 1 tablet (25 mg) by mouth daily. 90 tablet 0    sertraline (ZOLOFT) 50 MG tablet Take one 50mg tab daily 90 tablet 0    [START ON 5/1/2025] tirzepatide-Weight Management  (ZEPBOUND) 10 MG/0.5ML prefilled pen Inject 0.5 mLs (10 mg) subcutaneously every 7 days. 6 mL 1    tirzepatide-Weight Management (ZEPBOUND) 2.5 MG/0.5ML prefilled pen Inject 0.5 mLs (2.5 mg) subcutaneously every 7 days. 2 mL 0    [START ON 3/6/2025] tirzepatide-Weight Management (ZEPBOUND) 5 MG/0.5ML prefilled pen Inject 0.5 mLs (5 mg) subcutaneously every 7 days. 2 mL 0    [START ON 4/3/2025] tirzepatide-Weight Management (ZEPBOUND) 7.5 MG/0.5ML prefilled pen Inject 0.5 mLs (7.5 mg) subcutaneously every 7 days. 2 mL 0     Vitals          Wt 108.9 kg (240 lb)   BMI 37.59 kg/m       Pulse Readings from Last 5 Encounters:   11/04/24 78   07/30/24 65   10/24/23 81   09/08/22 77   03/25/22 75     Wt Readings from Last 5 Encounters:   02/26/25 108.9 kg (240 lb)   01/29/25 109.8 kg (242 lb)   11/26/24 106.6 kg (235 lb)   11/04/24 109.8 kg (242 lb)   10/14/24 107 kg (236 lb)     BP Readings from Last 5 Encounters:   01/29/25 118/82   11/04/24 116/80   07/30/24 115/79   10/24/23 125/85   09/08/22 (!) 157/89     Mental Status Exam          Alertness: alert  and oriented  Appearance: adequately groomed  Behavior/Demeanor: cooperative, pleasant and calm, with good  eye contact   Speech: normal and regular rate and rhythm  Language: no problems  Psychomotor: normal or unremarkable  Mood: description consistent with euthymia  Affect: full range and appropriate; was congruent to mood; was congruent to content  Thought Process/Associations: unremarkable  Thought Content:  Reports none;  Denies suicidal ideation, violent ideation, delusions, preoccupations, obsessions , phobia , magical thinking, over-valued ideas and paranoid ideation  Perception:  Reports none;  Denies auditory hallucinations, visual hallucinations, visual distortion seen as shadows , depersonalization and derealization  Insight: fair  Judgment: good  Cognition: does  appear grossly intact; formal cognitive testing was not done  Gait/Station and/or Muscle  Strength/Tone:  N/A    Labs and Data                          Rating Scales:    N/A    PHQ9 Today:        10/24/2023     4:55 PM 11/4/2024     7:29 AM 11/4/2024     8:49 AM   PHQ   PHQ-9 Total Score 1 0  0   Q9: Thoughts of better off dead/self-harm past 2 weeks Not at all Not at all Not at all       Patient-reported     Diagnosis      mild MDD, adjustment disorder with anxiety     Assessment          TODAY, the following items were reviewed:    : 3/2023    PSYCHOTROPIC DRUG INTERACTIONS:  - none with sertraline, Wegovy, Nexplanon, Prilosec     MANAGEMENT:  Monitoring for adverse effects, routine vitals, using lowest therapeutic dose of [psychotropics] and patient is aware of risks    Plan                                                                                                                   1) she chooses to continue sertraline 75mg daily  2) active in therapy with Dr. Genao    RTC: 12 weeks, sooner as needed    Level of Medical Decision Making:   - At least 1 chronic problem that is not stable  - Engaged in prescription drug management during visit (discussed any medication benefits, side effects, alternatives, etc.)     The longitudinal plan of care for the diagnosis(es)/condition(s) as documented were addressed during this visit. Due to the added complexity in care, I will continue to support Lena in the subsequent management and with ongoing continuity of care.    CRISIS NUMBERS:   Provided routinely in AVS.    Treatment Risk Statement:  The patient understands the risks, benefits, adverse effects and alternatives. Agrees to treatment with the capacity to do so. No medical contraindications to treatment. Agrees to call clinic for any problems. The patient understands to call 911 or go to the nearest ED if life threatening or urgent symptoms occur.     WHODAS 2.0  TODAY total score = N/A; [a 12-item WHODAS 2.0 assessment was not completed by the pt today and/or recorded in EPIC].    PROVIDER:   CATERINA Matos CNP
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
## 2025-03-31 ENCOUNTER — VIRTUAL VISIT (OUTPATIENT)
Dept: PSYCHIATRY | Facility: CLINIC | Age: 40
End: 2025-03-31
Attending: PSYCHOLOGIST
Payer: COMMERCIAL

## 2025-03-31 DIAGNOSIS — F41.1 GENERALIZED ANXIETY DISORDER: Primary | ICD-10-CM
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
## 2025-05-12 ENCOUNTER — VIRTUAL VISIT (OUTPATIENT)
Dept: PSYCHIATRY | Facility: CLINIC | Age: 40
End: 2025-05-12
Attending: PSYCHOLOGIST
Payer: COMMERCIAL

## 2025-05-12 DIAGNOSIS — F41.1 GENERALIZED ANXIETY DISORDER: Primary | ICD-10-CM
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
## 2025-05-27 ENCOUNTER — VIRTUAL VISIT (OUTPATIENT)
Dept: PSYCHIATRY | Facility: CLINIC | Age: 40
End: 2025-05-27
Attending: NURSE PRACTITIONER
Payer: COMMERCIAL

## 2025-05-27 DIAGNOSIS — F41.9 ANXIETY: ICD-10-CM

## 2025-05-27 PROCEDURE — G2211 COMPLEX E/M VISIT ADD ON: HCPCS | Mod: 95 | Performed by: NURSE PRACTITIONER

## 2025-05-27 PROCEDURE — 98006 SYNCH AUDIO-VIDEO EST MOD 30: CPT | Performed by: NURSE PRACTITIONER

## 2025-05-27 RX ORDER — SERTRALINE HYDROCHLORIDE 25 MG/1
25 TABLET, FILM COATED ORAL DAILY
Qty: 90 TABLET | Refills: 0 | Status: SHIPPED | OUTPATIENT
Start: 2025-05-27

## 2025-05-27 ASSESSMENT — PAIN SCALES - GENERAL: PAINLEVEL_OUTOF10: NO PAIN (0)
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
## 2025-05-27 NOTE — NURSING NOTE
Current patient location: [address]   [facility] [address]    Is the patient currently in the state of [place]? YES    Visit mode: VIDEO    If the visit is dropped, the patient can be reconnected by:VIDEO VISIT: Text to cell phone:   Telephone Information:   Mobile [phone]       Will anyone else be joining the visit? NO  (If patient encounters technical issues they should call [phone]    Are changes needed to the allergy or medication list? No    Are refills needed on medications prescribed by this physician? YES    Rooming Documentation:  Questionnaire(s) completed    Reason for visit: RECHECK    Agata ARVIZUF

## 2025-05-27 NOTE — PATIENT INSTRUCTIONS
**For crisis resources, please see the information at the end of this document**   Patient Education    Thank you for coming to the [facility] [place] [facility].     Lab Testing:  If you had lab testing today and your results are reassuring or normal they will be mailed to you or sent through [employer] within 7 days. If the lab tests need quick action we will call you with the results. The phone number we will call with results is # [phone]. If this is not the best number please call our clinic and change the number.     Medication Refills:  If you need any refills please call your pharmacy and they will contact us. Our fax number for refills is [phone].   Three business days of notice are needed for general medication refill requests.   Five business days of notice are needed for controlled substance refill requests.   If you need to change to a different pharmacy, please contact the new pharmacy directly. The new pharmacy will help you get your medications transferred.     Contact Us:  Please call [phone] during business hours (8-5:00 M-F).   If you have medication related questions after clinic hours, or on the weekend, please call [phone].     Financial Assistance [phone]   Medical Records [phone]       MENTAL HEALTH CRISIS RESOURCES:  For a emergency help, please call 911 or go to the nearest Emergency Department.     Emergency Walk-In Options:   [facility] Unit @ [facility] ([place]): [phone] - Specialized mental health emergency area designed to be calming  [facility] ([place]): [phone]  [facility] ([place]): [phone]  [facility]): [phone]    [facility] Crisis Information:   [place]: [phone]  [place]: [phone]  [place] ([place]) - Adult: [phone]     Child: [phone]  [place] - Adult: [phone]     Child: [phone]  [place]:  [phone]  List of all [facility] resources:   https://[place].gov/dhs/people-we-serve/adults/health-care/mental-health/resources/crisis-contacts.jsp    National Crisis Information:   Crisis Text Line: Text  [place]  to 860734  Suicide & Crisis Lifeline: 988  National Suicide Prevention Lifeline: 0-706-700-TALK (1-353.940.4807)       For online chat options, visit https://suicidepreventionlifeline.org/chat/  Poison Control Center: 2-551-448-0403  Trans Lifeline: 8-490-115-0180 - Hotline for transgender people of all ages  The [employer] Project: 6-609-831-2920 - Hotline for LGBT youth     For Non-Emergency Support:   Fast Tracker: Mental Health & Substance Use Disorder Resources -   https://www.ApplyInc.comckmygolan.org/

## 2025-06-24 NOTE — PROGRESS NOTES
"Bariatric Follow Up Visit with a History of Previous Bariatric Surgery     Date of visit: 6/24/2025  Physician: Wai Anders MD, MD  Primary Care Provider:  Jazz Abbott  Lena Quesada   39 year old  female    Date of Surgery: 3/24/22  Initial Weight: 399 lbs  Initial BMI: 62.5 with fatty liver disease Prediabetes (A1c 6.4% 2021)  Today's Weight:   Wt Readings from Last 1 Encounters:   06/25/25 105 kg (231 lb 6.4 oz)     Weight history:   Wt Readings from Last 4 Encounters:   06/25/25 105 kg (231 lb 6.4 oz)   01/29/25 109.8 kg (242 lb)   11/26/24 106.6 kg (235 lb)   11/04/24 109.8 kg (242 lb)    169 lbs down with good stability in the last 18 months, a 32.4% total body weight reduction and 16 BMI point reduction. Down another 9 lbs since transition to Zepbound last visit.   Body mass index is 36.24 kg/m .      Assessment and Plan     Assessment: Lena is a 39 year old year old female who is just over 3 years s/p  Sleeve Gastrectomy with Dr. Rodriguez.   In the interim she'd had good weight reduction but persisting Class II obesity for which we'd initiated Wegovy therapy in 2023.  She had an excellent additional weight loss as of 41 lbs as of our 11/09/23 visit, down to 235 lbs. Today, 1/29/25, she  is off her Wegovy as of a week ago and finds her weight hit plateau last year at this dose and is interested in further weight reduction for treatment of her resistant Class II obesity. She was transitioned to Zepbound at our 1/29/25 visit at 242 lbs.a  I've advised her to exhaust medication support options first before considering progression to a more malabsorptive procedure.     She's been having significant intertrigo/maceration that prompted a Plastics consultation at our 1/29/25 visit and in the interim she's waiting to save up for consult. Using compression/anti chaffing products as best she can. Gets a \"lot of giggling\" when moving that rubs/chaffs abdominal pannus/buttocks.     Bariatric labs " 11/4/24 showed good support, normal thyroid function and excellent A1c of 4.9%. LFTs improving from previous year with mild residual ALT increase (hx of NAFLD).  We'll recheck labs at our Fall visit.   .      Lena Quesada feels as if she has achieved the goals she hoped to accomplish through bariatric surgery and weight loss.    Encounter Diagnosis   Name Primary?    Anxiety          Current Outpatient Medications:     Calcium Citrate-Vitamin D (CALCIUM CITRATE + PO), Take 1 chew tab by mouth daily 600mg, Disp: , Rfl:     Cholecalciferol (VITAMIN D-3) 125 MCG (5000 UT) TABS, Take 1 tablet by mouth daily, Disp: , Rfl:     Cyanocobalamin 5000 MCG SUBL, Place 1 tablet under the tongue once a week, Disp: , Rfl:     nystatin (MYCOSTATIN) 029784 UNIT/GM external powder, Apply topically every 8 hours as needed (red skin)., Disp: 90 g, Rfl: 5    Pediatric Multivitamins-Iron (CHEWABLE BRADLEY/IRON CHILDRENS PO), Take 1 tablet by mouth daily, Disp: , Rfl:     sertraline (ZOLOFT) 25 MG tablet, Take 1 tablet (25 mg) by mouth daily. (Patient taking differently: Take 75 mg by mouth daily.), Disp: 90 tablet, Rfl: 0    sertraline (ZOLOFT) 50 MG tablet, Take one 50mg tab daily (Patient taking differently: 75 mg. Take one 50mg tab daily), Disp: 90 tablet, Rfl: 0    tirzepatide-Weight Management (ZEPBOUND) 10 MG/0.5ML prefilled pen, Inject 0.5 mLs (10 mg) subcutaneously every 7 days., Disp: 6 mL, Rfl: 1    tirzepatide-Weight Management (ZEPBOUND) 7.5 MG/0.5ML prefilled pen, Inject 0.5 mLs (7.5 mg) subcutaneously every 7 days. (Patient not taking: Reported on 6/25/2025), Disp: 2 mL, Rfl: 0    Plan:  Continue mindful diet and hitting 75-85 grams/day of lean protein to nourish your fat free mass well given your workout regimen.  Max of 100g/day protein depending on amount of strength work.     2. Continue good hydration through the day,  beverages from meals.     3. Zepbound 10mg/week going well and we'll maintain use long  "term.     4. Recheck in 6 months when we'll do all bariatric labs.     5. Fitness is top notch, continue couch to 5k training (compression/body glide for avoiding maceration/chaffing) for your August 5k. Gym workouts are excellent at 4-5 sessions weekly.     No follow-ups on file.    Bariatric Surgery Review     Interim History/LifeChanges: no ill effects of the Zepbound. Doing well at 10mg/week and we'll plan to stay on long term therapy at this dose. Getting protein in well. Vitamins going well. Feels much improved and on Zepbound her \"brain hunger\" is much improved.     Patient Concerns: none  Appetite (1-10): controlled well w/ meds  GERD: none. .    Reviewed whether any need/indication for screening EGD today and we will deferred. .  Typically, a screening EGD is recommend post op year 2-3 if no symptoms to assess health of esophagus/bariatric surgery and sooner if difficult to control GERD or persistent pain/dysphagia sx despite behavior modification.    Medication changes: none.    Vitamin Intake:   Continues to use. Chew tabs Flinstones (2 daily). Once weekly B12. Unsure vitamin D.              LABS: will be due at our next visit.    Most recent labs:  Lab Results   Component Value Date    WBC 4.9 11/04/2024    HGB 13.6 11/04/2024    HCT 39.4 11/04/2024    MCV 89 11/04/2024     11/04/2024     Lab Results   Component Value Date    CHOL 150 11/04/2024     Lab Results   Component Value Date    HDL 70 11/04/2024     No components found for: \"LDLCALC\"  Lab Results   Component Value Date    TRIG 78 11/04/2024     No results found for: \"CHOLHDL\"  Lab Results   Component Value Date    ALT 67 (H) 11/04/2024    AST 40 11/04/2024    ALKPHOS 110 11/04/2024     No results found for: \"HGBA1C\"  Lab Results   Component Value Date    B12 636 11/04/2024     No components found for: \"VITDT1\"  Lab Results   Component Value Date    BHAKTI 177 (H) 03/03/2023     Lab Results   Component Value Date    PTHI 28 03/03/2023     Lab " "Results   Component Value Date    ZN 43.9 (L) 03/03/2023     Lab Results   Component Value Date    VIB1WB 181 (H) 03/03/2023     Lab Results   Component Value Date    TSH 2.22 11/04/2024     No results found for: \"TEST\"    Habits:  Gym 5-6 times weekly with strength and aerobic workouts.  Works for [employer] has day in the [employer] events in August, softball tourney and 5k and planning to do 5k.     Social History     Social History     Socioeconomic History    Marital status: Single     Spouse name: Not on file    Number of children: Not on file    Years of education: Not on file    Highest education level: Not on file   Occupational History    Not on file   Tobacco Use    Smoking status: Never     Passive exposure: Never    Smokeless tobacco: Never   Vaping Use    Vaping status: Never Used   Substance and Sexual Activity    Alcohol use: Not Currently    Drug use: Never    Sexual activity: Yes     Partners: Male     Birth control/protection: Condom   Other Topics Concern    Parent/sibling w/ CABG, MI or angioplasty before 65F 55M? Not Asked   Social History Narrative    Not on file     Social Drivers of Health     Financial Resource Strain: Low Risk  (11/4/2024)    Financial Resource Strain     Within the past 12 months, have you or your family members you live with been unable to get utilities (heat, electricity) when it was really needed?: No   Food Insecurity: Low Risk  (11/4/2024)    Food Insecurity     Within the past 12 months, did you worry that your food would run out before you got money to buy more?: No     Within the past 12 months, did the food you bought just not last and you didn t have money to get more?: No   Transportation Needs: Low Risk  (11/4/2024)    Transportation Needs     Within the past 12 months, has lack of transportation kept you from medical appointments, getting your medicines, non-medical meetings or appointments, work, or from getting things that you need?: No   Physical Activity: Sufficiently " Active (11/4/2024)    Exercise Vital Sign     Days of Exercise per Week: 5 days     Minutes of Exercise per Session: 30 min   Stress: No Stress Concern Present (11/4/2024)    [employer] - Occupational Stress Questionnaire     Feeling of Stress : Only a little   Social Connections: Unknown (11/4/2024)    Social Connection and Isolation Panel [NHANES]     Frequency of Communication with Friends and Family: Not on file     Frequency of Social Gatherings with Friends and Family: Once a week     Attends Hindu Services: Not on file     Active Member of Clubs or Organizations: Not on file     Attends Club or Organization Meetings: Not on file     Marital Status: Not on file   Interpersonal Safety: Low Risk  (11/4/2024)    Interpersonal Safety     Do you feel physically and emotionally safe where you currently live?: Yes     Within the past 12 months, have you been hit, slapped, kicked or otherwise physically hurt by someone?: No     Within the past 12 months, have you been humiliated or emotionally abused in other ways by your partner or ex-partner?: No   Housing Stability: Low Risk  (11/4/2024)    Housing Stability     Do you have housing? : Yes     Are you worried about losing your housing?: No       Past Medical History     Past Medical History:   Diagnosis Date    Arthritis     chondromalacia of knee (right). injections.    Cholelithiasis     Chondromalacia of patellofemoral joint     Morbid obesity (H) 12/16/2020    Morbid obesity due to excess calories (H) 3/24/2022    Nexplanon in place  9/8/2022    Remove within 5 years, 9/2027     Past Surgical History:   Procedure Laterality Date    CHOLECYSTECTOMY  2005    LAPAROSCOPIC GASTRIC SLEEVE N/A 3/24/2022    Procedure: GASTRECTOMY, SLEEVE, LAPAROSCOPIC;  Surgeon: Bobby Rodriguez MD;  Location: [facility] [place]    TYMPANOPLASTY      WISDOM TOOTH EXTRACTION         Problem List     Patient Active Problem List   Diagnosis    Morbid  "obesity (H)    Chondromalacia of patellofemoral joint    Morbid obesity due to excess calories (H)    Nexplanon in place     Cervical high risk human papillomavirus (HPV) DNA test positive     Medications     [unfilled]  Surgical History     Past Surgical History  She has a past surgical history that includes Tympanoplasty; [place] Tooth Extraction; Cholecystectomy (); and Laparoscopic gastric sleeve (N/A, 3/24/2022).    Objective-Exam     Constitutional:  /64 (BP Location: Right arm, Patient Position: Sitting)   Ht 1.702 m (5' 7\")   Wt 105 kg (231 lb 6.4 oz)   BMI 36.24 kg/m    [unfilled] visceral fat rating today of 10. Muscle mass of 122.8 lbs, FFM of 128.4. body fat of 44.1%. BMR of 1821kcal/day per Tanita scale.   General:  Pleasant and in no acute distress   Eyes:  EOMI  ENT:  Airway patent    Neck:  Respiratory: Normal respiratory effort, no cough, .  CV:  RRR  Gastrointestinal: nontender. Excess skin/abdominal pannus  Musculoskeletal: muscle mass WNL  Skin: color without pallor, tanned. hair thick,   Psychiatric: alert and oriented X3, mood and affect normal    Counseling     We reviewed the important post op bariatric recommendations:  -eating 3 meals daily  -eating protein first, getting >60gm protein daily  -eating slowly, chewing food well  -avoiding/limiting calorie containing beverages  -drinking water 15-30 minutes before or after meals  -limiting restaurant or cafeteria eating to twice a week or less  Reviewed ongoing medication use.  Reviewed body composition data from [place]ita scale and how it translates to her training/nourishment.    Wai Anders MD    [facility].  2025  6:59 PM  [phone] (clinic phone)  [phone] (fax)    No images are attached to the encounter.  Medical Decision Makin minutes spent by me on the date of the encounter doing chart review, history and exam, documentation and further activities per the note  "

## 2025-06-25 ENCOUNTER — OFFICE VISIT (OUTPATIENT)
Dept: SURGERY | Facility: CLINIC | Age: 40
End: 2025-06-25
Payer: COMMERCIAL

## 2025-06-25 VITALS
HEIGHT: 67 IN | SYSTOLIC BLOOD PRESSURE: 112 MMHG | DIASTOLIC BLOOD PRESSURE: 64 MMHG | WEIGHT: 231.4 LBS | BODY MASS INDEX: 36.32 KG/M2

## 2025-06-25 DIAGNOSIS — E66.01 CLASS 2 SEVERE OBESITY DUE TO EXCESS CALORIES WITH SERIOUS COMORBIDITY AND BODY MASS INDEX (BMI) OF 37.0 TO 37.9 IN ADULT (H): ICD-10-CM

## 2025-06-25 DIAGNOSIS — E66.812 CLASS 2 SEVERE OBESITY DUE TO EXCESS CALORIES WITH SERIOUS COMORBIDITY AND BODY MASS INDEX (BMI) OF 37.0 TO 37.9 IN ADULT (H): ICD-10-CM

## 2025-06-25 DIAGNOSIS — R74.01 ELEVATED TRANSAMINASE LEVEL: ICD-10-CM

## 2025-06-25 DIAGNOSIS — F41.9 ANXIETY: ICD-10-CM

## 2025-06-25 PROCEDURE — 99214 OFFICE O/P EST MOD 30 MIN: CPT | Performed by: EMERGENCY MEDICINE

## 2025-06-25 NOTE — PATIENT INSTRUCTIONS
169 lbs down with good stability in the last 18 months, a 32.4% total body weight reduction and 16 BMI point reduction. Down another 9 lbs since transition to Zepbound last visit.       Plan:  Continue mindful diet and hitting 75-85 grams/day of lean protein to nourish your fat free mass well given your workout regimen.  Max of 100g/day protein depending on amount of strength work.     2. Continue good hydration through the day,  beverages from meals.     3. Zepbound 10mg/week going well and we'll maintain use long term.     4. Recheck in 6 months when we'll do all bariatric labs.     5. Fitness is top notch, continue couch to 5k training (compression/body glide for avoiding maceration/chaffing) for your August 5k. Gym workouts are excellent at 4-5 sessions weekly.         Zepbound/Mounjaro (Tirzepatide) is a very effective satiety boosting appetite suppressant that elevates satiety hormones GLP1 and GIP. It needs to be ramped up slowly to be tolerated adequately.  It helps release insulin in response to food when blood sugar runs higher than normal and is very helpful for diabetics in managing blood sugar levels with low risks for any low blood sugars.  About 1/10 people will not tolerate this medication. Each month, you move up to a higher dose until eventually reaching the 10mg/week dose if tolerated with further ramping to follow if needed. If intolerant or severe side effects, a dose decrease would be wise, so keep me posted if not tolerated the ramping well. This may be a longer term medication based on individual needs/physiology and appetite control.     Injections can be given after cleansing the skin with alcohol prep pad or swab (available OTC).     Stop Zepbound if severe abdominal pain/vomiting/rash/throat swelling or constant nausea that prevents adequate food/water intake. Stop 2-3 weeks prior to any planned general anesthesia surgeries to reduce risk for something called a post operative  ileus.     Gallstones can occur in about 1% of patients on this medication so update me if increase right upper abdominal pain after eating.     Start meals with protein first, separate beverages from meals by 20 minutes and work hard in between meals to get your 64-75 oz of water daily to reduce risks for severe constipation. Consider a fiber supplement like powdered psyllium husk in 12 oz water each night, stool softerners as needed and Miralax or milk of Magnesia if more than 3 days have passed without a Bowel Movement. Some other options include:  For Prevention and Treatment of Constipation when on Semaglutide     From least aggressive to most aggressive:     Move: Wallking is essential - the more we move, the more our bowels move  Water: Drink water - 64oz or more a day  Go when you need to go. Don't wait. The longer you wait, the harder it gets.  Fiber: Fruit, raw veggies, nuts, whole grains, prune each night, flax/iván seeds added into meals can all be helpful.   Stool Softeners: if constipation is mild and for maintenance  Gentle laxatives: Miralax, senokot, dulcolax , Smooth move tea as needed     More aggressive (and typically won't get to this point)  Milk of Magnesia  Mag Citrate (what you drink before a colonoscopy)  Suppositories  Enema      Check out Is That Odd for patient resources.  If you have weekends off, I recommend dosing Friday evenings.     Some people starve on this medication if not mindful about food intake. I recommend starting meals with the protein part of your meal first, chew thoroughly and separate beverages from meals by about 20 minutes to make sure you get your nourishment in first. Include vegetables/complex carbohydrates and unsaturated fat as part of your balanced diet but group these at the end of the meal, after your protein is mostly gone. Satiety will kick in too early if drinking too much with meals and under-nourishment can result.     It's not a bad idea to take a  complete multivitamin most days of the week if using this medication. Lower iron content tends to be less constipating.     Adequate hydration is essential for feeling your best, efficient fat burning, waste elimination and constipation prevention. For those without fluid restrictions due to other disease, the goal is at least one ounce of water per gram of protein consumed with a  minimum of 64oz/day goal.     Pancreatitis is a very rare but potentially serious side effect. Stop Zepbound if severe mid abdominal pain/burning in nature or if unable to eat/drink due to severe nausea/discomfort.   People with strong history of pancreatitis without clear cause should stay clear of this medication as should those planning to get pregnant, those with strong personal or family history for medullary thyroid cancer or Multiple Endocrine Neoplasia (rare).     Stop Zepbound at least 2 weeks prior to any planned surgery.  Stop until fully recovered if unexpected/emergent surgery is needed with anticipation that re-ramping will be needed if off longer than 14-16 days since last dose.    Kind Regards,  Wai Anders MD  [facility]    LEAN PROTEIN SOURCES  Getting 20-30 grams of protein, 3 meals daily, is appropriate for most people, some need more but more than about 40 grams per meal is not useful.  General rule is drinking one ounce of water per gram of protein eaten over the course of the day:  70 grams of protein each day, drink 70 oz of water.  Protein Source Portion Calories Grams of Protein                           Nonfat, plain Greek yogurt    (10 grams sugar or less) 3/4 cup (6 oz)  12-17   Light Yogurt (10 grams sugar or less) 3/4 cup (6 oz)  6-8   Protein Shake 1 shake 110-180 15-30   Skim/1% Milk or lactose-free milk 1 cup ( 8 oz)  8   Plain or light, flavored soymilk 1 cup  7-8   Plain or light, hemp milk 1 cup 110 6   Fat Free or 1% Cottage Cheese 1/2  cup 90 15   Part skim ricotta cheese 1/2 cup 100 14   Part skim or reduced fat cheese slices 1 ounce 65-80 8     Mozzarella String Cheese 1 80 8   Canned tuna, chicken, crab or salmon  (canned in water)  1/2 cup 100 15-20   White fish (broiled, grilled, baked) 3 ounces 100 21   [place]/Tuna (broiled, grilled, baked) 3 ounces 150-180 21   Shrimp, Scallops, Lobster, Crab 3 ounces 100 21   Pork loin, Pork Tenderloin 3 ounces 150 21   Boneless, skinless chicken /turkey breast                          (broiled, grilled, baked) 3 ounces 120 21   [place], [place], [place], and Venison 3 ounces 120 21   Lean cuts of red meat and pork (sirloin,   round, tenderloin, flank, ground 93%-96%) 3 ounces 170 21   Lean or Extra Lean Ground Turkey 1/2 cup 150 20   90-95% Lean [place] Burger 1 phani 140-180 21   Low-fat casserole with lean meat 3/4 cup 200 17   Luncheon Meats                                                        (turkey, lean ham, roast beef, chicken) 3 ounces 100 21   Egg (boiled, poached, scrambled) 1 Egg 60 7   Egg Substitute 1/2 cup 70 10   Nuts (limit to 1 serving per day)  3 Tbsp. 150 7   Nut [place] (peanut, almond)  Limit to 1 serving or less daily 1 Tbsp. 90 4   Soy Burger (varies) 1  15   Garbanzo, Black, [place]on Beans 1/2 cup 110 7   Refried Beans 1/2 cup 100 7   Kidney and Lima beans 1/2 cup 110 7   Tempeh 3 oz 175 18   Vegan crumbles 1/2 cup 100 14   Tofu 1/2 cup 110 14   Chili (beans and extra lean beef or turkey) 1 cup 200 23   Lentil Stew/Soup 1 cup 150 12   Black Bean Soup 1 cup 175 12       [facility]  Nutritional Guidelines  Gastric Sleeve 18 Months Post Op and Beyond    eneral Guidelines and Helpful Hints:  Eat 3 meals per day + protein supplement(s). No snacks between meals.  Do not skip meals.  This can cause overeating at the next meal and will prevent adequate protein and nutritional intake.  Aim for 60-80 grams of protein per day.  Always eat your protein first. This assists with  optimal nutrition and helps you stay full longer.  Depending on your portion size, you may need to drink approved protein supplement between meals to achieve protein goals. Follow recommendations of your Dietitian.   Eat your protein first, and then follow with fiber.   It is not necessary to count your fiber, but 15-25 grams per day is recommended.    Add fiber by including fruits, vegetables, whole grains, and beans.   Portions should remain about 1 cup per meal. Use measuring cups to be accurate.  Continue to use saucer/salad plates, infant/toddler silverware to keep portion sizes small and take small bites.  Eat S-L-O-W-L-Y to make each meal last 20-30 minutes. Always stop eating when satisfied.  Continue to use caution with foods containing skins, peels or membranes. Chew well!  Aim for 64 oz. of calorie-free fluids daily.  Continue to avoid caffeine and carbonation. If you choose to drink alcohol, do so in moderation.   Remember to avoid drinking during meals, 15-30 minutes before and 30 minutes after.  Exercise is dutta for continued weight loss and weight maintenance. 150 minutes weekly of moderate aerobic activity or 75 minutes of vigorous with 2 days or more a week of strength training. Try to get 20% or more of your steps each day at a brisk pace, as though hurrying to a bus stop. Look to get stronger this year.  If having trouble tolerating meat, try using a crock-pot, tinfoil tent, steamer or other moist cooking method to create tender meats. Add broth or low-fat gravy to help meat stay moist.   Avoid high sugar and high fat foods to prevent dumping syndrome.  Check nutrition labels for less than 10 grams of sugar and less than 10 grams of fat per serving.  Continue Taking Vitamins/Minerals:  1000 mcg of Sublingual B-12 at least 3 days weekly to average 350-500mcg/day. If using 2500mcg lozenges, 2 weekly.  If 5000 mcg, once weekly dosing works.  1 Complete Multivitamin with 18mg Iron twice daily (chewable  "or swallow tabs). Often sold as \"women's one a day\" if tablet but take twice daily.  500-600 mg Calcium Citrate twice daily (chewable or swallow tabs).  5000 IU Vitamin D3 daily.  If menstruating, you may need closer to 60mg of iron daily to prevent iron deficiency. An occasional \"boost\" of extra iron supplement during/after is reasonable if heavy flow.    Sample Grocery List    Protein:  Fat free Greek or light yogurt (less than 10 grams sugar)  Fat free or low-fat cottage cheese  String cheese or reduced fat cheese slices  Tuna, salmon, crab, egg, or chicken salad made with light or fat free mayonnaise  Egg or Egg Substitute  Lean/extra lean turkey, beef, bison, venison (ground, sirloin, round, flank)  Pork loin or tenderloin (grilled, baked, broiled)  Fish such as salmon, tuna, trout, tilapia, etc. (grilled, baked, broiled)  Tender cuts of lean (skinless) turkey or chicken  Lean deli meats: turkey, lean ham, chicken, lean roast beef  Beans such as kidney, garbanzo, black, bahena, or low-fat/fat free refried beans  Peanut butter (natural preferred). Limit to 1 Tbsp. per day.  Low-fat meatloaf (made with lean ground beef or turkey)  Sloppy Joes made with low-sugar ketchup and lean ground beef or turkey  Soy or vegetable protein (i.e. vegan crumbles, soy/veggie burger, tofu)  Hummus    Vegetables:  Fresh: cooked or raw (as tolerated)  Frozen vegetables  Canned vegetables (low sodium or no salt added, rinse before cooking/eating)  (Ok to have skins/peels/membranes/seeds - just chew well)    Fruits:  Fresh fruit  Frozen fruit (no sugar added)  Canned fruit (packed in its own juice, NOT syrup)  (Ok to have skins/peels/membranes/seeds - just chew well)    Starch:  Unsweetened whole-grain hot cereal (or high fiber cold cereal, dry)  Toasted whole wheat bread or [place] Thins  Whole grain crackers  Baked /boiled/mashed potato/sweet potato  Cooked whole grain pasta, brown rice, or other cooked whole grains  Starchy " vegetables: corn, peas, winter squash    Protein Supplement:   Ready to drink protein shake with:  15-30 grams protein per serving  Less than 10 grams total carbohydrate per serving   Protein powder mixed with:   Skim or 1% milk  Low fat or fat free Lactaid milk, plain or no sugar added soymilk  Water     Fats: (use in moderation)  1 teaspoon of soft tub margarine  1 teaspoon olive oil, canola oil, or peanut oil  1 tablespoon of low-fat harvey or salad dressing     Sample Menu for 18+ months after Gastric Sleeve    You do NOT need to eat/drink the full portion sizes listed below  Always stop when you are satisfied    Breakfast   cup 1% cottage cheese     cup mixed berries   Lunch 2 oz lean roast beef on   [place] Thin with 1 tsp. light [place]vey    small tomato, chopped, mixed with 1 tsp. light vinaigrette dressing   Supplement Approved protein supplement (if needed between meals)   Dinner 2 oz grilled salmon    cup salad greens with 1 tsp. light salad dressing and 1 tsp. ground flax seed    cup quinoa or brown rice     Breakfast   cup egg substitute with   cup sautéed chopped vegetables  2 light [place] Krisp crackers   Lunch Tuna Melt:   cup tuna mixed with 1 tsp. light harvey over   [place] Thin. Top with 2-3 slices cucumber and 1 oz slice of low fat cheese   Supplement 1 cup skim milk (if needed between meals)   Dinner 3 oz  grilled, broiled, or baked seasoned skinless chicken breast    cup asparagus     Breakfast   cup plain oatmeal made with skim or 1% milk with 1 Tbsp. flavored/unflavored protein powder added  1 mozzarella string cheese   Lunch 2 oz deli turkey breast  1/3 cup salad with 1 tsp. light salad dressing, 1/8 of a whole avocado and 1 Tbsp. sunflower seeds   Dinner 3 oz. pork loin made in a crock pot, seasoned with a spice rub    cup cooked carrots   Supplement Approved protein supplement (if needed between meals)     Breakfast 1 cup breakfast casserole made with egg substitute, turkey sausage,  and steamed,  chopped bell peppers   Supplement  1 cup light Greek yogurt (if needed between meals)   Lunch 2 oz. teriyaki turkey    cup mashed sweet potato with 1-2 spritzes of spray butter (like [place])    cup fresh pineapple   Dinner 3 oz low fat meatloaf    cup roasted garlic zucchini     Breakfast   cup leftover breakfast casserole    cup no sugar added applesauce with 1 Tbsp. unflavored protein powder and a sprinkle of cinnamon    Lunch 3 oz shrimp with 1-2 Tbsp. low-sugar cocktail sauce for dipping    c. whole wheat pasta drizzled with   tsp. olive oil   Supplement 1 cup skim/1% milk with scoop of protein powder (if needed between meals)   Dinner Grilled, seasoned kebob with 2 oz lean beef and   cup vegetables     Breakfast Breakfast pizza:   [place] Thin spread with 1 Tbsp. low sugar spaghetti sauce,   cup shredded low fat cheese, melted and 1 slice of [place] blakely     cup fresh fruit mixed with chopped almonds   Lunch   cup black bean soup  4-5 whole grain crackers   Dinner 3 oz  tilapia with lemon pepper seasoning    cup stewed tomatoes   Supplement 1 string cheese (if needed between meals)     Breakfast 2 hard boiled eggs (discard 1 egg yolk)    whole wheat English Muffin with 1 tsp. low sugar jelly   Lunch   cup leftover black bean soup topped with 1-2 Tbsp. low fat cheese  2-3 light [place] Krisp crackers   Supplement Approved protein supplement (if needed between meals)   Dinner 3 oz sirloin steak    cup steamed broccoli

## 2025-06-25 NOTE — LETTER
6/25/2025      Lena Quesada  [address]  [facility] [address]      Dear Colleague,    Thank you for referring your patient, Lena Quesada, to the [facility] [place]. Please see a copy of my visit note below.    Bariatric Follow Up Visit with a History of Previous Bariatric Surgery     Date of visit: 6/24/2025  Physician: Wai Anders MD, MD  Primary Care Provider:  Jazz Abbott  Lena Quesada   39 year old  female    Date of Surgery: 3/24/22  Initial Weight: 399 lbs  Initial BMI: 62.5 with fatty liver disease Prediabetes (A1c 6.4% 2021)  Today's Weight:   Wt Readings from Last 1 Encounters:   06/25/25 105 kg (231 lb 6.4 oz)     Weight history:   Wt Readings from Last 4 Encounters:   06/25/25 105 kg (231 lb 6.4 oz)   01/29/25 109.8 kg (242 lb)   11/26/24 106.6 kg (235 lb)   11/04/24 109.8 kg (242 lb)    169 lbs down with good stability in the last 18 months, a 32.4% total body weight reduction and 16 BMI point reduction. Down another 9 lbs since transition to Zepbound last visit.   Body mass index is 36.24 kg/m .      Assessment and Plan     Assessment: Lena is a 39 year old year old female who is just over 3 years s/p  Sleeve Gastrectomy with Dr. Rodriguez.   In the interim she'd had good weight reduction but persisting Class II obesity for which we'd initiated Wegovy therapy in 2023.  She had an excellent additional weight loss as of 41 lbs as of our 11/09/23 visit, down to 235 lbs. Today, 1/29/25, she  is off her Wegovy as of a week ago and finds her weight hit plateau last year at this dose and is interested in further weight reduction for treatment of her resistant Class II obesity. She was transitioned to Zepbound at our 1/29/25 visit at 242 lbs.a  I've advised her to exhaust medication support options first before considering progression to a more malabsorptive procedure.     She's been having significant intertrigo/maceration  "that prompted a Plastics consultation at our 1/29/25 visit and in the interim she's waiting to save up for consult. Using compression/anti chaffing products as best she can. Gets a \"lot of giggling\" when moving that rubs/chaffs abdominal pannus/buttocks.     Bariatric labs 11/4/24 showed good support, normal thyroid function and excellent A1c of 4.9%. LFTs improving from previous year with mild residual ALT increase (hx of NAFLD).  We'll recheck labs at our Fall visit.   .      Lena Quesada feels as if she has achieved the goals she hoped to accomplish through bariatric surgery and weight loss.    Encounter Diagnosis   Name Primary?     Anxiety          Current Outpatient Medications:      Calcium Citrate-Vitamin D (CALCIUM CITRATE + PO), Take 1 chew tab by mouth daily 600mg, Disp: , Rfl:      Cholecalciferol (VITAMIN D-3) 125 MCG (5000 UT) TABS, Take 1 tablet by mouth daily, Disp: , Rfl:      Cyanocobalamin 5000 MCG SUBL, Place 1 tablet under the tongue once a week, Disp: , Rfl:      nystatin (MYCOSTATIN) 117399 UNIT/GM external powder, Apply topically every 8 hours as needed (red skin)., Disp: 90 g, Rfl: 5     Pediatric Multivitamins-Iron (CHEWABLE BRADLEY/IRON CHILDRENS PO), Take 1 tablet by mouth daily, Disp: , Rfl:      sertraline (ZOLOFT) 25 MG tablet, Take 1 tablet (25 mg) by mouth daily. (Patient taking differently: Take 75 mg by mouth daily.), Disp: 90 tablet, Rfl: 0     sertraline (ZOLOFT) 50 MG tablet, Take one 50mg tab daily (Patient taking differently: 75 mg. Take one 50mg tab daily), Disp: 90 tablet, Rfl: 0     tirzepatide-Weight Management (ZEPBOUND) 10 MG/0.5ML prefilled pen, Inject 0.5 mLs (10 mg) subcutaneously every 7 days., Disp: 6 mL, Rfl: 1     tirzepatide-Weight Management (ZEPBOUND) 7.5 MG/0.5ML prefilled pen, Inject 0.5 mLs (7.5 mg) subcutaneously every 7 days. (Patient not taking: Reported on 6/25/2025), Disp: 2 mL, Rfl: 0    Plan:  Continue mindful diet and hitting 75-85 grams/day of lean " "protein to nourish your fat free mass well given your workout regimen.  Max of 100g/day protein depending on amount of strength work.     2. Continue good hydration through the day,  beverages from meals.     3. Zepbound 10mg/week going well and we'll maintain use long term.     4. Recheck in 6 months when we'll do all bariatric labs.     5. Fitness is top notch, continue couch to 5k training (compression/body glide for avoiding maceration/chaffing) for your August 5k. Gym workouts are excellent at 4-5 sessions weekly.     No follow-ups on file.    Bariatric Surgery Review     Interim History/LifeChanges: no ill effects of the Zepbound. Doing well at 10mg/week and we'll plan to stay on long term therapy at this dose. Getting protein in well. Vitamins going well. Feels much improved and on Zepbound her \"brain hunger\" is much improved.     Patient Concerns: none  Appetite (1-10): controlled well w/ meds  GERD: none. .    Reviewed whether any need/indication for screening EGD today and we will deferred. .  Typically, a screening EGD is recommend post op year 2-3 if no symptoms to assess health of esophagus/bariatric surgery and sooner if difficult to control GERD or persistent pain/dysphagia sx despite behavior modification.    Medication changes: none.    Vitamin Intake:   Continues to use. Chew tabs Flinstones (2 daily). Once weekly B12. Unsure vitamin D.              LABS: will be due at our next visit.    Most recent labs:  Lab Results   Component Value Date    WBC 4.9 11/04/2024    HGB 13.6 11/04/2024    HCT 39.4 11/04/2024    MCV 89 11/04/2024     11/04/2024     Lab Results   Component Value Date    CHOL 150 11/04/2024     Lab Results   Component Value Date    HDL 70 11/04/2024     No components found for: \"LDLCALC\"  Lab Results   Component Value Date    TRIG 78 11/04/2024     No results found for: \"CHOLHDL\"  Lab Results   Component Value Date    ALT 67 (H) 11/04/2024    AST 40 11/04/2024    " "ALKPHOS 110 11/04/2024     No results found for: \"HGBA1C\"  Lab Results   Component Value Date    B12 636 11/04/2024     No components found for: \"VITDT1\"  Lab Results   Component Value Date    BHAKTI 177 (H) 03/03/2023     Lab Results   Component Value Date    PTHI 28 03/03/2023     Lab Results   Component Value Date    ZN 43.9 (L) 03/03/2023     Lab Results   Component Value Date    VIB1WB 181 (H) 03/03/2023     Lab Results   Component Value Date    TSH 2.22 11/04/2024     No results found for: \"TEST\"    Habits:  Gym 5-6 times weekly with strength and aerobic workouts.  Works for [employer] has day in the [employer] events in August, softball tourney and 5k and planning to do 5k.     Social History     Social History     Socioeconomic History     Marital status: Single     Spouse name: Not on file     Number of children: Not on file     Years of education: Not on file     Highest education level: Not on file   Occupational History     Not on file   Tobacco Use     Smoking status: Never     Passive exposure: Never     Smokeless tobacco: Never   Vaping Use     Vaping status: Never Used   Substance and Sexual Activity     Alcohol use: Not Currently     Drug use: Never     Sexual activity: Yes     Partners: Male     Birth control/protection: Condom   Other Topics Concern     Parent/sibling w/ CABG, MI or angioplasty before 65F 55M? Not Asked   Social History Narrative     Not on file     Social Drivers of Health     Financial Resource Strain: Low Risk  (11/4/2024)    Financial Resource Strain      Within the past 12 months, have you or your family members you live with been unable to get utilities (heat, electricity) when it was really needed?: No   Food Insecurity: Low Risk  (11/4/2024)    Food Insecurity      Within the past 12 months, did you worry that your food would run out before you got money to buy more?: No      Within the past 12 months, did the food you bought just not last and you didn t have money to get more?: No "   Transportation Needs: Low Risk  (11/4/2024)    Transportation Needs      Within the past 12 months, has lack of transportation kept you from medical appointments, getting your medicines, non-medical meetings or appointments, work, or from getting things that you need?: No   Physical Activity: Sufficiently Active (11/4/2024)    Exercise Vital Sign      Days of Exercise per Week: 5 days      Minutes of Exercise per Session: 30 min   Stress: No Stress Concern Present (11/4/2024)    [employer] [place] [employer] - Occupational Stress Questionnaire      Feeling of Stress : Only a little   Social Connections: Unknown (11/4/2024)    Social Connection and Isolation Panel [NHANES]      Frequency of Communication with Friends and Family: Not on file      Frequency of Social Gatherings with Friends and Family: Once a week      Attends [employer] Services: Not on file      Active Member of Clubs or Organizations: Not on file      Attends Club or Organization Meetings: Not on file      Marital Status: Not on file   Interpersonal Safety: Low Risk  (11/4/2024)    Interpersonal Safety      Do you feel physically and emotionally safe where you currently live?: Yes      Within the past 12 months, have you been hit, slapped, kicked or otherwise physically hurt by someone?: No      Within the past 12 months, have you been humiliated or emotionally abused in other ways by your partner or ex-partner?: No   Housing Stability: Low Risk  (11/4/2024)    Housing Stability      Do you have housing? : Yes      Are you worried about losing your housing?: No       Past Medical History     Past Medical History:   Diagnosis Date     Arthritis     chondromalacia of knee (right). injections.     Cholelithiasis      Chondromalacia of patellofemoral joint      Morbid obesity (H) 12/16/2020     Morbid obesity due to excess calories (H) 3/24/2022     Nexplanon in place  9/8/2022    Remove within 5 years, 9/2027     Past Surgical History:  "  Procedure Laterality Date     CHOLECYSTECTOMY  2005     LAPAROSCOPIC GASTRIC SLEEVE N/A 3/24/2022    Procedure: GASTRECTOMY, SLEEVE, LAPAROSCOPIC;  Surgeon: Bobby Rodriguez MD;  Location: [facility] [place] [facility]     TYMPANOPLASTY       WISDOM TOOTH EXTRACTION         Problem List     Patient Active Problem List   Diagnosis     Morbid obesity (H)     Chondromalacia of patellofemoral joint     Morbid obesity due to excess calories (H)     Nexplanon in place      Cervical high risk human papillomavirus (HPV) DNA test positive     Medications     [unfilled]  Surgical History     Past Surgical History  She has a past surgical history that includes Tympanoplasty; [place] Tooth Extraction; Cholecystectomy (2005); and Laparoscopic gastric sleeve (N/A, 3/24/2022).    Objective-Exam     Constitutional:  /64 (BP Location: Right arm, Patient Position: Sitting)   Ht 1.702 m (5' 7\")   Wt 105 kg (231 lb 6.4 oz)   BMI 36.24 kg/m    [unfilled] visceral fat rating today of 10. Muscle mass of 122.8 lbs, FFM of 128.4. body fat of 44.1%. BMR of 1821kcal/day per Tanita scale.   General:  Pleasant and in no acute distress   Eyes:  EOMI  ENT:  Airway patent    Neck:  Respiratory: Normal respiratory effort, no cough, .  CV:  RRR  Gastrointestinal: nontender. Excess skin/abdominal pannus  Musculoskeletal: muscle mass WNL  Skin: color without pallor, tanned. hair thick,   Psychiatric: alert and oriented X3, mood and affect normal    Counseling     We reviewed the important post op bariatric recommendations:  -eating 3 meals daily  -eating protein first, getting >60gm protein daily  -eating slowly, chewing food well  -avoiding/limiting calorie containing beverages  -drinking water 15-30 minutes before or after meals  -limiting restaurant or cafeteria eating to twice a week or less  Reviewed ongoing medication use.  Reviewed body composition data from Tanita scale and how it translates to her training/nourishment.    Wai Anders" MD    [facility].  2025  6:59 PM  [phone] (clinic phone)  [phone] (fax)    No images are attached to the encounter.  Medical Decision Makin minutes spent by me on the date of the encounter doing chart review, history and exam, documentation and further activities per the note    Again, thank you for allowing me to participate in the care of your patient.        Sincerely,        Wai Anders MD    Electronically signed

## 2025-07-20 ENCOUNTER — HEALTH MAINTENANCE LETTER (OUTPATIENT)
Age: 40
End: 2025-07-20

## 2025-08-28 ENCOUNTER — VIRTUAL VISIT (OUTPATIENT)
Dept: PSYCHIATRY | Facility: CLINIC | Age: 40
End: 2025-08-28
Attending: NURSE PRACTITIONER
Payer: COMMERCIAL

## 2025-08-28 DIAGNOSIS — F41.1 GENERALIZED ANXIETY DISORDER: Primary | ICD-10-CM

## 2025-08-28 DIAGNOSIS — F32.1 CURRENT MODERATE EPISODE OF MAJOR DEPRESSIVE DISORDER WITHOUT PRIOR EPISODE (H): ICD-10-CM

## 2025-08-28 RX ORDER — SERTRALINE HYDROCHLORIDE 25 MG/1
25 TABLET, FILM COATED ORAL DAILY
Qty: 90 TABLET | Refills: 0 | Status: CANCELLED | OUTPATIENT
Start: 2025-08-28

## 2025-08-28 RX ORDER — GABAPENTIN 100 MG/1
100-200 CAPSULE ORAL
Qty: 60 CAPSULE | Refills: 0 | Status: SHIPPED | OUTPATIENT
Start: 2025-08-28

## 2025-08-28 ASSESSMENT — PAIN SCALES - GENERAL: PAINLEVEL_OUTOF10: NO PAIN (0)

## (undated) DEVICE — A3 SUPPLIES- SEE NURSING INFO PAGE

## (undated) DEVICE — SUTURE VICRYL+ 4-0 UNDYED PS-2 VCP496H

## (undated) DEVICE — GLOVE BIOGEL PI ULTRATOUCH G SZ 6.5 42165

## (undated) DEVICE — ENDO TROCAR OPTICAL ACCESS KII Z-THRD 15X100MM C0R37

## (undated) DEVICE — SOL WATER IRRIG 1000ML BOTTLE 2F7114

## (undated) DEVICE — ENDO TROCAR OPTICAL ACCESS KII Z-THRD 05X100MM CTR03

## (undated) DEVICE — CUSTOM PACK LAP CHOLE SBA5BLCHEA

## (undated) DEVICE — TUBING SMOKE EVAC PNEUMOCLEAR HIGH FLOW 0620050250

## (undated) DEVICE — DRESSING COVERLET STRIP 3/4 X 3 LF 230

## (undated) DEVICE — BANDAGE ADH LF 1X3 ABN3100A

## (undated) DEVICE — NEEDLE SPINAL DISP 22GA X 3.5" QUINCKE 333320

## (undated) DEVICE — DECANTER VIAL 2006S

## (undated) DEVICE — DRSG STERI STRIP 1/2X4" R1547

## (undated) DEVICE — STPL POWERED ECHELON LONG 60MM PLEE60A

## (undated) DEVICE — ENDO TROCAR SLEEVE KII Z-THREADED 05X100MM CTS02

## (undated) DEVICE — PREP DURAPREP 26ML APL 8630

## (undated) DEVICE — STPL RELOAD REG/THK TISSUE ECHELON 60 X 3.8MM GOLD GST60D

## (undated) DEVICE — SU SILK 2-0 SH 30" K833H

## (undated) DEVICE — SU VICRYL+ 0 27 UR6 VLT VCP603H

## (undated) DEVICE — ENDO TROCAR SHIELDED BLADED KII Z-THRD 12X100MM CTB73

## (undated) DEVICE — STPL RELOAD REG TISSUE ECHELON 60 X 3.6MM BLUE GST60B

## (undated) DEVICE — Device

## (undated) DEVICE — STPL RELOAD LINEAR CUT ENDOPATH ECHELON 60MM BLK GST60T

## (undated) DEVICE — SYR 30ML LL W/O NDL 302832

## (undated) DEVICE — GLOVE BIOGEL PI SZ 8.0 40880

## (undated) DEVICE — GOWN IMPERVIOUS BREATHABLE SMART XLG 89045

## (undated) DEVICE — CLIP LIGACLIP LG YELLOW LT400

## (undated) DEVICE — ESU LIGASURE MARYLAND LAPAROSCOPIC SLR/DVDR 5MMX37CM LF1937

## (undated) DEVICE — SYSTEM CLEARIFY VISUALIZATION 21-345

## (undated) DEVICE — GLOVE UNDER INDICATOR PI SZ 7.0 LF 41670

## (undated) DEVICE — TUBE COLON 32FR 30IN LNG 080074200

## (undated) DEVICE — ENDO SHEARS RENEW LAP ENDOCUT SCISSOR TIP 16.5MM 3142

## (undated) RX ORDER — EPHEDRINE SULFATE 50 MG/ML
INJECTION, SOLUTION INTRAMUSCULAR; INTRAVENOUS; SUBCUTANEOUS
Status: DISPENSED
Start: 2022-03-24

## (undated) RX ORDER — DEXAMETHASONE SODIUM PHOSPHATE 10 MG/ML
INJECTION INTRAMUSCULAR; INTRAVENOUS
Status: DISPENSED
Start: 2022-03-24

## (undated) RX ORDER — LIDOCAINE HYDROCHLORIDE 20 MG/ML
INJECTION, SOLUTION INFILTRATION; PERINEURAL
Status: DISPENSED
Start: 2022-03-24

## (undated) RX ORDER — PROPOFOL 10 MG/ML
INJECTION, EMULSION INTRAVENOUS
Status: DISPENSED
Start: 2022-03-24

## (undated) RX ORDER — FENTANYL CITRATE 50 UG/ML
INJECTION, SOLUTION INTRAMUSCULAR; INTRAVENOUS
Status: DISPENSED
Start: 2022-03-24

## (undated) RX ORDER — ONDANSETRON 2 MG/ML
INJECTION INTRAMUSCULAR; INTRAVENOUS
Status: DISPENSED
Start: 2022-03-24

## (undated) RX ORDER — BUPIVACAINE HYDROCHLORIDE 2.5 MG/ML
INJECTION, SOLUTION INFILTRATION; PERINEURAL
Status: DISPENSED
Start: 2022-03-24

## (undated) RX ORDER — KETAMINE HYDROCHLORIDE 10 MG/ML
INJECTION INTRAMUSCULAR; INTRAVENOUS
Status: DISPENSED
Start: 2022-03-24

## (undated) RX ORDER — BUPIVACAINE HYDROCHLORIDE 2.5 MG/ML
INJECTION, SOLUTION EPIDURAL; INFILTRATION; INTRACAUDAL
Status: DISPENSED
Start: 2022-03-24